# Patient Record
Sex: FEMALE | Race: WHITE | Employment: UNEMPLOYED | URBAN - METROPOLITAN AREA
[De-identification: names, ages, dates, MRNs, and addresses within clinical notes are randomized per-mention and may not be internally consistent; named-entity substitution may affect disease eponyms.]

---

## 2017-10-02 PROBLEM — N63.20 LEFT BREAST MASS: Status: ACTIVE | Noted: 2017-10-02

## 2018-03-19 ENCOUNTER — HOSPITAL ENCOUNTER (OUTPATIENT)
Dept: CT IMAGING | Age: 43
Discharge: HOME | End: 2018-03-21
Payer: COMMERCIAL

## 2018-03-19 DIAGNOSIS — K92.1 BLOOD IN STOOL: ICD-10-CM

## 2018-03-19 DIAGNOSIS — R10.84 GENERALIZED ABDOMINAL PAIN: ICD-10-CM

## 2018-03-19 PROCEDURE — 6360000004 HC RX CONTRAST MEDICATION: Performed by: RADIOLOGY

## 2018-03-19 PROCEDURE — 74177 CT ABD & PELVIS W/CONTRAST: CPT

## 2018-03-19 RX ADMIN — IOPAMIDOL 110 ML: 755 INJECTION, SOLUTION INTRAVENOUS at 16:54

## 2018-03-19 RX ADMIN — IOHEXOL 50 ML: 240 INJECTION, SOLUTION INTRATHECAL; INTRAVASCULAR; INTRAVENOUS; ORAL at 16:54

## 2018-03-21 ENCOUNTER — HOSPITAL ENCOUNTER (OUTPATIENT)
Age: 43
Discharge: HOME | End: 2018-03-21
Payer: COMMERCIAL

## 2018-03-21 ENCOUNTER — OFFICE VISIT (OUTPATIENT)
Dept: FAMILY MEDICINE CLINIC | Age: 43
End: 2018-03-21
Payer: COMMERCIAL

## 2018-03-21 VITALS
BODY MASS INDEX: 43.4 KG/M2 | SYSTOLIC BLOOD PRESSURE: 127 MMHG | OXYGEN SATURATION: 98 % | RESPIRATION RATE: 18 BRPM | HEIGHT: 69 IN | WEIGHT: 293 LBS | DIASTOLIC BLOOD PRESSURE: 77 MMHG | TEMPERATURE: 100 F | HEART RATE: 91 BPM

## 2018-03-21 DIAGNOSIS — K52.9 COLITIS: ICD-10-CM

## 2018-03-21 DIAGNOSIS — R50.9 FEVER, UNSPECIFIED FEVER CAUSE: Primary | ICD-10-CM

## 2018-03-21 DIAGNOSIS — K92.1 BLOOD IN STOOL: ICD-10-CM

## 2018-03-21 DIAGNOSIS — R05.9 COUGH: ICD-10-CM

## 2018-03-21 LAB
ALBUMIN SERPL-MCNC: 3.9 G/DL (ref 3.5–5.2)
ALP BLD-CCNC: 75 U/L (ref 35–104)
ALT SERPL-CCNC: 15 U/L (ref 0–32)
ANION GAP SERPL CALCULATED.3IONS-SCNC: 11 MMOL/L (ref 7–16)
AST SERPL-CCNC: 17 U/L (ref 0–31)
BASOPHILS ABSOLUTE: 0.04 E9/L (ref 0–0.2)
BASOPHILS RELATIVE PERCENT: 0.5 % (ref 0–2)
BILIRUB SERPL-MCNC: 0.3 MG/DL (ref 0–1.2)
BUN BLDV-MCNC: 8 MG/DL (ref 6–20)
C-REACTIVE PROTEIN: 5.1 MG/DL (ref 0–0.4)
CALCIUM SERPL-MCNC: 9.1 MG/DL (ref 8.6–10.2)
CHLORIDE BLD-SCNC: 93 MMOL/L (ref 98–107)
CO2: 28 MMOL/L (ref 22–29)
CREAT SERPL-MCNC: 0.7 MG/DL (ref 0.5–1)
EOSINOPHILS ABSOLUTE: 0.25 E9/L (ref 0.05–0.5)
EOSINOPHILS RELATIVE PERCENT: 3.3 % (ref 0–6)
GFR AFRICAN AMERICAN: >60
GFR NON-AFRICAN AMERICAN: >60 ML/MIN/1.73
GLUCOSE BLD-MCNC: 84 MG/DL (ref 74–109)
HCT VFR BLD CALC: 39.1 % (ref 34–48)
HEMOGLOBIN: 12.9 G/DL (ref 11.5–15.5)
IMMATURE GRANULOCYTES #: 0.02 E9/L
IMMATURE GRANULOCYTES %: 0.3 % (ref 0–5)
INFLUENZA A ANTIBODY: NORMAL
INFLUENZA B ANTIBODY: NORMAL
LACTIC ACID: 2.1 MMOL/L (ref 0.5–2.2)
LYMPHOCYTES ABSOLUTE: 1.46 E9/L (ref 1.5–4)
LYMPHOCYTES RELATIVE PERCENT: 19.5 % (ref 20–42)
MCH RBC QN AUTO: 29.5 PG (ref 26–35)
MCHC RBC AUTO-ENTMCNC: 33 % (ref 32–34.5)
MCV RBC AUTO: 89.3 FL (ref 80–99.9)
MONOCYTES ABSOLUTE: 0.8 E9/L (ref 0.1–0.95)
MONOCYTES RELATIVE PERCENT: 10.7 % (ref 2–12)
NEUTROPHILS ABSOLUTE: 4.92 E9/L (ref 1.8–7.3)
NEUTROPHILS RELATIVE PERCENT: 65.7 % (ref 43–80)
PDW BLD-RTO: 12.6 FL (ref 11.5–15)
PLATELET # BLD: 227 E9/L (ref 130–450)
PMV BLD AUTO: 10.1 FL (ref 7–12)
POTASSIUM SERPL-SCNC: 3.7 MMOL/L (ref 3.5–5)
RBC # BLD: 4.38 E12/L (ref 3.5–5.5)
SEDIMENTATION RATE, ERYTHROCYTE: 50 MM/HR (ref 0–20)
SODIUM BLD-SCNC: 132 MMOL/L (ref 132–146)
TOTAL PROTEIN: 7.6 G/DL (ref 6.4–8.3)
WBC # BLD: 7.5 E9/L (ref 4.5–11.5)

## 2018-03-21 PROCEDURE — 86140 C-REACTIVE PROTEIN: CPT

## 2018-03-21 PROCEDURE — 83605 ASSAY OF LACTIC ACID: CPT

## 2018-03-21 PROCEDURE — 80053 COMPREHEN METABOLIC PANEL: CPT

## 2018-03-21 PROCEDURE — 85025 COMPLETE CBC W/AUTO DIFF WBC: CPT

## 2018-03-21 PROCEDURE — 99214 OFFICE O/P EST MOD 30 MIN: CPT | Performed by: FAMILY MEDICINE

## 2018-03-21 PROCEDURE — 36415 COLL VENOUS BLD VENIPUNCTURE: CPT

## 2018-03-21 PROCEDURE — 87804 INFLUENZA ASSAY W/OPTIC: CPT | Performed by: FAMILY MEDICINE

## 2018-03-21 PROCEDURE — 85651 RBC SED RATE NONAUTOMATED: CPT

## 2018-04-05 ENCOUNTER — HOSPITAL ENCOUNTER (OUTPATIENT)
Age: 43
Discharge: HOME | End: 2018-04-05
Payer: COMMERCIAL

## 2018-04-06 ENCOUNTER — HOSPITAL ENCOUNTER (OUTPATIENT)
Age: 43
Setting detail: SPECIMEN
Discharge: HOME | End: 2018-04-06
Payer: COMMERCIAL

## 2018-04-06 PROCEDURE — 87329 GIARDIA AG IA: CPT

## 2018-04-06 PROCEDURE — 89055 LEUKOCYTE ASSESSMENT FECAL: CPT

## 2018-04-06 PROCEDURE — 87045 FECES CULTURE AEROBIC BACT: CPT

## 2018-04-06 PROCEDURE — 83993 ASSAY FOR CALPROTECTIN FECAL: CPT

## 2018-04-07 LAB — WHITE BLOOD CELLS (WBC), STOOL: NORMAL

## 2018-04-09 ENCOUNTER — HOSPITAL ENCOUNTER (OUTPATIENT)
Age: 43
Discharge: HOME | End: 2018-04-11

## 2018-04-09 LAB
CULTURE, STOOL: NORMAL
GIARDIA ANTIGEN STOOL: NORMAL

## 2018-04-09 PROCEDURE — 88305 TISSUE EXAM BY PATHOLOGIST: CPT

## 2018-04-12 LAB
Lab: NORMAL
REPORT: NORMAL
THIS TEST SENT TO: NORMAL

## 2018-04-16 LAB
Lab: NORMAL
REPORT: NORMAL
THIS TEST SENT TO: NORMAL

## 2018-05-02 ENCOUNTER — OFFICE VISIT (OUTPATIENT)
Dept: FAMILY MEDICINE CLINIC | Age: 43
End: 2018-05-02
Payer: COMMERCIAL

## 2018-05-02 VITALS
WEIGHT: 293 LBS | RESPIRATION RATE: 16 BRPM | OXYGEN SATURATION: 97 % | BODY MASS INDEX: 43.4 KG/M2 | SYSTOLIC BLOOD PRESSURE: 144 MMHG | HEART RATE: 92 BPM | DIASTOLIC BLOOD PRESSURE: 76 MMHG | TEMPERATURE: 98.8 F | HEIGHT: 69 IN

## 2018-05-02 DIAGNOSIS — J02.0 PHARYNGITIS, STREPTOCOCCAL: ICD-10-CM

## 2018-05-02 DIAGNOSIS — J02.9 SORE THROAT: Primary | ICD-10-CM

## 2018-05-02 LAB — S PYO AG THROAT QL: POSITIVE

## 2018-05-02 PROCEDURE — 99213 OFFICE O/P EST LOW 20 MIN: CPT | Performed by: FAMILY MEDICINE

## 2018-05-02 PROCEDURE — 87880 STREP A ASSAY W/OPTIC: CPT | Performed by: FAMILY MEDICINE

## 2018-05-02 RX ORDER — PREDNISONE 10 MG/1
30 TABLET ORAL DAILY
Refills: 0 | Status: ON HOLD | COMMUNITY
Start: 2018-04-09 | End: 2018-12-15 | Stop reason: HOSPADM

## 2018-05-02 RX ORDER — AZITHROMYCIN 250 MG/1
TABLET, FILM COATED ORAL
Qty: 1 PACKET | Refills: 0 | Status: SHIPPED | OUTPATIENT
Start: 2018-05-02 | End: 2018-05-12

## 2018-05-02 ASSESSMENT — PATIENT HEALTH QUESTIONNAIRE - PHQ9
1. LITTLE INTEREST OR PLEASURE IN DOING THINGS: 0
2. FEELING DOWN, DEPRESSED OR HOPELESS: 0
SUM OF ALL RESPONSES TO PHQ9 QUESTIONS 1 & 2: 0
SUM OF ALL RESPONSES TO PHQ QUESTIONS 1-9: 0

## 2018-05-31 ENCOUNTER — TELEPHONE (OUTPATIENT)
Dept: FAMILY MEDICINE CLINIC | Age: 43
End: 2018-05-31

## 2018-06-01 ENCOUNTER — OFFICE VISIT (OUTPATIENT)
Dept: FAMILY MEDICINE CLINIC | Age: 43
End: 2018-06-01
Payer: COMMERCIAL

## 2018-06-01 ENCOUNTER — HOSPITAL ENCOUNTER (OUTPATIENT)
Age: 43
Discharge: HOME | End: 2018-06-03
Payer: COMMERCIAL

## 2018-06-01 VITALS
HEART RATE: 83 BPM | WEIGHT: 291.2 LBS | OXYGEN SATURATION: 97 % | TEMPERATURE: 98.8 F | SYSTOLIC BLOOD PRESSURE: 126 MMHG | RESPIRATION RATE: 16 BRPM | DIASTOLIC BLOOD PRESSURE: 84 MMHG | BODY MASS INDEX: 43.13 KG/M2 | HEIGHT: 69 IN

## 2018-06-01 DIAGNOSIS — J02.9 SORE THROAT: Primary | ICD-10-CM

## 2018-06-01 DIAGNOSIS — J02.9 SORE THROAT: ICD-10-CM

## 2018-06-01 PROCEDURE — 87880 STREP A ASSAY W/OPTIC: CPT | Performed by: FAMILY MEDICINE

## 2018-06-01 PROCEDURE — 86308 HETEROPHILE ANTIBODY SCREEN: CPT

## 2018-06-01 PROCEDURE — 99213 OFFICE O/P EST LOW 20 MIN: CPT | Performed by: FAMILY MEDICINE

## 2018-06-01 PROCEDURE — 85025 COMPLETE CBC W/AUTO DIFF WBC: CPT

## 2018-06-01 PROCEDURE — 86665 EPSTEIN-BARR CAPSID VCA: CPT

## 2018-06-01 RX ORDER — LEVOFLOXACIN 500 MG/1
500 TABLET, FILM COATED ORAL DAILY
Qty: 5 TABLET | Refills: 0 | Status: SHIPPED | OUTPATIENT
Start: 2018-06-01 | End: 2018-06-06

## 2018-06-01 RX ORDER — BUDESONIDE 3 MG/1
CAPSULE, COATED PELLETS ORAL
Refills: 0 | Status: ON HOLD | COMMUNITY
Start: 2018-05-16 | End: 2018-12-13

## 2018-06-02 LAB
BASOPHILS ABSOLUTE: 0.05 E9/L (ref 0–0.2)
BASOPHILS RELATIVE PERCENT: 0.5 % (ref 0–2)
EOSINOPHILS ABSOLUTE: 0.28 E9/L (ref 0.05–0.5)
EOSINOPHILS RELATIVE PERCENT: 2.7 % (ref 0–6)
HCT VFR BLD CALC: 41.4 % (ref 34–48)
HEMOGLOBIN: 12.6 G/DL (ref 11.5–15.5)
IMMATURE GRANULOCYTES #: 0.03 E9/L
IMMATURE GRANULOCYTES %: 0.3 % (ref 0–5)
LYMPHOCYTES ABSOLUTE: 1.5 E9/L (ref 1.5–4)
LYMPHOCYTES RELATIVE PERCENT: 14.5 % (ref 20–42)
MCH RBC QN AUTO: 28.6 PG (ref 26–35)
MCHC RBC AUTO-ENTMCNC: 30.4 % (ref 32–34.5)
MCV RBC AUTO: 93.9 FL (ref 80–99.9)
MONOCYTES ABSOLUTE: 0.88 E9/L (ref 0.1–0.95)
MONOCYTES RELATIVE PERCENT: 8.5 % (ref 2–12)
NEUTROPHILS ABSOLUTE: 7.6 E9/L (ref 1.8–7.3)
NEUTROPHILS RELATIVE PERCENT: 73.5 % (ref 43–80)
PDW BLD-RTO: 13.4 FL (ref 11.5–15)
PLATELET # BLD: 291 E9/L (ref 130–450)
PMV BLD AUTO: 10.3 FL (ref 7–12)
RBC # BLD: 4.41 E12/L (ref 3.5–5.5)
WBC # BLD: 10.3 E9/L (ref 4.5–11.5)

## 2018-06-03 LAB — EPSTEIN-BARR VCA IGM: <10 U/ML (ref 0–43.9)

## 2018-06-04 LAB — MONO TEST: NEGATIVE

## 2018-06-11 ENCOUNTER — HOSPITAL ENCOUNTER (OUTPATIENT)
Age: 43
Discharge: HOME | End: 2018-06-13

## 2018-06-11 PROCEDURE — 88304 TISSUE EXAM BY PATHOLOGIST: CPT

## 2018-06-11 PROCEDURE — 88312 SPECIAL STAINS GROUP 1: CPT

## 2018-06-15 ENCOUNTER — TELEPHONE (OUTPATIENT)
Dept: FAMILY MEDICINE CLINIC | Age: 43
End: 2018-06-15

## 2018-06-15 RX ORDER — CIPROFLOXACIN 250 MG/1
250 TABLET, FILM COATED ORAL 2 TIMES DAILY
Qty: 10 TABLET | Refills: 0 | Status: SHIPPED | OUTPATIENT
Start: 2018-06-15 | End: 2018-06-20

## 2018-06-20 ENCOUNTER — PATIENT MESSAGE (OUTPATIENT)
Dept: FAMILY MEDICINE CLINIC | Age: 43
End: 2018-06-20

## 2018-07-24 ENCOUNTER — HOSPITAL ENCOUNTER (OUTPATIENT)
Age: 43
Discharge: HOME | End: 2018-07-24
Payer: COMMERCIAL

## 2018-07-24 ENCOUNTER — HOSPITAL ENCOUNTER (OUTPATIENT)
Dept: GENERAL RADIOLOGY | Age: 43
Discharge: HOME | End: 2018-07-26
Payer: COMMERCIAL

## 2018-07-24 ENCOUNTER — HOSPITAL ENCOUNTER (OUTPATIENT)
Age: 43
Discharge: HOME | End: 2018-07-26
Payer: COMMERCIAL

## 2018-07-24 DIAGNOSIS — R52 PAIN: ICD-10-CM

## 2018-07-24 LAB
ALBUMIN SERPL-MCNC: 3.5 G/DL (ref 3.5–5.2)
ALP BLD-CCNC: 84 U/L (ref 35–104)
ALT SERPL-CCNC: 43 U/L (ref 0–32)
ANION GAP SERPL CALCULATED.3IONS-SCNC: 15 MMOL/L (ref 7–16)
AST SERPL-CCNC: 32 U/L (ref 0–31)
BASOPHILS ABSOLUTE: 0 E9/L (ref 0–0.2)
BASOPHILS RELATIVE PERCENT: 0 % (ref 0–2)
BILIRUB SERPL-MCNC: 0.3 MG/DL (ref 0–1.2)
BUN BLDV-MCNC: 10 MG/DL (ref 6–20)
C-REACTIVE PROTEIN: 7.9 MG/DL (ref 0–0.4)
CALCIUM SERPL-MCNC: 9.6 MG/DL (ref 8.6–10.2)
CHLORIDE BLD-SCNC: 96 MMOL/L (ref 98–107)
CO2: 25 MMOL/L (ref 22–29)
CREAT SERPL-MCNC: 0.8 MG/DL (ref 0.5–1)
EOSINOPHILS ABSOLUTE: 0.18 E9/L (ref 0.05–0.5)
EOSINOPHILS RELATIVE PERCENT: 1.8 % (ref 0–6)
GFR AFRICAN AMERICAN: >60
GFR NON-AFRICAN AMERICAN: >60 ML/MIN/1.73
GLUCOSE BLD-MCNC: 151 MG/DL (ref 74–109)
HCT VFR BLD CALC: 36.6 % (ref 34–48)
HEMOGLOBIN: 11.6 G/DL (ref 11.5–15.5)
LYMPHOCYTES ABSOLUTE: 0.3 E9/L (ref 1.5–4)
LYMPHOCYTES RELATIVE PERCENT: 2.7 % (ref 20–42)
MCH RBC QN AUTO: 27.8 PG (ref 26–35)
MCHC RBC AUTO-ENTMCNC: 31.7 % (ref 32–34.5)
MCV RBC AUTO: 87.6 FL (ref 80–99.9)
MONOCYTES ABSOLUTE: 0.1 E9/L (ref 0.1–0.95)
MONOCYTES RELATIVE PERCENT: 0.9 % (ref 2–12)
NEUTROPHILS ABSOLUTE: 9.5 E9/L (ref 1.8–7.3)
NEUTROPHILS RELATIVE PERCENT: 94.6 % (ref 43–80)
NUCLEATED RED BLOOD CELLS: 0 /100 WBC
PDW BLD-RTO: 13.8 FL (ref 11.5–15)
PLATELET # BLD: 292 E9/L (ref 130–450)
PMV BLD AUTO: 9.7 FL (ref 7–12)
POTASSIUM SERPL-SCNC: 4.2 MMOL/L (ref 3.5–5)
RBC # BLD: 4.18 E12/L (ref 3.5–5.5)
RBC # BLD: NORMAL 10*6/UL
SODIUM BLD-SCNC: 136 MMOL/L (ref 132–146)
TOTAL PROTEIN: 7.7 G/DL (ref 6.4–8.3)
WBC # BLD: 10 E9/L (ref 4.5–11.5)

## 2018-07-24 PROCEDURE — 36415 COLL VENOUS BLD VENIPUNCTURE: CPT

## 2018-07-24 PROCEDURE — 87340 HEPATITIS B SURFACE AG IA: CPT

## 2018-07-24 PROCEDURE — 86481 TB AG RESPONSE T-CELL SUSP: CPT

## 2018-07-24 PROCEDURE — 80053 COMPREHEN METABOLIC PANEL: CPT

## 2018-07-24 PROCEDURE — 86706 HEP B SURFACE ANTIBODY: CPT

## 2018-07-24 PROCEDURE — 86140 C-REACTIVE PROTEIN: CPT

## 2018-07-24 PROCEDURE — 85025 COMPLETE CBC W/AUTO DIFF WBC: CPT

## 2018-07-24 PROCEDURE — 71046 X-RAY EXAM CHEST 2 VIEWS: CPT

## 2018-07-25 LAB
HBV SURFACE AB TITR SER: NORMAL {TITER}
HEPATITIS B SURFACE ANTIGEN INTERPRETATION: NORMAL

## 2018-08-09 ENCOUNTER — HOSPITAL ENCOUNTER (OUTPATIENT)
Age: 43
Discharge: HOME | End: 2018-08-09
Payer: COMMERCIAL

## 2018-08-09 PROCEDURE — 36415 COLL VENOUS BLD VENIPUNCTURE: CPT

## 2018-08-09 PROCEDURE — 86481 TB AG RESPONSE T-CELL SUSP: CPT

## 2018-08-13 LAB
COMMENT: NORMAL
REPORT: NORMAL

## 2018-08-24 ENCOUNTER — HOSPITAL ENCOUNTER (OUTPATIENT)
Age: 43
Discharge: HOME | End: 2018-08-24
Payer: COMMERCIAL

## 2018-08-24 LAB
BASOPHILS ABSOLUTE: 0.01 E9/L (ref 0–0.2)
BASOPHILS RELATIVE PERCENT: 0.1 % (ref 0–2)
C-REACTIVE PROTEIN: 8.2 MG/DL (ref 0–0.4)
EOSINOPHILS ABSOLUTE: 0 E9/L (ref 0.05–0.5)
EOSINOPHILS RELATIVE PERCENT: 0 % (ref 0–6)
HCT VFR BLD CALC: 35 % (ref 34–48)
HEMOGLOBIN: 10.9 G/DL (ref 11.5–15.5)
IMMATURE GRANULOCYTES #: 0.14 E9/L
IMMATURE GRANULOCYTES %: 1.7 % (ref 0–5)
LYMPHOCYTES ABSOLUTE: 0.61 E9/L (ref 1.5–4)
LYMPHOCYTES RELATIVE PERCENT: 7.6 % (ref 20–42)
MCH RBC QN AUTO: 27.4 PG (ref 26–35)
MCHC RBC AUTO-ENTMCNC: 31.1 % (ref 32–34.5)
MCV RBC AUTO: 87.9 FL (ref 80–99.9)
MONOCYTES ABSOLUTE: 0.25 E9/L (ref 0.1–0.95)
MONOCYTES RELATIVE PERCENT: 3.1 % (ref 2–12)
NEUTROPHILS ABSOLUTE: 7.05 E9/L (ref 1.8–7.3)
NEUTROPHILS RELATIVE PERCENT: 87.5 % (ref 43–80)
PDW BLD-RTO: 13.8 FL (ref 11.5–15)
PLATELET # BLD: 348 E9/L (ref 130–450)
PMV BLD AUTO: 9.2 FL (ref 7–12)
RBC # BLD: 3.98 E12/L (ref 3.5–5.5)
WBC # BLD: 8.1 E9/L (ref 4.5–11.5)

## 2018-08-24 PROCEDURE — 83993 ASSAY FOR CALPROTECTIN FECAL: CPT

## 2018-08-24 PROCEDURE — 86140 C-REACTIVE PROTEIN: CPT

## 2018-08-24 PROCEDURE — 36415 COLL VENOUS BLD VENIPUNCTURE: CPT

## 2018-08-24 PROCEDURE — 85025 COMPLETE CBC W/AUTO DIFF WBC: CPT

## 2018-08-28 ENCOUNTER — TELEPHONE (OUTPATIENT)
Dept: FAMILY MEDICINE CLINIC | Age: 43
End: 2018-08-28

## 2018-09-06 LAB
Lab: NORMAL
REPORT: NORMAL
THIS TEST SENT TO: NORMAL

## 2018-09-28 ENCOUNTER — HOSPITAL ENCOUNTER (OUTPATIENT)
Age: 43
Discharge: HOME | End: 2018-09-28
Payer: COMMERCIAL

## 2018-09-28 LAB
ALBUMIN SERPL-MCNC: 3.5 G/DL (ref 3.5–5.2)
ALP BLD-CCNC: 66 U/L (ref 35–104)
ALT SERPL-CCNC: 13 U/L (ref 0–32)
ANION GAP SERPL CALCULATED.3IONS-SCNC: 13 MMOL/L (ref 7–16)
AST SERPL-CCNC: 13 U/L (ref 0–31)
BASOPHILS ABSOLUTE: 0.03 E9/L (ref 0–0.2)
BASOPHILS RELATIVE PERCENT: 0.3 % (ref 0–2)
BILIRUB SERPL-MCNC: 0.4 MG/DL (ref 0–1.2)
BUN BLDV-MCNC: 8 MG/DL (ref 6–20)
C-REACTIVE PROTEIN: 4.3 MG/DL (ref 0–0.4)
CALCIUM SERPL-MCNC: 9.7 MG/DL (ref 8.6–10.2)
CHLORIDE BLD-SCNC: 94 MMOL/L (ref 98–107)
CO2: 30 MMOL/L (ref 22–29)
CREAT SERPL-MCNC: 0.8 MG/DL (ref 0.5–1)
EOSINOPHILS ABSOLUTE: 0.16 E9/L (ref 0.05–0.5)
EOSINOPHILS RELATIVE PERCENT: 1.7 % (ref 0–6)
GFR AFRICAN AMERICAN: >60
GFR NON-AFRICAN AMERICAN: >60 ML/MIN/1.73
GLUCOSE BLD-MCNC: 131 MG/DL (ref 74–109)
HCT VFR BLD CALC: 38.4 % (ref 34–48)
HEMOGLOBIN: 11.7 G/DL (ref 11.5–15.5)
IMMATURE GRANULOCYTES #: 0.05 E9/L
IMMATURE GRANULOCYTES %: 0.5 % (ref 0–5)
LYMPHOCYTES ABSOLUTE: 1.93 E9/L (ref 1.5–4)
LYMPHOCYTES RELATIVE PERCENT: 20.3 % (ref 20–42)
MCH RBC QN AUTO: 27.2 PG (ref 26–35)
MCHC RBC AUTO-ENTMCNC: 30.5 % (ref 32–34.5)
MCV RBC AUTO: 89.3 FL (ref 80–99.9)
MONOCYTES ABSOLUTE: 0.71 E9/L (ref 0.1–0.95)
MONOCYTES RELATIVE PERCENT: 7.5 % (ref 2–12)
NEUTROPHILS ABSOLUTE: 6.61 E9/L (ref 1.8–7.3)
NEUTROPHILS RELATIVE PERCENT: 69.7 % (ref 43–80)
PDW BLD-RTO: 14.6 FL (ref 11.5–15)
PLATELET # BLD: 297 E9/L (ref 130–450)
PMV BLD AUTO: 8.9 FL (ref 7–12)
POTASSIUM SERPL-SCNC: 4 MMOL/L (ref 3.5–5)
RBC # BLD: 4.3 E12/L (ref 3.5–5.5)
SODIUM BLD-SCNC: 137 MMOL/L (ref 132–146)
TOTAL PROTEIN: 7.4 G/DL (ref 6.4–8.3)
WBC # BLD: 9.5 E9/L (ref 4.5–11.5)

## 2018-09-28 PROCEDURE — 80053 COMPREHEN METABOLIC PANEL: CPT

## 2018-09-28 PROCEDURE — 86140 C-REACTIVE PROTEIN: CPT

## 2018-09-28 PROCEDURE — 36415 COLL VENOUS BLD VENIPUNCTURE: CPT

## 2018-09-28 PROCEDURE — 85025 COMPLETE CBC W/AUTO DIFF WBC: CPT

## 2018-11-07 ENCOUNTER — HOSPITAL ENCOUNTER (EMERGENCY)
Age: 43
Discharge: HOME | End: 2018-11-07
Attending: EMERGENCY MEDICINE
Payer: COMMERCIAL

## 2018-11-07 VITALS
TEMPERATURE: 99.1 F | OXYGEN SATURATION: 94 % | SYSTOLIC BLOOD PRESSURE: 144 MMHG | WEIGHT: 247 LBS | DIASTOLIC BLOOD PRESSURE: 65 MMHG | HEART RATE: 107 BPM | HEIGHT: 69 IN | RESPIRATION RATE: 15 BRPM | BODY MASS INDEX: 36.58 KG/M2

## 2018-11-07 DIAGNOSIS — R10.84 GENERALIZED ABDOMINAL PAIN: Primary | ICD-10-CM

## 2018-11-07 LAB
ALBUMIN SERPL-MCNC: 3.2 G/DL (ref 3.5–5.2)
ALP BLD-CCNC: 70 U/L (ref 35–104)
ALT SERPL-CCNC: 7 U/L (ref 0–32)
ANION GAP SERPL CALCULATED.3IONS-SCNC: 14 MMOL/L (ref 7–16)
AST SERPL-CCNC: 7 U/L (ref 0–31)
ATYPICAL LYMPHOCYTE RELATIVE PERCENT: 1 % (ref 0–4)
BACTERIA: ABNORMAL /HPF
BASOPHILS ABSOLUTE: 0 E9/L (ref 0–0.2)
BASOPHILS RELATIVE PERCENT: 0 % (ref 0–2)
BILIRUB SERPL-MCNC: 0.6 MG/DL (ref 0–1.2)
BILIRUBIN URINE: ABNORMAL
BLOOD, URINE: NEGATIVE
BUN BLDV-MCNC: 13 MG/DL (ref 6–20)
CALCIUM SERPL-MCNC: 9.1 MG/DL (ref 8.6–10.2)
CHLORIDE BLD-SCNC: 96 MMOL/L (ref 98–107)
CLARITY: ABNORMAL
CO2: 24 MMOL/L (ref 22–29)
COLOR: ABNORMAL
CREAT SERPL-MCNC: 0.9 MG/DL (ref 0.5–1)
EOSINOPHILS ABSOLUTE: 0 E9/L (ref 0.05–0.5)
EOSINOPHILS RELATIVE PERCENT: 0 % (ref 0–6)
EPITHELIAL CELLS, UA: ABNORMAL /HPF
GFR AFRICAN AMERICAN: >60
GFR NON-AFRICAN AMERICAN: >60 ML/MIN/1.73
GLUCOSE BLD-MCNC: 118 MG/DL (ref 74–99)
GLUCOSE URINE: NEGATIVE MG/DL
HCT VFR BLD CALC: 39.1 % (ref 34–48)
HEMOGLOBIN: 12.5 G/DL (ref 11.5–15.5)
INFLUENZA A BY PCR: NOT DETECTED
INFLUENZA B BY PCR: NOT DETECTED
KETONES, URINE: 40 MG/DL
LEUKOCYTE ESTERASE, URINE: ABNORMAL
LIPASE: 13 U/L (ref 13–60)
LYMPHOCYTES ABSOLUTE: 1.24 E9/L (ref 1.5–4)
LYMPHOCYTES RELATIVE PERCENT: 18 % (ref 20–42)
MCH RBC QN AUTO: 26.7 PG (ref 26–35)
MCHC RBC AUTO-ENTMCNC: 32 % (ref 32–34.5)
MCV RBC AUTO: 83.5 FL (ref 80–99.9)
MONOCYTES ABSOLUTE: 1.24 E9/L (ref 0.1–0.95)
MONOCYTES RELATIVE PERCENT: 19 % (ref 2–12)
MUCUS: PRESENT
NEUTROPHILS ABSOLUTE: 4.03 E9/L (ref 1.8–7.3)
NEUTROPHILS RELATIVE PERCENT: 62 % (ref 43–80)
NITRITE, URINE: NEGATIVE
PDW BLD-RTO: 13.5 FL (ref 11.5–15)
PH UA: 5.5 (ref 5–9)
PLATELET # BLD: 344 E9/L (ref 130–450)
PMV BLD AUTO: 8.8 FL (ref 7–12)
POTASSIUM SERPL-SCNC: 3.5 MMOL/L (ref 3.5–5)
PROTEIN UA: 30 MG/DL
RBC # BLD: 4.68 E12/L (ref 3.5–5.5)
RBC # BLD: NORMAL 10*6/UL
RBC UA: ABNORMAL /HPF (ref 0–2)
SODIUM BLD-SCNC: 134 MMOL/L (ref 132–146)
SPECIFIC GRAVITY UA: 1.02 (ref 1–1.03)
TOTAL PROTEIN: 7.4 G/DL (ref 6.4–8.3)
UROBILINOGEN, URINE: 0.2 E.U./DL
WBC # BLD: 6.5 E9/L (ref 4.5–11.5)
WBC UA: ABNORMAL /HPF (ref 0–5)

## 2018-11-07 PROCEDURE — 87088 URINE BACTERIA CULTURE: CPT

## 2018-11-07 PROCEDURE — 96374 THER/PROPH/DIAG INJ IV PUSH: CPT

## 2018-11-07 PROCEDURE — 6360000002 HC RX W HCPCS: Performed by: EMERGENCY MEDICINE

## 2018-11-07 PROCEDURE — 85025 COMPLETE CBC W/AUTO DIFF WBC: CPT

## 2018-11-07 PROCEDURE — 99284 EMERGENCY DEPT VISIT MOD MDM: CPT

## 2018-11-07 PROCEDURE — 36415 COLL VENOUS BLD VENIPUNCTURE: CPT

## 2018-11-07 PROCEDURE — 6370000000 HC RX 637 (ALT 250 FOR IP): Performed by: EMERGENCY MEDICINE

## 2018-11-07 PROCEDURE — 83690 ASSAY OF LIPASE: CPT

## 2018-11-07 PROCEDURE — 81001 URINALYSIS AUTO W/SCOPE: CPT

## 2018-11-07 PROCEDURE — 2580000003 HC RX 258: Performed by: EMERGENCY MEDICINE

## 2018-11-07 PROCEDURE — 87502 INFLUENZA DNA AMP PROBE: CPT

## 2018-11-07 PROCEDURE — 80053 COMPREHEN METABOLIC PANEL: CPT

## 2018-11-07 RX ORDER — KETOROLAC TROMETHAMINE 30 MG/ML
15 INJECTION, SOLUTION INTRAMUSCULAR; INTRAVENOUS ONCE
Status: COMPLETED | OUTPATIENT
Start: 2018-11-07 | End: 2018-11-07

## 2018-11-07 RX ORDER — ACETAMINOPHEN 500 MG
1000 TABLET ORAL ONCE
Status: COMPLETED | OUTPATIENT
Start: 2018-11-07 | End: 2018-11-07

## 2018-11-07 RX ORDER — CEFDINIR 300 MG/1
300 CAPSULE ORAL 2 TIMES DAILY
Qty: 14 CAPSULE | Refills: 0 | Status: SHIPPED | OUTPATIENT
Start: 2018-11-07 | End: 2018-11-14

## 2018-11-07 RX ORDER — 0.9 % SODIUM CHLORIDE 0.9 %
1000 INTRAVENOUS SOLUTION INTRAVENOUS ONCE
Status: COMPLETED | OUTPATIENT
Start: 2018-11-07 | End: 2018-11-07

## 2018-11-07 RX ADMIN — KETOROLAC TROMETHAMINE 15 MG: 30 INJECTION, SOLUTION INTRAMUSCULAR at 13:17

## 2018-11-07 RX ADMIN — ACETAMINOPHEN 1000 MG: 500 TABLET ORAL at 13:17

## 2018-11-07 RX ADMIN — SODIUM CHLORIDE 1000 ML: 9 INJECTION, SOLUTION INTRAVENOUS at 13:18

## 2018-11-07 ASSESSMENT — PAIN SCALES - GENERAL
PAINLEVEL_OUTOF10: 4
PAINLEVEL_OUTOF10: 8

## 2018-11-07 ASSESSMENT — PAIN DESCRIPTION - LOCATION
LOCATION: ABDOMEN
LOCATION: ABDOMEN

## 2018-11-07 ASSESSMENT — PAIN DESCRIPTION - PAIN TYPE
TYPE: ACUTE PAIN
TYPE: ACUTE PAIN

## 2018-11-08 LAB — URINE CULTURE, ROUTINE: NORMAL

## 2018-12-12 ENCOUNTER — HOSPITAL ENCOUNTER (INPATIENT)
Age: 43
LOS: 3 days | Discharge: HOME | DRG: 386 | End: 2018-12-15
Attending: EMERGENCY MEDICINE | Admitting: INTERNAL MEDICINE
Payer: COMMERCIAL

## 2018-12-12 ENCOUNTER — APPOINTMENT (OUTPATIENT)
Dept: CT IMAGING | Age: 43
DRG: 386 | End: 2018-12-12
Payer: COMMERCIAL

## 2018-12-12 DIAGNOSIS — K50.911 EXACERBATION OF CROHN'S DISEASE WITH RECTAL BLEEDING (HCC): Primary | ICD-10-CM

## 2018-12-12 PROBLEM — K50.10 EXACERBATION OF CROHN'S DISEASE OF LARGE INTESTINE (HCC): Status: ACTIVE | Noted: 2018-12-12

## 2018-12-12 LAB
ALBUMIN SERPL-MCNC: 3.2 G/DL (ref 3.5–5.2)
ALP BLD-CCNC: 68 U/L (ref 35–104)
ALT SERPL-CCNC: 12 U/L (ref 0–32)
ANION GAP SERPL CALCULATED.3IONS-SCNC: 15 MMOL/L (ref 7–16)
AST SERPL-CCNC: 15 U/L (ref 0–31)
BACTERIA: ABNORMAL /HPF
BASOPHILS ABSOLUTE: 0.01 E9/L (ref 0–0.2)
BASOPHILS RELATIVE PERCENT: 0.2 % (ref 0–2)
BILIRUB SERPL-MCNC: 0.2 MG/DL (ref 0–1.2)
BILIRUBIN URINE: NEGATIVE
BLOOD, URINE: NEGATIVE
BUN BLDV-MCNC: 8 MG/DL (ref 6–20)
CALCIUM SERPL-MCNC: 8.8 MG/DL (ref 8.6–10.2)
CHLORIDE BLD-SCNC: 96 MMOL/L (ref 98–107)
CHP ED QC CHECK: YES
CLARITY: CLEAR
CO2: 26 MMOL/L (ref 22–29)
COLOR: YELLOW
CREAT SERPL-MCNC: 0.7 MG/DL (ref 0.5–1)
EOSINOPHILS ABSOLUTE: 0 E9/L (ref 0.05–0.5)
EOSINOPHILS RELATIVE PERCENT: 0 % (ref 0–6)
EPITHELIAL CELLS, UA: ABNORMAL /HPF
GFR AFRICAN AMERICAN: >60
GFR NON-AFRICAN AMERICAN: >60 ML/MIN/1.73
GLUCOSE BLD-MCNC: 146 MG/DL (ref 74–99)
GLUCOSE URINE: NEGATIVE MG/DL
HCT VFR BLD CALC: 34.5 % (ref 34–48)
HEMOGLOBIN: 10.5 G/DL (ref 11.5–15.5)
IMMATURE GRANULOCYTES #: 0.03 E9/L
IMMATURE GRANULOCYTES %: 0.5 % (ref 0–5)
KETONES, URINE: ABNORMAL MG/DL
LACTIC ACID: 2.9 MMOL/L (ref 0.5–2.2)
LEUKOCYTE ESTERASE, URINE: ABNORMAL
LIPASE: 13 U/L (ref 13–60)
LYMPHOCYTES ABSOLUTE: 0.68 E9/L (ref 1.5–4)
LYMPHOCYTES RELATIVE PERCENT: 10.6 % (ref 20–42)
MCH RBC QN AUTO: 26.1 PG (ref 26–35)
MCHC RBC AUTO-ENTMCNC: 30.4 % (ref 32–34.5)
MCV RBC AUTO: 85.6 FL (ref 80–99.9)
MONOCYTES ABSOLUTE: 0.4 E9/L (ref 0.1–0.95)
MONOCYTES RELATIVE PERCENT: 6.2 % (ref 2–12)
MUCUS: PRESENT
NEUTROPHILS ABSOLUTE: 5.3 E9/L (ref 1.8–7.3)
NEUTROPHILS RELATIVE PERCENT: 82.5 % (ref 43–80)
NITRITE, URINE: NEGATIVE
PDW BLD-RTO: 13.4 FL (ref 11.5–15)
PH UA: 5.5 (ref 5–9)
PLATELET # BLD: 353 E9/L (ref 130–450)
PMV BLD AUTO: 9 FL (ref 7–12)
POTASSIUM SERPL-SCNC: 3.8 MMOL/L (ref 3.5–5)
PREGNANCY TEST URINE, POC: NEGATIVE
PROTEIN UA: ABNORMAL MG/DL
RBC # BLD: 4.03 E12/L (ref 3.5–5.5)
RBC UA: ABNORMAL /HPF (ref 0–2)
SODIUM BLD-SCNC: 137 MMOL/L (ref 132–146)
SPECIFIC GRAVITY UA: 1.02 (ref 1–1.03)
TOTAL PROTEIN: 6.8 G/DL (ref 6.4–8.3)
UROBILINOGEN, URINE: 0.2 E.U./DL
WBC # BLD: 6.4 E9/L (ref 4.5–11.5)
WBC UA: ABNORMAL /HPF (ref 0–5)

## 2018-12-12 PROCEDURE — 81001 URINALYSIS AUTO W/SCOPE: CPT

## 2018-12-12 PROCEDURE — 1200000000 HC SEMI PRIVATE

## 2018-12-12 PROCEDURE — 99284 EMERGENCY DEPT VISIT MOD MDM: CPT

## 2018-12-12 PROCEDURE — 83605 ASSAY OF LACTIC ACID: CPT

## 2018-12-12 PROCEDURE — 6360000004 HC RX CONTRAST MEDICATION: Performed by: RADIOLOGY

## 2018-12-12 PROCEDURE — 85025 COMPLETE CBC W/AUTO DIFF WBC: CPT

## 2018-12-12 PROCEDURE — 2580000003 HC RX 258: Performed by: EMERGENCY MEDICINE

## 2018-12-12 PROCEDURE — 80053 COMPREHEN METABOLIC PANEL: CPT

## 2018-12-12 PROCEDURE — 74177 CT ABD & PELVIS W/CONTRAST: CPT

## 2018-12-12 PROCEDURE — 96374 THER/PROPH/DIAG INJ IV PUSH: CPT

## 2018-12-12 PROCEDURE — 6360000002 HC RX W HCPCS: Performed by: EMERGENCY MEDICINE

## 2018-12-12 PROCEDURE — 83690 ASSAY OF LIPASE: CPT

## 2018-12-12 RX ORDER — 0.9 % SODIUM CHLORIDE 0.9 %
1000 INTRAVENOUS SOLUTION INTRAVENOUS ONCE
Status: COMPLETED | OUTPATIENT
Start: 2018-12-12 | End: 2018-12-13

## 2018-12-12 RX ORDER — SODIUM CHLORIDE 9 MG/ML
INJECTION, SOLUTION INTRAVENOUS ONCE
Status: DISCONTINUED | OUTPATIENT
Start: 2018-12-12 | End: 2018-12-15 | Stop reason: HOSPADM

## 2018-12-12 RX ORDER — SODIUM CHLORIDE 0.9 % (FLUSH) 0.9 %
10 SYRINGE (ML) INJECTION EVERY 12 HOURS SCHEDULED
Status: DISCONTINUED | OUTPATIENT
Start: 2018-12-12 | End: 2018-12-15 | Stop reason: HOSPADM

## 2018-12-12 RX ORDER — METHYLPREDNISOLONE SODIUM SUCCINATE 125 MG/2ML
125 INJECTION, POWDER, LYOPHILIZED, FOR SOLUTION INTRAMUSCULAR; INTRAVENOUS ONCE
Status: COMPLETED | OUTPATIENT
Start: 2018-12-12 | End: 2018-12-12

## 2018-12-12 RX ORDER — SODIUM CHLORIDE 0.9 % (FLUSH) 0.9 %
10 SYRINGE (ML) INJECTION PRN
Status: DISCONTINUED | OUTPATIENT
Start: 2018-12-12 | End: 2018-12-15 | Stop reason: HOSPADM

## 2018-12-12 RX ORDER — ACETAMINOPHEN 325 MG/1
650 TABLET ORAL EVERY 4 HOURS PRN
Status: DISCONTINUED | OUTPATIENT
Start: 2018-12-12 | End: 2018-12-15 | Stop reason: HOSPADM

## 2018-12-12 RX ADMIN — METHYLPREDNISOLONE SODIUM SUCCINATE 125 MG: 125 INJECTION, POWDER, FOR SOLUTION INTRAMUSCULAR; INTRAVENOUS at 21:27

## 2018-12-12 RX ADMIN — SODIUM CHLORIDE 1000 ML: 9 INJECTION, SOLUTION INTRAVENOUS at 23:02

## 2018-12-12 RX ADMIN — IOPAMIDOL 110 ML: 755 INJECTION, SOLUTION INTRAVENOUS at 22:25

## 2018-12-12 ASSESSMENT — PAIN SCALES - GENERAL: PAINLEVEL_OUTOF10: 4

## 2018-12-13 PROBLEM — R79.89 ELEVATED LACTIC ACID LEVEL: Status: ACTIVE | Noted: 2018-12-12

## 2018-12-13 LAB
ALBUMIN SERPL-MCNC: 3 G/DL (ref 3.5–5.2)
ALP BLD-CCNC: 60 U/L (ref 35–104)
ALT SERPL-CCNC: 13 U/L (ref 0–32)
ANION GAP SERPL CALCULATED.3IONS-SCNC: 11 MMOL/L (ref 7–16)
AST SERPL-CCNC: 15 U/L (ref 0–31)
BASOPHILS ABSOLUTE: 0 E9/L (ref 0–0.2)
BASOPHILS RELATIVE PERCENT: 0 % (ref 0–2)
BILIRUB SERPL-MCNC: 0.2 MG/DL (ref 0–1.2)
BILIRUBIN DIRECT: <0.2 MG/DL (ref 0–0.3)
BILIRUBIN, INDIRECT: ABNORMAL MG/DL (ref 0–1)
BUN BLDV-MCNC: 7 MG/DL (ref 6–20)
C-REACTIVE PROTEIN: 8.1 MG/DL (ref 0–0.4)
CALCIUM SERPL-MCNC: 8.6 MG/DL (ref 8.6–10.2)
CHLORIDE BLD-SCNC: 99 MMOL/L (ref 98–107)
CO2: 25 MMOL/L (ref 22–29)
CREAT SERPL-MCNC: 0.6 MG/DL (ref 0.5–1)
EOSINOPHILS ABSOLUTE: 0 E9/L (ref 0.05–0.5)
EOSINOPHILS RELATIVE PERCENT: 0 % (ref 0–6)
GFR AFRICAN AMERICAN: >60
GFR NON-AFRICAN AMERICAN: >60 ML/MIN/1.73
GLUCOSE BLD-MCNC: 148 MG/DL (ref 74–99)
HCT VFR BLD CALC: 31.3 % (ref 34–48)
HEMOGLOBIN: 9.8 G/DL (ref 11.5–15.5)
IMMATURE GRANULOCYTES #: 0.05 E9/L
IMMATURE GRANULOCYTES %: 0.7 % (ref 0–5)
LACTIC ACID: 2.9 MMOL/L (ref 0.5–2.2)
LYMPHOCYTES ABSOLUTE: 0.67 E9/L (ref 1.5–4)
LYMPHOCYTES RELATIVE PERCENT: 9.6 % (ref 20–42)
MCH RBC QN AUTO: 26.5 PG (ref 26–35)
MCHC RBC AUTO-ENTMCNC: 31.3 % (ref 32–34.5)
MCV RBC AUTO: 84.6 FL (ref 80–99.9)
MONOCYTES ABSOLUTE: 0.19 E9/L (ref 0.1–0.95)
MONOCYTES RELATIVE PERCENT: 2.7 % (ref 2–12)
NEUTROPHILS ABSOLUTE: 6.05 E9/L (ref 1.8–7.3)
NEUTROPHILS RELATIVE PERCENT: 87 % (ref 43–80)
PDW BLD-RTO: 13.5 FL (ref 11.5–15)
PLATELET # BLD: 312 E9/L (ref 130–450)
PMV BLD AUTO: 8.8 FL (ref 7–12)
POTASSIUM REFLEX MAGNESIUM: 4 MMOL/L (ref 3.5–5)
RBC # BLD: 3.7 E12/L (ref 3.5–5.5)
SEDIMENTATION RATE, ERYTHROCYTE: 77 MM/HR (ref 0–20)
SODIUM BLD-SCNC: 135 MMOL/L (ref 132–146)
TOTAL PROTEIN: 6.5 G/DL (ref 6.4–8.3)
WBC # BLD: 7 E9/L (ref 4.5–11.5)

## 2018-12-13 PROCEDURE — 6370000000 HC RX 637 (ALT 250 FOR IP): Performed by: CLINICAL NURSE SPECIALIST

## 2018-12-13 PROCEDURE — 86140 C-REACTIVE PROTEIN: CPT

## 2018-12-13 PROCEDURE — 82397 CHEMILUMINESCENT ASSAY: CPT

## 2018-12-13 PROCEDURE — 99222 1ST HOSP IP/OBS MODERATE 55: CPT | Performed by: INTERNAL MEDICINE

## 2018-12-13 PROCEDURE — 80076 HEPATIC FUNCTION PANEL: CPT

## 2018-12-13 PROCEDURE — 1200000000 HC SEMI PRIVATE

## 2018-12-13 PROCEDURE — 6360000002 HC RX W HCPCS: Performed by: INTERNAL MEDICINE

## 2018-12-13 PROCEDURE — 80299 QUANTITATIVE ASSAY DRUG: CPT

## 2018-12-13 PROCEDURE — 83993 ASSAY FOR CALPROTECTIN FECAL: CPT

## 2018-12-13 PROCEDURE — 6370000000 HC RX 637 (ALT 250 FOR IP): Performed by: INTERNAL MEDICINE

## 2018-12-13 PROCEDURE — 36415 COLL VENOUS BLD VENIPUNCTURE: CPT

## 2018-12-13 PROCEDURE — 80048 BASIC METABOLIC PNL TOTAL CA: CPT

## 2018-12-13 PROCEDURE — 83605 ASSAY OF LACTIC ACID: CPT

## 2018-12-13 PROCEDURE — 2580000003 HC RX 258: Performed by: INTERNAL MEDICINE

## 2018-12-13 PROCEDURE — 85025 COMPLETE CBC W/AUTO DIFF WBC: CPT

## 2018-12-13 PROCEDURE — 85651 RBC SED RATE NONAUTOMATED: CPT

## 2018-12-13 RX ORDER — 0.9 % SODIUM CHLORIDE 0.9 %
500 INTRAVENOUS SOLUTION INTRAVENOUS ONCE
Status: COMPLETED | OUTPATIENT
Start: 2018-12-13 | End: 2018-12-13

## 2018-12-13 RX ORDER — SODIUM CHLORIDE 0.9 % (FLUSH) 0.9 %
10 SYRINGE (ML) INJECTION PRN
Status: DISCONTINUED | OUTPATIENT
Start: 2018-12-13 | End: 2018-12-15 | Stop reason: HOSPADM

## 2018-12-13 RX ORDER — HYOSCYAMINE SULFATE 0.125 MG
125 TABLET ORAL 2 TIMES DAILY
Status: DISCONTINUED | OUTPATIENT
Start: 2018-12-13 | End: 2018-12-14

## 2018-12-13 RX ORDER — FLUTICASONE PROPIONATE 50 MCG
1 SPRAY, SUSPENSION (ML) NASAL DAILY
Status: DISCONTINUED | OUTPATIENT
Start: 2018-12-13 | End: 2018-12-15 | Stop reason: HOSPADM

## 2018-12-13 RX ORDER — PANTOPRAZOLE SODIUM 40 MG/1
40 TABLET, DELAYED RELEASE ORAL
Status: DISCONTINUED | OUTPATIENT
Start: 2018-12-13 | End: 2018-12-13

## 2018-12-13 RX ORDER — ONDANSETRON 2 MG/ML
4 INJECTION INTRAMUSCULAR; INTRAVENOUS EVERY 6 HOURS PRN
Status: DISCONTINUED | OUTPATIENT
Start: 2018-12-13 | End: 2018-12-15 | Stop reason: HOSPADM

## 2018-12-13 RX ORDER — PANTOPRAZOLE SODIUM 40 MG/1
40 TABLET, DELAYED RELEASE ORAL
Status: DISCONTINUED | OUTPATIENT
Start: 2018-12-13 | End: 2018-12-15 | Stop reason: HOSPADM

## 2018-12-13 RX ORDER — SODIUM CHLORIDE 0.9 % (FLUSH) 0.9 %
10 SYRINGE (ML) INJECTION EVERY 12 HOURS SCHEDULED
Status: DISCONTINUED | OUTPATIENT
Start: 2018-12-13 | End: 2018-12-15 | Stop reason: HOSPADM

## 2018-12-13 RX ORDER — SODIUM CHLORIDE 9 MG/ML
INJECTION, SOLUTION INTRAVENOUS CONTINUOUS
Status: DISCONTINUED | OUTPATIENT
Start: 2018-12-13 | End: 2018-12-15 | Stop reason: HOSPADM

## 2018-12-13 RX ORDER — DULOXETIN HYDROCHLORIDE 30 MG/1
30 CAPSULE, DELAYED RELEASE ORAL DAILY
Status: DISCONTINUED | OUTPATIENT
Start: 2018-12-13 | End: 2018-12-15 | Stop reason: HOSPADM

## 2018-12-13 RX ORDER — CALCIUM CARBONATE 200(500)MG
500 TABLET,CHEWABLE ORAL 3 TIMES DAILY PRN
Status: DISCONTINUED | OUTPATIENT
Start: 2018-12-13 | End: 2018-12-15 | Stop reason: HOSPADM

## 2018-12-13 RX ORDER — METHYLPREDNISOLONE SODIUM SUCCINATE 125 MG/2ML
60 INJECTION, POWDER, LYOPHILIZED, FOR SOLUTION INTRAMUSCULAR; INTRAVENOUS EVERY 6 HOURS
Status: DISCONTINUED | OUTPATIENT
Start: 2018-12-13 | End: 2018-12-15 | Stop reason: HOSPADM

## 2018-12-13 RX ORDER — CHOLECALCIFEROL (VITAMIN D3) 50 MCG
2000 TABLET ORAL DAILY
Status: DISCONTINUED | OUTPATIENT
Start: 2018-12-13 | End: 2018-12-15 | Stop reason: HOSPADM

## 2018-12-13 RX ADMIN — PANTOPRAZOLE SODIUM 40 MG: 40 TABLET, DELAYED RELEASE ORAL at 17:11

## 2018-12-13 RX ADMIN — METHYLPREDNISOLONE SODIUM SUCCINATE 60 MG: 125 INJECTION, POWDER, LYOPHILIZED, FOR SOLUTION INTRAMUSCULAR; INTRAVENOUS at 12:42

## 2018-12-13 RX ADMIN — CHOLECALCIFEROL TAB 50 MCG (2000 UNIT) 2000 UNITS: 50 TAB at 09:07

## 2018-12-13 RX ADMIN — METHYLPREDNISOLONE SODIUM SUCCINATE 60 MG: 125 INJECTION, POWDER, LYOPHILIZED, FOR SOLUTION INTRAMUSCULAR; INTRAVENOUS at 05:40

## 2018-12-13 RX ADMIN — SODIUM CHLORIDE: 9 INJECTION, SOLUTION INTRAVENOUS at 09:07

## 2018-12-13 RX ADMIN — Medication 10 ML: at 00:57

## 2018-12-13 RX ADMIN — METHYLPREDNISOLONE SODIUM SUCCINATE 60 MG: 125 INJECTION, POWDER, LYOPHILIZED, FOR SOLUTION INTRAMUSCULAR; INTRAVENOUS at 17:12

## 2018-12-13 RX ADMIN — FLUTICASONE PROPIONATE 1 SPRAY: 50 SPRAY, METERED NASAL at 09:07

## 2018-12-13 RX ADMIN — HYOSCYAMINE SULFATE 125 MCG: 0.12 TABLET ORAL at 20:49

## 2018-12-13 RX ADMIN — SODIUM CHLORIDE 500 ML: 9 INJECTION, SOLUTION INTRAVENOUS at 05:09

## 2018-12-13 RX ADMIN — PANTOPRAZOLE SODIUM 40 MG: 40 TABLET, DELAYED RELEASE ORAL at 05:41

## 2018-12-13 RX ADMIN — DULOXETINE HYDROCHLORIDE 30 MG: 30 CAPSULE, DELAYED RELEASE ORAL at 09:07

## 2018-12-13 ASSESSMENT — PAIN SCALES - GENERAL
PAINLEVEL_OUTOF10: 0
PAINLEVEL_OUTOF10: 0

## 2018-12-13 NOTE — ED PROVIDER NOTES
Qual   Result Value Ref Range    Preg Test, Ur NEGATIVE     QC OK? YES        RADIOLOGY:  Interpreted by Radiologist.  Kait Alicea IV CONTRAST Additional Contrast? None   Final Result   Colitis, which can be infectious, inflammatory or least likely ischemic. This report has been electronically signed by Carolynn Zuleta MD.          ------------------------- NURSING NOTES AND VITALS REVIEWED ---------------------------   The nursing notes within the ED encounter and vital signs as below have been reviewed. /64   Pulse 82   Temp 98 °F (36.7 °C) (Oral)   Resp 18   Ht 5' 9\" (1.753 m)   Wt 235 lb (106.6 kg)   SpO2 98%   BMI 34.70 kg/m²   Oxygen Saturation Interpretation: Normal      ---------------------------------------------------PHYSICAL EXAM--------------------------------------      Constitutional/General: Alert and oriented x3, well appearing, non toxic in NAD  Head: NC/AT  Eyes: PERRL, EOMI  Mouth: Oropharynx clear, handling secretions, no trismus  Neck: Supple, full ROM, no meningeal signs  Pulmonary: Lungs clear to auscultation bilaterally, no wheezes, rales, or rhonchi. Not in respiratory distress  Cardiovascular:  Regular rate and rhythm, no murmurs, gallops, or rubs. 2+ distal pulses  Abdomen: Soft, mild diffuse tenderness to palpation no guarding, bowel sounds hyperactive, non distended,   Extremities: Moves all extremities x 4.  Warm and well perfused  Skin: warm and dry without rash  Neurologic: GCS 15,  Psych: Normal Affect      ------------------------------ ED COURSE/MEDICAL DECISION MAKING----------------------  Medications   0.9 % sodium chloride infusion ( Intravenous Stopped 12/12/18 6374)   0.9 % sodium chloride bolus (1,000 mLs Intravenous New Bag 12/12/18 5815)   sodium chloride flush 0.9 % injection 10 mL (not administered)   sodium chloride flush 0.9 % injection 10 mL (not administered)   acetaminophen (TYLENOL) tablet 650 mg (not administered)   enoxaparin (LOVENOX) injection 40 mg (not administered)   methylPREDNISolone sodium (SOLU-MEDROL) injection 125 mg (125 mg Intravenous Given 12/12/18 2127)   iopamidol (ISOVUE-370) 76 % injection 110 mL (110 mLs Intravenous Given 12/12/18 2225)         Medical Decision Making:    Suspected Crohn's exacerbation-will obtain CT scan and labs and call her primary care physician for admission    Counseling: The emergency provider has spoken with the patient and discussed todays results, in addition to providing specific details for the plan of care and counseling regarding the diagnosis and prognosis. Questions are answered at this time and they are agreeable with the plan.      --------------------------------- IMPRESSION AND DISPOSITION ---------------------------------    IMPRESSION  1.  Exacerbation of Crohn's disease with [facility] ([facility]        DISPOSITION  Disposition: Admit to med/surg floor  Patient condition is Stable                  Erika Pearson MD  12/12/18 0837

## 2018-12-13 NOTE — CONSULTS
Consults     Gastroenterology Consult Note   [facility] with Fercho Ramirez M.D. Consult Note        Date of Service: 12/13/2018  Reason for Consult: Acute exacerbation of Crohn's  Requesting Physician: Dr Caryn Eduardo:  Bloody diarrhea, abdominal pain, nausea, unintentional weight loss, weakness, and fatigue. History Obtained From:  patient, electronic medical record    HISTORY OF PRESENT ILLNESS:       Nelly Reynaga is a 39 y.o. female with significant past medical history of Crohn's disease, anxiety, depression, and GERD admitted via ED for exacerbation of Crohn's disease. Patient was seen in Dr. Brooks Alvarado office yesterday with complaint of diarrhea of 8-10 bouts a day of watery brown to bloody stools with clots. Patient's  stated at that time he noted his wife to be weak, fatigued, and if it takes her until about 3 PM to get started for the day due to the increasing fatigue. Pt reports she has intermittent nausea without vomiting. Patient states she has continuous \"soreness discomfort all the time 3/10 in the stomach\" worse left lower quadrant which sharp cramping pain rated 10/10 with defecation that lessens post defecation. Patient states her appetite is poor and she did not feel she was keeping up on fluids due to the amount of diarrhea and inability to tolerate oral intake, she had lost 14 pounds over the past 3.5 weeks with a total weight loss of over 70 pounds in the past 11 months. Patient was seen in emergency on 11/7/2018, treated with IV fluids for dehydration due to diarrhea, her prednisone was increased to 30 mg a day at that time per Dr. Kwan Spears instruction. Patient was seen in Dr. Brooks Alvarado office about 3.5 weeks ago with continued symptoms of diarrhea, she was to continue her Humira and the prednisone 30 mg daily with adalimumab antibody test ordered which patient was unable to have done due to weakness and inability to get to the lab.  Patient presented yesterday Drugs: Pt denies. Family History: Mother living, Crohn's disease, COPD, renal failure on hemodialysis, she had parathyroid removal.  Father  at the age of 71, diabetes mellitus, hypertension, MI, he had renal failure on hemodialysis  2 children living and healthy. REVIEW OF SYSTEMS:    Aside from what was mentioned in the PMH and HPI, essentially unremarkable, all others negative. PHYSICAL EXAM:      Vitals:    /61   Pulse 71   Temp 98.7 °F (37.1 °C) (Oral)   Resp 16   Ht 5' 9\" (1.753 m)   Wt 238 lb 11.2 oz (108.3 kg)   SpO2 97%   BMI 35.25 kg/m²       CONSTITUTIONAL:  awake, alert, cooperative, pale, fatigue appearing, laying in bed, and appears stated age  EYES:  pupils equal, round and reactive to light, sclera anicteric and conjunctiva pale  ENT:  normocephalic, oral pharynx with moist mucous membranes  NECK:  supple   HEMATOLOGIC/LYMPHATICS:  no cervical or supraclavicular lymphadenopathy noted  LUNGS:  clear to auscultation bilaterally.   CARDIOVASCULAR:  regular rate and rhythm, no murmur noted; 2+ pulses; 1+ BLE edema  ABDOMEN:  hyperactive bowel sounds, softly distended, diffusely tender to palpation without guarding or rebound > LLQ, no masses palpated, no hepatosplenomegaly noted  MUSCULOSKELETAL:  full range of motion noted  motor strength is 5 out of 5 all extremities bilaterally  NEUROLOGIC:  Mental Status Exam:  Level of Alertness:   awake  Orientation:   person, place, time  Motor Exam:  Motor exam is symmetrical 5 out of 5 all extremities bilaterally  SKIN:  pale skin color, moderate texture, turgor    DATA:    CBC with Differential:    Lab Results   Component Value Date    WBC 6.4 2018    RBC 4.03 2018    HGB 10.5 2018    HCT 34.5 2018     2018    MCV 85.6 2018    MCH 26.1 2018    MCHC 30.4 2018    RDW 13.4 2018    NRBC 0.0 2018    SEGSPCT 70 2012    LYMPHOPCT 10.6 2018    MONOPCT 6.2

## 2018-12-14 PROBLEM — E44.0 MODERATE PROTEIN-CALORIE MALNUTRITION (HCC): Chronic | Status: ACTIVE | Noted: 2018-12-14

## 2018-12-14 LAB
ANION GAP SERPL CALCULATED.3IONS-SCNC: 12 MMOL/L (ref 7–16)
BASOPHILS ABSOLUTE: 0.01 E9/L (ref 0–0.2)
BASOPHILS RELATIVE PERCENT: 0.1 % (ref 0–2)
BUN BLDV-MCNC: 8 MG/DL (ref 6–20)
C-REACTIVE PROTEIN: 4.3 MG/DL (ref 0–0.4)
CALCIUM SERPL-MCNC: 9.1 MG/DL (ref 8.6–10.2)
CHLORIDE BLD-SCNC: 103 MMOL/L (ref 98–107)
CO2: 27 MMOL/L (ref 22–29)
CREAT SERPL-MCNC: 0.6 MG/DL (ref 0.5–1)
EOSINOPHILS ABSOLUTE: 0 E9/L (ref 0.05–0.5)
EOSINOPHILS RELATIVE PERCENT: 0 % (ref 0–6)
GFR AFRICAN AMERICAN: >60
GFR NON-AFRICAN AMERICAN: >60 ML/MIN/1.73
GLUCOSE BLD-MCNC: 164 MG/DL (ref 74–99)
HCT VFR BLD CALC: 32.3 % (ref 34–48)
HEMOGLOBIN: 9.7 G/DL (ref 11.5–15.5)
IMMATURE GRANULOCYTES #: 0.07 E9/L
IMMATURE GRANULOCYTES %: 0.8 % (ref 0–5)
LACTIC ACID: 2.8 MMOL/L (ref 0.5–2.2)
LYMPHOCYTES ABSOLUTE: 0.84 E9/L (ref 1.5–4)
LYMPHOCYTES RELATIVE PERCENT: 9.7 % (ref 20–42)
MCH RBC QN AUTO: 26.2 PG (ref 26–35)
MCHC RBC AUTO-ENTMCNC: 30 % (ref 32–34.5)
MCV RBC AUTO: 87.3 FL (ref 80–99.9)
MONOCYTES ABSOLUTE: 0.48 E9/L (ref 0.1–0.95)
MONOCYTES RELATIVE PERCENT: 5.6 % (ref 2–12)
NEUTROPHILS ABSOLUTE: 7.23 E9/L (ref 1.8–7.3)
NEUTROPHILS RELATIVE PERCENT: 83.8 % (ref 43–80)
PDW BLD-RTO: 13.7 FL (ref 11.5–15)
PLATELET # BLD: 338 E9/L (ref 130–450)
PMV BLD AUTO: 9.5 FL (ref 7–12)
POTASSIUM REFLEX MAGNESIUM: 4.3 MMOL/L (ref 3.5–5)
RBC # BLD: 3.7 E12/L (ref 3.5–5.5)
SODIUM BLD-SCNC: 142 MMOL/L (ref 132–146)
WBC # BLD: 8.6 E9/L (ref 4.5–11.5)

## 2018-12-14 PROCEDURE — 86140 C-REACTIVE PROTEIN: CPT

## 2018-12-14 PROCEDURE — 36415 COLL VENOUS BLD VENIPUNCTURE: CPT

## 2018-12-14 PROCEDURE — 83605 ASSAY OF LACTIC ACID: CPT

## 2018-12-14 PROCEDURE — 85025 COMPLETE CBC W/AUTO DIFF WBC: CPT

## 2018-12-14 PROCEDURE — APPSS30 APP SPLIT SHARED TIME 16-30 MINUTES: Performed by: PHYSICIAN ASSISTANT

## 2018-12-14 PROCEDURE — 6360000002 HC RX W HCPCS: Performed by: INTERNAL MEDICINE

## 2018-12-14 PROCEDURE — 6370000000 HC RX 637 (ALT 250 FOR IP): Performed by: INTERNAL MEDICINE

## 2018-12-14 PROCEDURE — 6370000000 HC RX 637 (ALT 250 FOR IP): Performed by: CLINICAL NURSE SPECIALIST

## 2018-12-14 PROCEDURE — 80048 BASIC METABOLIC PNL TOTAL CA: CPT

## 2018-12-14 PROCEDURE — 87045 FECES CULTURE AEROBIC BACT: CPT

## 2018-12-14 PROCEDURE — 87425 ROTAVIRUS AG IA: CPT

## 2018-12-14 PROCEDURE — 2580000003 HC RX 258: Performed by: INTERNAL MEDICINE

## 2018-12-14 PROCEDURE — 99233 SBSQ HOSP IP/OBS HIGH 50: CPT | Performed by: INTERNAL MEDICINE

## 2018-12-14 PROCEDURE — 1200000000 HC SEMI PRIVATE

## 2018-12-14 RX ORDER — HYOSCYAMINE SULFATE 0.125 MG
125 TABLET ORAL 2 TIMES DAILY
Status: DISCONTINUED | OUTPATIENT
Start: 2018-12-14 | End: 2018-12-15 | Stop reason: HOSPADM

## 2018-12-14 RX ADMIN — SODIUM CHLORIDE: 9 INJECTION, SOLUTION INTRAVENOUS at 11:11

## 2018-12-14 RX ADMIN — SODIUM CHLORIDE: 9 INJECTION, SOLUTION INTRAVENOUS at 00:20

## 2018-12-14 RX ADMIN — PANTOPRAZOLE SODIUM 40 MG: 40 TABLET, DELAYED RELEASE ORAL at 15:55

## 2018-12-14 RX ADMIN — FLUTICASONE PROPIONATE 1 SPRAY: 50 SPRAY, METERED NASAL at 09:17

## 2018-12-14 RX ADMIN — DULOXETINE HYDROCHLORIDE 30 MG: 30 CAPSULE, DELAYED RELEASE ORAL at 09:17

## 2018-12-14 RX ADMIN — HYOSCYAMINE SULFATE 125 MCG: 0.12 TABLET ORAL at 20:16

## 2018-12-14 RX ADMIN — PANTOPRAZOLE SODIUM 40 MG: 40 TABLET, DELAYED RELEASE ORAL at 06:02

## 2018-12-14 RX ADMIN — METHYLPREDNISOLONE SODIUM SUCCINATE 60 MG: 125 INJECTION, POWDER, LYOPHILIZED, FOR SOLUTION INTRAMUSCULAR; INTRAVENOUS at 17:45

## 2018-12-14 RX ADMIN — METHYLPREDNISOLONE SODIUM SUCCINATE 60 MG: 125 INJECTION, POWDER, LYOPHILIZED, FOR SOLUTION INTRAMUSCULAR; INTRAVENOUS at 00:17

## 2018-12-14 RX ADMIN — CALCIUM CARBONATE 500 MG: 500 TABLET, CHEWABLE ORAL at 14:48

## 2018-12-14 RX ADMIN — CHOLECALCIFEROL TAB 50 MCG (2000 UNIT) 2000 UNITS: 50 TAB at 09:17

## 2018-12-14 RX ADMIN — METHYLPREDNISOLONE SODIUM SUCCINATE 60 MG: 125 INJECTION, POWDER, LYOPHILIZED, FOR SOLUTION INTRAMUSCULAR; INTRAVENOUS at 06:01

## 2018-12-14 RX ADMIN — METHYLPREDNISOLONE SODIUM SUCCINATE 60 MG: 125 INJECTION, POWDER, LYOPHILIZED, FOR SOLUTION INTRAMUSCULAR; INTRAVENOUS at 23:14

## 2018-12-14 RX ADMIN — HYOSCYAMINE SULFATE 125 MCG: 0.12 TABLET ORAL at 09:17

## 2018-12-14 RX ADMIN — METHYLPREDNISOLONE SODIUM SUCCINATE 60 MG: 125 INJECTION, POWDER, LYOPHILIZED, FOR SOLUTION INTRAMUSCULAR; INTRAVENOUS at 12:27

## 2018-12-14 ASSESSMENT — PAIN SCALES - GENERAL
PAINLEVEL_OUTOF10: 2
PAINLEVEL_OUTOF10: 0

## 2018-12-14 NOTE — PROGRESS NOTES
PROGRESS NOTE    Patient Presents with/Seen in Consultation For      *Acute exacerbation of Crohn's  CHIEF COMPLAINT:  Bloody diarrhea, abdominal pain, nausea, unintentional weight loss, weakness, and fatigue. Subjective:     Patient states she is having loose stools with some red blood and clots. Pt reports the sharp cramping pain with defecation is slightly better today rated 7/10. Pt denies pain at rest. Pt states she is tolerating diet. Pt denies n/v.     Review of Systems  Aside from what was mentioned in the PMH and HPI, essentially unremarkable, all others negative. Objective:     BP (!) 156/75   Pulse 59   Temp 97.7 °F (36.5 °C) (Oral)   Resp 16   Ht 5' 9\" (1.753 m)   Wt 241 lb (109.3 kg)   SpO2 99%   BMI 35.59 kg/m²     General appearance: alert, awake, sitting up in bed, and cooperative  Eyes: conjunctiva pale, sclera anicteric. PERRL.   Lungs: clear to auscultation bilaterally  Heart: regular rate and rhythm, no murmur, 2+ pulses; no edema  Abdomen: softly distended, non-tender to palpation; bowel sounds normal; no masses,  no organomegaly  Extremities: extremities without edema  Pulses: 2+ and symmetric  Skin: Skin color pale, texture, turgor normal.   Neurologic: Grossly normal      hyoscyamine (ANASPAZ;LEVSIN) tablet 125 mcg BID   influenza quadrivalent split vaccine (FLUZONE;FLUARIX;FLULAVAL;AFLURIA) injection 0.5 mL Once   vitamin D tablet 2,000 Units Daily   DULoxetine (CYMBALTA) extended release capsule 30 mg Daily   fluticasone (FLONASE) 50 MCG/ACT nasal spray 1 spray Daily   sodium chloride flush 0.9 % injection 10 mL 2 times per day   sodium chloride flush 0.9 % injection 10 mL PRN   magnesium hydroxide (MILK OF MAGNESIA) 400 MG/5ML suspension 30 mL Daily PRN   ondansetron (ZOFRAN) injection 4 mg Q6H PRN   0.9 % sodium chloride infusion Continuous   methylPREDNISolone sodium (SOLU-MEDROL) injection 60 mg Q6H   pantoprazole (PROTONIX) tablet 40 mg BID AC   calcium carbonate (TUMS) chewable tablet 500 mg TID PRN   0.9 % sodium chloride infusion Once   sodium chloride flush 0.9 % injection 10 mL 2 times per day   sodium chloride flush 0.9 % injection 10 mL PRN   acetaminophen (TYLENOL) tablet 650 mg Q4H PRN        Data Review  CBC:   Lab Results   Component Value Date    WBC 8.6 12/14/2018    RBC 3.70 12/14/2018    HGB 9.7 12/14/2018    HCT 32.3 12/14/2018    MCV 87.3 12/14/2018    MCH 26.2 12/14/2018    MCHC 30.0 12/14/2018    RDW 13.7 12/14/2018     12/14/2018    MPV 9.5 12/14/2018     CMP:    Lab Results   Component Value Date     12/14/2018    K 4.3 12/14/2018     12/14/2018    CO2 27 12/14/2018    BUN 8 12/14/2018    CREATININE 0.6 12/14/2018    GFRAA >60 12/14/2018    LABGLOM >60 12/14/2018    GLUCOSE 164 12/14/2018    GLUCOSE 84 02/21/2012    PROT 6.5 12/13/2018    LABALBU 3.0 12/13/2018    LABALBU 3.9 02/21/2012    CALCIUM 9.1 12/14/2018    BILITOT 0.2 12/13/2018    ALKPHOS 60 12/13/2018    AST 15 12/13/2018    ALT 13 12/13/2018     Hepatic Function Panel:    Lab Results   Component Value Date    ALKPHOS 60 12/13/2018    ALT 13 12/13/2018    AST 15 12/13/2018    PROT 6.5 12/13/2018    BILITOT 0.2 12/13/2018    BILIDIR <0.2 12/13/2018    IBILI see below 12/13/2018    LABALBU 3.0 12/13/2018    LABALBU 3.9 02/21/2012     No components found for: CHLPL    Lab Results   Component Value Date    TRIG 177 (H) 06/13/2017    TRIG 152 (H) 05/17/2016    TRIG 105 04/30/2015       Lab Results   Component Value Date    HDL 49 06/13/2017    HDL 43 05/17/2016    HDL 48 04/30/2015       Lab Results   Component Value Date    LDLCALC 131 (H) 06/13/2017    LDLCALC 112 (H) 05/17/2016    LDLCALC 132 (H) 04/30/2015       Lab Results   Component Value Date    LABVLDL 35 06/13/2017    LABVLDL 30 05/17/2016    LABVLDL 21 04/30/2015      PT/INR:    Lab Results   Component Value Date    PROTIME 11.0 10/14/2011    INR 1.0 10/14/2011       Assessment:     Principal Problem:    Exacerbation of Crohn's disease of large intestine ([facility]  Active Problems:  ? Exacerbation of Crohn's colitis  ? Diarrhea with hematochezia  ? Abdominal pain, diffuse > LLQ  ? Nausea  ? Anemia, normocytic  ? Unintentional weight loss (14# in the past 3.5 weeks with >70#/11 mo loss)       Plan:     ? Continue Solumedrol IV 60 mg Q 6 hours x 24 more hours per Dr Robert Hdez  ? Continue Protonix as ordered  ? Monitor CBC, CMP  ? Adalimumab Ab pending, more than likely will switch to another class of biologics  ? Fecal calprotectin and stools per order  ? Continue to medicate for pain per PCP orders  ? Medicate for nausea as ordered  ? IV fluids per PCP orders  ? Lactose free gi soft diet  ?  Continue to monitor    Discussed with Dr. Wanda Aguirre per Dr. Jovi Del Cid [id number], FNP-BC 12/14/2018 1:43 PM For Dr. Robert Hdez

## 2018-12-14 NOTE — PATIENT CARE CONFERENCE
[facility] Quality Flow/Interdisciplinary Rounds Progress Note        Quality Flow Rounds held on December 14, 2018    Disciplines Attending:  Bedside Nurse, ,  and Nursing Unit Leadership    Rick Wren was admitted on 12/12/2018  8:12 PM    Anticipated Discharge Date:  Expected Discharge Date: 12/17/18    Disposition:    Jhno Score:  Jhon Scale Score: 22    Readmission Risk              Risk of Unplanned Readmission:        10           Discussed patient goal for the day, patient clinical progression, and barriers to discharge.   The following Goal(s) of the Day/Commitment(s) have been identified:  Labs - Report Results      Tani Hdz  December 14, 2018

## 2018-12-14 NOTE — PROGRESS NOTES
None  · Current Nutrition Therapies:  · Oral Diet Orders: Low Fiber (no caffeine, lactose controlled)   · Oral Diet intake: %  · Oral Nutrition Supplement (ONS) Orders: None  · Anthropometric Measures:  · Ht: 5' 9\" (175.3 cm)   · Current Body Wt: 241 lb (109.3 kg) (12/14 actual)  · Admission Body Wt: 238 lb (108 kg) (12/13 bed scale)  · Usual Body Wt: 291 lb (132 kg) (06/2018 actual per EMR)  · % Weight Change:    17% wt loss x 6 months  · Ideal Body Wt: 145 lb (65.8 kg), % Ideal Body 166%  · BMI Classification: BMI 35.0 - 39.9 Obese Class II    Nutrition Interventions:   Continue current diet  Continued Inpatient Monitoring, Education Completed, Coordination of Care (Low fiber diet ed completed; pt declines need for ONS at this time as she is tolerating diet)    Nutrition Evaluation:   · Evaluation: Goals set   · Goals: Pt to consume >75% most meals; stable wt    · Monitoring: Meal Intake, Diet Tolerance, Skin Integrity, I&O, Weight, Pertinent Labs, Nausea or Vomiting, Diarrhea, Monitor Hemodynamic Status, Monitor Bowel Function      Electronically signed by Renetta Cool, MS, RD, LD on 12/14/18 at 12:07 PM    Contact Number: [phone]

## 2018-12-14 NOTE — PROGRESS NOTES
carotid bruits. Heart:  Rhythm regular at rate of 60  Lungs:  CTA. No wheeze, rales, or rhonchi  Abdomen:  Positive bowel sounds positive. Soft. Mild diffuse tenderness. No guarding, rebound or rigidity. Breast/Rectal/Genitourinary: not pertinent. Extremities:  Negative for lower extremity edema  Skin:  Warm and dry  Vascular: 2/4 Dorsalis Pedis pulses bilaterally. Neuro:  Cranial nerves 2-12 grossly intact, no focal weakness or change in sensation noted. Extraocular muscles intact. Pupils equal, round, reactive to light. I agree with the assessment and plan of MARIEL Rdoriguez    Crohn's disease exacerbation  gerd  Depression  Elevated lactic acid level  Moderate protein calorie malnutrition  Hematochezia    Chart reviewed and updated by nursing    Time spent is 35 min    Electronically signed by Lewis Russell D.O.   Hospitalist  [facility] Hospitalist Service at [facility]

## 2018-12-15 VITALS
TEMPERATURE: 98 F | WEIGHT: 245.9 LBS | HEART RATE: 56 BPM | DIASTOLIC BLOOD PRESSURE: 78 MMHG | SYSTOLIC BLOOD PRESSURE: 157 MMHG | BODY MASS INDEX: 36.42 KG/M2 | HEIGHT: 69 IN | OXYGEN SATURATION: 97 % | RESPIRATION RATE: 16 BRPM

## 2018-12-15 LAB
ANION GAP SERPL CALCULATED.3IONS-SCNC: 15 MMOL/L (ref 7–16)
BASOPHILS ABSOLUTE: 0 E9/L (ref 0–0.2)
BASOPHILS RELATIVE PERCENT: 0 % (ref 0–2)
BUN BLDV-MCNC: 10 MG/DL (ref 6–20)
C-REACTIVE PROTEIN: 1.8 MG/DL (ref 0–0.4)
CALCIUM SERPL-MCNC: 8.8 MG/DL (ref 8.6–10.2)
CHLORIDE BLD-SCNC: 97 MMOL/L (ref 98–107)
CO2: 24 MMOL/L (ref 22–29)
CREAT SERPL-MCNC: 0.6 MG/DL (ref 0.5–1)
EOSINOPHILS ABSOLUTE: 0.02 E9/L (ref 0.05–0.5)
EOSINOPHILS RELATIVE PERCENT: 0.2 % (ref 0–6)
GFR AFRICAN AMERICAN: >60
GFR NON-AFRICAN AMERICAN: >60 ML/MIN/1.73
GLUCOSE BLD-MCNC: 174 MG/DL (ref 74–99)
HCT VFR BLD CALC: 30.7 % (ref 34–48)
HEMOGLOBIN: 9.2 G/DL (ref 11.5–15.5)
IMMATURE GRANULOCYTES #: 0.13 E9/L
IMMATURE GRANULOCYTES %: 1.4 % (ref 0–5)
LYMPHOCYTES ABSOLUTE: 1.03 E9/L (ref 1.5–4)
LYMPHOCYTES RELATIVE PERCENT: 11.2 % (ref 20–42)
MCH RBC QN AUTO: 26.1 PG (ref 26–35)
MCHC RBC AUTO-ENTMCNC: 30 % (ref 32–34.5)
MCV RBC AUTO: 87.2 FL (ref 80–99.9)
MONOCYTES ABSOLUTE: 0.5 E9/L (ref 0.1–0.95)
MONOCYTES RELATIVE PERCENT: 5.4 % (ref 2–12)
NEUTROPHILS ABSOLUTE: 7.51 E9/L (ref 1.8–7.3)
NEUTROPHILS RELATIVE PERCENT: 81.8 % (ref 43–80)
PDW BLD-RTO: 13.9 FL (ref 11.5–15)
PLATELET # BLD: 274 E9/L (ref 130–450)
PMV BLD AUTO: 10.2 FL (ref 7–12)
POTASSIUM REFLEX MAGNESIUM: 3.9 MMOL/L (ref 3.5–5)
RBC # BLD: 3.52 E12/L (ref 3.5–5.5)
ROTAVIRUS ANTIGEN: NORMAL
SODIUM BLD-SCNC: 136 MMOL/L (ref 132–146)
WBC # BLD: 9.2 E9/L (ref 4.5–11.5)

## 2018-12-15 PROCEDURE — 2580000003 HC RX 258: Performed by: INTERNAL MEDICINE

## 2018-12-15 PROCEDURE — 85025 COMPLETE CBC W/AUTO DIFF WBC: CPT

## 2018-12-15 PROCEDURE — 6370000000 HC RX 637 (ALT 250 FOR IP): Performed by: INTERNAL MEDICINE

## 2018-12-15 PROCEDURE — 86140 C-REACTIVE PROTEIN: CPT

## 2018-12-15 PROCEDURE — 99239 HOSP IP/OBS DSCHRG MGMT >30: CPT | Performed by: INTERNAL MEDICINE

## 2018-12-15 PROCEDURE — 90686 IIV4 VACC NO PRSV 0.5 ML IM: CPT | Performed by: INTERNAL MEDICINE

## 2018-12-15 PROCEDURE — 36415 COLL VENOUS BLD VENIPUNCTURE: CPT

## 2018-12-15 PROCEDURE — G0008 ADMIN INFLUENZA VIRUS VAC: HCPCS | Performed by: INTERNAL MEDICINE

## 2018-12-15 PROCEDURE — 80048 BASIC METABOLIC PNL TOTAL CA: CPT

## 2018-12-15 PROCEDURE — 6370000000 HC RX 637 (ALT 250 FOR IP): Performed by: CLINICAL NURSE SPECIALIST

## 2018-12-15 PROCEDURE — 6360000002 HC RX W HCPCS: Performed by: INTERNAL MEDICINE

## 2018-12-15 RX ORDER — PREDNISONE 1 MG/1
10 TABLET ORAL DAILY
Qty: 140 TABLET | Refills: 0 | Status: SHIPPED | OUTPATIENT
Start: 2018-12-15 | End: 2018-12-29

## 2018-12-15 RX ORDER — HYOSCYAMINE SULFATE 0.125 MG
125 TABLET ORAL 2 TIMES DAILY
Qty: 180 TABLET | Refills: 3 | Status: SHIPPED | OUTPATIENT
Start: 2018-12-15 | End: 2019-04-16

## 2018-12-15 RX ORDER — PANTOPRAZOLE SODIUM 40 MG/1
40 TABLET, DELAYED RELEASE ORAL
Qty: 30 TABLET | Refills: 3 | Status: SHIPPED | OUTPATIENT
Start: 2018-12-15 | End: 2019-01-09

## 2018-12-15 RX ADMIN — METHYLPREDNISOLONE SODIUM SUCCINATE 60 MG: 125 INJECTION, POWDER, LYOPHILIZED, FOR SOLUTION INTRAMUSCULAR; INTRAVENOUS at 12:08

## 2018-12-15 RX ADMIN — DULOXETINE HYDROCHLORIDE 30 MG: 30 CAPSULE, DELAYED RELEASE ORAL at 10:00

## 2018-12-15 RX ADMIN — Medication 10 ML: at 12:08

## 2018-12-15 RX ADMIN — INFLUENZA A VIRUS A/MICHIGAN/45/2015 X-275 (H1N1) ANTIGEN (FORMALDEHYDE INACTIVATED), INFLUENZA A VIRUS A/SINGAPORE/INFIMH-16-0019/2016 IVR-186 (H3N2) ANTIGEN (FORMALDEHYDE INACTIVATED), INFLUENZA B VIRUS B/PHUKET/3073/2013 ANTIGEN (FORMALDEHYDE INACTIVATED), AND INFLUENZA B VIRUS B/MARYLAND/15/2016 BX-69A ANTIGEN (FORMALDEHYDE INACTIVATED) 0.5 ML: 15; 15; 15; 15 INJECTION, SUSPENSION INTRAMUSCULAR at 10:01

## 2018-12-15 RX ADMIN — METHYLPREDNISOLONE SODIUM SUCCINATE 60 MG: 125 INJECTION, POWDER, LYOPHILIZED, FOR SOLUTION INTRAMUSCULAR; INTRAVENOUS at 06:43

## 2018-12-15 RX ADMIN — FLUTICASONE PROPIONATE 1 SPRAY: 50 SPRAY, METERED NASAL at 10:00

## 2018-12-15 RX ADMIN — CHOLECALCIFEROL TAB 50 MCG (2000 UNIT) 2000 UNITS: 50 TAB at 10:06

## 2018-12-15 RX ADMIN — HYOSCYAMINE SULFATE 125 MCG: 0.12 TABLET ORAL at 10:00

## 2018-12-15 RX ADMIN — CALCIUM CARBONATE 500 MG: 500 TABLET, CHEWABLE ORAL at 10:06

## 2018-12-15 RX ADMIN — SODIUM CHLORIDE: 9 INJECTION, SOLUTION INTRAVENOUS at 06:35

## 2018-12-15 RX ADMIN — PANTOPRAZOLE SODIUM 40 MG: 40 TABLET, DELAYED RELEASE ORAL at 06:36

## 2018-12-15 ASSESSMENT — PAIN SCALES - GENERAL: PAINLEVEL_OUTOF10: 0

## 2018-12-15 NOTE — PROGRESS NOTES
PROGRESS NOTE    Patient Presents with/Seen in Consultation For      *Acute exacerbation of Crohn's  CHIEF COMPLAINT:  Bloody diarrhea, abdominal pain, nausea, unintentional weight loss, weakness, and fatigue. Subjective:     Patient states she is having loose stools with some pale red blood of 3 loose/diarrhea since midnight. Pt reports the cramping pain with defecation is slightly better today rated 5-6/10. Pt denies pain at rest. Pt states she is tolerating diet. Pt denies n/v. Pt would like to go home. Lab results d/w pt and . Review of Systems  Aside from what was mentioned in the PMH and HPI, essentially unremarkable, all others negative. Objective:     BP (!) 157/78   Pulse 56   Temp 98 °F (36.7 °C) (Oral)   Resp 16   Ht 5' 9\" (1.753 m)   Wt 245 lb 14.4 oz (111.5 kg)   SpO2 97%   BMI 36.31 kg/m²     General appearance: alert, awake, sitting up in bed with  at [facility], and cooperative  Eyes: conjunctiva pale, sclera anicteric. PERRL.   Lungs: clear to auscultation bilaterally  Heart: regular rate and rhythm, no murmur, 2+ pulses; no edema  Abdomen: softly distended, non-tender to palpation; bowel sounds normal; no masses,  no organomegaly  Extremities: extremities without edema  Pulses: 2+ and symmetric  Skin: Skin color pale, texture, turgor normal.   Neurologic: Grossly normal      hyoscyamine (ANASPAZ;LEVSIN) tablet 125 mcg BID   vitamin D tablet 2,000 Units Daily   DULoxetine (CYMBALTA) extended release capsule 30 mg Daily   fluticasone (FLONASE) 50 MCG/ACT nasal spray 1 spray Daily   sodium chloride flush 0.9 % injection 10 mL 2 times per day   sodium chloride flush 0.9 % injection 10 mL PRN   magnesium hydroxide (MILK OF MAGNESIA) 400 MG/5ML suspension 30 mL Daily PRN   ondansetron (ZOFRAN) injection 4 mg Q6H PRN   0.9 % sodium chloride infusion Continuous   methylPREDNISolone sodium (SOLU-MEDROL) injection 60 mg Q6H   pantoprazole (PROTONIX) tablet 40 mg BID AC   calcium

## 2018-12-15 NOTE — DISCHARGE SUMMARY
instructions to follow up with her PCP and gastroenterologist    Discharge Exam:  Vitals:    12/14/18 0758 12/14/18 1930 12/15/18 0043 12/15/18 0737   BP: (!) 156/75 (!) 159/73  (!) 157/78   Pulse: 59 58  56   Resp: 16 16  16   Temp: 97.7 °F (36.5 °C) 98.8 °F (37.1 °C)  98 °F (36.7 °C)   TempSrc: Oral Oral  Oral   SpO2: 99% 97%     Weight:   245 lb 14.4 oz (111.5 kg)    Height:           General Appearance: , well developed and well- nourished, in no acute distress  Skin: warm and dry, no rash or erythema  Head: normocephalic and atraumatic  Neck: supple and non-tender without mass, no thyromegaly or thyroid nodules, no cervical lymphadenopathy  Pulmonary/Chest: clear to auscultation bilaterally- no wheezes, rales or rhonchi, normal air movement, no respiratory distress  Cardiovascular: normal rate, regular rhythm, normal S1 and S2, no murmurs  Abdomen: soft, non-tender, non-distended, normal bowel sounds, no masses or organomegaly   Extremities: No clubbing, cyanosis, or edema  Musculoskeletal: normal range of motion, no joint swelling, deformity or tenderness  Neurologic: alert and oriented to person, place and time. coordination and speech normal      I/O last 3 completed shifts: In: 2999 [P.O.:845; I.V.:2410]  Out: 850 [Urine:850]  I/O this shift:   In: 480 [P.O.:480]  Out: 250 [Urine:250]      LABS:  Recent Labs      12/13/18   0520  12/14/18   0401  12/15/18   0715   NA  135  142  136   K  4.0  4.3  3.9   CL  99  103  97*   CO2  25  27  24   BUN  7  8  10   CREATININE  0.6  0.6  0.6   GLUCOSE  148*  164*  174*   CALCIUM  8.6  9.1  8.8       Recent Labs      12/13/18   0520  12/14/18   0401  12/15/18   0715   WBC  7.0  8.6  9.2   RBC  3.70  3.70  3.52   HGB  9.8*  9.7*  9.2*   HCT  31.3*  32.3*  30.7*   MCV  84.6  87.3  87.2   MCH  26.5  26.2  26.1   MCHC  31.3*  30.0*  30.0*   RDW  13.5  13.7  13.9   PLT  312  338  274   MPV  8.8  9.5  10.2       No results for input(s): POCGLU in the last 72

## 2018-12-16 ENCOUNTER — CARE COORDINATION (OUTPATIENT)
Dept: CASE MANAGEMENT | Age: 43
End: 2018-12-16

## 2018-12-16 NOTE — CARE COORDINATION
[facility] Transitions Initial Follow Up Call    Call within 2 business days of discharge: Yes    Patient: Shanna Puckett Patient : 1975   MRN: [id number]  Reason for Admission: crohn's with rectal bleeding   Discharge Date: 12/15/18 RARS: Readmission Risk Score: 10 CMRS 4    Facility: [facility]    First attempt to reach the patient for Care Transition call post hospital discharge. Message left with CTC's contact information requesting return phone call. Follow Up  No future appointments.     Regina Kraus RN

## 2018-12-17 LAB
CULTURE, STOOL: NORMAL
Lab: NORMAL
Lab: NORMAL
REPORT: NORMAL
REPORT: NORMAL
THIS TEST SENT TO: NORMAL
THIS TEST SENT TO: NORMAL

## 2019-01-09 ENCOUNTER — OFFICE VISIT (OUTPATIENT)
Dept: FAMILY MEDICINE CLINIC | Age: 44
End: 2019-01-09
Payer: COMMERCIAL

## 2019-01-09 VITALS
WEIGHT: 220 LBS | OXYGEN SATURATION: 97 % | SYSTOLIC BLOOD PRESSURE: 136 MMHG | HEIGHT: 69 IN | BODY MASS INDEX: 32.58 KG/M2 | TEMPERATURE: 98.5 F | HEART RATE: 115 BPM | DIASTOLIC BLOOD PRESSURE: 85 MMHG | RESPIRATION RATE: 18 BRPM

## 2019-01-09 DIAGNOSIS — E44.0 MODERATE PROTEIN-CALORIE MALNUTRITION (HCC): Chronic | ICD-10-CM

## 2019-01-09 DIAGNOSIS — K50.10 EXACERBATION OF CROHN'S DISEASE OF LARGE INTESTINE (HCC): Primary | ICD-10-CM

## 2019-01-09 PROCEDURE — 99214 OFFICE O/P EST MOD 30 MIN: CPT | Performed by: FAMILY MEDICINE

## 2019-01-09 RX ORDER — PREDNISONE 10 MG/1
25 TABLET ORAL DAILY
Refills: 0 | COMMUNITY
Start: 2019-05-25 | End: 2019-05-31

## 2019-01-09 RX ORDER — OMEPRAZOLE 40 MG/1
40 CAPSULE, DELAYED RELEASE ORAL DAILY
COMMUNITY
End: 2019-05-22 | Stop reason: SDUPTHER

## 2019-01-11 ENCOUNTER — HOSPITAL ENCOUNTER (OUTPATIENT)
Age: 44
Discharge: HOME | End: 2019-01-11
Payer: COMMERCIAL

## 2019-01-11 LAB
ALBUMIN SERPL-MCNC: 3.4 G/DL (ref 3.5–5.2)
ALP BLD-CCNC: 79 U/L (ref 35–104)
ALT SERPL-CCNC: 18 U/L (ref 0–32)
ANION GAP SERPL CALCULATED.3IONS-SCNC: 15 MMOL/L (ref 7–16)
AST SERPL-CCNC: 11 U/L (ref 0–31)
BASOPHILS ABSOLUTE: 0.01 E9/L (ref 0–0.2)
BASOPHILS RELATIVE PERCENT: 0.1 % (ref 0–2)
BILIRUB SERPL-MCNC: 0.5 MG/DL (ref 0–1.2)
BUN BLDV-MCNC: 9 MG/DL (ref 6–20)
C-REACTIVE PROTEIN: 9.5 MG/DL (ref 0–0.4)
CALCIUM SERPL-MCNC: 9.5 MG/DL (ref 8.6–10.2)
CHLORIDE BLD-SCNC: 94 MMOL/L (ref 98–107)
CO2: 27 MMOL/L (ref 22–29)
CREAT SERPL-MCNC: 0.8 MG/DL (ref 0.5–1)
EOSINOPHILS ABSOLUTE: 0.01 E9/L (ref 0.05–0.5)
EOSINOPHILS RELATIVE PERCENT: 0.1 % (ref 0–6)
GFR AFRICAN AMERICAN: >60
GFR NON-AFRICAN AMERICAN: >60 ML/MIN/1.73
GLUCOSE BLD-MCNC: 142 MG/DL (ref 74–99)
HCT VFR BLD CALC: 37.8 % (ref 34–48)
HEMOGLOBIN: 11.7 G/DL (ref 11.5–15.5)
IMMATURE GRANULOCYTES #: 0.06 E9/L
IMMATURE GRANULOCYTES %: 0.7 % (ref 0–5)
LYMPHOCYTES ABSOLUTE: 0.74 E9/L (ref 1.5–4)
LYMPHOCYTES RELATIVE PERCENT: 8.8 % (ref 20–42)
MCH RBC QN AUTO: 25.9 PG (ref 26–35)
MCHC RBC AUTO-ENTMCNC: 31 % (ref 32–34.5)
MCV RBC AUTO: 83.6 FL (ref 80–99.9)
MONOCYTES ABSOLUTE: 0.32 E9/L (ref 0.1–0.95)
MONOCYTES RELATIVE PERCENT: 3.8 % (ref 2–12)
NEUTROPHILS ABSOLUTE: 7.28 E9/L (ref 1.8–7.3)
NEUTROPHILS RELATIVE PERCENT: 86.5 % (ref 43–80)
PDW BLD-RTO: 14.5 FL (ref 11.5–15)
PLATELET # BLD: 372 E9/L (ref 130–450)
PMV BLD AUTO: 8.5 FL (ref 7–12)
POTASSIUM SERPL-SCNC: 4 MMOL/L (ref 3.5–5)
RBC # BLD: 4.52 E12/L (ref 3.5–5.5)
SODIUM BLD-SCNC: 136 MMOL/L (ref 132–146)
TOTAL PROTEIN: 7.1 G/DL (ref 6.4–8.3)
WBC # BLD: 8.4 E9/L (ref 4.5–11.5)

## 2019-01-11 PROCEDURE — 86140 C-REACTIVE PROTEIN: CPT

## 2019-01-11 PROCEDURE — 80053 COMPREHEN METABOLIC PANEL: CPT

## 2019-01-11 PROCEDURE — 85025 COMPLETE CBC W/AUTO DIFF WBC: CPT

## 2019-01-11 PROCEDURE — 36415 COLL VENOUS BLD VENIPUNCTURE: CPT

## 2019-02-11 ENCOUNTER — HOSPITAL ENCOUNTER (OUTPATIENT)
Age: 44
Discharge: HOME | End: 2019-02-11
Payer: COMMERCIAL

## 2019-02-11 LAB — C-REACTIVE PROTEIN: 9.5 MG/DL (ref 0–0.4)

## 2019-02-11 PROCEDURE — 36415 COLL VENOUS BLD VENIPUNCTURE: CPT

## 2019-02-11 PROCEDURE — 86140 C-REACTIVE PROTEIN: CPT

## 2019-02-11 PROCEDURE — 83993 ASSAY FOR CALPROTECTIN FECAL: CPT

## 2019-02-18 LAB
Lab: NORMAL
REPORT: NORMAL
THIS TEST SENT TO: NORMAL

## 2019-02-27 ENCOUNTER — HOSPITAL ENCOUNTER (OUTPATIENT)
Age: 44
Discharge: HOME | End: 2019-02-27
Payer: COMMERCIAL

## 2019-02-27 LAB
ALBUMIN SERPL-MCNC: 3.2 G/DL (ref 3.5–5.2)
ALP BLD-CCNC: 76 U/L (ref 35–104)
ALT SERPL-CCNC: 22 U/L (ref 0–32)
ANION GAP SERPL CALCULATED.3IONS-SCNC: 15 MMOL/L (ref 7–16)
ANISOCYTOSIS: ABNORMAL
AST SERPL-CCNC: 15 U/L (ref 0–31)
BASOPHILS ABSOLUTE: 0.01 E9/L (ref 0–0.2)
BASOPHILS RELATIVE PERCENT: 0.1 % (ref 0–2)
BILIRUB SERPL-MCNC: 0.4 MG/DL (ref 0–1.2)
BUN BLDV-MCNC: 9 MG/DL (ref 6–20)
CALCIUM SERPL-MCNC: 9.6 MG/DL (ref 8.6–10.2)
CHLORIDE BLD-SCNC: 94 MMOL/L (ref 98–107)
CO2: 27 MMOL/L (ref 22–29)
CREAT SERPL-MCNC: 0.8 MG/DL (ref 0.5–1)
EOSINOPHILS ABSOLUTE: 0.04 E9/L (ref 0.05–0.5)
EOSINOPHILS RELATIVE PERCENT: 0.4 % (ref 0–6)
GFR AFRICAN AMERICAN: >60
GFR NON-AFRICAN AMERICAN: >60 ML/MIN/1.73
GLUCOSE BLD-MCNC: 150 MG/DL (ref 74–99)
HCT VFR BLD CALC: 35.5 % (ref 34–48)
HEMOGLOBIN: 11 G/DL (ref 11.5–15.5)
IMMATURE GRANULOCYTES #: 0.11 E9/L
IMMATURE GRANULOCYTES %: 1 % (ref 0–5)
LYMPHOCYTES ABSOLUTE: 0.57 E9/L (ref 1.5–4)
LYMPHOCYTES RELATIVE PERCENT: 5 % (ref 20–42)
MCH RBC QN AUTO: 26.1 PG (ref 26–35)
MCHC RBC AUTO-ENTMCNC: 31 % (ref 32–34.5)
MCV RBC AUTO: 84.1 FL (ref 80–99.9)
MONOCYTES ABSOLUTE: 0.3 E9/L (ref 0.1–0.95)
MONOCYTES RELATIVE PERCENT: 2.6 % (ref 2–12)
NEUTROPHILS ABSOLUTE: 10.36 E9/L (ref 1.8–7.3)
NEUTROPHILS RELATIVE PERCENT: 90.9 % (ref 43–80)
PDW BLD-RTO: 15.1 FL (ref 11.5–15)
PLATELET # BLD: 384 E9/L (ref 130–450)
PMV BLD AUTO: 8.5 FL (ref 7–12)
POLYCHROMASIA: ABNORMAL
POTASSIUM SERPL-SCNC: 3.9 MMOL/L (ref 3.5–5)
RBC # BLD: 4.22 E12/L (ref 3.5–5.5)
SODIUM BLD-SCNC: 136 MMOL/L (ref 132–146)
TOTAL PROTEIN: 7.5 G/DL (ref 6.4–8.3)
WBC # BLD: 11.4 E9/L (ref 4.5–11.5)

## 2019-02-27 PROCEDURE — 80053 COMPREHEN METABOLIC PANEL: CPT

## 2019-02-27 PROCEDURE — 36415 COLL VENOUS BLD VENIPUNCTURE: CPT

## 2019-02-27 PROCEDURE — 85025 COMPLETE CBC W/AUTO DIFF WBC: CPT

## 2019-03-08 RX ORDER — 0.9 % SODIUM CHLORIDE 0.9 %
10 VIAL (ML) INJECTION ONCE
Status: CANCELLED | OUTPATIENT
Start: 2019-03-11 | End: 2019-03-11

## 2019-03-08 RX ORDER — SODIUM CHLORIDE 0.9 % (FLUSH) 0.9 %
30 SYRINGE (ML) INJECTION PRN
Status: CANCELLED | OUTPATIENT
Start: 2019-03-11

## 2019-03-11 ENCOUNTER — APPOINTMENT (OUTPATIENT)
Dept: CT IMAGING | Age: 44
End: 2019-03-11
Payer: COMMERCIAL

## 2019-03-11 ENCOUNTER — HOSPITAL ENCOUNTER (EMERGENCY)
Age: 44
Discharge: HOME | End: 2019-03-11
Attending: EMERGENCY MEDICINE
Payer: COMMERCIAL

## 2019-03-11 VITALS
BODY MASS INDEX: 32.14 KG/M2 | WEIGHT: 217 LBS | HEART RATE: 78 BPM | SYSTOLIC BLOOD PRESSURE: 137 MMHG | OXYGEN SATURATION: 99 % | RESPIRATION RATE: 20 BRPM | DIASTOLIC BLOOD PRESSURE: 77 MMHG | HEIGHT: 69 IN | TEMPERATURE: 99.2 F

## 2019-03-11 DIAGNOSIS — K50.119 CROHN'S DISEASE OF COLON WITH COMPLICATION (HCC): Primary | ICD-10-CM

## 2019-03-11 DIAGNOSIS — E87.1 HYPONATREMIA: ICD-10-CM

## 2019-03-11 DIAGNOSIS — R19.7 DIARRHEA, UNSPECIFIED TYPE: ICD-10-CM

## 2019-03-11 LAB
ALBUMIN SERPL-MCNC: 2.8 G/DL (ref 3.5–5.2)
ALP BLD-CCNC: 83 U/L (ref 35–104)
ALT SERPL-CCNC: 17 U/L (ref 0–32)
ANION GAP SERPL CALCULATED.3IONS-SCNC: 15 MMOL/L (ref 7–16)
AST SERPL-CCNC: 21 U/L (ref 0–31)
BACTERIA: ABNORMAL /HPF
BASOPHILS ABSOLUTE: 0 E9/L (ref 0–0.2)
BASOPHILS RELATIVE PERCENT: 0 % (ref 0–2)
BILIRUB SERPL-MCNC: 0.6 MG/DL (ref 0–1.2)
BILIRUBIN URINE: NEGATIVE
BLOOD, URINE: ABNORMAL
BUN BLDV-MCNC: 7 MG/DL (ref 6–20)
CALCIUM SERPL-MCNC: 9.1 MG/DL (ref 8.6–10.2)
CHLORIDE BLD-SCNC: 86 MMOL/L (ref 98–107)
CLARITY: CLEAR
CO2: 26 MMOL/L (ref 22–29)
COLOR: YELLOW
CREAT SERPL-MCNC: 0.8 MG/DL (ref 0.5–1)
DOHLE BODIES: ABNORMAL
EOSINOPHILS ABSOLUTE: 0 E9/L (ref 0.05–0.5)
EOSINOPHILS RELATIVE PERCENT: 0 % (ref 0–6)
EPITHELIAL CELLS, UA: ABNORMAL /HPF
GFR AFRICAN AMERICAN: >60
GFR NON-AFRICAN AMERICAN: >60 ML/MIN/1.73
GLUCOSE BLD-MCNC: 154 MG/DL (ref 74–99)
GLUCOSE URINE: NEGATIVE MG/DL
HCG, URINE, POC: NEGATIVE
HCT VFR BLD CALC: 33.7 % (ref 34–48)
HEMOGLOBIN: 10.9 G/DL (ref 11.5–15.5)
HYPOCHROMIA: ABNORMAL
KETONES, URINE: NEGATIVE MG/DL
LACTIC ACID: 2.1 MMOL/L (ref 0.5–2.2)
LEUKOCYTE ESTERASE, URINE: ABNORMAL
LIPASE: 9 U/L (ref 13–60)
LYMPHOCYTES ABSOLUTE: 0.65 E9/L (ref 1.5–4)
LYMPHOCYTES RELATIVE PERCENT: 12.3 % (ref 20–42)
Lab: NORMAL
MCH RBC QN AUTO: 26.3 PG (ref 26–35)
MCHC RBC AUTO-ENTMCNC: 32.3 % (ref 32–34.5)
MCV RBC AUTO: 81.4 FL (ref 80–99.9)
MONOCYTES ABSOLUTE: 0.27 E9/L (ref 0.1–0.95)
MONOCYTES RELATIVE PERCENT: 5.3 % (ref 2–12)
NEGATIVE QC PASS/FAIL: NORMAL
NEUTROPHILS ABSOLUTE: 4.43 E9/L (ref 1.8–7.3)
NEUTROPHILS RELATIVE PERCENT: 82.4 % (ref 43–80)
NITRITE, URINE: NEGATIVE
NUCLEATED RED BLOOD CELLS: 0 /100 WBC
PDW BLD-RTO: 14.3 FL (ref 11.5–15)
PH UA: 6.5 (ref 5–9)
PLATELET # BLD: 344 E9/L (ref 130–450)
PMV BLD AUTO: 8.5 FL (ref 7–12)
POLYCHROMASIA: ABNORMAL
POSITIVE QC PASS/FAIL: NORMAL
POTASSIUM SERPL-SCNC: 3.8 MMOL/L (ref 3.5–5)
PROTEIN UA: NEGATIVE MG/DL
RBC # BLD: 4.14 E12/L (ref 3.5–5.5)
RBC UA: ABNORMAL /HPF (ref 0–2)
SODIUM BLD-SCNC: 127 MMOL/L (ref 132–146)
SPECIFIC GRAVITY UA: <=1.005 (ref 1–1.03)
TOTAL PROTEIN: 6.9 G/DL (ref 6.4–8.3)
TOXIC GRANULATION: ABNORMAL
UROBILINOGEN, URINE: 0.2 E.U./DL
VACUOLATED NEUTROPHILS: ABNORMAL
WBC # BLD: 5.4 E9/L (ref 4.5–11.5)
WBC UA: ABNORMAL /HPF (ref 0–5)

## 2019-03-11 PROCEDURE — 80053 COMPREHEN METABOLIC PANEL: CPT

## 2019-03-11 PROCEDURE — 6360000004 HC RX CONTRAST MEDICATION: Performed by: RADIOLOGY

## 2019-03-11 PROCEDURE — 6370000000 HC RX 637 (ALT 250 FOR IP): Performed by: EMERGENCY MEDICINE

## 2019-03-11 PROCEDURE — 6360000002 HC RX W HCPCS: Performed by: EMERGENCY MEDICINE

## 2019-03-11 PROCEDURE — 83605 ASSAY OF LACTIC ACID: CPT

## 2019-03-11 PROCEDURE — 96374 THER/PROPH/DIAG INJ IV PUSH: CPT

## 2019-03-11 PROCEDURE — 2580000003 HC RX 258: Performed by: EMERGENCY MEDICINE

## 2019-03-11 PROCEDURE — 96375 TX/PRO/DX INJ NEW DRUG ADDON: CPT

## 2019-03-11 PROCEDURE — 74177 CT ABD & PELVIS W/CONTRAST: CPT

## 2019-03-11 PROCEDURE — 85025 COMPLETE CBC W/AUTO DIFF WBC: CPT

## 2019-03-11 PROCEDURE — 99284 EMERGENCY DEPT VISIT MOD MDM: CPT

## 2019-03-11 PROCEDURE — 81001 URINALYSIS AUTO W/SCOPE: CPT

## 2019-03-11 PROCEDURE — 83690 ASSAY OF LIPASE: CPT

## 2019-03-11 RX ORDER — ACETAMINOPHEN 500 MG
1000 TABLET ORAL ONCE
Status: COMPLETED | OUTPATIENT
Start: 2019-03-11 | End: 2019-03-11

## 2019-03-11 RX ORDER — METHYLPREDNISOLONE SODIUM SUCCINATE 125 MG/2ML
125 INJECTION, POWDER, LYOPHILIZED, FOR SOLUTION INTRAMUSCULAR; INTRAVENOUS ONCE
Status: COMPLETED | OUTPATIENT
Start: 2019-03-11 | End: 2019-03-11

## 2019-03-11 RX ORDER — ONDANSETRON 2 MG/ML
8 INJECTION INTRAMUSCULAR; INTRAVENOUS ONCE
Status: COMPLETED | OUTPATIENT
Start: 2019-03-11 | End: 2019-03-11

## 2019-03-11 RX ORDER — 0.9 % SODIUM CHLORIDE 0.9 %
1000 INTRAVENOUS SOLUTION INTRAVENOUS ONCE
Status: COMPLETED | OUTPATIENT
Start: 2019-03-11 | End: 2019-03-11

## 2019-03-11 RX ORDER — ONDANSETRON 4 MG/1
4 TABLET, FILM COATED ORAL EVERY 8 HOURS PRN
Qty: 12 TABLET | Refills: 0 | Status: SHIPPED | OUTPATIENT
Start: 2019-03-11 | End: 2019-03-18 | Stop reason: SDUPTHER

## 2019-03-11 RX ORDER — KETOROLAC TROMETHAMINE 30 MG/ML
15 INJECTION, SOLUTION INTRAMUSCULAR; INTRAVENOUS ONCE
Status: DISCONTINUED | OUTPATIENT
Start: 2019-03-11 | End: 2019-03-11

## 2019-03-11 RX ADMIN — METHYLPREDNISOLONE SODIUM SUCCINATE 125 MG: 125 INJECTION, POWDER, FOR SOLUTION INTRAMUSCULAR; INTRAVENOUS at 19:09

## 2019-03-11 RX ADMIN — ACETAMINOPHEN 1000 MG: 500 TABLET ORAL at 19:06

## 2019-03-11 RX ADMIN — SODIUM CHLORIDE 1000 ML: 9 INJECTION, SOLUTION INTRAVENOUS at 19:00

## 2019-03-11 RX ADMIN — IOPAMIDOL 110 ML: 755 INJECTION, SOLUTION INTRAVENOUS at 20:08

## 2019-03-11 RX ADMIN — ONDANSETRON 8 MG: 2 INJECTION INTRAMUSCULAR; INTRAVENOUS at 19:05

## 2019-03-11 ASSESSMENT — PAIN DESCRIPTION - DESCRIPTORS: DESCRIPTORS: CRAMPING

## 2019-03-11 ASSESSMENT — PAIN DESCRIPTION - LOCATION: LOCATION: ABDOMEN

## 2019-03-11 ASSESSMENT — ENCOUNTER SYMPTOMS
CONSTIPATION: 0
BLOOD IN STOOL: 0
HEMATOCHEZIA: 0
BACK PAIN: 0
HEMATEMESIS: 0
WHEEZING: 0
SHORTNESS OF BREATH: 0
DIARRHEA: 0
ABDOMINAL PAIN: 1
NAUSEA: 1
VOMITING: 1
COUGH: 0

## 2019-03-11 ASSESSMENT — PAIN SCALES - GENERAL
PAINLEVEL_OUTOF10: 7
PAINLEVEL_OUTOF10: 7

## 2019-03-11 ASSESSMENT — PAIN DESCRIPTION - PAIN TYPE: TYPE: ACUTE PAIN

## 2019-03-11 ASSESSMENT — PAIN DESCRIPTION - FREQUENCY: FREQUENCY: CONTINUOUS

## 2019-03-15 ENCOUNTER — HOSPITAL ENCOUNTER (OUTPATIENT)
Dept: INFUSION THERAPY | Age: 44
Setting detail: INFUSION SERIES
Discharge: HOME | End: 2019-03-15
Payer: COMMERCIAL

## 2019-03-15 VITALS
DIASTOLIC BLOOD PRESSURE: 78 MMHG | HEART RATE: 77 BPM | OXYGEN SATURATION: 98 % | SYSTOLIC BLOOD PRESSURE: 138 MMHG | WEIGHT: 217 LBS | RESPIRATION RATE: 16 BRPM | TEMPERATURE: 98.8 F | BODY MASS INDEX: 32.14 KG/M2 | HEIGHT: 69 IN

## 2019-03-15 DIAGNOSIS — K50.10 EXACERBATION OF CROHN'S DISEASE OF LARGE INTESTINE (HCC): Primary | ICD-10-CM

## 2019-03-15 PROCEDURE — 6360000002 HC RX W HCPCS: Performed by: INTERNAL MEDICINE

## 2019-03-15 PROCEDURE — 96365 THER/PROPH/DIAG IV INF INIT: CPT

## 2019-03-15 PROCEDURE — 2580000003 HC RX 258: Performed by: INTERNAL MEDICINE

## 2019-03-15 RX ORDER — SODIUM CHLORIDE 0.9 % (FLUSH) 0.9 %
30 SYRINGE (ML) INJECTION PRN
Status: CANCELLED | OUTPATIENT
Start: 2019-03-15

## 2019-03-15 RX ORDER — 0.9 % SODIUM CHLORIDE 0.9 %
10 VIAL (ML) INJECTION ONCE
Status: COMPLETED | OUTPATIENT
Start: 2019-03-15 | End: 2019-03-15

## 2019-03-15 RX ORDER — SODIUM CHLORIDE 0.9 % (FLUSH) 0.9 %
30 SYRINGE (ML) INJECTION PRN
Status: DISCONTINUED | OUTPATIENT
Start: 2019-03-15 | End: 2019-03-16 | Stop reason: HOSPADM

## 2019-03-15 RX ORDER — 0.9 % SODIUM CHLORIDE 0.9 %
10 VIAL (ML) INJECTION ONCE
Status: CANCELLED | OUTPATIENT
Start: 2019-03-15 | End: 2019-03-15

## 2019-03-15 RX ADMIN — Medication 10 ML: at 13:19

## 2019-03-15 RX ADMIN — VEDOLIZUMAB 300 MG: 300 INJECTION, POWDER, LYOPHILIZED, FOR SOLUTION INTRAVENOUS at 14:05

## 2019-03-15 RX ADMIN — Medication 10 ML: at 14:39

## 2019-03-15 RX ADMIN — Medication 10 ML: at 14:40

## 2019-03-15 RX ADMIN — Medication 10 ML: at 14:41

## 2019-03-15 ASSESSMENT — PAIN SCALES - GENERAL: PAINLEVEL_OUTOF10: 0

## 2019-03-15 NOTE — PROGRESS NOTES
Before infusion patient declined any infections, open wounds, or change in medical condition. Tolerated infusion well. Reviewed AVS with patient, reviewed medication information, verbalizes good knowledge of current plan, and has no signs or symptoms to report at this time. Declines copy of AVS. Reviewed therapy plan and scheduled next appointment. Patient stayed the 30 minutes after infusion was complete .

## 2019-03-18 ENCOUNTER — PATIENT MESSAGE (OUTPATIENT)
Dept: FAMILY MEDICINE CLINIC | Age: 44
End: 2019-03-18

## 2019-03-18 RX ORDER — ONDANSETRON 4 MG/1
4 TABLET, FILM COATED ORAL EVERY 8 HOURS PRN
Qty: 30 TABLET | Refills: 1 | Status: SHIPPED | OUTPATIENT
Start: 2019-03-18 | End: 2019-03-23

## 2019-03-29 ENCOUNTER — HOSPITAL ENCOUNTER (OUTPATIENT)
Dept: INFUSION THERAPY | Age: 44
Setting detail: INFUSION SERIES
Discharge: HOME | End: 2019-03-29
Payer: COMMERCIAL

## 2019-03-29 VITALS
TEMPERATURE: 99.5 F | SYSTOLIC BLOOD PRESSURE: 128 MMHG | WEIGHT: 217 LBS | RESPIRATION RATE: 16 BRPM | HEART RATE: 89 BPM | OXYGEN SATURATION: 98 % | BODY MASS INDEX: 32.05 KG/M2 | DIASTOLIC BLOOD PRESSURE: 70 MMHG

## 2019-03-29 DIAGNOSIS — K50.10 EXACERBATION OF CROHN'S DISEASE OF LARGE INTESTINE (HCC): Primary | ICD-10-CM

## 2019-03-29 PROCEDURE — 2580000003 HC RX 258: Performed by: INTERNAL MEDICINE

## 2019-03-29 PROCEDURE — 96365 THER/PROPH/DIAG IV INF INIT: CPT

## 2019-03-29 PROCEDURE — 6360000002 HC RX W HCPCS: Performed by: INTERNAL MEDICINE

## 2019-03-29 RX ORDER — 0.9 % SODIUM CHLORIDE 0.9 %
10 VIAL (ML) INJECTION ONCE
Status: COMPLETED | OUTPATIENT
Start: 2019-03-29 | End: 2019-03-29

## 2019-03-29 RX ORDER — ONDANSETRON 4 MG/1
4 TABLET, FILM COATED ORAL EVERY 8 HOURS PRN
COMMUNITY
End: 2019-05-25

## 2019-03-29 RX ORDER — 0.9 % SODIUM CHLORIDE 0.9 %
10 VIAL (ML) INJECTION ONCE
Status: CANCELLED | OUTPATIENT
Start: 2019-03-29 | End: 2019-03-29

## 2019-03-29 RX ORDER — SODIUM CHLORIDE 0.9 % (FLUSH) 0.9 %
30 SYRINGE (ML) INJECTION PRN
Status: DISCONTINUED | OUTPATIENT
Start: 2019-03-29 | End: 2019-03-30 | Stop reason: HOSPADM

## 2019-03-29 RX ORDER — SODIUM CHLORIDE 0.9 % (FLUSH) 0.9 %
30 SYRINGE (ML) INJECTION PRN
Status: CANCELLED | OUTPATIENT
Start: 2019-03-29

## 2019-03-29 RX ADMIN — Medication 10 ML: at 13:15

## 2019-03-29 RX ADMIN — Medication 10 ML: at 13:58

## 2019-03-29 RX ADMIN — Medication 10 ML: at 13:59

## 2019-03-29 RX ADMIN — Medication 10 ML: at 13:57

## 2019-03-29 RX ADMIN — VEDOLIZUMAB 300 MG: 300 INJECTION, POWDER, LYOPHILIZED, FOR SOLUTION INTRAVENOUS at 13:20

## 2019-03-29 ASSESSMENT — PAIN DESCRIPTION - PROGRESSION: CLINICAL_PROGRESSION: NOT CHANGED

## 2019-03-29 ASSESSMENT — PAIN DESCRIPTION - DESCRIPTORS: DESCRIPTORS: ACHING

## 2019-03-29 ASSESSMENT — PAIN DESCRIPTION - ONSET: ONSET: ON-GOING

## 2019-03-29 ASSESSMENT — PAIN DESCRIPTION - LOCATION: LOCATION: ABDOMEN

## 2019-03-29 ASSESSMENT — PAIN DESCRIPTION - PAIN TYPE: TYPE: CHRONIC PAIN

## 2019-03-29 ASSESSMENT — PAIN DESCRIPTION - FREQUENCY: FREQUENCY: CONTINUOUS

## 2019-04-11 ENCOUNTER — HOSPITAL ENCOUNTER (EMERGENCY)
Age: 44
Discharge: HOME | End: 2019-04-12
Attending: EMERGENCY MEDICINE
Payer: COMMERCIAL

## 2019-04-11 DIAGNOSIS — K52.9 COLITIS: Primary | ICD-10-CM

## 2019-04-11 DIAGNOSIS — E86.0 DEHYDRATION: ICD-10-CM

## 2019-04-11 PROCEDURE — 99284 EMERGENCY DEPT VISIT MOD MDM: CPT

## 2019-04-11 RX ORDER — KETOROLAC TROMETHAMINE 30 MG/ML
15 INJECTION, SOLUTION INTRAMUSCULAR; INTRAVENOUS ONCE
Status: COMPLETED | OUTPATIENT
Start: 2019-04-12 | End: 2019-04-12

## 2019-04-11 RX ORDER — ONDANSETRON 2 MG/ML
4 INJECTION INTRAMUSCULAR; INTRAVENOUS ONCE
Status: COMPLETED | OUTPATIENT
Start: 2019-04-12 | End: 2019-04-12

## 2019-04-11 RX ORDER — 0.9 % SODIUM CHLORIDE 0.9 %
2000 INTRAVENOUS SOLUTION INTRAVENOUS ONCE
Status: COMPLETED | OUTPATIENT
Start: 2019-04-12 | End: 2019-04-12

## 2019-04-11 ASSESSMENT — PAIN DESCRIPTION - PAIN TYPE: TYPE: ACUTE PAIN

## 2019-04-11 ASSESSMENT — PAIN DESCRIPTION - ORIENTATION: ORIENTATION: MID;UPPER

## 2019-04-11 ASSESSMENT — PAIN SCALES - GENERAL: PAINLEVEL_OUTOF10: 6

## 2019-04-11 ASSESSMENT — PAIN DESCRIPTION - DESCRIPTORS: DESCRIPTORS: ACHING

## 2019-04-11 ASSESSMENT — PAIN DESCRIPTION - FREQUENCY: FREQUENCY: CONTINUOUS

## 2019-04-11 ASSESSMENT — PAIN DESCRIPTION - LOCATION: LOCATION: ABDOMEN

## 2019-04-12 VITALS
OXYGEN SATURATION: 98 % | SYSTOLIC BLOOD PRESSURE: 147 MMHG | RESPIRATION RATE: 18 BRPM | HEART RATE: 81 BPM | DIASTOLIC BLOOD PRESSURE: 74 MMHG | BODY MASS INDEX: 31.1 KG/M2 | TEMPERATURE: 98.2 F | WEIGHT: 210 LBS | HEIGHT: 69 IN

## 2019-04-12 LAB
ALBUMIN SERPL-MCNC: 2.5 G/DL (ref 3.5–5.2)
ALP BLD-CCNC: 137 U/L (ref 35–104)
ALT SERPL-CCNC: 13 U/L (ref 0–32)
ANION GAP SERPL CALCULATED.3IONS-SCNC: 15 MMOL/L (ref 7–16)
AST SERPL-CCNC: 11 U/L (ref 0–31)
BASOPHILS ABSOLUTE: 0.01 E9/L (ref 0–0.2)
BASOPHILS RELATIVE PERCENT: 0.1 % (ref 0–2)
BILIRUB SERPL-MCNC: 0.2 MG/DL (ref 0–1.2)
BUN BLDV-MCNC: 10 MG/DL (ref 6–20)
C-REACTIVE PROTEIN: 8.5 MG/DL (ref 0–0.4)
CALCIUM SERPL-MCNC: 8.4 MG/DL (ref 8.6–10.2)
CHLORIDE BLD-SCNC: 94 MMOL/L (ref 98–107)
CO2: 28 MMOL/L (ref 22–29)
CREAT SERPL-MCNC: 0.9 MG/DL (ref 0.5–1)
EOSINOPHILS ABSOLUTE: 0 E9/L (ref 0.05–0.5)
EOSINOPHILS RELATIVE PERCENT: 0 % (ref 0–6)
GFR AFRICAN AMERICAN: >60
GFR NON-AFRICAN AMERICAN: >60 ML/MIN/1.73
GLUCOSE BLD-MCNC: 115 MG/DL (ref 74–99)
HCT VFR BLD CALC: 35.9 % (ref 34–48)
HEMOGLOBIN: 11 G/DL (ref 11.5–15.5)
IMMATURE GRANULOCYTES #: 0.05 E9/L
IMMATURE GRANULOCYTES %: 0.5 % (ref 0–5)
INFLUENZA A BY PCR: NOT DETECTED
INFLUENZA B BY PCR: NOT DETECTED
LACTIC ACID: 2.2 MMOL/L (ref 0.5–2.2)
LACTIC ACID: 4.4 MMOL/L (ref 0.5–2.2)
LYMPHOCYTES ABSOLUTE: 1.14 E9/L (ref 1.5–4)
LYMPHOCYTES RELATIVE PERCENT: 10.4 % (ref 20–42)
MCH RBC QN AUTO: 25.5 PG (ref 26–35)
MCHC RBC AUTO-ENTMCNC: 30.6 % (ref 32–34.5)
MCV RBC AUTO: 83.1 FL (ref 80–99.9)
MONOCYTES ABSOLUTE: 0.53 E9/L (ref 0.1–0.95)
MONOCYTES RELATIVE PERCENT: 4.8 % (ref 2–12)
NEUTROPHILS ABSOLUTE: 9.26 E9/L (ref 1.8–7.3)
NEUTROPHILS RELATIVE PERCENT: 84.2 % (ref 43–80)
PDW BLD-RTO: 14.6 FL (ref 11.5–15)
PLATELET # BLD: 411 E9/L (ref 130–450)
PMV BLD AUTO: 7.9 FL (ref 7–12)
POTASSIUM SERPL-SCNC: 3.5 MMOL/L (ref 3.5–5)
RBC # BLD: 4.32 E12/L (ref 3.5–5.5)
SEDIMENTATION RATE, ERYTHROCYTE: 54 MM/HR (ref 0–20)
SODIUM BLD-SCNC: 137 MMOL/L (ref 132–146)
TOTAL PROTEIN: 6.4 G/DL (ref 6.4–8.3)
WBC # BLD: 11 E9/L (ref 4.5–11.5)

## 2019-04-12 PROCEDURE — 96375 TX/PRO/DX INJ NEW DRUG ADDON: CPT

## 2019-04-12 PROCEDURE — 85025 COMPLETE CBC W/AUTO DIFF WBC: CPT

## 2019-04-12 PROCEDURE — 85651 RBC SED RATE NONAUTOMATED: CPT

## 2019-04-12 PROCEDURE — 80053 COMPREHEN METABOLIC PANEL: CPT

## 2019-04-12 PROCEDURE — 96361 HYDRATE IV INFUSION ADD-ON: CPT

## 2019-04-12 PROCEDURE — 2580000003 HC RX 258: Performed by: EMERGENCY MEDICINE

## 2019-04-12 PROCEDURE — 86140 C-REACTIVE PROTEIN: CPT

## 2019-04-12 PROCEDURE — 96374 THER/PROPH/DIAG INJ IV PUSH: CPT

## 2019-04-12 PROCEDURE — 6360000002 HC RX W HCPCS: Performed by: EMERGENCY MEDICINE

## 2019-04-12 PROCEDURE — 83605 ASSAY OF LACTIC ACID: CPT

## 2019-04-12 PROCEDURE — 87502 INFLUENZA DNA AMP PROBE: CPT

## 2019-04-12 RX ORDER — 0.9 % SODIUM CHLORIDE 0.9 %
1000 INTRAVENOUS SOLUTION INTRAVENOUS ONCE
Status: COMPLETED | OUTPATIENT
Start: 2019-04-12 | End: 2019-04-12

## 2019-04-12 RX ADMIN — ONDANSETRON 4 MG: 2 INJECTION INTRAMUSCULAR; INTRAVENOUS at 00:59

## 2019-04-12 RX ADMIN — KETOROLAC TROMETHAMINE 15 MG: 30 INJECTION, SOLUTION INTRAMUSCULAR at 00:59

## 2019-04-12 RX ADMIN — SODIUM CHLORIDE 1000 ML: 9 INJECTION, SOLUTION INTRAVENOUS at 03:42

## 2019-04-12 RX ADMIN — SODIUM CHLORIDE 1000 ML: 9 INJECTION, SOLUTION INTRAVENOUS at 02:53

## 2019-04-12 RX ADMIN — SODIUM CHLORIDE 2000 ML: 9 INJECTION, SOLUTION INTRAVENOUS at 00:59

## 2019-04-12 ASSESSMENT — PAIN SCALES - GENERAL: PAINLEVEL_OUTOF10: 4

## 2019-04-12 NOTE — ED PROVIDER NOTES
atraumatic  Eyes: PERRL, EOMI, conjunctiva normal  Mouth: Oropharynx clear, handling secretions, no asymmetry of the posterior oropharynx or uvular edema  Neck: Supple, full ROM, no meningeal signs  Respiratory: Lungs clear to auscultation bilaterally, no wheezes, rales, or rhonchi. Not in respiratory distress  Cardiovascular:  Regular rate. Regular rhythm. No murmurs, gallops, or rubs. 2+ distal pulses  GI:  Abdomen Soft, Non tender, Non distended. +BS. No rebound, guarding, or rigidity. No pulsatile masses. Musculoskeletal: Moves all extremities x 4. Warm and well perfused  Integument: skin warm and dry. No rashes. Neurologic: GCS 15, no focal deficits  Psychiatric: Normal Affect      -------------------------------------------------- RESULTS -------------------------------------------------  I have personally reviewed all laboratory and imaging results for this patient. Results are listed below.      LABS:  Results for orders placed or performed during the hospital encounter of 04/11/19   Rapid influenza A/B antigens   Result Value Ref Range    Influenza A by PCR Not Detected Not Detected    Influenza B by PCR Not Detected Not Detected   Comprehensive Metabolic Panel   Result Value Ref Range    Sodium 137 132 - 146 mmol/L    Potassium 3.5 3.5 - 5.0 mmol/L    Chloride 94 (L) 98 - 107 mmol/L    CO2 28 22 - 29 mmol/L    Anion Gap 15 7 - 16 mmol/L    Glucose 115 (H) 74 - 99 mg/dL    BUN 10 6 - 20 mg/dL    CREATININE 0.9 0.5 - 1.0 mg/dL    GFR Non-African American >60 >=60 mL/min/1.73    GFR African American >60     Calcium 8.4 (L) 8.6 - 10.2 mg/dL    Total Protein 6.4 6.4 - 8.3 g/dL    Alb 2.5 (L) 3.5 - 5.2 g/dL    Total Bilirubin 0.2 0.0 - 1.2 mg/dL    Alkaline Phosphatase 137 (H) 35 - 104 U/L    ALT 13 0 - 32 U/L    AST 11 0 - 31 U/L   CBC Auto Differential   Result Value Ref Range    WBC 11.0 4.5 - 11.5 E9/L    RBC 4.32 3.50 - 5.50 E12/L    Hemoglobin 11.0 (L) 11.5 - 15.5 g/dL    Hematocrit 35.9 34.0 - 48.0 % MCV 83.1 80.0 - 99.9 fL    MCH 25.5 (L) 26.0 - 35.0 pg    MCHC 30.6 (L) 32.0 - 34.5 %    RDW 14.6 11.5 - 15.0 fL    Platelets 437 047 - 452 E9/L    MPV 7.9 7.0 - 12.0 fL    Neutrophils % 84.2 (H) 43.0 - 80.0 %    Immature Granulocytes % 0.5 0.0 - 5.0 %    Lymphocytes % 10.4 (L) 20.0 - 42.0 %    Monocytes % 4.8 2.0 - 12.0 %    Eosinophils % 0.0 0.0 - 6.0 %    Basophils % 0.1 0.0 - 2.0 %    Neutrophils # 9.26 (H) 1.80 - 7.30 E9/L    Immature Granulocytes # 0.05 E9/L    Lymphocytes # 1.14 (L) 1.50 - 4.00 E9/L    Monocytes # 0.53 0.10 - 0.95 E9/L    Eosinophils # 0.00 (L) 0.05 - 0.50 E9/L    Basophils # 0.01 0.00 - 0.20 E9/L   Lactic Acid, Plasma   Result Value Ref Range    Lactic Acid 4.4 (HH) 0.5 - 2.2 mmol/L   Sedimentation Rate   Result Value Ref Range    Sed Rate 54 (H) 0 - 20 mm/Hr   Lactic acid, plasma   Result Value Ref Range    Lactic Acid 2.2 0.5 - 2.2 mmol/L       RADIOLOGY:  Interpreted by Radiologist.  No orders to display       ------------------------- NURSING NOTES AND VITALS REVIEWED ---------------------------   The nursing notes within the ED encounter and vital signs as below have been reviewed by myself. /77   Pulse 76   Temp 98.2 °F (36.8 °C) (Oral)   Resp 16   Ht 5' 9\" (1.753 m)   Wt 210 lb (95.3 kg)   SpO2 100%   BMI 31.01 kg/m²   Oxygen Saturation Interpretation: Normal    The patients available past medical records and past encounters were reviewed.         ------------------------------ ED COURSE/MEDICAL DECISION MAKING----------------------  Medications   0.9 % sodium chloride bolus (has no administration in time range)   0.9 % sodium chloride bolus (0 mLs Intravenous Stopped 4/12/19 0253)   ondansetron (ZOFRAN) injection 4 mg (4 mg Intravenous Given 4/12/19 0059)   ketorolac (TORADOL) injection 15 mg (15 mg Intravenous Given 4/12/19 0059)   0.9 % sodium chloride bolus (0 mLs Intravenous Stopped 4/12/19 0332)            Medical Decision Making:    Pt presents for abdominal pain. Hx of colitis. IV fluids and medications given. Imaging and labs obtained and reviewed. Results discussed with pt and family. This patient's ED course included: a personal history and physicial examination and re-evaluation prior to disposition    This patient has remained hemodynamically stable during their ED course. Re-Evaluations:           Re-evaluation. Patients symptoms are improving    Consultations:             na    Counseling: The emergency provider has spoken with the patient and family and discussed todays results, in addition to providing specific details for the plan of care and counseling regarding the diagnosis and prognosis. Questions are answered at this time and they are agreeable with the plan.       --------------------------------- IMPRESSION AND DISPOSITION ---------------------------------    IMPRESSION  1. Colitis Stable   2. Dehydration Improving       DISPOSITION  Disposition: Discharge to home  Patient condition is stable    4/12/19, 12:03 AM.    This note is prepared by Dorota Travis, acting as Scribe for Miriam Thompson MD.    Miriam Thompson MD:  The scribe's documentation has been prepared under my direction and personally reviewed by me in its entirety. I confirm that the note above accurately reflects all work, treatment, procedures, and medical decision making performed by me.              Miriam Thompson MD  04/12/19 [id number]

## 2019-04-15 ENCOUNTER — APPOINTMENT (OUTPATIENT)
Dept: CT IMAGING | Age: 44
End: 2019-04-15
Payer: COMMERCIAL

## 2019-04-15 ENCOUNTER — HOSPITAL ENCOUNTER (EMERGENCY)
Age: 44
Discharge: ANOTHER HOSPITAL | End: 2019-04-16
Attending: EMERGENCY MEDICINE
Payer: COMMERCIAL

## 2019-04-15 ENCOUNTER — OFFICE VISIT (OUTPATIENT)
Dept: FAMILY MEDICINE CLINIC | Age: 44
End: 2019-04-15
Payer: COMMERCIAL

## 2019-04-15 VITALS
OXYGEN SATURATION: 100 % | SYSTOLIC BLOOD PRESSURE: 132 MMHG | BODY MASS INDEX: 28.88 KG/M2 | RESPIRATION RATE: 16 BRPM | DIASTOLIC BLOOD PRESSURE: 82 MMHG | HEART RATE: 114 BPM | WEIGHT: 195 LBS | TEMPERATURE: 99.8 F | HEIGHT: 69 IN

## 2019-04-15 DIAGNOSIS — E86.0 DEHYDRATION: ICD-10-CM

## 2019-04-15 DIAGNOSIS — K50.10 EXACERBATION OF CROHN'S DISEASE OF LARGE INTESTINE (HCC): Primary | ICD-10-CM

## 2019-04-15 DIAGNOSIS — K50.118 CROHN'S COLITIS, OTHER COMPLICATION (HCC): Primary | ICD-10-CM

## 2019-04-15 LAB
ALBUMIN SERPL-MCNC: 2.4 G/DL (ref 3.5–5.2)
ALP BLD-CCNC: 124 U/L (ref 35–104)
ALT SERPL-CCNC: 13 U/L (ref 0–32)
ANION GAP SERPL CALCULATED.3IONS-SCNC: 11 MMOL/L (ref 7–16)
AST SERPL-CCNC: 12 U/L (ref 0–31)
BASOPHILS ABSOLUTE: 0.01 E9/L (ref 0–0.2)
BASOPHILS RELATIVE PERCENT: 0.2 % (ref 0–2)
BILIRUB SERPL-MCNC: 0.7 MG/DL (ref 0–1.2)
BUN BLDV-MCNC: 6 MG/DL (ref 6–20)
CALCIUM SERPL-MCNC: 8.1 MG/DL (ref 8.6–10.2)
CHLORIDE BLD-SCNC: 91 MMOL/L (ref 98–107)
CO2: 28 MMOL/L (ref 22–29)
CREAT SERPL-MCNC: 0.7 MG/DL (ref 0.5–1)
EOSINOPHILS ABSOLUTE: 0 E9/L (ref 0.05–0.5)
EOSINOPHILS RELATIVE PERCENT: 0 % (ref 0–6)
GFR AFRICAN AMERICAN: >60
GFR NON-AFRICAN AMERICAN: >60 ML/MIN/1.73
GLUCOSE BLD-MCNC: 134 MG/DL (ref 74–99)
HCT VFR BLD CALC: 34.1 % (ref 34–48)
HEMOGLOBIN: 10.8 G/DL (ref 11.5–15.5)
IMMATURE GRANULOCYTES #: 0.04 E9/L
IMMATURE GRANULOCYTES %: 0.6 % (ref 0–5)
LACTIC ACID: 1.6 MMOL/L (ref 0.5–2.2)
LIPASE: 6 U/L (ref 13–60)
LYMPHOCYTES ABSOLUTE: 1.04 E9/L (ref 1.5–4)
LYMPHOCYTES RELATIVE PERCENT: 16.7 % (ref 20–42)
MCH RBC QN AUTO: 25.8 PG (ref 26–35)
MCHC RBC AUTO-ENTMCNC: 31.7 % (ref 32–34.5)
MCV RBC AUTO: 81.6 FL (ref 80–99.9)
MONOCYTES ABSOLUTE: 0.29 E9/L (ref 0.1–0.95)
MONOCYTES RELATIVE PERCENT: 4.6 % (ref 2–12)
NEUTROPHILS ABSOLUTE: 4.86 E9/L (ref 1.8–7.3)
NEUTROPHILS RELATIVE PERCENT: 77.9 % (ref 43–80)
PDW BLD-RTO: 15.2 FL (ref 11.5–15)
PLATELET # BLD: 297 E9/L (ref 130–450)
PMV BLD AUTO: 8 FL (ref 7–12)
POTASSIUM SERPL-SCNC: 3.5 MMOL/L (ref 3.5–5)
RBC # BLD: 4.18 E12/L (ref 3.5–5.5)
SODIUM BLD-SCNC: 130 MMOL/L (ref 132–146)
TOTAL PROTEIN: 5.9 G/DL (ref 6.4–8.3)
WBC # BLD: 6.2 E9/L (ref 4.5–11.5)

## 2019-04-15 PROCEDURE — 83690 ASSAY OF LIPASE: CPT

## 2019-04-15 PROCEDURE — 80053 COMPREHEN METABOLIC PANEL: CPT

## 2019-04-15 PROCEDURE — 2580000003 HC RX 258: Performed by: STUDENT IN AN ORGANIZED HEALTH CARE EDUCATION/TRAINING PROGRAM

## 2019-04-15 PROCEDURE — 96361 HYDRATE IV INFUSION ADD-ON: CPT

## 2019-04-15 PROCEDURE — 36415 COLL VENOUS BLD VENIPUNCTURE: CPT

## 2019-04-15 PROCEDURE — 99215 OFFICE O/P EST HI 40 MIN: CPT | Performed by: FAMILY MEDICINE

## 2019-04-15 PROCEDURE — 99285 EMERGENCY DEPT VISIT HI MDM: CPT

## 2019-04-15 PROCEDURE — 74177 CT ABD & PELVIS W/CONTRAST: CPT

## 2019-04-15 PROCEDURE — 6360000004 HC RX CONTRAST MEDICATION: Performed by: RADIOLOGY

## 2019-04-15 PROCEDURE — 85025 COMPLETE CBC W/AUTO DIFF WBC: CPT

## 2019-04-15 PROCEDURE — 83605 ASSAY OF LACTIC ACID: CPT

## 2019-04-15 RX ORDER — 0.9 % SODIUM CHLORIDE 0.9 %
1000 INTRAVENOUS SOLUTION INTRAVENOUS ONCE
Status: COMPLETED | OUTPATIENT
Start: 2019-04-15 | End: 2019-04-15

## 2019-04-15 RX ADMIN — IOPAMIDOL 110 ML: 755 INJECTION, SOLUTION INTRAVENOUS at 21:26

## 2019-04-15 RX ADMIN — SODIUM CHLORIDE 1000 ML: 9 INJECTION, SOLUTION INTRAVENOUS at 20:23

## 2019-04-15 ASSESSMENT — ENCOUNTER SYMPTOMS
SORE THROAT: 0
COUGH: 0
BACK PAIN: 0
BLOOD IN STOOL: 0
SHORTNESS OF BREATH: 0
NAUSEA: 0
CONSTIPATION: 0
DIARRHEA: 1
RHINORRHEA: 0
CHEST TIGHTNESS: 0
WHEEZING: 0
VOMITING: 0
ABDOMINAL PAIN: 1

## 2019-04-15 NOTE — ED PROVIDER NOTES
Patient is 69-year-old female with history of Crohn's disease who presents emergency Department for dehydration and increased symptoms. Patient was sent by her primary care physician. Patient saw her primary care physician today because she said increased flare up with her Crohn's disease. Patient's primary care provider felt that she is dehydrated and he come the emergency department for hydration. Also concerned the patient may need inpatient colonoscopy. Patient has tried outpatient colonoscopy with failure. She is not able to prep. Patient is having multiple episodes of diarrhea per hour. She denies any headache, chest pain, shortness of breath. Review of Systems   Constitutional: Positive for fatigue. Negative for appetite change, diaphoresis and fever. HENT: Negative for congestion, rhinorrhea and sore throat. Eyes: Negative for visual disturbance. Respiratory: Negative for cough, chest tightness, shortness of breath and wheezing. Cardiovascular: Negative for chest pain, palpitations and leg swelling. Gastrointestinal: Positive for abdominal pain and diarrhea. Negative for blood in stool, constipation, nausea and vomiting. Endocrine: Negative for polyuria. Genitourinary: Negative for decreased urine volume, dysuria and vaginal discharge. Musculoskeletal: Negative for back pain, neck pain and neck stiffness. Skin: Negative for rash. Neurological: Negative for syncope, weakness, light-headedness and headaches. Hematological: Negative for adenopathy. Psychiatric/Behavioral: Negative for confusion. Physical Exam   Constitutional: She is oriented to person, place, and time. She appears well-developed and well-nourished. No distress. HENT:   Head: Normocephalic and atraumatic. Mouth/Throat: No oropharyngeal exudate. Eyes: Pupils are equal, round, and reactive to light. EOM are normal. Right eye exhibits no discharge. Left eye exhibits no discharge. No scleral icterus.

## 2019-04-15 NOTE — PROGRESS NOTES
Chief Complaint   Patient presents with    Ulcerative Colitis       HPI:  Janneth Rivera presents to the office for ER follow up. Patient was seen in the ER for colitis. Since being discharged from the ER Perla's  symptoms have been ongoing. She states she has been dehydrated and having diarrhea. Any prescribed medications have not been changed. Discharge instructions and any medication changes were again reviewed. Pt was diagnosed with Crohn's on CT scan and colonoscopy/biopsy 3/2018. She was started on Prednisone with no improvement. She has been hospitalized twice for flares and dehydration. She has tried Humira with limited response from 9/18-1/19. She was seen at [facility] Dr. Mark Montyoa who recommended a clinical trial medication. She was switched to [facility] by Dr. Brandy Ortega. She has had two infusions with a third scheduled on 4/26. She was starting the prep for a colonoscopy last Monday and began having severe diarrhea worse than ever. She ended up in ER and had 4 L of IVF. She never improved and has had diarrhea 1-3 times every hour since then. She is only eating less than 500 calories per day and cannot hydrate enough. She has lost 104 lbs since diagnosis a year ago. Patient's past medical, surgical, social and/or family history reviewed, updated in chart, and are non-contributory (unless otherwise stated). Medications and allergies also reviewed and updated in chart.       Review of Systems:  Constitutional:  No fever, no fatigue, no chills, no headaches, no weight change  Dermatology:  No rash, no mole, no dry or sensitive skin  ENT:  No cough, no sore throat, no sinus pain, no runny nose, no ear pain  Cardiology:  No chest pain, no palpitations, no leg edema, no shortness of breath, no PND  Gastroenterology:  No dysphagia, no abdominal pain, no nausea, no vomiting, no constipation, no diarrhea, no heartburn  Musculoskeletal:  No joint pain, no leg cramps, no back pain, no muscle aches  Respiratory:  No assure aware of all possible risks and side effects of medication before taking. Reviewed age and gender appropriate health screening exams and vaccinations. Advised patient regarding importance of keeping up with recommended health maintenance and to schedule as soon as possible if overdue, as this is important in assessing for undiagnosed pathology, especially cancer, as well as protecting against potentially harmful/life threatening disease. Patient and/or guardian verbalizes understanding and agrees with above counseling, assessment and plan. All questions answered.

## 2019-04-16 VITALS
BODY MASS INDEX: 28.88 KG/M2 | SYSTOLIC BLOOD PRESSURE: 140 MMHG | HEART RATE: 81 BPM | WEIGHT: 195 LBS | TEMPERATURE: 97.7 F | RESPIRATION RATE: 20 BRPM | DIASTOLIC BLOOD PRESSURE: 76 MMHG | OXYGEN SATURATION: 100 % | HEIGHT: 69 IN

## 2019-04-16 PROCEDURE — 96374 THER/PROPH/DIAG INJ IV PUSH: CPT

## 2019-04-16 PROCEDURE — 6360000002 HC RX W HCPCS

## 2019-04-16 RX ORDER — ONDANSETRON 2 MG/ML
INJECTION INTRAMUSCULAR; INTRAVENOUS
Status: COMPLETED
Start: 2019-04-16 | End: 2019-04-16

## 2019-04-16 RX ORDER — ONDANSETRON 2 MG/ML
4 INJECTION INTRAMUSCULAR; INTRAVENOUS EVERY 6 HOURS PRN
Status: DISCONTINUED | OUTPATIENT
Start: 2019-04-16 | End: 2019-04-16

## 2019-04-16 RX ORDER — ONDANSETRON 2 MG/ML
4 INJECTION INTRAMUSCULAR; INTRAVENOUS ONCE
Status: COMPLETED | OUTPATIENT
Start: 2019-04-16 | End: 2019-04-16

## 2019-04-16 RX ADMIN — ONDANSETRON 4 MG: 2 INJECTION INTRAMUSCULAR; INTRAVENOUS at 07:31

## 2019-04-16 NOTE — PROGRESS NOTES
0133 called access center at this time, currently no bed assignment at this time at [facility].

## 2019-04-16 NOTE — PROGRESS NOTES
Per Dr. Srini Garica, [facility] @ 1990. Requesting transfer to [facility] for Complications of Crohns Disease.

## 2019-04-26 ENCOUNTER — HOSPITAL ENCOUNTER (OUTPATIENT)
Dept: INFUSION THERAPY | Age: 44
Setting detail: INFUSION SERIES
Discharge: HOME | End: 2019-04-26
Payer: COMMERCIAL

## 2019-04-26 NOTE — PROGRESS NOTES
Received message from pt  that she is in the hospital and  is taking her off entyvio and starting remicade.  Wants to cancel all future entyvio appts

## 2019-05-09 ENCOUNTER — TELEPHONE (OUTPATIENT)
Dept: FAMILY MEDICINE CLINIC | Age: 44
End: 2019-05-09

## 2019-05-09 RX ORDER — GABAPENTIN 300 MG/1
300 CAPSULE ORAL 3 TIMES DAILY
Qty: 90 CAPSULE | Refills: 1 | Status: SHIPPED | OUTPATIENT
Start: 2019-05-09 | End: 2019-07-09 | Stop reason: SDUPTHER

## 2019-05-09 NOTE — TELEPHONE ENCOUNTER
Bianca Jackson from [facility] left message stating that patient was discharge from [facility] yesterday and they will be following her for new illeostomy and teaching. Patient was on gabapentin 300 mg TID while admitted at [facility] but it was stopped at discharge. She is complaining of a lot of pain in both legs due to her neuropathy and is requesting a Rx for Gabapentin. She asked if it could be sent to [facility] in [place].

## 2019-05-22 RX ORDER — OMEPRAZOLE 40 MG/1
CAPSULE, DELAYED RELEASE ORAL
Qty: 90 CAPSULE | Refills: 3 | Status: SHIPPED
Start: 2019-05-22 | End: 2020-06-11 | Stop reason: SDUPTHER

## 2019-05-24 ENCOUNTER — HOSPITAL ENCOUNTER (OUTPATIENT)
Age: 44
Setting detail: OBSERVATION
Discharge: HOME | End: 2019-05-25
Attending: EMERGENCY MEDICINE | Admitting: INTERNAL MEDICINE
Payer: COMMERCIAL

## 2019-05-24 DIAGNOSIS — I47.1 PAROXYSMAL SUPRAVENTRICULAR TACHYCARDIA (HCC): Primary | ICD-10-CM

## 2019-05-24 PROCEDURE — 84443 ASSAY THYROID STIM HORMONE: CPT

## 2019-05-24 PROCEDURE — 84484 ASSAY OF TROPONIN QUANT: CPT

## 2019-05-24 PROCEDURE — 83880 ASSAY OF NATRIURETIC PEPTIDE: CPT

## 2019-05-24 PROCEDURE — 2580000003 HC RX 258: Performed by: EMERGENCY MEDICINE

## 2019-05-24 PROCEDURE — 93005 ELECTROCARDIOGRAM TRACING: CPT | Performed by: EMERGENCY MEDICINE

## 2019-05-24 PROCEDURE — 99285 EMERGENCY DEPT VISIT HI MDM: CPT

## 2019-05-24 PROCEDURE — 80053 COMPREHEN METABOLIC PANEL: CPT

## 2019-05-24 PROCEDURE — 85025 COMPLETE CBC W/AUTO DIFF WBC: CPT

## 2019-05-24 PROCEDURE — 36415 COLL VENOUS BLD VENIPUNCTURE: CPT

## 2019-05-24 RX ORDER — 0.9 % SODIUM CHLORIDE 0.9 %
1000 INTRAVENOUS SOLUTION INTRAVENOUS ONCE
Status: COMPLETED | OUTPATIENT
Start: 2019-05-24 | End: 2019-05-25

## 2019-05-24 RX ADMIN — SODIUM CHLORIDE 1000 ML: 9 INJECTION, SOLUTION INTRAVENOUS at 23:39

## 2019-05-25 ENCOUNTER — APPOINTMENT (OUTPATIENT)
Dept: GENERAL RADIOLOGY | Age: 44
End: 2019-05-25
Payer: COMMERCIAL

## 2019-05-25 VITALS
BODY MASS INDEX: 27.4 KG/M2 | HEART RATE: 86 BPM | TEMPERATURE: 98 F | WEIGHT: 185 LBS | RESPIRATION RATE: 18 BRPM | OXYGEN SATURATION: 98 % | SYSTOLIC BLOOD PRESSURE: 155 MMHG | DIASTOLIC BLOOD PRESSURE: 69 MMHG | HEIGHT: 69 IN

## 2019-05-25 PROBLEM — I47.10 SVT (SUPRAVENTRICULAR TACHYCARDIA): Status: ACTIVE | Noted: 2019-05-25

## 2019-05-25 PROBLEM — N63.20 LEFT BREAST MASS: Status: RESOLVED | Noted: 2017-10-02 | Resolved: 2019-05-25

## 2019-05-25 PROBLEM — E44.0 MODERATE PROTEIN-CALORIE MALNUTRITION (HCC): Chronic | Status: RESOLVED | Noted: 2018-12-14 | Resolved: 2019-05-25

## 2019-05-25 PROBLEM — D64.9 ANEMIA: Chronic | Status: ACTIVE | Noted: 2019-05-25

## 2019-05-25 PROBLEM — I47.1 SVT (SUPRAVENTRICULAR TACHYCARDIA) (HCC): Status: ACTIVE | Noted: 2019-05-25

## 2019-05-25 PROBLEM — R07.9 CHEST PAIN: Status: ACTIVE | Noted: 2019-05-25

## 2019-05-25 PROBLEM — R79.89 ELEVATED LACTIC ACID LEVEL: Status: RESOLVED | Noted: 2018-12-12 | Resolved: 2019-05-25

## 2019-05-25 LAB
ALBUMIN SERPL-MCNC: 3.7 G/DL (ref 3.5–5.2)
ALP BLD-CCNC: 56 U/L (ref 35–104)
ALT SERPL-CCNC: 85 U/L (ref 0–32)
ANION GAP SERPL CALCULATED.3IONS-SCNC: 12 MMOL/L (ref 7–16)
AST SERPL-CCNC: 89 U/L (ref 0–31)
BASOPHILS ABSOLUTE: 0.01 E9/L (ref 0–0.2)
BASOPHILS RELATIVE PERCENT: 0.1 % (ref 0–2)
BILIRUB SERPL-MCNC: 0.2 MG/DL (ref 0–1.2)
BUN BLDV-MCNC: 29 MG/DL (ref 6–20)
CALCIUM SERPL-MCNC: 9.2 MG/DL (ref 8.6–10.2)
CHLORIDE BLD-SCNC: 107 MMOL/L (ref 98–107)
CHOLESTEROL, TOTAL: 211 MG/DL (ref 0–199)
CO2: 22 MMOL/L (ref 22–29)
CREAT SERPL-MCNC: 0.7 MG/DL (ref 0.5–1)
EKG ATRIAL RATE: 104 BPM
EKG P AXIS: 70 DEGREES
EKG P-R INTERVAL: 150 MS
EKG Q-T INTERVAL: 338 MS
EKG QRS DURATION: 76 MS
EKG QTC CALCULATION (BAZETT): 444 MS
EKG R AXIS: 7 DEGREES
EKG T AXIS: 25 DEGREES
EKG VENTRICULAR RATE: 104 BPM
EOSINOPHILS ABSOLUTE: 0.05 E9/L (ref 0.05–0.5)
EOSINOPHILS RELATIVE PERCENT: 0.7 % (ref 0–6)
GFR AFRICAN AMERICAN: >60
GFR NON-AFRICAN AMERICAN: >60 ML/MIN/1.73
GLUCOSE BLD-MCNC: 104 MG/DL (ref 74–99)
HCG QUALITATIVE: NEGATIVE
HCT VFR BLD CALC: 29.3 % (ref 34–48)
HDLC SERPL-MCNC: 75 MG/DL
HEMOGLOBIN: 8.6 G/DL (ref 11.5–15.5)
IMMATURE GRANULOCYTES #: 0.19 E9/L
IMMATURE GRANULOCYTES %: 2.7 % (ref 0–5)
LDL CHOLESTEROL CALCULATED: 98 MG/DL (ref 0–99)
LYMPHOCYTES ABSOLUTE: 1.54 E9/L (ref 1.5–4)
LYMPHOCYTES RELATIVE PERCENT: 22 % (ref 20–42)
MCH RBC QN AUTO: 27.2 PG (ref 26–35)
MCHC RBC AUTO-ENTMCNC: 29.4 % (ref 32–34.5)
MCV RBC AUTO: 92.7 FL (ref 80–99.9)
MONOCYTES ABSOLUTE: 0.69 E9/L (ref 0.1–0.95)
MONOCYTES RELATIVE PERCENT: 9.8 % (ref 2–12)
NEUTROPHILS ABSOLUTE: 4.53 E9/L (ref 1.8–7.3)
NEUTROPHILS RELATIVE PERCENT: 64.7 % (ref 43–80)
PDW BLD-RTO: 19.2 FL (ref 11.5–15)
PLATELET # BLD: 241 E9/L (ref 130–450)
PMV BLD AUTO: 9.3 FL (ref 7–12)
POTASSIUM SERPL-SCNC: 4.2 MMOL/L (ref 3.5–5)
PRO-BNP: 174 PG/ML (ref 0–125)
RBC # BLD: 3.16 E12/L (ref 3.5–5.5)
SODIUM BLD-SCNC: 141 MMOL/L (ref 132–146)
T4 FREE: 1.2 NG/DL (ref 0.93–1.7)
TOTAL PROTEIN: 6.5 G/DL (ref 6.4–8.3)
TRIGL SERPL-MCNC: 191 MG/DL (ref 0–149)
TROPONIN: 0.07 NG/ML (ref 0–0.03)
TROPONIN: <0.01 NG/ML (ref 0–0.03)
TSH SERPL DL<=0.05 MIU/L-ACNC: 1.46 UIU/ML (ref 0.27–4.2)
VLDLC SERPL CALC-MCNC: 38 MG/DL
WBC # BLD: 7 E9/L (ref 4.5–11.5)

## 2019-05-25 PROCEDURE — 84484 ASSAY OF TROPONIN QUANT: CPT

## 2019-05-25 PROCEDURE — APPSS45 APP SPLIT SHARED TIME 31-45 MINUTES: Performed by: PHYSICIAN ASSISTANT

## 2019-05-25 PROCEDURE — 84439 ASSAY OF FREE THYROXINE: CPT

## 2019-05-25 PROCEDURE — 2580000003 HC RX 258: Performed by: INTERNAL MEDICINE

## 2019-05-25 PROCEDURE — 96372 THER/PROPH/DIAG INJ SC/IM: CPT

## 2019-05-25 PROCEDURE — 6370000000 HC RX 637 (ALT 250 FOR IP): Performed by: INTERNAL MEDICINE

## 2019-05-25 PROCEDURE — 71045 X-RAY EXAM CHEST 1 VIEW: CPT

## 2019-05-25 PROCEDURE — 99255 IP/OBS CONSLTJ NEW/EST HI 80: CPT | Performed by: INTERNAL MEDICINE

## 2019-05-25 PROCEDURE — 6360000002 HC RX W HCPCS: Performed by: INTERNAL MEDICINE

## 2019-05-25 PROCEDURE — G0378 HOSPITAL OBSERVATION PER HR: HCPCS

## 2019-05-25 PROCEDURE — 93010 ELECTROCARDIOGRAM REPORT: CPT | Performed by: INTERNAL MEDICINE

## 2019-05-25 PROCEDURE — 84703 CHORIONIC GONADOTROPIN ASSAY: CPT

## 2019-05-25 PROCEDURE — 36415 COLL VENOUS BLD VENIPUNCTURE: CPT

## 2019-05-25 PROCEDURE — 80061 LIPID PANEL: CPT

## 2019-05-25 RX ORDER — ASPIRIN 81 MG/1
81 TABLET, CHEWABLE ORAL DAILY
Status: DISCONTINUED | OUTPATIENT
Start: 2019-05-26 | End: 2019-05-25 | Stop reason: HOSPADM

## 2019-05-25 RX ORDER — DULOXETIN HYDROCHLORIDE 30 MG/1
30 CAPSULE, DELAYED RELEASE ORAL DAILY
Status: DISCONTINUED | OUTPATIENT
Start: 2019-05-25 | End: 2019-05-25 | Stop reason: HOSPADM

## 2019-05-25 RX ORDER — GABAPENTIN 300 MG/1
300 CAPSULE ORAL 3 TIMES DAILY
Status: DISCONTINUED | OUTPATIENT
Start: 2019-05-25 | End: 2019-05-25 | Stop reason: HOSPADM

## 2019-05-25 RX ORDER — ONDANSETRON 2 MG/ML
4 INJECTION INTRAMUSCULAR; INTRAVENOUS EVERY 6 HOURS PRN
Status: DISCONTINUED | OUTPATIENT
Start: 2019-05-25 | End: 2019-05-25 | Stop reason: HOSPADM

## 2019-05-25 RX ORDER — ATORVASTATIN CALCIUM 40 MG/1
40 TABLET, FILM COATED ORAL NIGHTLY
Qty: 30 TABLET | Refills: 3 | Status: SHIPPED | OUTPATIENT
Start: 2019-05-25 | End: 2019-07-29 | Stop reason: SDUPTHER

## 2019-05-25 RX ORDER — ACETAMINOPHEN 325 MG/1
650 TABLET ORAL EVERY 4 HOURS PRN
Status: DISCONTINUED | OUTPATIENT
Start: 2019-05-25 | End: 2019-05-25 | Stop reason: HOSPADM

## 2019-05-25 RX ORDER — ACETAMINOPHEN 325 MG/1
650 TABLET ORAL EVERY 6 HOURS PRN
COMMUNITY
End: 2020-01-13

## 2019-05-25 RX ORDER — SODIUM CHLORIDE 0.9 % (FLUSH) 0.9 %
10 SYRINGE (ML) INJECTION PRN
Status: DISCONTINUED | OUTPATIENT
Start: 2019-05-25 | End: 2019-05-25 | Stop reason: HOSPADM

## 2019-05-25 RX ORDER — FLUTICASONE PROPIONATE 50 MCG
1 SPRAY, SUSPENSION (ML) NASAL DAILY
Status: DISCONTINUED | OUTPATIENT
Start: 2019-05-25 | End: 2019-05-25 | Stop reason: HOSPADM

## 2019-05-25 RX ORDER — METOPROLOL SUCCINATE 25 MG/1
25 TABLET, EXTENDED RELEASE ORAL DAILY
Status: DISCONTINUED | OUTPATIENT
Start: 2019-05-25 | End: 2019-05-25 | Stop reason: HOSPADM

## 2019-05-25 RX ORDER — PANTOPRAZOLE SODIUM 40 MG/1
40 TABLET, DELAYED RELEASE ORAL
Status: DISCONTINUED | OUTPATIENT
Start: 2019-05-25 | End: 2019-05-25 | Stop reason: HOSPADM

## 2019-05-25 RX ORDER — ATORVASTATIN CALCIUM 40 MG/1
40 TABLET, FILM COATED ORAL NIGHTLY
Status: DISCONTINUED | OUTPATIENT
Start: 2019-05-25 | End: 2019-05-25 | Stop reason: HOSPADM

## 2019-05-25 RX ORDER — METOPROLOL SUCCINATE 25 MG/1
25 TABLET, EXTENDED RELEASE ORAL DAILY
Qty: 30 TABLET | Refills: 3 | Status: SHIPPED | OUTPATIENT
Start: 2019-05-26 | End: 2019-07-29 | Stop reason: SDUPTHER

## 2019-05-25 RX ORDER — SODIUM CHLORIDE 0.9 % (FLUSH) 0.9 %
10 SYRINGE (ML) INJECTION EVERY 12 HOURS SCHEDULED
Status: DISCONTINUED | OUTPATIENT
Start: 2019-05-25 | End: 2019-05-25 | Stop reason: HOSPADM

## 2019-05-25 RX ADMIN — ENOXAPARIN SODIUM 40 MG: 40 INJECTION SUBCUTANEOUS at 09:05

## 2019-05-25 RX ADMIN — ACETAMINOPHEN 650 MG: 325 TABLET, FILM COATED ORAL at 12:14

## 2019-05-25 RX ADMIN — GABAPENTIN 300 MG: 300 CAPSULE ORAL at 09:05

## 2019-05-25 RX ADMIN — METOPROLOL SUCCINATE 25 MG: 25 TABLET, EXTENDED RELEASE ORAL at 12:29

## 2019-05-25 RX ADMIN — PANTOPRAZOLE SODIUM 40 MG: 40 TABLET, DELAYED RELEASE ORAL at 09:05

## 2019-05-25 RX ADMIN — DULOXETINE HYDROCHLORIDE 30 MG: 30 CAPSULE, DELAYED RELEASE ORAL at 09:05

## 2019-05-25 RX ADMIN — Medication 10 ML: at 09:05

## 2019-05-25 RX ADMIN — PREDNISONE 25 MG: 5 TABLET ORAL at 09:05

## 2019-05-25 ASSESSMENT — PAIN SCALES - GENERAL
PAINLEVEL_OUTOF10: 0
PAINLEVEL_OUTOF10: 3

## 2019-05-25 ASSESSMENT — PAIN DESCRIPTION - ONSET: ONSET: ON-GOING

## 2019-05-25 ASSESSMENT — PAIN DESCRIPTION - LOCATION: LOCATION: CHEST;ABDOMEN

## 2019-05-25 ASSESSMENT — PAIN DESCRIPTION - DESCRIPTORS: DESCRIPTORS: DULL;ACHING

## 2019-05-25 ASSESSMENT — PAIN DESCRIPTION - PAIN TYPE: TYPE: ACUTE PAIN

## 2019-05-25 ASSESSMENT — PAIN DESCRIPTION - ORIENTATION: ORIENTATION: MID

## 2019-05-25 ASSESSMENT — PAIN DESCRIPTION - FREQUENCY: FREQUENCY: CONTINUOUS

## 2019-05-25 ASSESSMENT — PAIN DESCRIPTION - PROGRESSION: CLINICAL_PROGRESSION: NOT CHANGED

## 2019-05-25 NOTE — ED PROVIDER NOTES
Department of Emergency Medicine   ED  Provider Note  Admit Date/RoomTime: 5/24/2019 11:07 PM  ED Room: 0074/8524-H          History of Present Illness:  5/24/19, Time: 11:19 PM         Kelly Mendieta is a 39 y.o. female presenting to the ED for irregular heart beath, beginning prior to arrival.  The complaint has been intermittent, moderate in severity, and worsened by nothing. Pt states that she started to feel like her heart was racing. Pt also had shortness of breath and diaphoresis. Reports that she called EMS. EMS reported that pt was in SVT. Pt was cardiovert en route by EMS. Pt states that her symptoms resolved after. Pt presently denies shortness of breath, chest pain, diaphoresis, and heart racing. States that she has not had SVT or any other cardiac issues in the past. Does not report any family cardiac issues. Pt report that she had abdominal surgery at the end of April. Has been on Lovenox since then. Also denies abdominal pain, nausea, emesis, diarrhea, and further complaints at this time. Review of Systems:   Pertinent positives and negatives are stated within HPI, all other systems reviewed and are negative.      --------------------------------------------- PAST HISTORY ---------------------------------------------  Past Medical History:  has a past medical history of Acid reflux, Anxiety and depression, and Crohn's colitis ([facility] [address] Past Surgical History:  has a past surgical history that includes Breast biopsy (1991); Cholecystectomy (11/18/2011); Endometrial ablation (2004); and Upper gastrointestinal endoscopy. Social History:  reports that she has never smoked. She has never used smokeless tobacco. She reports that she does not drink alcohol or use drugs. Family History: family history includes Diabetes in her father; Heart Disease in her father; High Blood Pressure in her father; Kidney Disease in her mother. The patients home medications have been reviewed.     Allergies: Amoxil [amoxicillin]; Doxycycline; and Diflucan [fluconazole]      ---------------------------------------------------PHYSICAL EXAM--------------------------------------    Constitutional/General: Alert and oriented x3  Head: Normocephalic and atraumatic  Eyes: PERRL, EOMI  Mouth: Oropharynx clear, handling secretions  Neck: Supple, full ROM  Respiratory: Lungs clear to auscultation bilaterally, no wheezes, rales, or rhonchi. Not in respiratory distress  Cardiovascular:  Regular rate. Regular rhythm. No murmurs, gallops, or rubs. 2+ distal pulses  Chest: No chest wall tenderness  GI:  Abdomen Soft, Non tender, Non distended. +BS. No rebound, guarding, or rigidity. No pulsatile masses. Musculoskeletal: Moves all extremities x 4. Warm and well perfused, no edema. Integument: skin warm and dry. No rashes. Neurologic: GCS 15, no focal deficits, symmetric strength 5/5 in the upper and lower extremities bilaterally  Psychiatric: Normal Affect      -------------------------------------------------- RESULTS -------------------------------------------------  I have personally reviewed all laboratory and imaging results for this patient. Results are listed below.      LABS:  Results for orders placed or performed during the hospital encounter of 05/24/19   Troponin   Result Value Ref Range    Troponin <0.01 0.00 - 0.03 ng/mL   CBC Auto Differential   Result Value Ref Range    WBC 7.0 4.5 - 11.5 E9/L    RBC 3.16 (L) 3.50 - 5.50 E12/L    Hemoglobin 8.6 (L) 11.5 - 15.5 g/dL    Hematocrit 29.3 (L) 34.0 - 48.0 %    MCV 92.7 80.0 - 99.9 fL    MCH 27.2 26.0 - 35.0 pg    MCHC 29.4 (L) 32.0 - 34.5 %    RDW 19.2 (H) 11.5 - 15.0 fL    Platelets 818 658 - 198 E9/L    MPV 9.3 7.0 - 12.0 fL    Neutrophils % 64.7 43.0 - 80.0 %    Immature Granulocytes % 2.7 0.0 - 5.0 %    Lymphocytes % 22.0 20.0 - 42.0 %    Monocytes % 9.8 2.0 - 12.0 %    Eosinophils % 0.7 0.0 - 6.0 %    Basophils % 0.1 0.0 - 2.0 %    Neutrophils # 4.53 1.80 - 7.30 E9/L    Immature Granulocytes # 0.19 E9/L    Lymphocytes # 1.54 1.50 - 4.00 E9/L    Monocytes # 0.69 0.10 - 0.95 E9/L    Eosinophils # 0.05 0.05 - 0.50 E9/L    Basophils # 0.01 0.00 - 0.20 E9/L   Comprehensive Metabolic Panel   Result Value Ref Range    Sodium 141 132 - 146 mmol/L    Potassium 4.2 3.5 - 5.0 mmol/L    Chloride 107 98 - 107 mmol/L    CO2 22 22 - 29 mmol/L    Anion Gap 12 7 - 16 mmol/L    Glucose 104 (H) 74 - 99 mg/dL    BUN 29 (H) 6 - 20 mg/dL    CREATININE 0.7 0.5 - 1.0 mg/dL    GFR Non-African American >60 >=60 mL/min/1.73    GFR African American >60     Calcium 9.2 8.6 - 10.2 mg/dL    Total Protein 6.5 6.4 - 8.3 g/dL    Alb 3.7 3.5 - 5.2 g/dL    Total Bilirubin 0.2 0.0 - 1.2 mg/dL    Alkaline Phosphatase 56 35 - 104 U/L    ALT 85 (H) 0 - 32 U/L    AST 89 (H) 0 - 31 U/L   Brain Natriuretic Peptide   Result Value Ref Range    Pro- (H) 0 - 125 pg/mL   TSH without Reflex   Result Value Ref Range    TSH 1.460 0.270 - 4.200 uIU/mL   Troponin   Result Value Ref Range    Troponin 0.07 (H) 0.00 - 0.03 ng/mL   Lipid panel - fasting   Result Value Ref Range    Cholesterol, Total 211 (H) 0 - 199 mg/dL    Triglycerides 191 (H) 0 - 149 mg/dL    HDL 75 >40 mg/dL    LDL Calculated 98 0 - 99 mg/dL    VLDL Cholesterol Calculated 38 mg/dL   HCG Qualitative, Serum   Result Value Ref Range    hCG Qual NEGATIVE NEGATIVE   T4, free   Result Value Ref Range    T4 Free 1.20 0.93 - 1.70 ng/dL   EKG 12 Lead   Result Value Ref Range    Ventricular Rate 104 BPM    Atrial Rate 104 BPM    P-R Interval 150 ms    QRS Duration 76 ms    Q-T Interval 338 ms    QTc Calculation (Bazett) 444 ms    P Axis 70 degrees    R Axis 7 degrees    T Axis 25 degrees       RADIOLOGY:  Interpreted by Radiologist.  XR CHEST PORTABLE   Final Result   Air space disease , compatible with atelectasis at the   right cardiophrenic angle                           EKG:  This EKG is signed and interpreted by the EP.     Time: 23:04  Rate: 104  Rhythm: sinus tachycardia  Interpretation: no acute changes  Comparison: no STEMI    ------------------------- NURSING NOTES AND VITALS REVIEWED ---------------------------   The nursing notes within the ED encounter and vital signs as below have been reviewed by myself. BP (!) 155/69   Pulse 86   Temp 98 °F (36.7 °C) (Oral)   Resp 18   Ht 5' 9\" (1.753 m)   Wt 185 lb (83.9 kg)   SpO2 98%   BMI 27.32 kg/m²   Oxygen Saturation Interpretation: Normal    The patients available past medical records and past encounters were reviewed. ------------------------------ ED COURSE/MEDICAL DECISION MAKING----------------------  Medications   0.9 % sodium chloride bolus (0 mLs Intravenous Stopped 5/25/19 0204)       Medical Decision Making:    Patient comes to the ED with irregular heart beat. Had shortness of breath, heart racing, and sweating. Was felt to be in SVT and cardioverted en route by EMS. Symptoms resolved after and pt feels normal. Had EKG, lab work, chest XR, and monitoring ordered. This patient's ED course included: a personal history and physicial examination, re-evaluation prior to disposition, IV medications, cardiac monitoring and continuous pulse oximetry    This patient has been closely monitored during their ED course. Re-Evaluations:           Time :2:45 AM  Upon re-examination, patient is stable. This patient is stable for admission. The emergency provider has shared the specific reasons for admission, and has shared results of today's workup. The patient is agreeable for admission. Consultations:  Time 2:27 AM  Spoke with Dr. Luisa Hartmann (Medicine). Discussed case. They will admit this patient. Counseling: The emergency provider has spoken with the patient and discussed todays results, in addition to providing specific details for the plan of care and counseling regarding the diagnosis and prognosis. Questions are answered at this time and they are agreeable with the plan. --------------------------------- IMPRESSION AND DISPOSITION ---------------------------------    IMPRESSION  1. Paroxysmal supraventricular tachycardia ([address]        DISPOSITION  Disposition: Admit to telemetry  Patient condition is stable    SCRIBE ATTESTATION  5/24/19, 11:19 PM.    This note is prepared by Umang Peck acting as Scribe for Judith Rojas MD.    Judith Rojas MD:  The scribe's documentation has been prepared under my direction and personally reviewed by me in its entirety. I confirm that the note above accurately reflects all work, treatment, procedures, and medical decision making performed by me.              Judith Rojas MD  06/03/19 [id number]

## 2019-05-25 NOTE — CONSULTS
I have reviewed the notes, assessments, and/or procedures performed by MARIEL Macario, I interviewed and examined the patient, I concur with her/his documentation of Rach Rivers. HPI: 39year old, IBD, Crohn's colon resection and ileostomy placement April 2019, GERD, anxiety/depression, anemia of chronic disease, Hg 8.6 stable, uterine ablation, recently sedentary given abdominal surgery, and yesterday felt persistent palpitations, SOB, diaphoresis, mid sternal dull ache 4/10. Persisted 30 minutes, EMS arrived and telemetry strip, SVT status post 30 J shock with restoration of NSR. On telemetry now, NSR, no recurrence, and symptoms have completely resolved. No stroke like symptoms,      VS: HR 80s, BP 130s/60s; afebrile  NAD  AT/NC anicteric  No JVD  CTABL  S1S2 no m/r/g  Extremities no edema warm  Neuro intact        ECG: NSR  , , HDL 75, LDL 98  Carole < 0.01, 0.07  TSH 1.46        A:  1. SVT, status post successful cardioversion  2. Elevated troponin due to cardioversion  3. Dyslipidemia  4. Anemia of chronic disease  5. IBD, crohn's status post recent ileostomy      R:  1. Toprol xl 25 mg daily  2. Atorvastatin 40 mg daily  3. Outpatient holter and tte we will schedule and arrange  4. [id number] [address] for discharge from cardiology standpoint      Thank you for this consultation. We will continue to participate in the care of this  patient. Please do not hesitate to contact us with any questions. Finn Tolentino M.D.  [facility]  Heart Failure and Pulmonary Hypertension  Mobile [phone]

## 2019-05-25 NOTE — CONSULTS
Inpatient Cardiology Consultation      Reason for Consult:  SVT/chest pain    Consulting Physician: Dr. Shira Figueroa    Requesting Physician:  Dr. Debbie Tavares    Date of Consultation: 5/25/2019    HISTORY OF PRESENT ILLNESS:    This is a very pleasant 39year old female who is unknown to the practice; however, she does have a medical history of GERD, Crohn's disease (s/p colon resection and ileostomy 4/26/19 at [facility]), anemia of chronic disease, hx uterine ablation, anxiety, and depression. She had unremarkable exercise non-nuclear stress testing in 10/11. Yesterday at 830 pm while walking up 3 steps at home, she noted sudden onset of palpitations, SOB, clamminess, and dull 4/10 mid-sternal chest pressure. She denied any radiation of the chest pressure. Her symptoms persisted for 30 minutes. Upon EMS arrival, EKG noted SVT rate 195 with extensive st/t wave changes. She underwent 50 J shock administered by EMS with return to NSR. Upon arrival to ED, /78, EKG noted ST rate 104 with nonspecific st/t wave changes, K 4.2, BUN 29, creatinine 0.7, pBNP 174, trop <0.01, TSH WNL, H/H 8.6/29.3. Repeat troponin noted 0.07. She is presently asymptomatic and maintaining NSR. Please note: past medical records were reviewed per electronic medical record (EMR) - see detailed reports under Past Medical/ Surgical History.    Past Medical History:    Past Medical History:   Diagnosis Date    Acid reflux     Anxiety and depression     Crohn's colitis ([facility] [address] 04/2018    Crohn's colitis per colonoscopy biopsies 4/9/18, 6/11/18     · GERD  ·  Crohn's disease (s/p colon resection and ileostomy 4/26/19 at [facility])  · anemia of chronic disease  ·  hx uterine ablation  · Anxiety  · Depression  · 10/11 Exercise non-nuclear stress: No ischemic EKG changes, normal exercise tolerance reported  Past Surgical History:    Past Surgical History:   Procedure Laterality Date    BREAST BIOPSY  1991    Benign Cyst    CHOLECYSTECTOMY 11/18/2011    Ramon Paez, laparoscopic     ENDOMETRIAL ABLATION  2004    UPPER GASTROINTESTINAL ENDOSCOPY         Medications Prior to admit:  Prior to Admission medications    Medication Sig Start Date End Date Taking? Authorizing Provider   enoxaparin (LOVENOX) 40 MG/0.4ML injection Inject into the skin daily   Yes Historical Provider, MD   acetaminophen (TYLENOL) 325 MG tablet Take 650 mg by mouth every 6 hours as needed for Pain   Yes Historical Provider, MD   omeprazole (PRILOSEC) 40 MG delayed release capsule TAKE 1 CAPSULE DAILY 5/22/19  Yes Jhoana Damico MD   gabapentin (NEURONTIN) 300 MG capsule Take 1 capsule by mouth 3 times daily for 30 days.  Intended supply: 90 days 5/9/19 6/8/19 Yes Irwin Sanchez MD   loperamide (IMODIUM) 1 MG/5ML solution Take 2 mg by mouth 4 times daily as needed for Diarrhea   Yes Historical Provider, MD   predniSONE (DELTASONE) 10 MG tablet Take 25 mg by mouth daily Patient states she has been decreasing her prednisone dose weekly, states she is to begin taking 25mg daily today Saturday 5/25/19 5/25/19 5/31/19 Yes Historical Provider, MD   DULoxetine (CYMBALTA) 30 MG extended release capsule TAKE 1 CAPSULE DAILY 8/13/18  Yes Jhoana Damico MD   fluticasone (FLONASE) 50 MCG/ACT nasal spray 1 spray by Nasal route daily  Patient taking differently: 1 spray by Nasal route daily as needed  5/5/15  Yes Jhoana Damico MD   Cholecalciferol (VITAMIN D) 2000 UNITS TABS Take 2,000 Units by mouth daily    Yes Historical Provider, MD       Current Medications:    Current Facility-Administered Medications: DULoxetine (CYMBALTA) extended release capsule 30 mg, 30 mg, Oral, Daily  fluticasone (FLONASE) 50 MCG/ACT nasal spray 1 spray, 1 spray, Nasal, Daily  gabapentin (NEURONTIN) capsule 300 mg, 300 mg, Oral, TID  pantoprazole (PROTONIX) tablet 40 mg, 40 mg, Oral, QAM AC  predniSONE (DELTASONE) tablet 25 mg, 25 mg, Oral, Daily  sodium chloride flush 0.9 % injection 10 mL, 10 mL, Intravenous, 2 times per day  sodium chloride flush 0.9 % injection 10 mL, 10 mL, Intravenous, PRN  magnesium hydroxide (MILK OF MAGNESIA) 400 MG/5ML suspension 30 mL, 30 mL, Oral, Daily PRN  ondansetron (ZOFRAN) injection 4 mg, 4 mg, Intravenous, Q6H PRN  [START ON 5/26/2019] aspirin chewable tablet 81 mg, 81 mg, Oral, Daily  enoxaparin (LOVENOX) injection 40 mg, 40 mg, Subcutaneous, Daily  regadenoson (LEXISCAN) injection 0.4 mg, 0.4 mg, Intravenous, ONCE PRN  acetaminophen (TYLENOL) tablet 650 mg, 650 mg, Oral, Q4H PRN    Allergies:  Amoxil [amoxicillin];  Doxycycline; and Diflucan [fluconazole]    Social History:    Social History     Socioeconomic History    Marital status:      Spouse name: Not on file    Number of children: Not on file    Years of education: Not on file    Highest education level: Not on file   Occupational History    Not on file   Social Needs    Financial resource strain: Not on file    Food insecurity:     Worry: Not on file     Inability: Not on file    Transportation needs:     Medical: Not on file     Non-medical: Not on file   Tobacco Use    Smoking status: Never Smoker    Smokeless tobacco: Never Used   Substance and Sexual Activity    Alcohol use: No    Drug use: No    Sexual activity: Not on file   Lifestyle    Physical activity:     Days per week: Not on file     Minutes per session: Not on file    Stress: Not on file   Relationships    Social connections:     Talks on phone: Not on file     Gets together: Not on file     Attends Druze service: Not on file     Active member of club or organization: Not on file     Attends meetings of clubs or organizations: Not on file     Relationship status: Not on file    Intimate partner violence:     Fear of current or ex partner: Not on file     Emotionally abused: Not on file     Physically abused: Not on file     Forced sexual activity: Not on file   Other Topics Concern    Not on file   Social History Narrative    Not on file       Family History:   Family History   Problem Relation Age of Onset    Kidney Disease Mother     Diabetes Father         on HD s/p MI    Heart Disease Father     High Blood Pressure Father    Father- DM, CKD, cardiac arrest (age 71)    REVIEW OF SYSTEMS:     · Constitutional: + fatigue, denies fevers, chills or night sweats  · Eyes: Denies visual changes or drainage  · ENT: Denies headaches or hearing loss. No mouth sores or sore throat. No epistaxis   · Cardiovascular: Denies chest pain, pressure or palpitations. No lower extremity swelling. · Respiratory: Denies SKINNER, cough, orthopnea or PND. No hemoptysis   · Gastrointestinal: Denies hematemesis or anorexia. No hematochezia or melena    · Genitourinary: Denies urgency, dysuria or hematuria. · Musculoskeletal: Denies gait disturbance, weakness or joint complaints  · Integumentary: Denies rash, hives or pruritis   · Neurological: Denies dizziness, headaches or seizures. No numbness or tingling  · Psychiatric: Denies anxiety or depression. · Endocrine: Denies temperature intolerance. No recent weight change. .  · Hematologic/Lymphatic: Denies abnormal bruising or bleeding. No swollen lymph nodes    PHYSICAL EXAM:   /63   Pulse 87   Temp 98 °F (36.7 °C) (Oral)   Resp 18   Ht 5' 9\" (1.753 m)   Wt 185 lb (83.9 kg)   SpO2 98%   BMI 27.32 kg/m²   CONST:  Well developed, well nourished who appears of stated age. Awake, alert and cooperative. No apparent distress. HEENT:   Head- Normocephalic, atraumatic   Eyes- Conjunctivae pink, anicteric  Throat- Oral mucosa pink and moist  Neck-  No stridor, trachea midline, no jugular venous distention. No carotid bruit. CHEST: Chest symmetrical and non-tender to palpation.  No accessory muscle use or intercostal retractions  RESPIRATORY: Lung sounds - clear throughout fields   CARDIOVASCULAR:     Heart Inspection- shows no noted pulsations  Heart Palpation- no heaves or thrills; PMI is non-displaced   Heart Ausculation- Regular rate and rhythm, no murmur. No s3, s4 or rub   PV: No lower extremity edema. No varicosities. Pedal pulses palpable, no clubbing or cyanosis   ABDOMEN: Soft,  + mild tender to light palpation. Bowel sounds present. No palpable masses no organomegaly; no abdominal bruit + ileostomy  MS: Good muscle strength and tone. No atrophy or abnormal movements. : Deferred  SKIN: Warm and dry no statis dermatitis or ulcers   NEURO / PSYCH: Oriented to person, place and time. Speech clear and appropriate. Follows all commands. Pleasant affect     DATA:    ECG / Tele strips: EMS strip 22:13 SVT rate 195 extensive st/t wave changes    EKG ST rate 104, QTc 444, nonspecific st/t wave changes    RS: presently NSR/ST (rates  bpm)    Diagnostic:    CXR:  Impression:        Air space disease , compatible with atelectasis at the  right cardiophrenic angle         No intake or output data in the 24 hours ending 05/25/19 1200    Labs:   CBC:   Recent Labs     05/24/19 2335   WBC 7.0   HGB 8.6*   HCT 29.3*        BMP:   Recent Labs     05/24/19 2335      K 4.2   CO2 22   BUN 29*   CREATININE 0.7   LABGLOM >60   CALCIUM 9.2     Mag: No results for input(s): MG in the last 72 hours. Phos: No results for input(s): PHOS in the last 72 hours. TSH:   Recent Labs     05/24/19 2335   TSH 1.460     HgA1c: No results found for: LABA1C  No results found for: EAG  proBNP:   Recent Labs     05/24/19 2335   PROBNP 174*     PT/INR: No results for input(s): PROTIME, INR in the last 72 hours. APTT:No results for input(s): APTT in the last 72 hours.   CARDIAC ENZYMES:  Recent Labs     05/24/19  2335 05/25/19  0739   TROPONINI <0.01 0.07*     FASTING LIPID PANEL:  Lab Results   Component Value Date    CHOL 211 05/25/2019    HDL 75 05/25/2019    LDLCALC 98 05/25/2019    TRIG 191 05/25/2019     LIVER PROFILE:  Recent Labs     05/24/19 2335   AST 89*   ALT 85*   LABALBU 3.7 Assessment and Recommendations are to follow as per Dr. Osman Alcantar.  [facility]    Electronically signed by MARIEL Fisher on 5/25/2019 at 12:00 PM

## 2019-05-25 NOTE — PLAN OF CARE
Problem: Falls - Risk of:  Goal: Will remain free from falls  Description  Will remain free from falls  5/25/2019 1421 by Haley Joe RN  Outcome: Met This Shift     Problem: Falls - Risk of:  Goal: Absence of physical injury  Description  Absence of physical injury  5/25/2019 1421 by Haley Joe RN  Outcome: Met This Shift

## 2019-05-25 NOTE — H&P
[address] [facility]  Attending History and Physical      CHIEF COMPLAINT:  Chest pain      HISTORY OF PRESENT ILLNESS:      The patient is a 39 y.o. female patient of dr Oliver López who presents with complains of chest pain. Patient was in her usual state of health the morning of presentation. She acutely felt her heart racing. She felt terrible. She was lightheaded and dizzy. She denied shortness of breath, abdominal pain, nausea, vomiting, fevers, chills and diaphoresis. EMS were called. She was found to have low BP and SVT. She was cardioverted en route to the hospital.  Her symptoms resolved. She is back to her usual state of health. She has never had symptoms like this before. Past Medical History:    Past Medical History:   Diagnosis Date    Acid reflux     Anxiety and depression     Crohn's colitis ([facility] [address] 04/2018    Crohn's colitis per colonoscopy biopsies 4/9/18, 6/11/18       Past Surgical History:    Past Surgical History:   Procedure Laterality Date    BREAST BIOPSY  1991    Benign Cyst    CHOLECYSTECTOMY  11/18/2011    Sharla Vazquez, laparoscopic     ENDOMETRIAL ABLATION  2004    UPPER GASTROINTESTINAL ENDOSCOPY         Medications Prior to Admission:    Medications Prior to Admission: enoxaparin (LOVENOX) 40 MG/0.4ML injection, Inject into the skin daily  acetaminophen (TYLENOL) 325 MG tablet, Take 650 mg by mouth every 6 hours as needed for Pain  omeprazole (PRILOSEC) 40 MG delayed release capsule, TAKE 1 CAPSULE DAILY  gabapentin (NEURONTIN) 300 MG capsule, Take 1 capsule by mouth 3 times daily for 30 days.  Intended supply: 90 days  loperamide (IMODIUM) 1 MG/5ML solution, Take 2 mg by mouth 4 times daily as needed for Diarrhea  predniSONE (DELTASONE) 10 MG tablet, Take 25 mg by mouth daily Patient states she has been decreasing her prednisone dose weekly, states she is to begin taking 25mg daily today Saturday 5/25/19  DULoxetine (CYMBALTA) 30 MG extended release capsule, TAKE 1 CAPSULE DAILY  fluticasone (FLONASE) 50 MCG/ACT nasal spray, 1 spray by Nasal route daily (Patient taking differently: 1 spray by Nasal route daily as needed )  Cholecalciferol (VITAMIN D) 2000 UNITS TABS, Take 2,000 Units by mouth daily     Allergies:    Amoxil [amoxicillin]; Doxycycline; and Diflucan [fluconazole]    Social History:    reports that she has never smoked. She has never used smokeless tobacco. She reports that she does not drink alcohol or use drugs. Family History:   family history includes Diabetes in her father; Heart Disease in her father; High Blood Pressure in her father; Kidney Disease in her mother. REVIEW OF SYSTEMS:  As above in the HPI, otherwise negative    PHYSICAL EXAM:    Vitals:  /63   Pulse 87   Temp 98 °F (36.7 °C) (Oral)   Resp 18   Ht 5' 9\" (1.753 m)   Wt 185 lb (83.9 kg)   SpO2 98%   BMI 27.32 kg/m²     General:  Awake, alert, oriented X 3. Well developed, well nourished, well groomed. No apparent distress. HEENT:  Normocephalic, atraumatic. Pupils equal, round, reactive to light. No scleral icterus. No conjunctival injection. Normal lips, teeth, and gums. No nasal discharge. Neck:  Supple  Heart:  RRR, no murmurs, gallops, rubs  Lungs:  CTA bilaterally, bilat symmetrical expansion, no wheeze, rales, or rhonchi  Abdomen:   Bowel sounds present, soft, nontender, no masses, no organomegaly, no peritoneal signs  Extremities:  No clubbing, cyanosis, or edema  Skin:  Warm and dry, no open lesions or rash  Neuro:  Cranial nerves 2-12 intact, no focal deficits  Breast: deferred  Rectal: deferred  Genitalia:  deferred    LABS:    CBC with Differential:    Lab Results   Component Value Date    WBC 7.0 05/24/2019    RBC 3.16 05/24/2019    HGB 8.6 05/24/2019    HCT 29.3 05/24/2019     05/24/2019    MCV 92.7 05/24/2019    MCH 27.2 05/24/2019    MCHC 29.4 05/24/2019    RDW 19.2 05/24/2019    NRBC 0.0 03/11/2019    SEGSPCT 70 12/04/2012 LYMPHOPCT 22.0 05/24/2019    MONOPCT 9.8 05/24/2019    BASOPCT 0.1 05/24/2019    MONOSABS 0.69 05/24/2019    LYMPHSABS 1.54 05/24/2019    EOSABS 0.05 05/24/2019    BASOSABS 0.01 05/24/2019     CMP:    Lab Results   Component Value Date     05/24/2019    K 4.2 05/24/2019    K 3.9 12/15/2018     05/24/2019    CO2 22 05/24/2019    BUN 29 05/24/2019    CREATININE 0.7 05/24/2019    GFRAA >60 05/24/2019    LABGLOM >60 05/24/2019    GLUCOSE 104 05/24/2019    GLUCOSE 84 02/21/2012    PROT 6.5 05/24/2019    LABALBU 3.7 05/24/2019    LABALBU 3.9 02/21/2012    CALCIUM 9.2 05/24/2019    BILITOT 0.2 05/24/2019    ALKPHOS 56 05/24/2019    AST 89 05/24/2019    ALT 85 05/24/2019     BMP:    Lab Results   Component Value Date     05/24/2019    K 4.2 05/24/2019    K 3.9 12/15/2018     05/24/2019    CO2 22 05/24/2019    BUN 29 05/24/2019    LABALBU 3.7 05/24/2019    LABALBU 3.9 02/21/2012    CREATININE 0.7 05/24/2019    CALCIUM 9.2 05/24/2019    GFRAA >60 05/24/2019    LABGLOM >60 05/24/2019    GLUCOSE 104 05/24/2019    GLUCOSE 84 02/21/2012     Magnesium:  No results found for: MG  Phosphorus:    Lab Results   Component Value Date    PHOS 4.0 02/14/2011     PT/INR:    Lab Results   Component Value Date    PROTIME 11.0 10/14/2011    INR 1.0 10/14/2011     PTT:    Lab Results   Component Value Date    APTT 27.0 10/14/2011   [APTT}  Troponin:    Lab Results   Component Value Date    TROPONINI 0.07 05/25/2019     Last 3 Troponin:    Lab Results   Component Value Date    TROPONINI 0.07 05/25/2019    TROPONINI <0.01 05/24/2019    TROPONINI <0.01 10/14/2011    TROPONINI <0.01 10/13/2011     U/A:    Lab Results   Component Value Date    COLORU Yellow 03/11/2019    PROTEINU Negative 03/11/2019    PHUR 6.5 03/11/2019    WBCUA 5-10 03/11/2019    WBCUA 0-1 02/20/2012    RBCUA 1-3 03/11/2019    RBCUA NONE 02/20/2012    MUCUS Present 12/12/2018    BACTERIA MODERATE 03/11/2019    CLARITYU Clear 03/11/2019    SPECGRAV <=1.005

## 2019-05-28 ENCOUNTER — TELEPHONE (OUTPATIENT)
Dept: CARDIOLOGY CLINIC | Age: 44
End: 2019-05-28

## 2019-05-28 ENCOUNTER — TELEPHONE (OUTPATIENT)
Dept: FAMILY MEDICINE CLINIC | Age: 44
End: 2019-05-28

## 2019-05-28 DIAGNOSIS — R00.2 PALPITATIONS: Primary | ICD-10-CM

## 2019-05-28 DIAGNOSIS — R06.02 SHORTNESS OF BREATH: ICD-10-CM

## 2019-05-28 NOTE — TELEPHONE ENCOUNTER
[facility] Trans[facility] Initial Follow Up Call    Outreach made within 2 business days of discharge: No    Patient: Reji Ramírez Patient : 1975   MRN: [id number]  Reason for Admission: There are no discharge diagnoses documented for the most recent discharge. Discharge Date: 19      Attempted to make TCM Call to patient but was unsuccessful.

## 2019-05-28 NOTE — TELEPHONE ENCOUNTER
Attempted to call patient to schedule 30day monitor and 4-6 week OV. No answer so I left a message on patient's voicemail to call me back at the office.     I placed ordered for Echo and Halima notified to schedule

## 2019-05-28 NOTE — TELEPHONE ENCOUNTER
5/28/19 @ 3:53 pm  After the SVT episode ΣΑΡΑΝΤΙ wants to know if she should participate in physical therapy ( post ileostomy surgery). Are there any restrictions? She will be doing steps.

## 2019-05-30 NOTE — TELEPHONE ENCOUNTER
Called patient to schedule 30 day monitor per previous note from Mariana Maldonado. Per patient she is going to [facility] 6-9-19 through 6-14-19 and will not have any cell signal.  Is it ok to schedule monitor to be placed after she comes back to town? Please Advise. Thanks.

## 2019-05-31 ENCOUNTER — TELEPHONE (OUTPATIENT)
Dept: CARDIOLOGY | Age: 44
End: 2019-05-31

## 2019-06-03 NOTE — TELEPHONE ENCOUNTER
Patient scheduled for 30 day cardionet on 6-17-19 per patient request.  (recent hospital admission for SVT)  Patient also scheduled for FU with Dr. General Augustin on 7-29-19 per Chris Ch and Cyndi.

## 2019-06-05 ENCOUNTER — OFFICE VISIT (OUTPATIENT)
Dept: FAMILY MEDICINE CLINIC | Age: 44
End: 2019-06-05
Payer: COMMERCIAL

## 2019-06-05 VITALS
SYSTOLIC BLOOD PRESSURE: 133 MMHG | RESPIRATION RATE: 16 BRPM | BODY MASS INDEX: 30.84 KG/M2 | HEART RATE: 82 BPM | TEMPERATURE: 98.4 F | WEIGHT: 208.2 LBS | OXYGEN SATURATION: 100 % | DIASTOLIC BLOOD PRESSURE: 85 MMHG | HEIGHT: 69 IN

## 2019-06-05 DIAGNOSIS — K50.10 EXACERBATION OF CROHN'S DISEASE OF LARGE INTESTINE (HCC): ICD-10-CM

## 2019-06-05 DIAGNOSIS — I47.1 SVT (SUPRAVENTRICULAR TACHYCARDIA) (HCC): Primary | ICD-10-CM

## 2019-06-05 PROCEDURE — 99214 OFFICE O/P EST MOD 30 MIN: CPT | Performed by: FAMILY MEDICINE

## 2019-06-05 RX ORDER — PREDNISONE 1 MG/1
TABLET ORAL
Refills: 0 | COMMUNITY
Start: 2019-05-08 | End: 2020-01-13

## 2019-06-05 NOTE — PROGRESS NOTES
Chief Complaint   Patient presents with    Follow-Up from Hospital       HPI:  Matthew Hayes presents to the office for hospital follow up. Patient was seen in the hospital for  Ileostomy and SVT. Since being discharged from the  [facility]  symptoms have been resolved from SVT. She has seen GI and surgeon in [place] for follow up. Next appointment is in October. Any prescribed medications have not been finished. Discharge instructions and any medication changes were again reviewed. She is feeling much better. She is weaning off Prednisone and will likely not have further surgeries due to risk of Crohn's flare. Patient's past medical, surgical, social and/or family history reviewed, updated in chart, and are non-contributory (unless otherwise stated). Medications and allergies also reviewed and updated in chart.       Review of Systems:  Constitutional:  No fever, no fatigue, no chills, no headaches, no weight change  Dermatology:  No rash, no mole, no dry or sensitive skin  ENT:  No cough, no sore throat, no sinus pain, no runny nose, no ear pain  Cardiology:  No chest pain, no palpitations, no leg edema, no shortness of breath, no PND  Gastroenterology:  No dysphagia, no abdominal pain, no nausea, no vomiting, no constipation, no diarrhea, no heartburn  Musculoskeletal:  No joint pain, no leg cramps, no back pain, no muscle aches  Respiratory:  No shortness of breath, no orthopnea, no wheezing, no SKINNER, no hemoptysis  Urology:  No blood in the urine, no urinary frequency, no urinary incontinence, no urinary urgency, no nocturia, no dysuria    Vitals:    06/05/19 1139   BP: 133/85   Pulse: 82   Resp: 16   Temp: 98.4 °F (36.9 °C)   TempSrc: Oral   SpO2: 100%   Weight: 208 lb 3.2 oz (94.4 kg)   Height: 5' 9\" (1.753 m)       General:  Patient alert and oriented x 3, NAD, pleasant  HEENT:  Atraumatic, normocephalic, PERRLA, EOMI, clear conjunctiva, TMs clear, nose-clear, throat - no erythema  Neck:  Supple, no goiter, no carotid bruits, no LAD  Lungs:  CTA B  Heart:  RRR, no murmurs, gallops or rubs  Abdomen:  Soft/nt/nd, + bowel sounds  Extremities:  No clubbing, cyanosis or edema  Skin: unremarkable    Assessment/Plan:      Torrey Hernandez was seen today for follow-up from hospital.    Diagnoses and all orders for this visit:    SVT (supraventricular tachycardia) (HCC)    Exacerbation of Crohn's disease of large intestine ([address]      Over 25 mins spent with the patient and > 50% was spent on counseling and care coordination. Educational materials and/or home exercises printed for patient's review and were included in patient instructions on his/her After Visit Summary and given to patient at the end of visit. Counseled regarding above diagnosis, including possible risks and complications,  especially if left uncontrolled. Counseled regarding the possible side effects, risks, benefits and alternatives to treatment; patient and/or guardian verbalizes understanding, agrees, feels comfortable with and wishes to proceed with above treatment plan. Advised patient to call with any new medication issues, and read all Rx info from pharmacy to assure aware of all possible risks and side effects of medication before taking. Reviewed age and gender appropriate health screening exams and vaccinations. Advised patient regarding importance of keeping up with recommended health maintenance and to schedule as soon as possible if overdue, as this is important in assessing for undiagnosed pathology, especially cancer, as well as protecting against potentially harmful/life threatening disease. Patient and/or guardian verbalizes understanding and agrees with above counseling, assessment and plan. All questions answered.

## 2019-06-17 ENCOUNTER — HOSPITAL ENCOUNTER (OUTPATIENT)
Age: 44
Discharge: HOME | End: 2019-06-17
Payer: COMMERCIAL

## 2019-06-17 ENCOUNTER — NURSE ONLY (OUTPATIENT)
Dept: CARDIOLOGY CLINIC | Age: 44
End: 2019-06-17

## 2019-06-17 LAB
C-REACTIVE PROTEIN: 0.3 MG/DL (ref 0–0.4)
FERRITIN: 13 NG/ML
FOLATE: 19.4 NG/ML (ref 4.8–24.2)
HCT VFR BLD CALC: 28.8 % (ref 34–48)
HEMOGLOBIN: 8.2 G/DL (ref 11.5–15.5)
IRON SATURATION: 7 % (ref 15–50)
IRON: 27 MCG/DL (ref 37–145)
MCH RBC QN AUTO: 25.2 PG (ref 26–35)
MCHC RBC AUTO-ENTMCNC: 28.5 % (ref 32–34.5)
MCV RBC AUTO: 88.3 FL (ref 80–99.9)
PDW BLD-RTO: 16.7 FL (ref 11.5–15)
PLATELET # BLD: 265 E9/L (ref 130–450)
PMV BLD AUTO: 9.2 FL (ref 7–12)
RBC # BLD: 3.26 E12/L (ref 3.5–5.5)
TOTAL IRON BINDING CAPACITY: 392 MCG/DL (ref 250–450)
VITAMIN B-12: 266 PG/ML (ref 211–946)
WBC # BLD: 7.9 E9/L (ref 4.5–11.5)

## 2019-06-17 PROCEDURE — 85027 COMPLETE CBC AUTOMATED: CPT

## 2019-06-17 PROCEDURE — 82728 ASSAY OF FERRITIN: CPT

## 2019-06-17 PROCEDURE — 36415 COLL VENOUS BLD VENIPUNCTURE: CPT

## 2019-06-17 PROCEDURE — 82746 ASSAY OF FOLIC ACID SERUM: CPT

## 2019-06-17 PROCEDURE — 86140 C-REACTIVE PROTEIN: CPT

## 2019-06-17 PROCEDURE — 83540 ASSAY OF IRON: CPT

## 2019-06-17 PROCEDURE — 82607 VITAMIN B-12: CPT

## 2019-06-17 PROCEDURE — 83550 IRON BINDING TEST: CPT

## 2019-06-17 NOTE — PROGRESS NOTES
Pt came in for a 30 day Cardionet monitor per Dr Amara Hunt, I put it on the patient and we couldn't upload cause Dr Amara Hunt isn't in our selection of Dr's to enroll it. [place] [facility] is enrolling it there at their office. Patient understood all instructions.

## 2019-07-02 ENCOUNTER — OFFICE VISIT (OUTPATIENT)
Dept: FAMILY MEDICINE CLINIC | Age: 44
End: 2019-07-02
Payer: COMMERCIAL

## 2019-07-02 VITALS
DIASTOLIC BLOOD PRESSURE: 77 MMHG | BODY MASS INDEX: 35.1 KG/M2 | SYSTOLIC BLOOD PRESSURE: 120 MMHG | HEIGHT: 69 IN | WEIGHT: 237 LBS | TEMPERATURE: 98.6 F | RESPIRATION RATE: 16 BRPM | OXYGEN SATURATION: 100 % | HEART RATE: 71 BPM

## 2019-07-02 DIAGNOSIS — L30.9 DERMATITIS: Primary | ICD-10-CM

## 2019-07-02 PROCEDURE — 99213 OFFICE O/P EST LOW 20 MIN: CPT | Performed by: FAMILY MEDICINE

## 2019-07-02 RX ORDER — TRIAMCINOLONE ACETONIDE 1 MG/G
CREAM TOPICAL
Qty: 60 G | Refills: 0 | Status: SHIPPED | OUTPATIENT
Start: 2019-07-02 | End: 2020-01-13

## 2019-07-02 NOTE — PROGRESS NOTES
LAD  Lungs:  CTA B  Heart:  RRR, no murmurs, gallops or rubs  Abdomen:  Soft/nt/nd, + bowel sounds  Extremities:  No clubbing, cyanosis or edema  Skin: chest with scattered erythematous papules    Assessment/Plan:      Isidro Harrell was seen today for rash. Diagnoses and all orders for this visit:    Dermatitis    Other orders  -     triamcinolone (KENALOG) 0.1 % cream; Apply to affected area twice a day      As above. Call or go to ED immediately if symptoms worsen or persist.  No follow-ups on file. , or sooner if necessary. Educational materials and/or home exercises printed for patient's review and were included in patient instructions on his/her After Visit Summary and given to patient at the end of visit. Counseled regarding above diagnosis, including possible risks and complications,  especially if left uncontrolled. Counseled regarding the possible side effects, risks, benefits and alternatives to treatment; patient and/or guardian verbalizes understanding, agrees, feels comfortable with and wishes to proceed with above treatment plan. Advised patient to call with any new medication issues, and read all Rx info from pharmacy to assure aware of all possible risks and side effects of medication before taking. Reviewed age and gender appropriate health screening exams and vaccinations. Advised patient regarding importance of keeping up with recommended health maintenance and to schedule as soon as possible if overdue, as this is important in assessing for undiagnosed pathology, especially cancer, as well as protecting against potentially harmful/life threatening disease. Patient and/or guardian verbalizes understanding and agrees with above counseling, assessment and plan. All questions answered.

## 2019-07-05 ENCOUNTER — HOSPITAL ENCOUNTER (OUTPATIENT)
Dept: CARDIOLOGY | Age: 44
Discharge: HOME | End: 2019-07-05
Payer: COMMERCIAL

## 2019-07-05 DIAGNOSIS — R06.02 SHORTNESS OF BREATH: ICD-10-CM

## 2019-07-05 DIAGNOSIS — R00.2 PALPITATIONS: ICD-10-CM

## 2019-07-05 LAB
LV EF: 65 %
LVEF MODALITY: NORMAL

## 2019-07-05 PROCEDURE — 93306 TTE W/DOPPLER COMPLETE: CPT

## 2019-07-09 RX ORDER — GABAPENTIN 300 MG/1
300 CAPSULE ORAL 3 TIMES DAILY
Qty: 90 CAPSULE | Refills: 1 | Status: SHIPPED | OUTPATIENT
Start: 2019-07-09 | End: 2019-09-06 | Stop reason: SDUPTHER

## 2019-07-11 DIAGNOSIS — D50.0 IRON DEFICIENCY ANEMIA SECONDARY TO BLOOD LOSS (CHRONIC): ICD-10-CM

## 2019-07-11 RX ORDER — SODIUM CHLORIDE 0.9 % (FLUSH) 0.9 %
10 SYRINGE (ML) INJECTION PRN
Status: CANCELLED | OUTPATIENT
Start: 2019-07-12

## 2019-07-23 ENCOUNTER — HOSPITAL ENCOUNTER (OUTPATIENT)
Dept: INFUSION THERAPY | Age: 44
Setting detail: INFUSION SERIES
Discharge: HOME | End: 2019-07-23
Payer: COMMERCIAL

## 2019-07-23 ENCOUNTER — TELEPHONE (OUTPATIENT)
Dept: CARDIOLOGY CLINIC | Age: 44
End: 2019-07-23

## 2019-07-23 VITALS
BODY MASS INDEX: 35.92 KG/M2 | RESPIRATION RATE: 16 BRPM | HEIGHT: 68 IN | TEMPERATURE: 98.4 F | DIASTOLIC BLOOD PRESSURE: 53 MMHG | OXYGEN SATURATION: 97 % | HEART RATE: 64 BPM | SYSTOLIC BLOOD PRESSURE: 108 MMHG | WEIGHT: 237 LBS

## 2019-07-23 DIAGNOSIS — D50.0 IRON DEFICIENCY ANEMIA SECONDARY TO BLOOD LOSS (CHRONIC): Primary | ICD-10-CM

## 2019-07-23 PROCEDURE — 96365 THER/PROPH/DIAG IV INF INIT: CPT

## 2019-07-23 PROCEDURE — 6360000002 HC RX W HCPCS: Performed by: INTERNAL MEDICINE

## 2019-07-23 PROCEDURE — 2580000003 HC RX 258: Performed by: INTERNAL MEDICINE

## 2019-07-23 RX ORDER — SODIUM CHLORIDE 0.9 % (FLUSH) 0.9 %
10 SYRINGE (ML) INJECTION PRN
Status: CANCELLED | OUTPATIENT
Start: 2019-07-30

## 2019-07-23 RX ORDER — SODIUM CHLORIDE 0.9 % (FLUSH) 0.9 %
10 SYRINGE (ML) INJECTION PRN
Status: DISCONTINUED | OUTPATIENT
Start: 2019-07-23 | End: 2019-07-24 | Stop reason: HOSPADM

## 2019-07-23 RX ADMIN — Medication 10 ML: at 09:13

## 2019-07-23 RX ADMIN — FERUMOXYTOL 510 MG: 510 INJECTION INTRAVENOUS at 09:28

## 2019-07-23 RX ADMIN — Medication 10 ML: at 09:57

## 2019-07-23 RX ADMIN — Medication 10 ML: at 09:58

## 2019-07-26 ENCOUNTER — HOSPITAL ENCOUNTER (OUTPATIENT)
Dept: INFUSION THERAPY | Age: 44
Setting detail: INFUSION SERIES
Discharge: HOME | End: 2019-07-26
Payer: COMMERCIAL

## 2019-07-26 VITALS
HEIGHT: 68 IN | SYSTOLIC BLOOD PRESSURE: 119 MMHG | WEIGHT: 237 LBS | DIASTOLIC BLOOD PRESSURE: 55 MMHG | HEART RATE: 80 BPM | RESPIRATION RATE: 16 BRPM | BODY MASS INDEX: 35.92 KG/M2 | TEMPERATURE: 98.3 F | OXYGEN SATURATION: 99 %

## 2019-07-26 DIAGNOSIS — K50.10 EXACERBATION OF CROHN'S DISEASE OF LARGE INTESTINE (HCC): Primary | ICD-10-CM

## 2019-07-26 DIAGNOSIS — D50.0 IRON DEFICIENCY ANEMIA SECONDARY TO BLOOD LOSS (CHRONIC): ICD-10-CM

## 2019-07-26 PROCEDURE — 96365 THER/PROPH/DIAG IV INF INIT: CPT

## 2019-07-26 PROCEDURE — 6360000002 HC RX W HCPCS: Performed by: INTERNAL MEDICINE

## 2019-07-26 PROCEDURE — 2580000003 HC RX 258: Performed by: INTERNAL MEDICINE

## 2019-07-26 RX ORDER — 0.9 % SODIUM CHLORIDE 0.9 %
10 VIAL (ML) INJECTION ONCE
Status: CANCELLED | OUTPATIENT
Start: 2019-08-16

## 2019-07-26 RX ORDER — SODIUM CHLORIDE 0.9 % (FLUSH) 0.9 %
30 SYRINGE (ML) INJECTION PRN
Status: CANCELLED | OUTPATIENT
Start: 2019-08-16

## 2019-07-26 RX ORDER — SODIUM CHLORIDE 0.9 % (FLUSH) 0.9 %
10 SYRINGE (ML) INJECTION PRN
Status: CANCELLED | OUTPATIENT
Start: 2019-07-30

## 2019-07-26 RX ORDER — SODIUM CHLORIDE 0.9 % (FLUSH) 0.9 %
10 SYRINGE (ML) INJECTION PRN
Status: DISCONTINUED | OUTPATIENT
Start: 2019-07-26 | End: 2019-07-27 | Stop reason: HOSPADM

## 2019-07-26 RX ADMIN — Medication 10 ML: at 12:19

## 2019-07-26 RX ADMIN — Medication 10 ML: at 11:50

## 2019-07-26 RX ADMIN — Medication 10 ML: at 12:18

## 2019-07-26 RX ADMIN — FERUMOXYTOL 510 MG: 510 INJECTION INTRAVENOUS at 11:57

## 2019-07-26 NOTE — PROGRESS NOTES
Tolerated infusion well. Therapy plan reviewed with patient. Verbalizes understanding. Reviewed AVS with patient, reviewed medication information, verbalizes good knowledge of current plan, and has no signs or symptoms to report at this time. stayed 30 minutes post infusion with no complaints. Copy of avs provided and therapy is complete at this time.

## 2019-07-29 ENCOUNTER — INITIAL CONSULT (OUTPATIENT)
Dept: CARDIOLOGY CLINIC | Age: 44
End: 2019-07-29
Payer: COMMERCIAL

## 2019-07-29 VITALS
WEIGHT: 246.2 LBS | BODY MASS INDEX: 36.46 KG/M2 | SYSTOLIC BLOOD PRESSURE: 118 MMHG | HEART RATE: 80 BPM | DIASTOLIC BLOOD PRESSURE: 80 MMHG | RESPIRATION RATE: 16 BRPM | HEIGHT: 69 IN

## 2019-07-29 DIAGNOSIS — R00.2 PALPITATIONS: Primary | ICD-10-CM

## 2019-07-29 PROCEDURE — 99214 OFFICE O/P EST MOD 30 MIN: CPT | Performed by: INTERNAL MEDICINE

## 2019-07-29 PROCEDURE — 93000 ELECTROCARDIOGRAM COMPLETE: CPT | Performed by: INTERNAL MEDICINE

## 2019-07-29 RX ORDER — ATORVASTATIN CALCIUM 40 MG/1
40 TABLET, FILM COATED ORAL NIGHTLY
Qty: 90 TABLET | Refills: 3 | Status: SHIPPED | OUTPATIENT
Start: 2019-07-29 | End: 2020-01-13

## 2019-07-29 RX ORDER — METOPROLOL SUCCINATE 25 MG/1
25 TABLET, EXTENDED RELEASE ORAL DAILY
Qty: 90 TABLET | Refills: 3 | Status: SHIPPED | OUTPATIENT
Start: 2019-07-29 | End: 2019-10-11 | Stop reason: SDUPTHER

## 2019-07-29 NOTE — PROGRESS NOTES
predniSONE (DELTASONE) 5 MG tablet patient is taking 5 mg daily  0    atorvastatin (LIPITOR) 40 MG tablet Take 1 tablet by mouth nightly (Patient not taking: Reported on 7/29/2019) 30 tablet 3     No current facility-administered medications for this visit. Social History     Socioeconomic History    Marital status:      Spouse name: Not on file    Number of children: Not on file    Years of education: Not on file    Highest education level: Not on file   Occupational History    Not on file   Social Needs    Financial resource strain: Not on file    Food insecurity:     Worry: Not on file     Inability: Not on file    Transportation needs:     Medical: Not on file     Non-medical: Not on file   Tobacco Use    Smoking status: Never Smoker    Smokeless tobacco: Never Used   Substance and Sexual Activity    Alcohol use: No    Drug use: No    Sexual activity: Not on file   Lifestyle    Physical activity:     Days per week: Not on file     Minutes per session: Not on file    Stress: Not on file   Relationships    Social connections:     Talks on phone: Not on file     Gets together: Not on file     Attends Yazidi service: Not on file     Active member of club or organization: Not on file     Attends meetings of clubs or organizations: Not on file     Relationship status: Not on file    Intimate partner violence:     Fear of current or ex partner: Not on file     Emotionally abused: Not on file     Physically abused: Not on file     Forced sexual activity: Not on file   Other Topics Concern    Not on file   Social History Narrative    Not on file       Family History   Problem Relation Age of Onset    Kidney Disease Mother     Diabetes Father         on HD s/p MI    Heart Disease Father     High Blood Pressure Father        Review of Systems:  Heart: as above   Lungs: as above   Eyes: denies changes in vision or discharge. Ears: denies changes in hearing or pain.    Nose: denies epistaxis or masses   Throat: denies sore throat or trouble swallowing. Neuro: denies numbness, tingling, tremors. Skin: denies rashes or itching. : denies hematuria, dysuria   GI: denies vomiting, diarrhea   Psych: denies mood changed, anxiety, depression. All other systems negative. Physical Exam   /80   Pulse 80   Resp 16   Ht 5' 9\" (1.753 m)   Wt 246 lb 3.2 oz (111.7 kg)   BMI 36.36 kg/m²   Constitutional: Oriented to person, place, and time. Well-developed and well-nourished. No distress. Head: Normocephalic and atraumatic. Eyes: EOM are normal. Pupils are equal, round, and reactive to light. Neck: Normal range of motion. Neck supple. No hepatojugular reflux and no JVD present. Carotid bruit is not present. No tracheal deviation present. No thyromegaly present. Cardiovascular: Normal rate, regular rhythm, normal heart sounds and intact distal pulses. Exam reveals no gallop and no friction rub. No murmur heard. Pulmonary/Chest: Effort normal and breath sounds normal. No respiratory distress. No wheezes. No rales. No tenderness. Abdominal: Soft. Bowel sounds are normal. No distension and no mass. No tenderness. No rebound and no guarding. Musculoskeletal: Normal range of motion. No edema and no tenderness. Lymphadenopathy:   No cervical adenopathy. No groin adenopathy. Neurological: Alert and oriented to person, place, and time. Skin: Skin is warm and dry. No rash noted. Not diaphoretic. No erythema. Psychiatric: Normal mood and affect. Behavior is normal.     EKG:  normal sinus rhythm, nonspecific ST and T waves changes.     ASSESSMENT AND PLAN:  Patient Active Problem List   Diagnosis    GERD (gastroesophageal reflux disease)    Depression with anxiety    Exacerbation of Cro[address]'s disease of large intestine ([facility] [address]    SVT (supraventricular tachycardia) (HCC)    Chest pain    Anemia    Paroxysmal supraventricular tachycardia (HCC)    Iron deficiency anemia secondary to blood loss (chronic)     1. SVT: Echo normal.     Unable to wear monitor due to severe reaction to patches. Continue BB. If recurrent then EP. 2. Lipids: Statin. 3. IBD    4. Anemia of chronic disease    Beni Patel D.O.   Cardiologist  Cardiology, [address] [facility]

## 2019-08-06 DIAGNOSIS — E78.2 MIXED HYPERLIPIDEMIA: ICD-10-CM

## 2019-08-06 DIAGNOSIS — D50.0 IRON DEFICIENCY ANEMIA SECONDARY TO BLOOD LOSS (CHRONIC): Primary | ICD-10-CM

## 2019-08-12 ENCOUNTER — HOSPITAL ENCOUNTER (OUTPATIENT)
Age: 44
Discharge: HOME | End: 2019-08-12
Payer: COMMERCIAL

## 2019-08-12 DIAGNOSIS — D50.0 IRON DEFICIENCY ANEMIA SECONDARY TO BLOOD LOSS (CHRONIC): ICD-10-CM

## 2019-08-12 DIAGNOSIS — E78.2 MIXED HYPERLIPIDEMIA: ICD-10-CM

## 2019-08-12 LAB
ANISOCYTOSIS: ABNORMAL
BASOPHILS ABSOLUTE: 0.04 E9/L (ref 0–0.2)
BASOPHILS RELATIVE PERCENT: 1 % (ref 0–2)
CHOLESTEROL, TOTAL: 181 MG/DL (ref 0–199)
EOSINOPHILS ABSOLUTE: 0.18 E9/L (ref 0.05–0.5)
EOSINOPHILS RELATIVE PERCENT: 4.3 % (ref 0–6)
FERRITIN: 222 NG/ML
HCT VFR BLD CALC: 37.3 % (ref 34–48)
HDLC SERPL-MCNC: 47 MG/DL
HEMOGLOBIN: 11.1 G/DL (ref 11.5–15.5)
HYPOCHROMIA: ABNORMAL
IMMATURE GRANULOCYTES #: 0.01 E9/L
IMMATURE GRANULOCYTES %: 0.2 % (ref 0–5)
IRON SATURATION: 29 % (ref 15–50)
IRON: 79 MCG/DL (ref 37–145)
LDL CHOLESTEROL CALCULATED: 110 MG/DL (ref 0–99)
LYMPHOCYTES ABSOLUTE: 1.11 E9/L (ref 1.5–4)
LYMPHOCYTES RELATIVE PERCENT: 26.4 % (ref 20–42)
MCH RBC QN AUTO: 25.3 PG (ref 26–35)
MCHC RBC AUTO-ENTMCNC: 29.8 % (ref 32–34.5)
MCV RBC AUTO: 85 FL (ref 80–99.9)
MONOCYTES ABSOLUTE: 0.43 E9/L (ref 0.1–0.95)
MONOCYTES RELATIVE PERCENT: 10.2 % (ref 2–12)
NEUTROPHILS ABSOLUTE: 2.44 E9/L (ref 1.8–7.3)
NEUTROPHILS RELATIVE PERCENT: 57.9 % (ref 43–80)
PDW BLD-RTO: 21.3 FL (ref 11.5–15)
PLATELET # BLD: 209 E9/L (ref 130–450)
PMV BLD AUTO: 11.1 FL (ref 7–12)
POIKILOCYTES: ABNORMAL
POLYCHROMASIA: ABNORMAL
RBC # BLD: 4.39 E12/L (ref 3.5–5.5)
TEAR DROP CELLS: ABNORMAL
TOTAL IRON BINDING CAPACITY: 274 MCG/DL (ref 250–450)
TRIGL SERPL-MCNC: 122 MG/DL (ref 0–149)
VLDLC SERPL CALC-MCNC: 24 MG/DL
WBC # BLD: 4.2 E9/L (ref 4.5–11.5)

## 2019-08-12 PROCEDURE — 82728 ASSAY OF FERRITIN: CPT

## 2019-08-12 PROCEDURE — 83540 ASSAY OF IRON: CPT

## 2019-08-12 PROCEDURE — 83550 IRON BINDING TEST: CPT

## 2019-08-12 PROCEDURE — 36415 COLL VENOUS BLD VENIPUNCTURE: CPT

## 2019-08-12 PROCEDURE — 85025 COMPLETE CBC W/AUTO DIFF WBC: CPT

## 2019-08-12 PROCEDURE — 80061 LIPID PANEL: CPT

## 2019-09-06 RX ORDER — GABAPENTIN 300 MG/1
CAPSULE ORAL
Qty: 90 CAPSULE | Refills: 0 | Status: SHIPPED | OUTPATIENT
Start: 2019-09-06 | End: 2019-10-04 | Stop reason: SDUPTHER

## 2019-09-23 ENCOUNTER — PATIENT MESSAGE (OUTPATIENT)
Dept: FAMILY MEDICINE CLINIC | Age: 44
End: 2019-09-23

## 2019-09-24 ENCOUNTER — PATIENT MESSAGE (OUTPATIENT)
Dept: FAMILY MEDICINE CLINIC | Age: 44
End: 2019-09-24

## 2019-09-24 RX ORDER — DULOXETIN HYDROCHLORIDE 30 MG/1
30 CAPSULE, DELAYED RELEASE ORAL DAILY
Qty: 30 CAPSULE | Refills: 0 | Status: SHIPPED | OUTPATIENT
Start: 2019-09-24 | End: 2019-09-24 | Stop reason: SDUPTHER

## 2019-09-24 RX ORDER — DULOXETIN HYDROCHLORIDE 30 MG/1
30 CAPSULE, DELAYED RELEASE ORAL DAILY
Qty: 90 CAPSULE | Refills: 3 | Status: SHIPPED | OUTPATIENT
Start: 2019-09-24 | End: 2019-09-24 | Stop reason: SDUPTHER

## 2019-09-24 NOTE — TELEPHONE ENCOUNTER
From: Shane Holt  To: Awilda Huffman MD  Sent: 2019 1:16 PM EDT  Subject: Prescription Question    Gilma Conroy, I do not have any cymbalta left. Is there any way to get a partial or just a month prescription sent to [facility] in [place] since there was confusion with the mail order pharmacy? Thanks :)  ----- Message -----  From: Neelam Rahman MA  Sent: 2019 7:28 AM EDT  To: Shane Holt  Subject: RE: Prescription Question  Sarahy Abbott,  What prescription are you referring too?     ----- Message -----   From: Shane Holt   Sent: 2019 8:57 PM EDT   To: Awilda Huffman MD  Subject: Prescription Question    Dr. Yas Pastrana, I requested a prescription refill over a week ago for our mail order pharmacy and received a reply that it had been refilled. I have not received it yet, and it shows no pending refill on our account online. There may have been some confusion as we now have a new mail order pharmacy. Our new mail order pharmacy is [facility](???). I am now down to my last few pills. Could you please resend the script to be refilled to the mail order pharmacy and possibly send a partial supply to [facility] in [place] until they get this cleared up?     Thank you -   Babetta Apgar :)

## 2019-09-25 RX ORDER — DULOXETIN HYDROCHLORIDE 30 MG/1
30 CAPSULE, DELAYED RELEASE ORAL DAILY
Qty: 30 CAPSULE | Refills: 0 | Status: SHIPPED
Start: 2019-09-25 | End: 2020-10-08 | Stop reason: SDUPTHER

## 2019-10-11 ENCOUNTER — NURSE ONLY (OUTPATIENT)
Dept: FAMILY MEDICINE CLINIC | Age: 44
End: 2019-10-11
Payer: COMMERCIAL

## 2019-10-11 DIAGNOSIS — Z23 NEED FOR INFLUENZA VACCINATION: Primary | ICD-10-CM

## 2019-10-11 PROCEDURE — 90471 IMMUNIZATION ADMIN: CPT | Performed by: FAMILY MEDICINE

## 2019-10-11 PROCEDURE — 90686 IIV4 VACC NO PRSV 0.5 ML IM: CPT | Performed by: FAMILY MEDICINE

## 2019-10-11 RX ORDER — METOPROLOL SUCCINATE 25 MG/1
25 TABLET, EXTENDED RELEASE ORAL DAILY
Qty: 90 TABLET | Refills: 3 | Status: SHIPPED
Start: 2019-10-11 | End: 2020-06-11 | Stop reason: SDUPTHER

## 2019-10-18 ENCOUNTER — PATIENT MESSAGE (OUTPATIENT)
Dept: FAMILY MEDICINE CLINIC | Age: 44
End: 2019-10-18

## 2019-10-18 ENCOUNTER — NURSE ONLY (OUTPATIENT)
Dept: FAMILY MEDICINE CLINIC | Age: 44
End: 2019-10-18
Payer: COMMERCIAL

## 2019-10-18 ENCOUNTER — HOSPITAL ENCOUNTER (OUTPATIENT)
Age: 44
Discharge: HOME | End: 2019-10-20
Payer: COMMERCIAL

## 2019-10-18 DIAGNOSIS — R30.0 DYSURIA: ICD-10-CM

## 2019-10-18 DIAGNOSIS — R30.0 DYSURIA: Primary | ICD-10-CM

## 2019-10-18 LAB
BILIRUBIN, POC: NORMAL
BLOOD URINE, POC: NORMAL
CLARITY, POC: CLEAR
COLOR, POC: YELLOW
GLUCOSE URINE, POC: NORMAL
KETONES, POC: NORMAL
LEUKOCYTE EST, POC: NORMAL
NITRITE, POC: NORMAL
PH, POC: 5.5
PROTEIN, POC: NORMAL
SPECIFIC GRAVITY, POC: >=1.03
UROBILINOGEN, POC: NORMAL

## 2019-10-18 PROCEDURE — 81003 URINALYSIS AUTO W/O SCOPE: CPT | Performed by: FAMILY MEDICINE

## 2019-10-18 PROCEDURE — 87088 URINE BACTERIA CULTURE: CPT

## 2019-10-18 RX ORDER — CIPROFLOXACIN 500 MG/1
500 TABLET, FILM COATED ORAL 2 TIMES DAILY
Qty: 10 TABLET | Refills: 0 | Status: SHIPPED | OUTPATIENT
Start: 2019-10-18 | End: 2019-10-23

## 2019-10-20 LAB — URINE CULTURE, ROUTINE: NORMAL

## 2019-10-23 ENCOUNTER — HOSPITAL ENCOUNTER (OUTPATIENT)
Age: 44
Discharge: HOME | End: 2019-10-23
Payer: COMMERCIAL

## 2019-10-23 LAB
BASOPHILS ABSOLUTE: 0.04 E9/L (ref 0–0.2)
BASOPHILS RELATIVE PERCENT: 0.7 % (ref 0–2)
EOSINOPHILS ABSOLUTE: 0.24 E9/L (ref 0.05–0.5)
EOSINOPHILS RELATIVE PERCENT: 4.2 % (ref 0–6)
FERRITIN: 43 NG/ML
HCT VFR BLD CALC: 35.7 % (ref 34–48)
HEMOGLOBIN: 11.5 G/DL (ref 11.5–15.5)
IMMATURE GRANULOCYTES #: 0.01 E9/L
IMMATURE GRANULOCYTES %: 0.2 % (ref 0–5)
IRON SATURATION: 29 % (ref 15–50)
IRON: 82 MCG/DL (ref 37–145)
LYMPHOCYTES ABSOLUTE: 1.42 E9/L (ref 1.5–4)
LYMPHOCYTES RELATIVE PERCENT: 25.1 % (ref 20–42)
MCH RBC QN AUTO: 28.1 PG (ref 26–35)
MCHC RBC AUTO-ENTMCNC: 32.2 % (ref 32–34.5)
MCV RBC AUTO: 87.3 FL (ref 80–99.9)
MONOCYTES ABSOLUTE: 0.39 E9/L (ref 0.1–0.95)
MONOCYTES RELATIVE PERCENT: 6.9 % (ref 2–12)
NEUTROPHILS ABSOLUTE: 3.55 E9/L (ref 1.8–7.3)
NEUTROPHILS RELATIVE PERCENT: 62.9 % (ref 43–80)
PDW BLD-RTO: 16.5 FL (ref 11.5–15)
PLATELET # BLD: 189 E9/L (ref 130–450)
PMV BLD AUTO: 10.9 FL (ref 7–12)
RBC # BLD: 4.09 E12/L (ref 3.5–5.5)
TOTAL IRON BINDING CAPACITY: 278 MCG/DL (ref 250–450)
WBC # BLD: 5.7 E9/L (ref 4.5–11.5)

## 2019-10-23 PROCEDURE — 82728 ASSAY OF FERRITIN: CPT

## 2019-10-23 PROCEDURE — 36415 COLL VENOUS BLD VENIPUNCTURE: CPT

## 2019-10-23 PROCEDURE — 85025 COMPLETE CBC W/AUTO DIFF WBC: CPT

## 2019-10-23 PROCEDURE — 83540 ASSAY OF IRON: CPT

## 2019-10-23 PROCEDURE — 83550 IRON BINDING TEST: CPT

## 2019-12-04 RX ORDER — GABAPENTIN 300 MG/1
CAPSULE ORAL
Qty: 90 CAPSULE | Refills: 0 | Status: SHIPPED | OUTPATIENT
Start: 2019-12-04 | End: 2020-01-06

## 2019-12-27 ENCOUNTER — NURSE ONLY (OUTPATIENT)
Dept: FAMILY MEDICINE CLINIC | Age: 44
End: 2019-12-27
Payer: COMMERCIAL

## 2019-12-27 ENCOUNTER — HOSPITAL ENCOUNTER (OUTPATIENT)
Age: 44
Discharge: HOME | End: 2019-12-29
Payer: COMMERCIAL

## 2019-12-27 DIAGNOSIS — R30.0 DYSURIA: Primary | ICD-10-CM

## 2019-12-27 LAB
BILIRUBIN, POC: NORMAL
BLOOD URINE, POC: NORMAL
CLARITY, POC: NORMAL
COLOR, POC: YELLOW
GLUCOSE URINE, POC: NORMAL
KETONES, POC: NORMAL
LEUKOCYTE EST, POC: NORMAL
NITRITE, POC: NORMAL
PH, POC: 5.5
PROTEIN, POC: 30
SPECIFIC GRAVITY, POC: 1.02
UROBILINOGEN, POC: 0.2

## 2019-12-27 PROCEDURE — 87088 URINE BACTERIA CULTURE: CPT

## 2019-12-27 PROCEDURE — 81002 URINALYSIS NONAUTO W/O SCOPE: CPT | Performed by: FAMILY MEDICINE

## 2019-12-27 RX ORDER — SULFAMETHOXAZOLE AND TRIMETHOPRIM 800; 160 MG/1; MG/1
1 TABLET ORAL 2 TIMES DAILY
Qty: 10 TABLET | Refills: 0 | Status: SHIPPED
Start: 2019-12-27 | End: 2020-03-24 | Stop reason: SDUPTHER

## 2019-12-29 LAB — URINE CULTURE, ROUTINE: NORMAL

## 2020-01-06 RX ORDER — GABAPENTIN 300 MG/1
CAPSULE ORAL
Qty: 90 CAPSULE | Refills: 0 | Status: SHIPPED | OUTPATIENT
Start: 2020-01-06 | End: 2020-02-03

## 2020-01-13 ENCOUNTER — OFFICE VISIT (OUTPATIENT)
Dept: CARDIOLOGY CLINIC | Age: 45
End: 2020-01-13
Payer: COMMERCIAL

## 2020-01-13 VITALS
DIASTOLIC BLOOD PRESSURE: 74 MMHG | HEIGHT: 69 IN | HEART RATE: 57 BPM | BODY MASS INDEX: 37.03 KG/M2 | WEIGHT: 250 LBS | RESPIRATION RATE: 16 BRPM | SYSTOLIC BLOOD PRESSURE: 112 MMHG

## 2020-01-13 PROCEDURE — 99214 OFFICE O/P EST MOD 30 MIN: CPT | Performed by: INTERNAL MEDICINE

## 2020-01-13 PROCEDURE — 93000 ELECTROCARDIOGRAM COMPLETE: CPT | Performed by: INTERNAL MEDICINE

## 2020-01-13 RX ORDER — OMEGA-3 FATTY ACIDS/FISH OIL 300-1000MG
2 CAPSULE ORAL 2 TIMES DAILY
COMMUNITY
End: 2022-10-05

## 2020-01-13 NOTE — PROGRESS NOTES
CHIEF COMPLAINT: SVT    HISTORY OF PRESENT ILLNESS: Patient is a 38 y.o. female seen at the request of Beatriz Ledesma MD.      No further palpitations. No CP or SOB. Unable to wear monitor due to severe reaction to patches. Past Medical History:   Diagnosis Date    Acid reflux     Anxiety and depression     Crohn's colitis ([facility] [address] 04/2018    Crohn's colitis per colonoscopy biopsies 4/9/18, 6/11/18       Patient Active Problem List   Diagnosis    GERD (gastroesophageal reflux disease)    Depression with anxiety    Exacerbation of Crohn's disease of large intestine (HCC)    SVT (supraventricular tachycardia) (HCC)    Chest pain    Anemia    Paroxysmal supraventricular tachycardia (HCC)    Iron deficiency anemia secondary to blood loss (chronic)       Allergies   Allergen Reactions    Amoxil [Amoxicillin] Diarrhea    Doxycycline      Stomach upset      Diflucan [Fluconazole] Palpitations       Current Outpatient Medications   Medication Sig Dispense Refill    ibuprofen (ADVIL) 200 MG CAPS Take 2 capsules by mouth 2 times daily      gabapentin (NEURONTIN) 300 MG capsule TAKE 1 CAPSULE BY MOUTH THREE TIMES DAILY 90 capsule 0    metoprolol succinate (TOPROL XL) 25 MG extended release tablet Take 1 tablet by mouth daily 90 tablet 3    DULoxetine (CYMBALTA) 30 MG extended release capsule Take 1 capsule by mouth daily 30 capsule 0    omeprazole (PRILOSEC) 40 MG delayed release capsule TAKE 1 CAPSULE DAILY 90 capsule 3    loperamide (IMODIUM) 1 MG/5ML solution Take 2 mg by mouth 4 times daily as needed for Diarrhea      fluticasone (FLONASE) 50 MCG/ACT nasal spray 1 spray by Nasal route daily (Patient taking differently: 1 spray by Nasal route daily as needed ) 3 Bottle 3    Cholecalciferol (VITAMIN D) 2000 UNITS TABS Take 2,000 Units by mouth daily        No current facility-administered medications for this visit.         Social History     Socioeconomic History    Marital status:      Spouse name: Not on file    Number of children: Not on file    Years of education: Not on file    Highest education level: Not on file   Occupational History    Not on file   Social Needs    Financial resource strain: Not on file    Food insecurity:     Worry: Not on file     Inability: Not on file    Transportation needs:     Medical: Not on file     Non-medical: Not on file   Tobacco Use    Smoking status: Never Smoker    Smokeless tobacco: Never Used   Substance and Sexual Activity    Alcohol use: No    Drug use: No    Sexual activity: Not on file   Lifestyle    Physical activity:     Days per week: Not on file     Minutes per session: Not on file    Stress: Not on file   Relationships    Social connections:     Talks on phone: Not on file     Gets together: Not on file     Attends Sabianist service: Not on file     Active member of club or organization: Not on file     Attends meetings of clubs or organizations: Not on file     Relationship status: Not on file    Intimate partner violence:     Fear of current or ex partner: Not on file     Emotionally abused: Not on file     Physically abused: Not on file     Forced sexual activity: Not on file   Other Topics Concern    Not on file   Social History Narrative    Not on file       Family History   Problem Relation Age of Onset    Kidney Disease Mother     Diabetes Father         on HD s/p MI    Heart Disease Father     High Blood Pressure Father        Review of Systems:  Heart: as above   Lungs: as above   Eyes: denies changes in vision or discharge. Ears: denies changes in hearing or pain. Nose: denies epistaxis or masses   Throat: denies sore throat or trouble swallowing. Neuro: denies numbness, tingling, tremors. Skin: denies rashes or itching. : denies hematuria, dysuria   GI: denies vomiting, diarrhea   Psych: denies mood changed, anxiety, depression. All other systems negative.     Physical Exam   /74 Pulse 57   Resp 16   Ht 5' 9\" (1.753 m)   Wt 250 lb (113.4 kg)   BMI 36.92 kg/m²   Constitutional: Oriented to person, place, and time. Well-developed and well-nourished. No distress. Head: Normocephalic and atraumatic. Eyes: EOM are normal. Pupils are equal, round, and reactive to light. Neck: Normal range of motion. Neck supple. No hepatojugular reflux and no JVD present. Carotid bruit is not present. No tracheal deviation present. No thyromegaly present. Cardiovascular: Normal rate, regular rhythm, normal heart sounds and intact distal pulses. Exam reveals no gallop and no friction rub. No murmur heard. Pulmonary/Chest: Effort normal and breath sounds normal. No respiratory distress. No wheezes. No rales. No tenderness. Abdominal: Soft. Bowel sounds are normal. No distension and no mass. No tenderness. No rebound and no guarding. Musculoskeletal: Normal range of motion. No edema and no tenderness. Lymphadenopathy:   No cervical adenopathy. No groin adenopathy. Neurological: Alert and oriented to person, place, and time. Skin: Skin is warm and dry. No rash noted. Not diaphoretic. No erythema. Psychiatric: Normal mood and affect. Behavior is normal.     EKG:  sinus bradycardia, nonspecific ST and T waves changes. ASSESSMENT AND PLAN:  Patient Active Problem List   Diagnosis    GERD (gastroesophageal reflux disease)    Depression with anxiety    Exacerbation of Crohn's disease of large intestine ([address]    SVT (supraventricular tachycardia) (HCC)    Chest pain    Anemia    Paroxysmal supraventricular tachycardia (HCC)    Iron deficiency anemia secondary to blood loss (chronic)     1. SVT: Echo normal.     Unable to wear monitor due to severe reaction to patches. Continue BB. If recurrent then EP. 2. Lipids: Statin. 3. IBD    4. Anemia of chronic disease    5. Pre-op: Patient is an acceptable risk to proceed without further cardiac testing.      Beata Hernandez

## 2020-02-03 RX ORDER — GABAPENTIN 300 MG/1
CAPSULE ORAL
Qty: 90 CAPSULE | Refills: 0 | Status: SHIPPED
Start: 2020-02-03 | End: 2020-03-09

## 2020-03-09 RX ORDER — GABAPENTIN 300 MG/1
CAPSULE ORAL
Qty: 90 CAPSULE | Refills: 0 | Status: SHIPPED
Start: 2020-03-09 | End: 2020-04-06

## 2020-03-23 ENCOUNTER — PATIENT MESSAGE (OUTPATIENT)
Dept: FAMILY MEDICINE CLINIC | Age: 45
End: 2020-03-23

## 2020-03-24 RX ORDER — SULFAMETHOXAZOLE AND TRIMETHOPRIM 800; 160 MG/1; MG/1
1 TABLET ORAL 2 TIMES DAILY
Qty: 10 TABLET | Refills: 0 | Status: SHIPPED | OUTPATIENT
Start: 2020-03-24 | End: 2020-03-29

## 2020-04-06 RX ORDER — GABAPENTIN 300 MG/1
CAPSULE ORAL
Qty: 90 CAPSULE | Refills: 0 | Status: SHIPPED
Start: 2020-04-06 | End: 2020-05-04

## 2020-04-16 ENCOUNTER — HOSPITAL ENCOUNTER (OUTPATIENT)
Age: 45
Discharge: HOME | End: 2020-04-18
Payer: COMMERCIAL

## 2020-04-16 ENCOUNTER — VIRTUAL VISIT (OUTPATIENT)
Dept: FAMILY MEDICINE CLINIC | Age: 45
End: 2020-04-16
Payer: COMMERCIAL

## 2020-04-16 LAB
BILIRUBIN, POC: NORMAL
BLOOD URINE, POC: NORMAL
CLARITY, POC: NORMAL
COLOR, POC: NORMAL
GLUCOSE URINE, POC: NORMAL
KETONES, POC: NORMAL
LEUKOCYTE EST, POC: NORMAL
NITRITE, POC: NORMAL
PH, POC: 5
PROTEIN, POC: NORMAL
SPECIFIC GRAVITY, POC: 1.03
UROBILINOGEN, POC: 0.2

## 2020-04-16 PROCEDURE — 99214 OFFICE O/P EST MOD 30 MIN: CPT | Performed by: FAMILY MEDICINE

## 2020-04-16 PROCEDURE — 81003 URINALYSIS AUTO W/O SCOPE: CPT | Performed by: FAMILY MEDICINE

## 2020-04-16 PROCEDURE — 87088 URINE BACTERIA CULTURE: CPT

## 2020-04-16 RX ORDER — CIPROFLOXACIN 500 MG/1
500 TABLET, FILM COATED ORAL 2 TIMES DAILY
Qty: 14 TABLET | Refills: 0 | Status: SHIPPED | OUTPATIENT
Start: 2020-04-16 | End: 2020-04-23

## 2020-04-16 NOTE — PROGRESS NOTES
TeleMedicine Patient Consent    This visit was performed as a virtual video visit using a synchronous, two-way, audio-video telehealth technology platform. Patient identification was verified at the start of the visit, including the patient's telephone number and physical location. I discussed with the patient the nature of our telehealth visits, that:     1. Due to the nature of an audio- video modality, the only components of a physical exam that could be done are the elements supported by direct observation. 2. I would evaluate the patient and recommend diagnostics and treatments based on my assessment. 3. If it was felt that the patient should be evaluated in clinic or an emergency room setting, then they would be directed there. 4. Our sessions are not being recorded and that personal health information is protected. 5. Our team would provide follow up care in person if/when the patient needs it. Patient does agree to proceed with telemedicine consultation. Patient's location: home address in [place]    2020    TELEHEALTH EVALUATION -- Audio/Visual (During GAJJT-08 public health emergency)    HPI:    Shelby Corley (:  1975) has requested an audio/video evaluation for the following concern(s):    Dysuria:  Patient is here today with complaints of dysuria. This has been going on for several day(s). Associated signs and symptoms include dysuria, frequency, urgency. No alleviating factors. Patient does have a history of recurrent UTI. Patient does not have a history of pyelonephritis. Patient does not have genital complaints. Patient denies back pain, fever, chills, nausea, hematuria, nocturia, incontinence, stones or infection.       ROS Unless otherwise specified  Review of Systems - General ROS: negative for - chills, fatigue, fever, night sweats, sleep disturbance, weight gain or weight loss  Psychological ROS: negative for - anxiety, behavioral disorder, depression, hallucinations, irritability, memory difficulties, mood swings, sleep disturbances or suicidal ideation  ENT ROS: negative for - epistaxis, headaches, hearing change, nasal congestion, nasal discharge, nasal polyps, sinus pain, tinnitus, vertigo or visual changes  Hematological and Lymphatic ROS: negative for - bleeding problems, blood clots, fatigue or swollen lymph nodes  Respiratory ROS: negative for - cough, orthopnea, shortness of breath, sputum changes, tachypnea or wheezing  Cardiovascular ROS: negative for - chest pain, dyspnea on exertion, irregular heartbeat, loss of consciousness, palpitations, paroxysmal nocturnal dyspnea or rapid heart rate  Gastrointestinal ROS: negative for - abdominal pain, blood in stools, change in bowel habits, constipation, diarrhea, gas/bloating, heartburn or nausea/vomiting  Musculoskeletal ROS: negative for - joint pain, joint stiffness, joint swelling or muscle, back pain, bowel or bladder incontinence  Neurological ROS: negative for - behavioral changes, confusion, dizziness, headaches, memory loss, numbness/tingling, seizures or speech problems, weakness  Dermatological ROS: negative for - dry skin, mole changes, nail changes, pruritus, rash or skin lesion changes    Prior to Visit Medications    Medication Sig Taking?  Authorizing Provider   ciprofloxacin (CIPRO) 500 MG tablet Take 1 tablet by mouth 2 times daily for 7 days Yes Zabrina Hernandez, DO   gabapentin (NEURONTIN) 300 MG capsule TAKE 1 CAPSULE BY MOUTH THREE TIMES DAILY Yes Zabrina Hernandez, DO   ibuprofen (ADVIL) 200 MG CAPS Take 2 capsules by mouth 2 times daily Yes Historical Provider, MD   metoprolol succinate (TOPROL XL) 25 MG extended release tablet Take 1 tablet by mouth daily Yes Louie Núñez MD   DULoxetine (CYMBALTA) 30 MG extended release capsule Take 1 capsule by mouth daily Yes Louie Núñez MD   omeprazole (PRILOSEC) 40 MG delayed release capsule TAKE 1 CAPSULE DAILY Yes Dorota Gorman Bradford Phillip MD   loperamide (IMODIUM) 1 MG/5ML solution Take 2 mg by mouth 4 times daily as needed for Diarrhea Yes Historical Provider, MD   fluticasone (FLONASE) 50 MCG/ACT nasal spray 1 spray by Nasal route daily  Patient taking differently: 1 spray by Nasal route daily as needed  Yes Kimani Anderson MD   Cholecalciferol (VITAMIN D) 2000 UNITS TABS Take 2,000 Units by mouth daily  Yes Historical Provider, MD       Social History     Tobacco Use    Smoking status: Never Smoker    Smokeless tobacco: Never Used   Substance Use Topics    Alcohol use: No    Drug use: No        Allergies   Allergen Reactions    Amoxil [Amoxicillin] Diarrhea    Doxycycline      Stomach upset      Diflucan [Fluconazole] Palpitations   ,   Past Medical History:   Diagnosis Date    Acid reflux     Anxiety and depression     [facility] 04/2018    Crohn's colitis per colonoscopy biopsies 4/9/18, 6/11/18   ,   Past Surgical History:   Procedure Laterality Date    BREAST BIOPSY  1991    Benign Cyst    CHOLECYSTECTOMY  11/18/2011    Caffie Actis, laparoscopic     ENDOMETRIAL ABLATION  2004    ILEOSTOMY OR JEJUNOSTOMY  04/2019    UPPER GASTROINTESTINAL ENDOSCOPY     ,   Social History     Tobacco Use    Smoking status: Never Smoker    Smokeless tobacco: Never Used   Substance Use Topics    Alcohol use: No    Drug use: No   ,   Family History   Problem Relation Age of Onset    Kidney Disease Mother     Diabetes Father         on HD s/p MI    Heart Disease Father     High Blood Pressure Father    ,   Immunization History   Administered Date(s) Administered    Influenza 10/01/2012, 10/08/2013    Influenza, Quadv, 6 mo and older, IM, PF (Flulaval, Fluarix) 12/15/2018    Influenza, Quadv, IM, PF (6 mo and older Fluzone, Flulaval, Fluarix, and 3 yrs and older Afluria) 10/11/2019    Tdap (Boostrix, Adacel) 04/22/2014   ,   Health Maintenance   Topic Date Due    HIV screen  07/13/1989    Diabetes screen  07/13/2014  Cervical cancer screen  03/23/2018    Breast cancer screen  07/05/2019    DTaP/Tdap/Td vaccine (2 - Td) 04/22/2024    Lipid screen  08/12/2024    Flu vaccine  Completed    Hepatitis A vaccine  Aged Out    Hepatitis B vaccine  Aged Out    Hib vaccine  Aged Out    Meningococcal (ACWY) vaccine  Aged Out    Pneumococcal 0-64 years Vaccine  Aged Out       PHYSICAL EXAMINATION:  [ INSTRUCTIONS:  \"[x]\" Indicates a positive item  \"[]\" Indicates a negative item  -- DELETE ALL ITEMS NOT EXAMINED]  Vital Signs: (As obtained by patient/caregiver or practitioner observation)    Blood pressure-  Heart rate-    Respiratory rate-    Temperature- afebrile Pulse oximetry-     Constitutional: [x] Appears well-developed and well-nourished [x] No apparent distress      [] Abnormal-   Mental status  [x] Alert and awake  [x] Oriented to person/place/time []Able to follow commands      Eyes:  EOM    [x]  Normal  [] Abnormal-  Sclera  [x]  Normal  [] Abnormal -         Discharge [x]  None visible  [] Abnormal -    HENT:   [x] Normocephalic, atraumatic.   [x] Abnormal   [] Mouth/Throat: Mucous membranes are moist.     External Ears [x] Normal  [] Abnormal-     Neck: [x] No visualized mass     Pulmonary/Chest: [x] Respiratory effort normal.  [x] No visualized signs of difficulty breathing or respiratory distress        [] Abnormal-      Musculoskeletal:   [x] Normal gait with no signs of ataxia         [] Normal range of motion of neck        [] Abnormal-       Neurological:        [x] No Facial Asymmetry (Cranial nerve 7 motor function) (limited exam to video visit)          [x] No gaze palsy        [] Abnormal-         Skin:        [x] No significant exanthematous lesions or discoloration noted on facial skin         [] Abnormal-            Psychiatric:       [x] Normal Affect [x] No Hallucinations        [] Abnormal-     Other pertinent observable physical exam findings-     Due to this being a TeleHealth encounter, evaluation

## 2020-04-18 LAB — URINE CULTURE, ROUTINE: NORMAL

## 2020-04-23 ENCOUNTER — HOSPITAL ENCOUNTER (OUTPATIENT)
Age: 45
Discharge: HOME | End: 2020-04-25
Payer: COMMERCIAL

## 2020-04-23 LAB
BILIRUBIN, POC: NORMAL
BLOOD URINE, POC: NORMAL
CLARITY, POC: CLEAR
COLOR, POC: YELLOW
GLUCOSE URINE, POC: NORMAL
KETONES, POC: NORMAL
LEUKOCYTE EST, POC: NORMAL
NITRITE, POC: NORMAL
PH, POC: 5
PROTEIN, POC: NORMAL
SPECIFIC GRAVITY, POC: 1.02
UROBILINOGEN, POC: 0.2

## 2020-04-23 PROCEDURE — 81003 URINALYSIS AUTO W/O SCOPE: CPT | Performed by: FAMILY MEDICINE

## 2020-04-23 PROCEDURE — 87088 URINE BACTERIA CULTURE: CPT

## 2020-04-25 LAB — URINE CULTURE, ROUTINE: NORMAL

## 2020-05-04 RX ORDER — GABAPENTIN 300 MG/1
CAPSULE ORAL
Qty: 90 CAPSULE | Refills: 0 | Status: SHIPPED
Start: 2020-05-04 | End: 2020-06-01

## 2020-06-01 RX ORDER — GABAPENTIN 300 MG/1
CAPSULE ORAL
Qty: 90 CAPSULE | Refills: 0 | Status: SHIPPED
Start: 2020-06-01 | End: 2020-07-01

## 2020-06-09 ENCOUNTER — OFFICE VISIT (OUTPATIENT)
Dept: FAMILY MEDICINE CLINIC | Age: 45
End: 2020-06-09
Payer: COMMERCIAL

## 2020-06-09 ENCOUNTER — HOSPITAL ENCOUNTER (OUTPATIENT)
Age: 45
Discharge: HOME | End: 2020-06-11
Payer: COMMERCIAL

## 2020-06-09 VITALS
RESPIRATION RATE: 16 BRPM | SYSTOLIC BLOOD PRESSURE: 127 MMHG | OXYGEN SATURATION: 98 % | HEIGHT: 69 IN | WEIGHT: 249.8 LBS | BODY MASS INDEX: 37 KG/M2 | HEART RATE: 70 BPM | DIASTOLIC BLOOD PRESSURE: 77 MMHG | TEMPERATURE: 98.8 F

## 2020-06-09 LAB
BILIRUBIN, POC: NORMAL
BLOOD URINE, POC: NORMAL
CLARITY, POC: CLEAR
COLOR, POC: YELLOW
GLUCOSE URINE, POC: NORMAL
KETONES, POC: NORMAL
LEUKOCYTE EST, POC: NORMAL
NITRITE, POC: NORMAL
PH, POC: 5.5
PROTEIN, POC: NORMAL
SPECIFIC GRAVITY, POC: >=1.03
UROBILINOGEN, POC: 0.2

## 2020-06-09 PROCEDURE — 81002 URINALYSIS NONAUTO W/O SCOPE: CPT | Performed by: FAMILY MEDICINE

## 2020-06-09 PROCEDURE — 87088 URINE BACTERIA CULTURE: CPT

## 2020-06-09 PROCEDURE — 99213 OFFICE O/P EST LOW 20 MIN: CPT | Performed by: FAMILY MEDICINE

## 2020-06-09 RX ORDER — OXYCODONE HYDROCHLORIDE 5 MG/1
TABLET ORAL
COMMUNITY
Start: 2020-06-04 | End: 2021-02-26 | Stop reason: SDUPTHER

## 2020-06-09 RX ORDER — FLUCONAZOLE 100 MG/1
100 TABLET ORAL DAILY
Qty: 7 TABLET | Refills: 0 | Status: SHIPPED
Start: 2020-06-09 | End: 2020-09-30 | Stop reason: ALTCHOICE

## 2020-06-09 NOTE — PROGRESS NOTES
Dysuria:  Patient is here today with complaints dysuria. This has been going on for 1 1/2 week(s). Associated signs and symptoms include burning with urination, edema and vaginal itching. Alleviating factors include none. This has not happen to patient in the past.  Patient does have genital complaints. She has cutaneous Crohn's and is dealing with wound healing. She also c/o possible yeast infection. Patient's past medical, surgical, social and/or family history reviewed, updated in chart, and are non-contributory (unless otherwise stated). Medications and allergies also reviewed and updated in chart.      Review of Systems:  Constitutional:  No fever, no fatigue, no chills, no headaches, no weight change  Dermatology:  No rash, no mole, no dry or sensitive skin  ENT:  No cough, no sore throat, no sinus pain, no runny nose, no ear pain  Cardiology:  No chest pain, no palpitations, no leg edema, no shortness of breath, no PND  Gastroenterology:  No dysphagia, no abdominal pain, no nausea, no vomiting, no constipation, no diarrhea, no heartburn  Musculoskeletal:  No joint pain, no leg cramps, no back pain, no muscle aches  Respiratory:  No shortness of breath, no orthopnea, no wheezing, no SKINNER, no hemoptysis  Urology:  No blood in the urine, no urinary frequency, no urinary incontinence, no urinary urgency, no nocturia, no dysuria    Vitals:    06/09/20 1152   BP: 127/77   Pulse: 70   Resp: 16   Temp: 98.8 °F (37.1 °C)   TempSrc: Temporal   SpO2: 98%   Weight: 249 lb 12.8 oz (113.3 kg)   Height: 5' 9\" (1.753 m)       General:  Patient alert and oriented x 3, NAD, pleasant  HEENT:  Atraumatic, normocephalic, PERRLA, EOMI, clear conjunctiva, TMs clear, nose-clear, throat - no erythema  Neck:  Supple, no goiter, no carotid bruits, no lymphadenopathy  Lungs:  CTA B  Heart:  RRR, no murmurs, gallops or rubs  Abdomen:  Soft/nt/nd, + bowel sounds  Extremities:  No clubbing, cyanosis or edema  Skin: unremarkable    Assessment/Plan:      Vania Barker was seen today for urinary tract infection. Diagnoses and all orders for this visit:    Dysuria  -     Culture, Urine; Future  -     POCT Urinalysis no Micro    Crohn's disease of both small and large intestine with other complication (HCC)    Vulvovaginitis    Other orders  -     fluconazole (DIFLUCAN) 100 MG tablet; Take 1 tablet by mouth daily      As above. Call or go to ED immediately if symptoms worsen or persist.  No follow-ups on file. , or sooner if necessary. Educational materials and/or home exercises printed for patient's review and were included in patient instructions on his/her After Visit Summary and given to patient at the end of visit. Counseled regarding above diagnosis, including possible risks and complications,  especially if left uncontrolled. Counseled regarding the possible side effects, risks, benefits and alternatives to treatment; patient and/or guardian verbalizes understanding, agrees, feels comfortable with and wishes to proceed with above treatment plan. Advised patient to call with any new medication issues, and read all Rx info from pharmacy to assure aware of all possible risks and side effects of medication before taking. Reviewed age and gender appropriate health screening exams and vaccinations. Advised patient regarding importance of keeping up with recommended health maintenance and to schedule as soon as possible if overdue, as this is important in assessing for undiagnosed pathology, especially cancer, as well as protecting against potentially harmful/life threatening disease. Patient and/or guardian verbalizes understanding and agrees with above counseling, assessment and plan. All questions answered. Hernan Schaefer MD  6/10/2020    I have personally reviewed and updated the chief complaint, HPI, Past Medical, Family and Social History, as well as the above Review of Systems.

## 2020-06-11 LAB — URINE CULTURE, ROUTINE: NORMAL

## 2020-06-11 RX ORDER — OMEPRAZOLE 40 MG/1
CAPSULE, DELAYED RELEASE ORAL
Qty: 90 CAPSULE | Refills: 3 | Status: SHIPPED
Start: 2020-06-11 | End: 2021-05-20

## 2020-06-11 RX ORDER — METOPROLOL SUCCINATE 25 MG/1
25 TABLET, EXTENDED RELEASE ORAL DAILY
Qty: 90 TABLET | Refills: 3 | Status: SHIPPED
Start: 2020-06-11 | End: 2021-01-05 | Stop reason: SDUPTHER

## 2020-07-01 RX ORDER — GABAPENTIN 300 MG/1
CAPSULE ORAL
Qty: 90 CAPSULE | Refills: 0 | Status: SHIPPED
Start: 2020-07-01 | End: 2020-07-29

## 2020-07-29 RX ORDER — GABAPENTIN 300 MG/1
CAPSULE ORAL
Qty: 90 CAPSULE | Refills: 0 | Status: SHIPPED
Start: 2020-07-29 | End: 2020-08-27

## 2020-08-27 RX ORDER — GABAPENTIN 300 MG/1
CAPSULE ORAL
Qty: 90 CAPSULE | Refills: 0 | Status: SHIPPED
Start: 2020-08-27 | End: 2020-09-28

## 2020-09-28 RX ORDER — GABAPENTIN 300 MG/1
CAPSULE ORAL
Qty: 90 CAPSULE | Refills: 0 | Status: SHIPPED
Start: 2020-09-28 | End: 2020-10-26

## 2020-09-30 ENCOUNTER — HOSPITAL ENCOUNTER (OUTPATIENT)
Dept: HYPERBARIC MEDICINE | Age: 45
Setting detail: THERAPIES SERIES
Discharge: HOME | End: 2020-09-30
Payer: COMMERCIAL

## 2020-09-30 ENCOUNTER — HOSPITAL ENCOUNTER (OUTPATIENT)
Dept: WOUND CARE | Age: 45
Discharge: HOME | End: 2020-09-30
Payer: COMMERCIAL

## 2020-09-30 VITALS
HEART RATE: 72 BPM | WEIGHT: 245 LBS | RESPIRATION RATE: 18 BRPM | BODY MASS INDEX: 36.29 KG/M2 | DIASTOLIC BLOOD PRESSURE: 58 MMHG | SYSTOLIC BLOOD PRESSURE: 102 MMHG | TEMPERATURE: 98.8 F | HEIGHT: 69 IN

## 2020-09-30 PROCEDURE — 99204 OFFICE O/P NEW MOD 45 MIN: CPT | Performed by: SURGERY

## 2020-09-30 PROCEDURE — 99213 OFFICE O/P EST LOW 20 MIN: CPT

## 2020-09-30 PROCEDURE — 99214 OFFICE O/P EST MOD 30 MIN: CPT

## 2020-09-30 RX ORDER — SODIUM HYPOCHLORITE 1.25 MG/ML
SOLUTION TOPICAL DAILY
Qty: 4 BOTTLE | Refills: 3 | Status: SHIPPED | OUTPATIENT
Start: 2020-09-30 | End: 2020-10-30

## 2020-09-30 RX ORDER — LIDOCAINE HYDROCHLORIDE 40 MG/ML
SOLUTION TOPICAL ONCE
Status: DISCONTINUED | OUTPATIENT
Start: 2020-09-30 | End: 2020-10-01 | Stop reason: HOSPADM

## 2020-09-30 RX ORDER — TACROLIMUS 1 MG/G
1 OINTMENT TOPICAL DAILY
COMMUNITY

## 2020-09-30 RX ORDER — SULFAMETHOXAZOLE AND TRIMETHOPRIM 800; 160 MG/1; MG/1
1 TABLET ORAL 2 TIMES DAILY
COMMUNITY
End: 2022-09-06

## 2020-09-30 ASSESSMENT — PAIN DESCRIPTION - FREQUENCY: FREQUENCY: INTERMITTENT

## 2020-09-30 ASSESSMENT — PAIN DESCRIPTION - PAIN TYPE: TYPE: CHRONIC PAIN

## 2020-09-30 ASSESSMENT — PAIN DESCRIPTION - DESCRIPTORS: DESCRIPTORS: THROBBING

## 2020-09-30 ASSESSMENT — PAIN - FUNCTIONAL ASSESSMENT: PAIN_FUNCTIONAL_ASSESSMENT: PREVENTS OR INTERFERES SOME ACTIVE ACTIVITIES AND ADLS

## 2020-09-30 ASSESSMENT — PAIN DESCRIPTION - LOCATION: LOCATION: BUTTOCKS;ABDOMEN

## 2020-09-30 ASSESSMENT — PAIN DESCRIPTION - PROGRESSION: CLINICAL_PROGRESSION: NOT CHANGED

## 2020-09-30 ASSESSMENT — PAIN SCALES - GENERAL: PAINLEVEL_OUTOF10: 7

## 2020-09-30 ASSESSMENT — PAIN DESCRIPTION - ONSET: ONSET: ON-GOING

## 2020-10-04 PROBLEM — L98.492 ABDOMINAL WALL SKIN ULCER, WITH FAT LAYER EXPOSED (HCC): Chronic | Status: ACTIVE | Noted: 2020-10-04

## 2020-10-04 PROBLEM — L98.492: Chronic | Status: ACTIVE | Noted: 2020-10-04

## 2020-10-04 PROBLEM — K50.119 CROHN'S DISEASE OF COLON WITH COMPLICATION (HCC): Chronic | Status: ACTIVE | Noted: 2020-10-04

## 2020-10-04 NOTE — H&P
Wound Healing Center /Hyperbarics   History and Physical/Consultation  Vascular    Referring Physician : MD Jenniffer Sprague RECORD NUMBER:  [id number]  AGE: 54 y.o. GENDER: female  : 1975  EPISODE DATE:  2020  Subjective:     Chief Complaint   Patient presents with    Wound Check     abdomen/perineum         HISTORY of PRESENT ILLNESS HPI     Brando Marin is a 54 y.o. female presents to wound care center for treatment of multiple wounds. She has history of Crohn's colitis s/p TAC 3/2019 and completion proctectomy 2020. She developed wound in lower abdominal crease and in perineum. She has been treating with local care. C/o severe pain with dressing changes. Denies leaking from stoma. Also investigating HBO as Crohn's may still be active in perineal wound.  present at bedside.       PAST MEDICAL HISTORY      Diagnosis Date    Acid reflux     Anxiety and depression     Crohn's colitis ([address] 2018    Crohn's colitis per colonoscopy biopsies 18, 18     Past Surgical History:   Procedure Laterality Date    BREAST BIOPSY      Benign Cyst    CHOLECYSTECTOMY  2011    [facility], laparoscopic     ENDOMETRIAL ABLATION  2004    ILEOSTOMY OR JEJUNOSTOMY  2019    UPPER GASTROINTESTINAL ENDOSCOPY       Family History   Problem Relation Age of Onset    Kidney Disease Mother     Diabetes Father         on HD s/p MI    Heart Disease Father     High Blood Pressure Father      Social History     Tobacco Use    Smoking status: Never Smoker    Smokeless tobacco: Never Used   Substance Use Topics    Alcohol use: No    Drug use: No     Allergies   Allergen Reactions    Amoxil [Amoxicillin] Diarrhea    Doxycycline      Stomach upset       Current Outpatient Medications on File Prior to Encounter   Medication Sig Dispense Refill    sulfamethoxazole-trimethoprim (BACTRIM DS;SEPTRA DS) 800-160 MG per tablet Take 1 tablet by mouth 2 times daily      tacrolimus (PROTOPIC) 0.1 % ointment Apply 1 each topically daily Apply topically 2 times daily.  gabapentin (NEURONTIN) 300 MG capsule TAKE 1 CAPSULE BY MOUTH THREE TIMES DAILY 90 capsule 0    metoprolol succinate (TOPROL XL) 25 MG extended release tablet Take 1 tablet by mouth daily 90 tablet 3    omeprazole (PRILOSEC) 40 MG delayed release capsule TAKE 1 CAPSULE DAILY 90 capsule 3    oxyCODONE (ROXICODONE) 5 MG immediate release tablet TK 1 T PO Q 6 H PRF PAIN      DULoxetine (CYMBALTA) 30 MG extended release capsule Take 1 capsule by mouth daily 30 capsule 0    fluticasone (FLONASE) 50 MCG/ACT nasal spray 1 spray by Nasal route daily (Patient taking differently: 1 spray by Nasal route daily as needed ) 3 Bottle 3    Cholecalciferol (VITAMIN D) 2000 UNITS TABS Take 2,000 Units by mouth daily       ibuprofen (ADVIL) 200 MG CAPS Take 2 capsules by mouth 2 times daily      loperamide (IMODIUM) 1 MG/5ML solution Take 2 mg by mouth 4 times daily as needed for Diarrhea       No current facility-administered medications on file prior to encounter.         REVIEW OF SYSTEMS   ROS : All others Negative if blank [], Positive if [x]  General Urinary   [] Fevers [] Hematuria   [] Chills [] Dysuria   [] Weight Loss Vascular   Skin [] Claudication   [x] Tissue Loss [] Rest Pain   Eyes Neurologic   [] Wears Glasses/Contacts [] Stroke/TIA   [] Vision Changes [] Focal weakness   Respiratory [] Slurred Speech    [] Shortness of breath ENT   Cardiovascular [] Difficulty swallowing   [] Chest Pain Gastrointestinal   [] Shortness of breath with exertion [] Abdominal Pain    [] Melena       [] Hematochezia               Objective:    BP (!) 102/58   Pulse 72   Temp 98.8 °F (37.1 °C) (Temporal)   Resp 18   Ht 5' 9\" (1.753 m)   Wt 245 lb (111.1 kg)   BMI 36.18 kg/m²   Wt Readings from Last 3 Encounters:   09/30/20 245 lb (111.1 kg)   06/09/20 249 lb 12.8 oz (113.3 kg)   01/13/20 250 lb (113.4 kg) PHYSICAL EXAM  CONSTITUTIONAL:   Awake, alert, cooperative   PSYCHIATRIC :  Oriented to time, place and person      normal insight to disease process  ENT:  External ears and nose without lesions    Hearing deficits is not noted  NECK: Supple, symmetrical, trachea midline      LUNGS:  No increased work of breathing                  Clear to auscultation bilaterally   CARDIOVASCULAR:  regular rate and rhythm   ABDOMEN:  soft, non-distended, non-tender    Hernias is not noted   Lower stoma.   Wound in lower crease  Lymphatics : Cervical lymphadenopathy is not noted     Femoral lymphadenopathy is not noted  SKIN:   Skin color is normal   Texture and turgor is  normal   Induration is not noted, perineal wound  EXTREMITIES:   R UE Edema is not noted  L UE Edema is not noted  R LE Edema is not noted  L LE Edema is not noted  R femoral  L femoral    R dorsalis pedis  L dorsalis pedis    R posterior tibial  L posterior tibial      Assessment:     Problem List Items Addressed This Visit     Crohn's disease of colon with complication (HCC) (Chronic)    * (Principal) Abdominal wall skin ulcer, with fat layer exposed ([address] (Chronic)    Skin ulcer of perineum, with fat layer exposed ([address] (Chronic)          Pre Debridement Measurements:  Are located in the [facility]  Documentation Flow Sheet  Post Debridement Measurements:  Wound/Ulcer Descriptions are Pre Debridement except measurements:     Wound 09/30/20 Abdomen Lower;Medial #1 (Active)   Wound Image   09/30/20 1343   Wound Other 09/30/20 1343   Wound Length (cm) 1.6 cm 09/30/20 1343   Wound Width (cm) 24 cm 09/30/20 1343   Wound Depth (cm) 1.1 cm 09/30/20 1343   Wound Surface Area (cm^2) 38.4 cm^2 09/30/20 1343   Wound Volume (cm^3) 42.24 cm^3 09/30/20 1343   Wound Assessment Pink;Yellow 09/30/20 1343   Drainage Amount Large 09/30/20 1343   Drainage Description Yellow 09/30/20 1343   Odor None 09/30/20 1343   Lakisha-wound Assessment Maceration 09/30/20 1343   Non-staged Wound Description Full thickness 09/30/20 1343   Number of days: 3          Procedure Note  Indications:  Based on my examination of this patient's wound(s)/ulcer(s) today, debridement is required to promote healing and evaluate the wound base. Performed by: Rocio Hill MD    Consent obtained:  Yes    Time out taken:  Yes    Pain Control: Anesthetic  Anesthetic: 4% Lidocaine Liquid Topical     Debridement:Other (comment)    Using  the wound(s)/ulcer(s) was/were sharply debrided down through and including the removal of . Devitalized Tissue Debrided:   to stimulate bleeding to promote healing, post debridement good bleeding base and wound edges noted    Wound/Ulcer #: 1 and 2    Percent of Wound/Ulcer Debrided: 0%    Total Surface Area Debrided:  0 sq cm     Estimated Blood Loss:  None  Hemostasis Achieved:  not needed    Procedural Pain:    / 10   Post Procedural Pain:   / 10     Response to treatment:  Well tolerated by patient. A culture was not done. 9/30/20 - I reviewed with patient and  that they need to pack wound open. The wounds are in skin creases and repetitive shear stress is pulling skin edges apart. The wound must heal from the depth. They understand. Plan:     Pt is not a smoker   - Discussed relationship of smoking and negative affects on wound healing   -   In my professional opinion and based off the information that is available at this time this patient has appropriate indication for HBO Therapy: Yes    Treatment Note please see attached Discharge Instructions    Written patient dismissal instructions given to patient and signed by patient or POA.          Discharge Instructions       Visit Discharge/Physician Orders    Discharge condition: Stable    Assessment of pain at discharge:MODERATE    Anesthetic used: Tamara Petty 45    Discharge to: Home    Left via:Private automobile    Accompanied by:     ECF/HHA: [facility]    Dressing Rajesh Leroy AND RECTAL AREA CLEANSE WOUND WITH NORMAL SALINE APPLY DAKINS MOISTENED GAUZE, COVER WITH DRY DRESSING AND SECURE. CHANGE DAILY AND AS NEEDED     Treatment Orders:FOLLOW A NUTRITIOUS DIET HIGH IN PROTEIN AND VITAMIN C TO PROMOTE WOUND HEALING. TAKE A MULTIVITAMIN DAILY. KEEP AS MUCH PRESSURE OFF WOUNDS AS POSSIBLE. CONSULT [address] followup visit ____1 WEEK_________________________  (Please note your next appointment above and if you are unable to keep, kindly give a 24 hour notice. Thank you.)    Physician signature:__________________________      If you experience any of the following, please call the [facility] during business hours:    * Increase in Pain  * Temperature over 101  * Increase in drainage from your wound  * Drainage with a foul odor  * Bleeding  * Increase in swelling  * Need for compression bandage changes due to slippage, breakthrough drainage. If you need medical attention outside of the business hours of the [facility] please contact your PCP or go to the nearest emergency room.         Electronically signed by John Oliveros MD on 10/4/2020 at 10:04 AM

## 2020-10-04 NOTE — H&P
Wound Healing Center /Hyperbarics   History and Physical/Consultation  Vascular    Referring Physician : Bethanie Lopez MD  alma rosa [facility] RECORD NUMBER:  [id number]  AGE: 54 y.o. GENDER: female  : 1975  EPISODE DATE:  2020  Subjective:     Chief Complaint   Patient presents with    Wound Check     abdomen/perineum         HISTORY of PRESENT ILLNESS HPI     Thea Bishop is a 54 y.o. female presents to wound care center for treatment of multiple wounds. She has history of Crohn's colitis s/p TAC 3/2019 and completion proctectomy 2020. She developed wound in lower abdominal crease and in perineum. She has been treating with local care. C/o severe pain with dressing changes. Denies leaking from stoma. Also investigating HBO as Crohn's may still be active in perineal wound.  present at bedside.       PAST MEDICAL HISTORY      Diagnosis Date    Acid reflux     Anxiety and depression     Crohn's colitis ([facility] [address] [facility] 2018    Crohn's colitis per colonoscopy biopsies 18, 18     Past Surgical History:   Procedure Laterality Date    BREAST BIOPSY      Benign Cyst    CHOLECYSTECTOMY  2011    [facility], laparoscopic     ENDOMETRIAL ABLATION  2004    ILEOSTOMY OR JEJUNOSTOMY  2019    UPPER GASTROINTESTINAL ENDOSCOPY       Family History   Problem Relation Age of Onset    Kidney Disease Mother     Diabetes Father         on HD s/p MI    Heart Disease Father     High Blood Pressure Father      Social History     Tobacco Use    Smoking status: Never Smoker    Smokeless tobacco: Never Used   Substance Use Topics    Alcohol use: No    Drug use: No     Allergies   Allergen Reactions    Amoxil [Amoxicillin] Diarrhea    Doxycycline      Stomach upset       Current Outpatient Medications on File Prior to Encounter   Medication Sig Dispense Refill    sulfamethoxazole-trimethoprim (BACTRIM DS;SEPTRA DS) 800-160 MG per tablet Take 1 tablet by mouth 2 times daily      tacrolimus (PROTOPIC) 0.1 % ointment Apply 1 each topically daily Apply topically 2 times daily.  gabapentin (NEURONTIN) 300 MG capsule TAKE 1 CAPSULE BY MOUTH THREE TIMES DAILY 90 capsule 0    metoprolol succinate (TOPROL XL) 25 MG extended release tablet Take 1 tablet by mouth daily 90 tablet 3    omeprazole (PRILOSEC) 40 MG delayed release capsule TAKE 1 CAPSULE DAILY 90 capsule 3    oxyCODONE (ROXICODONE) 5 MG immediate release tablet TK 1 T PO Q 6 H PRF PAIN      DULoxetine (CYMBALTA) 30 MG extended release capsule Take 1 capsule by mouth daily 30 capsule 0    fluticasone (FLONASE) 50 MCG/ACT nasal spray 1 spray by Nasal route daily (Patient taking differently: 1 spray by Nasal route daily as needed ) 3 Bottle 3    Cholecalciferol (VITAMIN D) 2000 UNITS TABS Take 2,000 Units by mouth daily       ibuprofen (ADVIL) 200 MG CAPS Take 2 capsules by mouth 2 times daily      loperamide (IMODIUM) 1 MG/5ML solution Take 2 mg by mouth 4 times daily as needed for Diarrhea       No current facility-administered medications on file prior to encounter.         REVIEW OF SYSTEMS   ROS : All others Negative if blank [], Positive if [x]  General Urinary   [] Fevers [] Hematuria   [] Chills [] Dysuria   [] Weight Loss Vascular   Skin [] Claudication   [x] Tissue Loss [] Rest Pain   Eyes Neurologic   [] Wears Glasses/Contacts [] Stroke/TIA   [] Vision Changes [] Focal weakness   Respiratory [] Slurred Speech    [] Shortness of breath ENT   Cardiovascular [] Difficulty swallowing   [] Chest Pain Gastrointestinal   [] Shortness of breath with exertion [] Abdominal Pain    [] Melena       [] Hematochezia               Objective:    BP (!) 102/58   Pulse 72   Temp 98.8 °F (37.1 °C) (Temporal)   Resp 18   Ht 5' 9\" (1.753 m)   Wt 245 lb (111.1 kg)   BMI 36.18 kg/m²   Wt Readings from Last 3 Encounters:   09/30/20 245 lb (111.1 kg)   06/09/20 249 lb 12.8 oz (113.3 kg)   01/13/20 250 lb (113.4 kg) PHYSICAL EXAM  CONSTITUTIONAL:   Awake, alert, cooperative   PSYCHIATRIC :  Oriented to time, place and person      normal insight to disease process  ENT:  External ears and nose without lesions    Hearing deficits is not noted  NECK: Supple, symmetrical, trachea midline      LUNGS:  No increased work of breathing                  Clear to auscultation bilaterally   CARDIOVASCULAR:  regular rate and rhythm   ABDOMEN:  soft, non-distended, non-tender    Hernias is not noted   Lower stoma.   Wound in lower crease  Lymphatics : Cervical lymphadenopathy is not noted     Femoral lymphadenopathy is not noted  SKIN:   Skin color is normal   Texture and turgor is  normal   Induration is not noted, perineal wound  EXTREMITIES:   R UE Edema is not noted  L UE Edema is not noted  R LE Edema is not noted  L LE Edema is not noted  R femoral  L femoral    R dorsalis pedis  L dorsalis pedis    R posterior tibial  L posterior tibial      Assessment:     Problem List Items Addressed This Visit     Crohn's disease of colon with complication (HCC) (Chronic)    * (Principal) Abdominal wall skin ulcer, with fat layer exposed ([address] (Chronic)    Skin ulcer of perineum, with fat layer exposed ([address] (Chronic)          Pre Debridement Measurements:  Are located in the [facility]  Documentation Flow Sheet  Post Debridement Measurements:  Wound/Ulcer Descriptions are Pre Debridement except measurements:     Wound 09/30/20 Abdomen Lower;Medial #1 (Active)   Wound Image   09/30/20 1343   Wound Other 09/30/20 1343   Wound Length (cm) 1.6 cm 09/30/20 1343   Wound Width (cm) 24 cm 09/30/20 1343   Wound Depth (cm) 1.1 cm 09/30/20 1343   Wound Surface Area (cm^2) 38.4 cm^2 09/30/20 1343   Wound Volume (cm^3) 42.24 cm^3 09/30/20 1343   Wound Assessment Pink;Yellow 09/30/20 1343   Drainage Amount Large 09/30/20 1343   Drainage Description Yellow 09/30/20 1343   Odor None 09/30/20 1343   Lakisha-wound Assessment Maceration 09/30/20 1343   Non-staged Wound Description Full thickness 09/30/20 1343   Number of days: 3      I reviewed the option of hyperbaric oxygen therapy with the patient and her . HBO has been shown effective in wounds associated with active Crohn's disease, particularly fistulas. I reviewed the procedure with the patient. I discussed the risks, benefits, and alternatives of the treatment. The patient understands and consents. All questions were answered. Plan:     Pt is not a smoker   - Discussed relationship of smoking and negative affects on wound healing   -   In my professional opinion and based off the information that is available at this time this patient has appropriate indication for HBO Therapy: Yes    Treatment Note please see attached Discharge Instructions    Written patient dismissal instructions given to patient and signed by patient or POA.

## 2020-10-07 ENCOUNTER — HOSPITAL ENCOUNTER (OUTPATIENT)
Dept: WOUND CARE | Age: 45
Discharge: HOME | End: 2020-10-07
Payer: COMMERCIAL

## 2020-10-07 VITALS
SYSTOLIC BLOOD PRESSURE: 110 MMHG | DIASTOLIC BLOOD PRESSURE: 70 MMHG | RESPIRATION RATE: 18 BRPM | TEMPERATURE: 99.8 F | HEART RATE: 72 BPM

## 2020-10-07 PROCEDURE — 97597 DBRDMT OPN WND 1ST 20 CM/<: CPT | Performed by: SURGERY

## 2020-10-07 PROCEDURE — 97597 DBRDMT OPN WND 1ST 20 CM/<: CPT

## 2020-10-07 RX ORDER — LIDOCAINE HYDROCHLORIDE 40 MG/ML
SOLUTION TOPICAL ONCE
Status: DISCONTINUED | OUTPATIENT
Start: 2020-10-07 | End: 2020-10-08 | Stop reason: HOSPADM

## 2020-10-07 NOTE — PROGRESS NOTES
Wound Healing Center Followup Visit Note    Referring Physician : MD Jenniffer Tong RECORD NUMBER:  [id number]  AGE: 54 y.o. GENDER: female  : 1975  EPISODE DATE:  10/7/2020    Subjective:     Chief Complaint   Patient presents with    Wound Check     perineum      HISTORY of PRESENT ILLNESS HPI    20 - Monica Mackey is a 54 y.o. female presents to wound care center for treatment of multiple wounds. She has history of Crohn's colitis s/p TAC 3/2019 and completion proctectomy 2020. She developed wound in lower abdominal crease and in perineum. She has been treating with local care. C/o severe pain with dressing changes. Denies leaking from stoma. Also investigating HBO as Crohn's may still be active in perineal wound.  present at bedside. 10/7/20 -patient and  return. Still complaining of pain with dressing changes but improved. Denies fevers or chills.         PAST MEDICAL HISTORY      Diagnosis Date    Acid reflux     Anxiety and depression     Crohn's colitis ([facility] [address] 2018    Crohn's colitis per colonoscopy biopsies 18, 18     Past Surgical History:   Procedure Laterality Date    BREAST BIOPSY      Benign Cyst    CHOLECYSTECTOMY  2011    [place] [facility], laparoscopic     ENDOMETRIAL ABLATION  2004    ILEOSTOMY OR JEJUNOSTOMY  2019    UPPER GASTROINTESTINAL ENDOSCOPY       Family History   Problem Relation Age of Onset    Kidney Disease Mother     Diabetes Father         on HD s/p MI    Heart Disease Father     High Blood Pressure Father      Social History     Tobacco Use    Smoking status: Never Smoker    Smokeless tobacco: Never Used   Substance Use Topics    Alcohol use: No    Drug use: No     Allergies   Allergen Reactions    Amoxil [Amoxicillin] Diarrhea    Doxycycline      Stomach upset       Current Outpatient Medications on File Prior to Encounter   Medication Sig Dispense Refill    sulfamethoxazole-trimethoprim (BACTRIM DS;SEPTRA DS) 800-160 MG per tablet Take 1 tablet by mouth 2 times daily      tacrolimus (PROTOPIC) 0.1 % ointment Apply 1 each topically daily Apply topically 2 times daily.  sodium hypochlorite (DAKINS) 0.125 % SOLN external solution Apply topically daily 4 Bottle 3    gabapentin (NEURONTIN) 300 MG capsule TAKE 1 CAPSULE BY MOUTH THREE TIMES DAILY 90 capsule 0    metoprolol succinate (TOPROL XL) 25 MG extended release tablet Take 1 tablet by mouth daily 90 tablet 3    omeprazole (PRILOSEC) 40 MG delayed release capsule TAKE 1 CAPSULE DAILY 90 capsule 3    oxyCODONE (ROXICODONE) 5 MG immediate release tablet TK 1 T PO Q 6 H PRF PAIN      ibuprofen (ADVIL) 200 MG CAPS Take 2 capsules by mouth 2 times daily      DULoxetine (CYMBALTA) 30 MG extended release capsule Take 1 capsule by mouth daily 30 capsule 0    fluticasone (FLONASE) 50 MCG/ACT nasal spray 1 spray by Nasal route daily (Patient taking differently: 1 spray by Nasal route daily as needed ) 3 Bottle 3    Cholecalciferol (VITAMIN D) 2000 UNITS TABS Take 2,000 Units by mouth daily       loperamide (IMODIUM) 1 MG/5ML solution Take 2 mg by mouth 4 times daily as needed for Diarrhea       No current facility-administered medications on file prior to encounter.         REVIEW OF SYSTEMS See HPI    Objective:    /70   Pulse 72   Temp 99.8 °F (37.7 °C) (Temporal)   Resp 18   Wt Readings from Last 3 Encounters:   09/30/20 245 lb (111.1 kg)   06/09/20 249 lb 12.8 oz (113.3 kg)   01/13/20 250 lb (113.4 kg)     PHYSICAL EXAM  CONSTITUTIONAL:   Awake, alert, cooperative  EYES:  lids and lashes normal  ENT: external ears and nose without lesions  NECK:  supple, symmetrical, trachea midline  SKIN:  Open wound present    Assessment:     Problem List Items Addressed This Visit     Crohn's disease of colon with complication ([facility] [address] - Primary (Chronic)    * (Principal) Abdominal wall skin ulcer, with fat layer exposed ([address] (Chronic)    Skin ulcer of perineum, with fat layer exposed ([address] (Chronic)          Pre Debridement Measurements:  Are located in the [place]  Documentation Flow Sheet  Post Debridement Measurements:  Wound/Ulcer Descriptions are Pre Debridement except measurements:     Wound 09/30/20 Abdomen Lower;Medial #1 (Active)   Wound Image   09/30/20 1343   Wound Etiology Other 09/30/20 1343   Wound Length (cm) 1.2 cm 10/07/20 1341   Wound Width (cm) 22 cm 10/07/20 1341   Wound Depth (cm) 0.2 cm 10/07/20 1341   Wound Surface Area (cm^2) 26.4 cm^2 10/07/20 1341   Change in Wound Size % (l*w) 31.25 10/07/20 1341   Wound Volume (cm^3) 5.28 cm^3 10/07/20 1341   Wound Healing % 88 10/07/20 1341   Wound Assessment Granulation tissue 10/07/20 1334   Drainage Amount Large 10/07/20 1334   Drainage Description Yellow;Purulent 10/07/20 1334   Odor None 10/07/20 1334   Lakisha-wound Assessment Intact 10/07/20 1334   Wound Thickness Description not for Pressure Injury Full thickness 09/30/20 1343   Number of days: 7       Wound 09/30/20 Perineum #2 (Active)   Wound Image   10/07/20 1334   Wound Etiology Other 10/07/20 1334   Wound Length (cm) 10 cm 10/07/20 1334   Wound Width (cm) 1.9 cm 10/07/20 1334   Wound Depth (cm) 1.8 cm 10/07/20 1334   Wound Surface Area (cm^2) 19 cm^2 10/07/20 1334   Wound Volume (cm^3) 34.2 cm^3 10/07/20 1334   Wound Assessment Granulation tissue 10/07/20 1334   Drainage Amount Large 10/07/20 1334   Drainage Description Yellow;Purulent 10/07/20 1334   Odor None 10/07/20 1334   Lakisha-wound Assessment Intact 10/07/20 1334   Number of days: 7          Procedure Note  Indications:  Based on my examination of this patient's wound(s)/ulcer(s) today, debridement is required to promote healing and evaluate the wound base.     Performed by: Magali Villegas MD    Consent obtained:  Yes    Time out taken:  Yes    Pain Control: Anesthetic  Anesthetic: 4% Lidocaine Liquid Topical     Debridement:Non-excisional Debridement    Using curette the wound(s)/ulcer(s) was/were sharply debrided down through and including the removal of epidermis. Devitalized Tissue Debrided:  slough to stimulate bleeding to promote healing, post debridement good bleeding base and wound edges noted    Wound/Ulcer #: 1    Percent of Wound/Ulcer Debrided: 70%    Total Surface Area Debrided:  20 sq cm     Estimated Blood Loss:  Minimal  Hemostasis Achieved:  not needed    Procedural Pain:  7  / 10   Post Procedural Pain:  4 / 10     Response to treatment:  Well tolerated by patient. Plan:   Treatment Note please see attached Discharge Instructions    Written patient dismissal instructions given to patient and signed by patient or POA. Discharge Instructions       Visit Discharge/Physician Orders     Discharge condition: Stable     Assessment of pain at discharge:MODERATE     Anesthetic used: LIODOCAINE 45     Discharge to: Home     Left via:Private automobile     Accompanied by:      ECF/HHA: [facility]     Dressing Orders:WOUND LOCATION ABDOMEN AND RECTAL OH AREA CLEANSE WOUND WITH NORMAL SALINE PACK WITH DAKINS MOISTENED GAUZE, COVER WITH DRY DRESSING AND SECURE. CHANGE DAILY AND AS NEEDED      Treatment Orders:FOLLOW A NUTRITIOUS DIET HIGH IN PROTEIN AND VITAMIN C TO PROMOTE WOUND HEALING. TAKE A MULTIVITAMIN DAILY.     KEEP AS MUCH PRESSURE OFF WOUNDS AS POSSIBLE.      CONSULT [facility] followup visit ____1 WEEK_________________________  (Please note your next appointment above and if you are unable to keep, kindly give a 24 hour notice.  Thank you.)     Physician signature:__________________________        If you experience any of the following, please call the [address] [place] [address] Road during business hours:     * Increase in Pain  * Temperature over 101  * Increase in drainage from your wound  * Drainage with a foul odor  * Bleeding  * Increase in swelling  * Need for compression bandage changes due to slippage, breakthrough drainage.     If you need medical attention outside of the business hours of the [address] [place] [address] Road please contact your PCP or go to the nearest emergency room.                   Electronically signed by Denise Johnson MD on 10/7/2020 at 2:25 PM

## 2020-10-08 ENCOUNTER — NURSE ONLY (OUTPATIENT)
Dept: FAMILY MEDICINE CLINIC | Age: 45
End: 2020-10-08

## 2020-10-08 ENCOUNTER — HOSPITAL ENCOUNTER (OUTPATIENT)
Age: 45
Discharge: HOME | End: 2020-10-10
Payer: COMMERCIAL

## 2020-10-08 LAB
ALBUMIN SERPL-MCNC: 3.7 G/DL (ref 3.5–5.2)
ALP BLD-CCNC: 64 U/L (ref 35–104)
ALT SERPL-CCNC: 9 U/L (ref 0–32)
ANION GAP SERPL CALCULATED.3IONS-SCNC: 14 MMOL/L (ref 7–16)
AST SERPL-CCNC: 22 U/L (ref 0–31)
BASOPHILS ABSOLUTE: 0.05 E9/L (ref 0–0.2)
BASOPHILS RELATIVE PERCENT: 0.6 % (ref 0–2)
BILIRUB SERPL-MCNC: 0.3 MG/DL (ref 0–1.2)
BUN BLDV-MCNC: 15 MG/DL (ref 6–20)
CALCIUM SERPL-MCNC: 9.4 MG/DL (ref 8.6–10.2)
CHLORIDE BLD-SCNC: 102 MMOL/L (ref 98–107)
CHOLESTEROL, TOTAL: 166 MG/DL (ref 0–199)
CO2: 21 MMOL/L (ref 22–29)
CREAT SERPL-MCNC: 1 MG/DL (ref 0.5–1)
EOSINOPHILS ABSOLUTE: 0.36 E9/L (ref 0.05–0.5)
EOSINOPHILS RELATIVE PERCENT: 4.6 % (ref 0–6)
GFR AFRICAN AMERICAN: >60
GFR NON-AFRICAN AMERICAN: 60 ML/MIN/1.73
GLUCOSE BLD-MCNC: 83 MG/DL (ref 74–99)
HCT VFR BLD CALC: 37.7 % (ref 34–48)
HDLC SERPL-MCNC: 47 MG/DL
HEMOGLOBIN: 11.7 G/DL (ref 11.5–15.5)
IMMATURE GRANULOCYTES #: 0.02 E9/L
IMMATURE GRANULOCYTES %: 0.3 % (ref 0–5)
LDL CHOLESTEROL CALCULATED: 99 MG/DL (ref 0–99)
LYMPHOCYTES ABSOLUTE: 1.32 E9/L (ref 1.5–4)
LYMPHOCYTES RELATIVE PERCENT: 17 % (ref 20–42)
MCH RBC QN AUTO: 28.5 PG (ref 26–35)
MCHC RBC AUTO-ENTMCNC: 31 % (ref 32–34.5)
MCV RBC AUTO: 92 FL (ref 80–99.9)
MONOCYTES ABSOLUTE: 0.44 E9/L (ref 0.1–0.95)
MONOCYTES RELATIVE PERCENT: 5.7 % (ref 2–12)
NEUTROPHILS ABSOLUTE: 5.59 E9/L (ref 1.8–7.3)
NEUTROPHILS RELATIVE PERCENT: 71.8 % (ref 43–80)
PDW BLD-RTO: 13.7 FL (ref 11.5–15)
PLATELET # BLD: 278 E9/L (ref 130–450)
PMV BLD AUTO: 10.5 FL (ref 7–12)
POTASSIUM SERPL-SCNC: 4.7 MMOL/L (ref 3.5–5)
RBC # BLD: 4.1 E12/L (ref 3.5–5.5)
SODIUM BLD-SCNC: 137 MMOL/L (ref 132–146)
TOTAL PROTEIN: 7.9 G/DL (ref 6.4–8.3)
TRIGL SERPL-MCNC: 99 MG/DL (ref 0–149)
TSH SERPL DL<=0.05 MIU/L-ACNC: 3 UIU/ML (ref 0.27–4.2)
VLDLC SERPL CALC-MCNC: 20 MG/DL
WBC # BLD: 7.8 E9/L (ref 4.5–11.5)

## 2020-10-08 PROCEDURE — 82306 VITAMIN D 25 HYDROXY: CPT

## 2020-10-08 PROCEDURE — 84443 ASSAY THYROID STIM HORMONE: CPT

## 2020-10-08 PROCEDURE — 80053 COMPREHEN METABOLIC PANEL: CPT

## 2020-10-08 PROCEDURE — 80061 LIPID PANEL: CPT

## 2020-10-08 PROCEDURE — 85025 COMPLETE CBC W/AUTO DIFF WBC: CPT

## 2020-10-09 LAB — VITAMIN D 25-HYDROXY: 35 NG/ML (ref 30–100)

## 2020-10-14 ENCOUNTER — OFFICE VISIT (OUTPATIENT)
Dept: FAMILY MEDICINE CLINIC | Age: 45
End: 2020-10-14
Payer: COMMERCIAL

## 2020-10-14 VITALS
DIASTOLIC BLOOD PRESSURE: 57 MMHG | HEIGHT: 69 IN | OXYGEN SATURATION: 100 % | SYSTOLIC BLOOD PRESSURE: 109 MMHG | RESPIRATION RATE: 16 BRPM | BODY MASS INDEX: 36.4 KG/M2 | WEIGHT: 245.8 LBS | TEMPERATURE: 97.8 F | HEART RATE: 67 BPM

## 2020-10-14 PROCEDURE — 90471 IMMUNIZATION ADMIN: CPT | Performed by: FAMILY MEDICINE

## 2020-10-14 PROCEDURE — 90686 IIV4 VACC NO PRSV 0.5 ML IM: CPT | Performed by: FAMILY MEDICINE

## 2020-10-14 PROCEDURE — 99396 PREV VISIT EST AGE 40-64: CPT | Performed by: FAMILY MEDICINE

## 2020-10-14 RX ORDER — USTEKINUMAB 90 MG/ML
INJECTION, SOLUTION SUBCUTANEOUS
COMMUNITY
Start: 2020-05-21 | End: 2022-02-23 | Stop reason: ALTCHOICE

## 2020-10-14 SDOH — ECONOMIC STABILITY: FOOD INSECURITY: WITHIN THE PAST 12 MONTHS, THE FOOD YOU BOUGHT JUST DIDN'T LAST AND YOU DIDN'T HAVE MONEY TO GET MORE.: NEVER TRUE

## 2020-10-14 SDOH — ECONOMIC STABILITY: INCOME INSECURITY: HOW HARD IS IT FOR YOU TO PAY FOR THE VERY BASICS LIKE FOOD, HOUSING, MEDICAL CARE, AND HEATING?: NOT HARD AT ALL

## 2020-10-14 SDOH — ECONOMIC STABILITY: TRANSPORTATION INSECURITY
IN THE PAST 12 MONTHS, HAS LACK OF TRANSPORTATION KEPT YOU FROM MEETINGS, WORK, OR FROM GETTING THINGS NEEDED FOR DAILY LIVING?: NO

## 2020-10-14 SDOH — ECONOMIC STABILITY: TRANSPORTATION INSECURITY
IN THE PAST 12 MONTHS, HAS THE LACK OF TRANSPORTATION KEPT YOU FROM MEDICAL APPOINTMENTS OR FROM GETTING MEDICATIONS?: NO

## 2020-10-14 SDOH — ECONOMIC STABILITY: FOOD INSECURITY: WITHIN THE PAST 12 MONTHS, YOU WORRIED THAT YOUR FOOD WOULD RUN OUT BEFORE YOU GOT MONEY TO BUY MORE.: NEVER TRUE

## 2020-10-14 NOTE — PROGRESS NOTES
Annual exam:  Patient is here for routine yearly physical/preventative visit. Patient has no complaints or concerns today. Patient is  generally healthy. Chronic medical conditions are generally controlled. Most recent labs reviewed with patient and  are not remarkable. Health maintenance reviewed with patient and is not up to date. Overall doing well.       Past Medical History:   Diagnosis Date    Acid reflux     Anxiety and depression     Crohn's colitis ([facility] [address] 04/2018    Crohn's colitis per colonoscopy biopsies 4/9/18, 6/11/18      Past Surgical History:   Procedure Laterality Date    BREAST BIOPSY  1991    Benign Cyst    CHOLECYSTECTOMY  11/18/2011    Donnie Stokes, laparoscopic     ENDOMETRIAL ABLATION  2004    ILEOSTOMY OR JEJUNOSTOMY  04/2019    UPPER GASTROINTESTINAL ENDOSCOPY        Family History   Problem Relation Age of Onset    Kidney Disease Mother     Diabetes Father         on HD s/p MI    Heart Disease Father     High Blood Pressure Father      Social History     Socioeconomic History    Marital status:      Spouse name: Not on file    Number of children: Not on file    Years of education: Not on file    Highest education level: Not on file   Occupational History    Not on file   Social Needs    Financial resource strain: Not hard at all   [employer] insecurity     Worry: Never true     Inability: Never true   [employer] needs     Medical: No     Non-medical: No   Tobacco Use    Smoking status: Never Smoker    Smokeless tobacco: Never Used   Substance and Sexual Activity    Alcohol use: No    Drug use: No    Sexual activity: Not on file   Lifestyle    Physical activity     Days per week: Not on file     Minutes per session: Not on file    Stress: Not on file   Relationships    Social connections     Talks on phone: Not on file     Gets together: Not on file     Attends Jain service: Not on file     Active member of club or organization: Not on file Attends meetings of clubs or organizations: Not on file     Relationship status: Not on file    Intimate partner violence     Fear of current or ex partner: Not on file     Emotionally abused: Not on file     Physically abused: Not on file     Forced sexual activity: Not on file   Other Topics Concern    Not on file   Social History Narrative    Not on file      Allergies   Allergen Reactions    Amoxil [Amoxicillin] Diarrhea    Doxycycline      Stomach upset          Review of Systems:  Constitutional:  No fever, no fatigue, no chills, no headaches, no weight change  Dermatology:  No rash, no mole, no dry or sensitive skin  ENT:  No cough, no sore throat, no sinus pain, no runny nose, no ear pain  Cardiology:  No chest pain, no palpitations, no leg edema, no shortness of breath, no PND  Endocrinology:  No polydipsia, no polyuria, no cold intolerance, no heat intolerance, no polyphagia, no hair changes  Gastroenterology:  No dysphagia, no abdominal pain, no nausea, no vomiting, no constipation, no diarrhea, no heartburn  Female Reproductive:  No hot flashes, no abnormal vaginal discharge, no pain with menstruation, no pelvic pain  Musculoskeletal:  No joint pain, no leg cramps, no back pain, no muscle aches  Respiratory:  No shortness of breath, no orthopnea, no wheezing, no SKINNER, no hemoptysis  Urology:  No blood in the urine, no urinary frequency, no urinary incontinence, no urinary urgency, no nocturia, no dysuria  Neurology:  No numbness/tingling, no dizziness, no weakness  Psychology:  No depression, no sleep disturbances, no suicidal ideation, no anxiety  Physical Exam:  Vitals:    10/14/20 1159   BP: (!) 109/57   Pulse: 67   Resp: 16   Temp: 97.8 °F (36.6 °C)   TempSrc: Temporal   SpO2: 100%   Weight: 245 lb 12.8 oz (111.5 kg)   Height: 5' 9\" (1.753 m)     General:  Patient alert and oriented x 3, NAD, pleasant  HEENT:  Atraumatic, normocephalic, PERRLA, EOMI, clear conjunctiva, TMs clear, nose-clear, possible if overdue, as this is important in assessing for undiagnosed pathology, especially cancer, as well as protecting against potentially harmful/life threatening disease. Patient and/or guardian verbalizes understanding and agrees with above counseling, assessment and plan. All questions answered. Bethanie Lopez MD  10/14/2020    I have personally reviewed and updated the chief complaint, HPI, Past Medical, Family and Social History, as well as the above Review of Systems.

## 2020-10-21 ENCOUNTER — HOSPITAL ENCOUNTER (OUTPATIENT)
Dept: WOUND CARE | Age: 45
Discharge: HOME | End: 2020-10-21
Payer: COMMERCIAL

## 2020-10-26 RX ORDER — GABAPENTIN 300 MG/1
CAPSULE ORAL
Qty: 90 CAPSULE | Refills: 0 | Status: SHIPPED
Start: 2020-10-26 | End: 2020-11-23

## 2020-10-28 ENCOUNTER — HOSPITAL ENCOUNTER (OUTPATIENT)
Dept: WOUND CARE | Age: 45
Discharge: HOME | End: 2020-10-28
Payer: COMMERCIAL

## 2020-10-28 VITALS
SYSTOLIC BLOOD PRESSURE: 126 MMHG | RESPIRATION RATE: 16 BRPM | TEMPERATURE: 98.5 F | WEIGHT: 245 LBS | BODY MASS INDEX: 36.29 KG/M2 | HEART RATE: 64 BPM | HEIGHT: 69 IN | DIASTOLIC BLOOD PRESSURE: 72 MMHG

## 2020-10-28 PROCEDURE — 99212 OFFICE O/P EST SF 10 MIN: CPT | Performed by: SURGERY

## 2020-10-28 PROCEDURE — 99213 OFFICE O/P EST LOW 20 MIN: CPT

## 2020-10-28 RX ORDER — LIDOCAINE HYDROCHLORIDE 40 MG/ML
SOLUTION TOPICAL ONCE
Status: DISCONTINUED | OUTPATIENT
Start: 2020-10-28 | End: 2020-10-29 | Stop reason: HOSPADM

## 2020-10-28 NOTE — PROGRESS NOTES
W[facility]aling Center Followup Visit Note    Referring Physician : MD Jenniffer Valdivia RECORD NUMBER:  [id number]  AGE: 54 y.o. GENDER: female  : 1975  EPISODE DATE:  10/28/2020    Subjective:     Chief Complaint   Patient presents with    Wound Check     perineum and abdomen      HISTORY of PRESENT ILLNESS HPI    20 - Wendie Payne is a 54 y.o. female presents to wound care center for treatment of multiple wounds. She has history of Crohn's colitis s/p TAC 3/2019 and completion proctectomy 2020. She developed wound in lower abdominal crease and in perineum. She has been treating with local care. C/o severe pain with dressing changes. Denies leaking from stoma. Also investigating HBO as Crohn's may still be active in perineal wound.  present at bedside. 10/7/20 -patient and  return. Still complaining of pain with dressing changes but improved. Denies fevers or chills. 10/28/20 -patient and  state that the wounds are improving. She denies fevers or chills. They are using Aquacel packing as the Dakin soaked gauze was too painful to remove.         PAST MEDICAL HISTORY      Diagnosis Date    Acid reflux     Anxiety and depression     Crohn's colitis ([facility] [address] 2018    Crohn's colitis per colonoscopy biopsies 18, 18     Past Surgical History:   Procedure Laterality Date    BREAST BIOPSY      Benign Cyst    CHOLECYSTECTOMY  2011    Santos Shock, laparoscopic     ENDOMETRIAL ABLATION  2004    ILEOSTOMY OR JEJUNOSTOMY  2019    UPPER GASTROINTESTINAL ENDOSCOPY       Family History   Problem Relation Age of Onset    Kidney Disease Mother     Diabetes Father         on HD s/p MI    Heart Disease Father     High Blood Pressure Father      Social History     Tobacco Use    Smoking status: Never Smoker    Smokeless tobacco: Never Used   Substance Use Topics    Alcohol use: No    Drug use: No     Allergies Allergen Reactions    Amoxil [Amoxicillin] Diarrhea    Doxycycline      Stomach upset       Current Outpatient Medications on File Prior to Encounter   Medication Sig Dispense Refill    Multiple Vitamins-Minerals (MULTIVITAMIN ADULT PO) Take by mouth daily      gabapentin (NEURONTIN) 300 MG capsule TAKE 1 CAPSULE BY MOUTH THREE TIMES DAILY 90 capsule 0    ustekinumab (STELARA) 90 MG/ML SOSY prefilled syringe Inject into the skin      DULoxetine (CYMBALTA) 30 MG extended release capsule Take 1 capsule by mouth daily 90 capsule 3    sulfamethoxazole-trimethoprim (BACTRIM DS;SEPTRA DS) 800-160 MG per tablet Take 1 tablet by mouth 2 times daily      tacrolimus (PROTOPIC) 0.1 % ointment Apply 1 each topically daily Apply topically 2 times daily.  sodium hypochlorite (DAKINS) 0.125 % SOLN external solution Apply topically daily 4 Bottle 3    metoprolol succinate (TOPROL XL) 25 MG extended release tablet Take 1 tablet by mouth daily 90 tablet 3    omeprazole (PRILOSEC) 40 MG delayed release capsule TAKE 1 CAPSULE DAILY 90 capsule 3    oxyCODONE (ROXICODONE) 5 MG immediate release tablet TK 1 T PO Q 6 H PRF PAIN      ibuprofen (ADVIL) 200 MG CAPS Take 2 capsules by mouth 2 times daily      loperamide (IMODIUM) 1 MG/5ML solution Take 2 mg by mouth 4 times daily as needed for Diarrhea      fluticasone (FLONASE) 50 MCG/ACT nasal spray 1 spray by Nasal route daily (Patient taking differently: 1 spray by Nasal route daily as needed ) 3 Bottle 3    Cholecalciferol (VITAMIN D) 2000 UNITS TABS Take 2,000 Units by mouth daily        No current facility-administered medications on file prior to encounter.         REVIEW OF SYSTEMS See HPI    Objective:    /72   Pulse 64   Temp 98.5 °F (36.9 °C) (Temporal)   Resp 16   Ht 5' 9\" (1.753 m)   Wt 245 lb (111.1 kg)   BMI 36.18 kg/m²   Wt Readings from Last 3 Encounters:   10/28/20 245 lb (111.1 kg)   10/14/20 245 lb 12.8 oz (111.5 kg)   09/30/20 245 lb (111.1 kg)     PHYSICAL EXAM  CONSTITUTIONAL:   Awake, alert, cooperative  EYES:  lids and lashes normal  ENT: external ears and nose without lesions  NECK:  supple, symmetrical, trachea midline  SKIN:  Open wound present    Assessment:     Problem List Items Addressed This Visit     Crohn's disease of colon with complication ([address] (Chronic)    * (Principal) Abdominal wall skin ulcer, with fat layer exposed ([address] (Chronic)    Skin ulcer of perineum, with fat layer exposed ([address] - Primary (Chronic)          Pre Debridement Measurements:  Are located in the [facility]  Documentation Flow Sheet  Post Debridement Measurements:  Wound/Ulcer Descriptions are Pre Debridement except measurements:     Wound 09/30/20 Abdomen Lower;Medial #1 (Active)   Wound Image   09/30/20 1343   Wound Etiology Other 09/30/20 1343   Wound Length (cm) 0.9 cm 10/28/20 1345   Wound Width (cm) 22 cm 10/28/20 1345   Wound Depth (cm) 0.7 cm 10/28/20 1345   Wound Surface Area (cm^2) 19.8 cm^2 10/28/20 1345   Change in Wound Size % (l*w) 48.44 10/28/20 1345   Wound Volume (cm^3) 13.86 cm^3 10/28/20 1345   Wound Healing % 67 10/28/20 1345   Wound Assessment Granulation tissue;Fibrin 10/28/20 1345   Drainage Amount Small 10/28/20 1345   Drainage Description Serous; Serosanguinous 10/28/20 1345   Odor None 10/28/20 1345   Lakisha-wound Assessment Intact 10/28/20 1345   Wound Thickness Description not for Pressure Injury Full thickness 09/30/20 1343   Number of days: 28       Wound 09/30/20 Perineum #2 (Active)   Wound Image   10/07/20 1334   Wound Etiology Other 10/07/20 1334   Wound Length (cm) 8.6 cm 10/28/20 1345   Wound Width (cm) 1 cm 10/28/20 1345   Wound Depth (cm) 1 cm 10/28/20 1345   Wound Surface Area (cm^2) 8.6 cm^2 10/28/20 1345   Change in Wound Size % (l*w) 54.74 10/28/20 1345   Wound Volume (cm^3) 8.6 cm^3 10/28/20 1345   Wound Healing % 75 10/28/20 1345   Wound Assessment Granulation tissue 10/28/20 1345   Drainage Amount Large 10/28/20 1345   Drainage Description Serous; Serosanguinous 10/28/20 1345   Odor None 10/28/20 1345   Oh-wound Assessment Intact 10/28/20 1345   Number of days: 28          Procedure Note  Indications:  Based on my examination of this patient's wound(s)/ulcer(s) today, debridement is required to promote healing and evaluate the wound base. Performed by: Zen Lee MD    Consent obtained:  Yes    Time out taken:  Yes    Pain Control: Anesthetic  Anesthetic: 4% Lidocaine Liquid Topical     Debridement:Other (comment)    Using curette the wound(s)/ulcer(s) was/were sharply debrided down through and including the removal of epidermis. Devitalized Tissue Debrided:  slough to stimulate bleeding to promote healing, post debridement good bleeding base and wound edges noted    Wound/Ulcer #: 1    Percent of Wound/Ulcer Debrided: 0%    Total Surface Area Debrided: 0 sq cm     Estimated Blood Loss:  Minimal  Hemostasis Achieved:  not needed    Procedural Pain:   / 10   Post Procedural Pain:  / 10     Response to treatment:  Well tolerated by patient. Plan:   Treatment Note please see attached Discharge Instructions    Written patient dismissal instructions given to patient and signed by patient or POA. Discharge Instructions          Visit Discharge/Physician Orders     Discharge condition: Stable     Assessment of pain at discharge:MODERATE     Anesthetic used: LIODOCAINE 45     Discharge to: Home     Left via:Private automobile     Accompanied by:      ECF/HHA: [facility]     Dressing Orders:WOUND LOCATION ABDOMEN AND RECTAL OH AREA CLEANSE WOUND WITH NORMAL SALINE PACK WITH AQUACEL GAUZE, COVER WITH DRY DRESSING AND SECURE. CHANGE DAILY AND AS NEEDED      Treatment Orders:FOLLOW A NUTRITIOUS DIET HIGH IN PROTEIN AND VITAMIN C TO PROMOTE WOUND HEALING.  TAKE A MULTIVITAMIN DAILY.     KEEP AS MUCH PRESSURE OFF WOUNDS AS POSSIBLE.      HBO DENIED BY INSURANCE     [facility] followup visit ____3 WEEK_________________________  (Please note your next appointment above and if you are unable to keep, kindly give a 24 hour notice.  Thank you.)     Physician signature:__________________________        If you experience any of the following, please call the [facility] during business hours:     * Increase in Pain  * Temperature over 101  * Increase in drainage from your wound  * Drainage with a foul odor  * Bleeding  * Increase in swelling  * Need for compression bandage changes due to slippage, breakthrough drainage.     If you need medical attention outside of the business hours of the [facility] please contact your PCP or go to the nearest emergency room.                   Electronically signed by Bruno Capellan MD on 10/28/2020 at 4:16 PM

## 2020-11-18 ENCOUNTER — HOSPITAL ENCOUNTER (OUTPATIENT)
Dept: WOUND CARE | Age: 45
Discharge: HOME | End: 2020-11-18
Payer: COMMERCIAL

## 2020-11-23 RX ORDER — GABAPENTIN 300 MG/1
CAPSULE ORAL
Qty: 90 CAPSULE | Refills: 0 | Status: SHIPPED
Start: 2020-11-23 | End: 2020-12-22

## 2020-12-22 RX ORDER — GABAPENTIN 300 MG/1
CAPSULE ORAL
Qty: 90 CAPSULE | Refills: 0 | Status: SHIPPED
Start: 2020-12-22 | End: 2021-01-25

## 2021-01-05 RX ORDER — METOPROLOL SUCCINATE 25 MG/1
25 TABLET, EXTENDED RELEASE ORAL DAILY
Qty: 30 TABLET | Refills: 0 | Status: SHIPPED
Start: 2021-01-05 | End: 2021-02-03

## 2021-01-12 ENCOUNTER — NURSE ONLY (OUTPATIENT)
Dept: FAMILY MEDICINE CLINIC | Age: 46
End: 2021-01-12
Payer: COMMERCIAL

## 2021-01-12 DIAGNOSIS — R30.0 DYSURIA: Primary | ICD-10-CM

## 2021-01-12 DIAGNOSIS — R30.0 DYSURIA: ICD-10-CM

## 2021-01-12 LAB
BILIRUBIN, POC: NORMAL
BLOOD URINE, POC: NORMAL
CLARITY, POC: CLEAR
COLOR, POC: YELLOW
GLUCOSE URINE, POC: NORMAL
KETONES, POC: NORMAL
LEUKOCYTE EST, POC: NORMAL
NITRITE, POC: NORMAL
PH, POC: 6
PROTEIN, POC: NORMAL
SPECIFIC GRAVITY, POC: >=1.03
UROBILINOGEN, POC: NORMAL

## 2021-01-12 PROCEDURE — 81002 URINALYSIS NONAUTO W/O SCOPE: CPT | Performed by: FAMILY MEDICINE

## 2021-01-12 RX ORDER — CIPROFLOXACIN 500 MG/1
500 TABLET, FILM COATED ORAL 2 TIMES DAILY
Qty: 10 TABLET | Refills: 0 | Status: SHIPPED
Start: 2021-01-12 | End: 2021-07-29 | Stop reason: SDUPTHER

## 2021-01-15 LAB — URINE CULTURE, ROUTINE: NORMAL

## 2021-01-21 RX ORDER — FLUCONAZOLE 100 MG/1
100 TABLET ORAL DAILY
Qty: 7 TABLET | Refills: 0 | Status: SHIPPED
Start: 2021-01-21 | End: 2021-07-29 | Stop reason: SDUPTHER

## 2021-01-25 RX ORDER — GABAPENTIN 300 MG/1
CAPSULE ORAL
Qty: 90 CAPSULE | Refills: 0 | Status: SHIPPED
Start: 2021-01-25 | End: 2021-02-22

## 2021-02-03 RX ORDER — METOPROLOL SUCCINATE 25 MG/1
25 TABLET, EXTENDED RELEASE ORAL DAILY
Qty: 30 TABLET | Refills: 0 | Status: SHIPPED
Start: 2021-02-03 | End: 2021-02-23

## 2021-02-08 ENCOUNTER — NURSE ONLY (OUTPATIENT)
Dept: FAMILY MEDICINE CLINIC | Age: 46
End: 2021-02-08
Payer: COMMERCIAL

## 2021-02-08 DIAGNOSIS — R30.0 DYSURIA: ICD-10-CM

## 2021-02-08 DIAGNOSIS — R30.0 DYSURIA: Primary | ICD-10-CM

## 2021-02-08 LAB
BILIRUBIN, POC: NORMAL
BLOOD URINE, POC: NORMAL
CLARITY, POC: CLEAR
COLOR, POC: YELLOW
GLUCOSE URINE, POC: NORMAL
KETONES, POC: NORMAL
LEUKOCYTE EST, POC: NORMAL
NITRITE, POC: NORMAL
PH, POC: 5.5
PROTEIN, POC: NORMAL
SPECIFIC GRAVITY, POC: 1.03
UROBILINOGEN, POC: 0.2

## 2021-02-08 PROCEDURE — 81002 URINALYSIS NONAUTO W/O SCOPE: CPT | Performed by: FAMILY MEDICINE

## 2021-02-10 LAB — URINE CULTURE, ROUTINE: NORMAL

## 2021-02-22 RX ORDER — GABAPENTIN 300 MG/1
CAPSULE ORAL
Qty: 90 CAPSULE | Refills: 0 | Status: SHIPPED
Start: 2021-02-22 | End: 2021-03-22

## 2021-02-23 ENCOUNTER — OFFICE VISIT (OUTPATIENT)
Dept: CARDIOLOGY CLINIC | Age: 46
End: 2021-02-23
Payer: COMMERCIAL

## 2021-02-23 VITALS
HEART RATE: 56 BPM | WEIGHT: 248.3 LBS | SYSTOLIC BLOOD PRESSURE: 118 MMHG | BODY MASS INDEX: 36.78 KG/M2 | RESPIRATION RATE: 16 BRPM | DIASTOLIC BLOOD PRESSURE: 72 MMHG | HEIGHT: 69 IN

## 2021-02-23 DIAGNOSIS — R00.2 PALPITATIONS: Primary | ICD-10-CM

## 2021-02-23 PROCEDURE — 93000 ELECTROCARDIOGRAM COMPLETE: CPT | Performed by: INTERNAL MEDICINE

## 2021-02-23 PROCEDURE — 99214 OFFICE O/P EST MOD 30 MIN: CPT | Performed by: INTERNAL MEDICINE

## 2021-02-23 RX ORDER — METOPROLOL SUCCINATE 25 MG/1
12.5 TABLET, EXTENDED RELEASE ORAL DAILY
Qty: 90 TABLET | Refills: 3 | Status: SHIPPED
Start: 2021-02-23 | End: 2022-07-21 | Stop reason: SDUPTHER

## 2021-02-23 NOTE — PROGRESS NOTES
Diabetes Father         on HD s/p MI    Heart Disease Father     High Blood Pressure Father        Review of Systems:  Heart: as above   Lungs: as above   Eyes: denies changes in vision or discharge. Ears: denies changes in hearing or pain. Nose: denies epistaxis or masses   Throat: denies sore throat or trouble swallowing. Neuro: denies numbness, tingling, tremors. Skin: denies rashes or itching. : denies hematuria, dysuria   GI: denies vomiting, diarrhea   Psych: denies mood changed, anxiety, depression. All other systems negative. Physical Exam   /72   Pulse 56   Resp 16   Ht 5' 9\" (1.753 m)   Wt 248 lb 4.8 oz (112.6 kg)   BMI 36.67 kg/m²   Constitutional: Oriented to person, place, and time. Well-developed and well-nourished. No distress. Head: Normocephalic and atraumatic. Eyes: EOM are normal. Pupils are equal, round, and reactive to light. Neck: Normal range of motion. Neck supple. No hepatojugular reflux and no JVD present. Carotid bruit is not present. No tracheal deviation present. No thyromegaly present. Cardiovascular: Normal rate, regular rhythm, normal heart sounds and intact distal pulses. Exam reveals no gallop and no friction rub. No murmur heard. Pulmonary/Chest: Effort normal and breath sounds normal. No respiratory distress. No wheezes. No rales. No tenderness. Abdominal: Soft. Bowel sounds are normal. No distension and no mass. No tenderness. No rebound and no guarding. Musculoskeletal: Normal range of motion. No edema and no tenderness. Lymphadenopathy:   No cervical adenopathy. No groin adenopathy. Neurological: Alert and oriented to person, place, and time. Skin: Skin is warm and dry. No rash noted. Not diaphoretic. No erythema. Psychiatric: Normal mood and affect. Behavior is normal.     EKG:  sinus bradycardia, nonspecific ST and T waves changes.     ASSESSMENT AND PLAN:  Patient Active Problem List   Diagnosis    GERD (gastroesophageal reflux disease)    Depression with anxiety    Exacerbation of Crohn's disease of large intestine (HCC)    SVT (supraventricular tachycardia) (HCC)    Chest pain    Anemia    Paroxysmal supraventricular tachycardia (HCC)    Iron deficiency anemia secondary to blood loss (chronic)    Crohn's disease of colon with complication (HCC)    Abdominal wall skin ulcer, with fat layer exposed ([address]    Skin ulcer of perineum, with fat layer exposed ([address]     1. SVT: Echo normal.     Unable to wear monitor due to severe reaction to patches. Continue BB. If recurrent then EP. 2. Lipids: Statin. 3. IBD    4. Anemia of chronic disease    Jesu West D.O.   Cardiologist  Cardiology, [facility]

## 2021-02-25 ENCOUNTER — PATIENT MESSAGE (OUTPATIENT)
Dept: FAMILY MEDICINE CLINIC | Age: 46
End: 2021-02-25

## 2021-02-25 DIAGNOSIS — K50.818 CROHN'S DISEASE OF BOTH SMALL AND LARGE INTESTINE WITH OTHER COMPLICATION (HCC): Primary | ICD-10-CM

## 2021-02-25 NOTE — TELEPHONE ENCOUNTER
From: Deon Reed  To: Cyndee Gomez MD  Sent: 2/25/2021 2:18 PM EST  Subject: Prescription Question    Dr. Shannan Olivas,   1) As we spoke at my appt back in October, could you send in a refill for my oxycodone 5MG immediate release tablet. I have finished my script from June of 2020 and am currently seeing a plastic surgeon for the wounds and take the oxycodone for exam appts. 2) Could you also please send a script for my Loperamide 2MG capsules, Alvaro cannot get in touch with the GI surgeon to renew the script.     Thank you - Landon Britton :)

## 2021-02-26 RX ORDER — OXYCODONE HYDROCHLORIDE 5 MG/1
5 TABLET ORAL EVERY 8 HOURS PRN
Qty: 30 TABLET | Refills: 0 | Status: SHIPPED | OUTPATIENT
Start: 2021-02-26 | End: 2021-03-28

## 2021-02-26 RX ORDER — LOPERAMIDE HYDROCHLORIDE 2 MG/1
2 CAPSULE ORAL 4 TIMES DAILY PRN
Qty: 120 CAPSULE | Refills: 3 | Status: SHIPPED
Start: 2021-02-26 | End: 2021-07-02

## 2021-03-17 ENCOUNTER — HOSPITAL ENCOUNTER (OUTPATIENT)
Dept: HYPERBARIC MEDICINE | Age: 46
Setting detail: THERAPIES SERIES
Discharge: HOME | End: 2021-03-17
Payer: COMMERCIAL

## 2021-03-17 DIAGNOSIS — L98.492 SKIN ULCER OF PERINEUM, WITH FAT LAYER EXPOSED (HCC): Chronic | ICD-10-CM

## 2021-03-17 DIAGNOSIS — K50.119 CROHN'S DISEASE OF COLON WITH COMPLICATION (HCC): Primary | Chronic | ICD-10-CM

## 2021-03-17 DIAGNOSIS — L98.492 ABDOMINAL WALL SKIN ULCER, WITH FAT LAYER EXPOSED (HCC): Chronic | ICD-10-CM

## 2021-03-17 PROCEDURE — 99213 OFFICE O/P EST LOW 20 MIN: CPT

## 2021-03-17 PROCEDURE — 99213 OFFICE O/P EST LOW 20 MIN: CPT | Performed by: SURGERY

## 2021-03-17 NOTE — H&P
H&P / Consultation Note - Hyperbaric Oxygen Therapy    Referring Physician:    Reason for Consult: Nonhealing abdominal and perineal wounds associated with Crohn's disease    HISTORY OF PRESENT ILLNESS:    The patient is a 54 y.o. female who presents for evaluation and treatment at the [address] [place] [address]. Linda Richardson is a 54 y.o. female presents to wound care center for treatment of multiple wounds. She has history of Crohn's colitis s/p TAC 3/2019 and completion proctectomy 1/2020. She developed wound in lower abdominal crease and in perineum. She has been treating with local care. C/o severe pain with dressing changes. Denies leaking from stoma. Investigating HBO as Crohn's may still be active in perineal wound.  present at bedside.         ROS : Negative if blank [], Positive if [x]  General Vascular   [] Fevers [] Claudication (Blocks)   [] Chills [] Rest Pain   [] Weight Loss [] Tissue Loss   [] Chest Pain [] Vision Changes   [] SOB at rest [] Slurred Speech   [] SOB with exertion [] Focal Weakness   [] Nausea    [] Vomitting [] Stroke/TIA   [] Abdominal Pain [] DVT   [] Melena    [] Hematochezia    [] Hematuria    [] Dysuria    [] Leg Swelling    [] Wears Glasses/Contacts    [] Blindness       [] Difficulty swallowing         Problem List:  Patient Active Problem List   Diagnosis Code    GERD (gastroesophageal reflux disease) K21.9    Depression with anxiety F41.8    Exacerbation of Crohn's disease of large intestine (HCC) K50.10    SVT (supraventricular tachycardia) (HCC) I47.1    Chest pain R07.9    Anemia D64.9    Paroxysmal supraventricular tachycardia (HCC) I47.1    Iron deficiency anemia secondary to blood loss (chronic) D50.0    Crohn's disease of colon with complication (HCC) S87.697    Abdominal wall skin ulcer, with fat layer exposed ([address] L98.492    Skin ulcer of perineum, with fat layer exposed ([address] B93.317       Past Medical History:  Past Medical History: Diagnosis Date    Acid reflux     Anxiety and depression     Crohn's colitis ([facility] [address] 04/2018    Crohn's colitis per colonoscopy biopsies 4/9/18, 6/11/18       Past Surgical History  Past Surgical History:   Procedure Laterality Date    BREAST BIOPSY  1991    Benign Cyst    CHOLECYSTECTOMY  11/18/2011    Suly Shepardrodolfo, laparoscopic     ENDOMETRIAL ABLATION  2004    ILEOSTOMY OR JEJUNOSTOMY  04/2019    UPPER GASTROINTESTINAL ENDOSCOPY         Current Medications:     Current Outpatient Medications:     oxyCODONE (ROXICODONE) 5 MG immediate release tablet, Take 1 tablet by mouth every 8 hours as needed for Pain for up to 30 days. , Disp: 30 tablet, Rfl: 0    loperamide (RA ANTI-DIARRHEAL) 2 MG capsule, Take 1 capsule by mouth 4 times daily as needed for Diarrhea, Disp: 120 capsule, Rfl: 3    metoprolol succinate (TOPROL XL) 25 MG extended release tablet, Take 0.5 tablets by mouth daily, Disp: 90 tablet, Rfl: 3    gabapentin (NEURONTIN) 300 MG capsule, TAKE 1 CAPSULE BY MOUTH THREE TIMES DAILY, Disp: 90 capsule, Rfl: 0    Multiple Vitamins-Minerals (MULTIVITAMIN ADULT PO), Take by mouth daily, Disp: , Rfl:     ustekinumab (STELARA) 90 MG/ML SOSY prefilled syringe, Inject into the skin every 28 days , Disp: , Rfl:     DULoxetine (CYMBALTA) 30 MG extended release capsule, Take 1 capsule by mouth daily, Disp: 90 capsule, Rfl: 3    sulfamethoxazole-trimethoprim (BACTRIM DS;SEPTRA DS) 800-160 MG per tablet, Take 1 tablet by mouth 2 times daily, Disp: , Rfl:     tacrolimus (PROTOPIC) 0.1 % ointment, Apply 1 each topically daily Apply topically 2 times daily. , Disp: , Rfl:     omeprazole (PRILOSEC) 40 MG delayed release capsule, TAKE 1 CAPSULE DAILY, Disp: 90 capsule, Rfl: 3    ibuprofen (ADVIL) 200 MG CAPS, Take 2 capsules by mouth 2 times daily, Disp: , Rfl:     loperamide (IMODIUM) 1 MG/5ML solution, Take 2 mg by mouth 4 times daily as needed for Diarrhea, Disp: , Rfl:     fluticasone (FLONASE) 50 MCG/ACT nasal tissue - Minimal amt  - Granulation tissue - Moderate amt  - Errythema - Minimal amt            Measurements shown are from today's visit. Please refer to nursing measurements and assessment regarding wound. There were no vitals taken for this visit. CONSTITUTIONAL:  awake, alert, cooperative, no apparent distress, and appears stated age  CN II-XII intact   EYES:  lids and lashes normal, sclera clear and conjunctiva normal  ENT:  normocepalic, without obvious abnormality, external ears without lesions  NECK:  supple, symmetrical, trachea midline,no jugular venous distension, no carotid bruits  LUNGS:  no increased work of breathing, good air exchange  CARDIOVASCULAR:  regular rate and rhythm  ABDOMEN:  soft, non-distended, non-tender, Aorta not palpated  SKIN: Lower abdominal wound increase. Perineal wound. Both wounds have evidence of skin bridging. No drainage currently. EXTREMITIES:         Procedure:     Hyperbaric oxygen therapy would help the patient with wound healing if her wounds are being affected by active Crohn's. We will submit for insurance approval.  I reviewed the treatment with the patient as well as the risk, benefits, and alternatives. She understands. All questions were answered.     Plan:         Armando Joseph MD

## 2021-03-22 RX ORDER — GABAPENTIN 300 MG/1
CAPSULE ORAL
Qty: 90 CAPSULE | Refills: 0 | Status: SHIPPED
Start: 2021-03-22 | End: 2021-04-19

## 2021-04-19 RX ORDER — GABAPENTIN 300 MG/1
CAPSULE ORAL
Qty: 90 CAPSULE | Refills: 0 | Status: SHIPPED
Start: 2021-04-19 | End: 2021-05-17

## 2021-05-17 ENCOUNTER — HOSPITAL ENCOUNTER (OUTPATIENT)
Dept: HYPERBARIC MEDICINE | Age: 46
Setting detail: THERAPIES SERIES
Discharge: HOME | End: 2021-05-17
Payer: COMMERCIAL

## 2021-05-17 VITALS
TEMPERATURE: 97.2 F | DIASTOLIC BLOOD PRESSURE: 56 MMHG | SYSTOLIC BLOOD PRESSURE: 123 MMHG | HEART RATE: 68 BPM | RESPIRATION RATE: 18 BRPM

## 2021-05-17 PROBLEM — R07.9 CHEST PAIN: Status: RESOLVED | Noted: 2019-05-25 | Resolved: 2021-05-17

## 2021-05-17 PROCEDURE — G0277 HBOT, FULL BODY CHAMBER, 30M: HCPCS

## 2021-05-17 PROCEDURE — 99183 HYPERBARIC OXYGEN THERAPY: CPT | Performed by: SURGERY

## 2021-05-17 RX ORDER — GABAPENTIN 300 MG/1
CAPSULE ORAL
Qty: 90 CAPSULE | Refills: 0 | Status: SHIPPED
Start: 2021-05-17 | End: 2021-06-17

## 2021-05-17 NOTE — PROGRESS NOTES
[facility]  Hyperbaric Oxygen Therapy   Progress Note    NAME: Jenniffer Louis RECORD NUMBER:  [id number]  AGE: 54 y.o. GENDER: female  : 1975  EPISODE DATE:  2021   Subjective   HBO Treatment Number: 1 out of Total Treatments: 40  HBO Diagnosis:   Problem List Items Addressed This Visit     None        Safety checks performed prior to treatment. See doc flowsheets for documentation. Objective       No results for input(s): GLUMET in the last 72 hours. Pre treatment Vital Signs       Temp: 98.4 °F (36.9 °C)     Pulse: 78     Resp: 18     BP: 126/68     Post treatment Vital Signs  Temp: 97.2 °F (36.2 °C)  Pulse: 68  Resp: 18  BP: (!) 123/56  Assessment      Physical Exam:  General Appearance:  alert and oriented to person, place and time, well-developed and well-nourished, in no acute distress  ENT:  tympanic membranes intact bilaterally  Pulmonary/Chest:  clear to auscultation bilaterally- no wheezes, rales or rhonchi, normal air movement, no respiratory distress  Cardiovascular:  regular rate and rhythm  Chamber #: [id number]  Treatment Start Time: 0944     Pressure Reached Time: 0952  RONAN : 2  Number of Air Breaks:  Treatment Status: No Air break     Decompression Time: 1122   Treatment End Time: 1134  Length of Treatment: 90 Minutes  Symptoms Noted During Treatment: None  Total Treatment Time (min): 110    Adverse Event: no    I was present on these premises and immediately available to furnish assistance & direction throughout the procedure. Sonam Rodriguez is a 54 y.o. female  did successfully complete today's hyperbaric oxygen treatment at [address] [place] [address]. In my clinical judgement, ongoing HBO therapy is  necessary at this time, given a threat to patient function, limb or life from the current condition. Supervision and attendance of Hyperbaric Oxygen Therapy provided.   Continue HBO treatment as outlined in the treatment plan. Hyperbaric Oxygen: Perla Linares tolerated Treatment Number: 1 well today without complications.     Discharge Instructions were explained and given to Ms. Hackettus Morris     Electronically signed by Fredrick Durham MD on 5/17/2021 at 12:23 PM

## 2021-05-18 ENCOUNTER — HOSPITAL ENCOUNTER (OUTPATIENT)
Dept: HYPERBARIC MEDICINE | Age: 46
Setting detail: THERAPIES SERIES
Discharge: HOME | End: 2021-05-18
Payer: COMMERCIAL

## 2021-05-18 VITALS
DIASTOLIC BLOOD PRESSURE: 68 MMHG | SYSTOLIC BLOOD PRESSURE: 130 MMHG | HEART RATE: 64 BPM | TEMPERATURE: 97.3 F | RESPIRATION RATE: 16 BRPM

## 2021-05-18 DIAGNOSIS — L98.492 SKIN ULCER OF PERINEUM, WITH FAT LAYER EXPOSED (HCC): ICD-10-CM

## 2021-05-18 DIAGNOSIS — L98.492 ABDOMINAL WALL SKIN ULCER, WITH FAT LAYER EXPOSED (HCC): Primary | Chronic | ICD-10-CM

## 2021-05-18 PROCEDURE — 99183 HYPERBARIC OXYGEN THERAPY: CPT | Performed by: SURGERY

## 2021-05-18 PROCEDURE — G0277 HBOT, FULL BODY CHAMBER, 30M: HCPCS

## 2021-05-18 NOTE — PROGRESS NOTES
the current condition. Supervision and attendance of Hyperbaric Oxygen Therapy provided. Continue HBO treatment as outlined in the treatment plan. Hyperbaric Oxygen: Perla Linares tolerated Treatment Number: 2 well today without complications.     Discharge Instructions were explained and given to MsTracy Ezekiel Rachel     Electronically signed by Berry Mazariegos MD on 5/18/2021 at 12:59 PM

## 2021-05-19 ENCOUNTER — HOSPITAL ENCOUNTER (OUTPATIENT)
Dept: HYPERBARIC MEDICINE | Age: 46
Setting detail: THERAPIES SERIES
Discharge: HOME | End: 2021-05-19
Payer: COMMERCIAL

## 2021-05-19 VITALS
DIASTOLIC BLOOD PRESSURE: 57 MMHG | SYSTOLIC BLOOD PRESSURE: 132 MMHG | TEMPERATURE: 97.1 F | HEART RATE: 61 BPM | RESPIRATION RATE: 18 BRPM

## 2021-05-19 DIAGNOSIS — K50.119 CROHN'S DISEASE OF COLON WITH COMPLICATION (HCC): Chronic | ICD-10-CM

## 2021-05-19 DIAGNOSIS — L98.492 ABDOMINAL WALL SKIN ULCER, WITH FAT LAYER EXPOSED (HCC): Primary | Chronic | ICD-10-CM

## 2021-05-19 DIAGNOSIS — L98.492 SKIN ULCER OF PERINEUM, WITH FAT LAYER EXPOSED (HCC): ICD-10-CM

## 2021-05-19 PROCEDURE — G0277 HBOT, FULL BODY CHAMBER, 30M: HCPCS

## 2021-05-19 PROCEDURE — 99183 HYPERBARIC OXYGEN THERAPY: CPT | Performed by: SURGERY

## 2021-05-20 ENCOUNTER — HOSPITAL ENCOUNTER (OUTPATIENT)
Dept: HYPERBARIC MEDICINE | Age: 46
Setting detail: THERAPIES SERIES
Discharge: HOME | End: 2021-05-20
Payer: COMMERCIAL

## 2021-05-20 VITALS
DIASTOLIC BLOOD PRESSURE: 80 MMHG | HEART RATE: 76 BPM | SYSTOLIC BLOOD PRESSURE: 164 MMHG | RESPIRATION RATE: 19 BRPM | TEMPERATURE: 97.2 F

## 2021-05-20 DIAGNOSIS — L98.492 ABDOMINAL WALL SKIN ULCER, WITH FAT LAYER EXPOSED (HCC): ICD-10-CM

## 2021-05-20 DIAGNOSIS — K50.119 CROHN'S DISEASE OF COLON WITH COMPLICATION (HCC): Primary | ICD-10-CM

## 2021-05-20 DIAGNOSIS — L98.492 SKIN ULCER OF PERINEUM, WITH FAT LAYER EXPOSED (HCC): ICD-10-CM

## 2021-05-20 PROCEDURE — G0277 HBOT, FULL BODY CHAMBER, 30M: HCPCS

## 2021-05-20 PROCEDURE — 99183 HYPERBARIC OXYGEN THERAPY: CPT | Performed by: SURGERY

## 2021-05-20 RX ORDER — OMEPRAZOLE 40 MG/1
CAPSULE, DELAYED RELEASE ORAL
Qty: 90 CAPSULE | Refills: 3 | Status: SHIPPED
Start: 2021-05-20 | End: 2022-05-31 | Stop reason: SDUPTHER

## 2021-05-21 ENCOUNTER — HOSPITAL ENCOUNTER (OUTPATIENT)
Dept: HYPERBARIC MEDICINE | Age: 46
Setting detail: THERAPIES SERIES
Discharge: HOME | End: 2021-05-21
Payer: COMMERCIAL

## 2021-05-21 VITALS
HEART RATE: 72 BPM | SYSTOLIC BLOOD PRESSURE: 122 MMHG | TEMPERATURE: 97.9 F | DIASTOLIC BLOOD PRESSURE: 78 MMHG | RESPIRATION RATE: 16 BRPM

## 2021-05-21 DIAGNOSIS — L98.492 ABDOMINAL WALL SKIN ULCER, WITH FAT LAYER EXPOSED (HCC): ICD-10-CM

## 2021-05-21 DIAGNOSIS — K50.119 CROHN'S DISEASE OF COLON WITH COMPLICATION (HCC): Primary | ICD-10-CM

## 2021-05-21 DIAGNOSIS — L98.492 SKIN ULCER OF PERINEUM, WITH FAT LAYER EXPOSED (HCC): ICD-10-CM

## 2021-05-21 PROCEDURE — 99183 HYPERBARIC OXYGEN THERAPY: CPT | Performed by: SURGERY

## 2021-05-21 PROCEDURE — G0277 HBOT, FULL BODY CHAMBER, 30M: HCPCS

## 2021-05-21 NOTE — PROGRESS NOTES
[facility]  Hyperbaric Oxygen Therapy   Progress Note    NAME: Jenniffer Louis RECORD NUMBER:  [id number]  AGE: 54 y.o. GENDER: female  : 1975  EPISODE DATE:  2021   Subjective   HBO Treatment Number: 4 out of Total Treatments: 40  HBO Diagnosis:   Problem List Items Addressed This Visit     Crohn's disease of colon with complication ([address] - Primary (Chronic)    Abdominal wall skin ulcer, with fat layer exposed ([address] (Chronic)    Skin ulcer of perineum, with fat layer exposed ([address] (Chronic)        Safety checks performed prior to treatment by HBOT staff. See doc flowsheets for documentation. Objective       No results for input(s): GLUMET in the last 72 hours. Pre treatment Vital Signs       Temp: 97.4 °F (36.3 °C)     Pulse: 76     Resp: 18     BP: 130/78     Post treatment Vital Signs  Temp: 97.2 °F (36.2 °C)  Pulse: 76  Resp: 19  BP: (!) 164/80  Assessment      Physical Exam:  General Appearance:  alert and oriented to person, place and time, well-developed and well-nourished, in no acute distress  ENT:  tympanic membranes intact bilaterally, normal color, normal light reflex bilaterally  Pulmonary/Chest:  clear to auscultation bilaterally- no wheezes, rales or rhonchi, normal air movement, no respiratory distress  Cardiovascular:  regular rate and rhythm  Chamber #: 38  Treatment Start Time: 1005     Pressure Reached Time: 1018  RONAN : 2  Number of Air Breaks:  Treatment Status: No Air break     Decompression Time: 1148   Treatment End Time: 1200  Length of Treatment: 90 Minutes  Symptoms Noted During Treatment: None  Total Treatment Time (min): 115    Adverse Event: no    I was present on these premises and immediately available to furnish assistance & direction throughout the procedure.    Sonam Rodriguez is a 54 y.o. female  did successfully complete today's hyperbaric oxygen treatment at [facility]

## 2021-05-24 ENCOUNTER — HOSPITAL ENCOUNTER (OUTPATIENT)
Dept: HYPERBARIC MEDICINE | Age: 46
Setting detail: THERAPIES SERIES
Discharge: HOME | End: 2021-05-24
Payer: COMMERCIAL

## 2021-05-24 VITALS
HEART RATE: 73 BPM | SYSTOLIC BLOOD PRESSURE: 124 MMHG | TEMPERATURE: 97.4 F | RESPIRATION RATE: 18 BRPM | DIASTOLIC BLOOD PRESSURE: 80 MMHG

## 2021-05-24 DIAGNOSIS — L98.492 ABDOMINAL WALL SKIN ULCER, WITH FAT LAYER EXPOSED (HCC): ICD-10-CM

## 2021-05-24 DIAGNOSIS — K50.119 CROHN'S DISEASE OF COLON WITH COMPLICATION (HCC): Primary | ICD-10-CM

## 2021-05-24 DIAGNOSIS — L98.492 SKIN ULCER OF PERINEUM, WITH FAT LAYER EXPOSED (HCC): ICD-10-CM

## 2021-05-24 PROCEDURE — 99183 HYPERBARIC OXYGEN THERAPY: CPT | Performed by: SURGERY

## 2021-05-24 PROCEDURE — G0277 HBOT, FULL BODY CHAMBER, 30M: HCPCS

## 2021-05-24 NOTE — PROGRESS NOTES
[facility]  Hyperbaric Oxygen Therapy   Progress Note    NAME: Jenniffer Louis RECORD NUMBER:  [id number]  AGE: 54 y.o. GENDER: female  : 1975  EPISODE DATE:  2021   Subjective   HBO Treatment Number: 6 out of Total Treatments: 40  HBO Diagnosis:   Problem List Items Addressed This Visit     Abdominal wall skin ulcer, with fat layer exposed ([address] (Chronic)    Relevant Orders    Notify physician (specify)    Hyperbaric Oxygen Therapy    Skin ulcer of perineum, with fat layer exposed ([address] (Chronic)    Relevant Orders    Notify physician (specify)    Hyperbaric Oxygen Therapy    Crohn's disease of colon with complication ([address] - Primary (Chronic)    Relevant Orders    Notify physician (specify)    Hyperbaric Oxygen Therapy        Safety checks performed prior to treatment. See doc flowsheets for documentation. Objective       No results for input(s): GLUMET in the last 72 hours. Pre treatment Vital Signs       Temp: 97.4 °F (36.3 °C)     Pulse: 72     Resp: 18     BP: 134/60     Post treatment Vital Signs  Temp: 97.4 °F (36.3 °C)  Pulse: 73  Resp: 18  BP: 124/80  Assessment      Physical Exam:  General Appearance:  alert and oriented to person, place and time, well-developed and well-nourished, in no acute distress  ENT:  tympanic membranes intact bilaterally  Pulmonary/Chest:  clear to auscultation bilaterally- no wheezes, rales or rhonchi, normal air movement, no respiratory distress  Cardiovascular:  regular rate and rhythm  Chamber #: 38  Treatment Start Time: 0957     Pressure Reached Time: 1007  RONAN : 2  Number of Air Breaks:  Treatment Status: No Air break     Decompression Time: 1138   Treatment End Time: 1150  Length of Treatment: 90 Minutes  Symptoms Noted During Treatment: None  Total Treatment Time (min): 113    Adverse Event: no    I was present on these premises and immediately available to furnish assistance & direction throughout the procedure. Franklyn Dupree is a 54 y.o. female  did successfully complete today's hyperbaric oxygen treatment at [address] In my clinical judgement, ongoing HBO therapy is  necessary at this time, given a threat to patient function, limb or life from the current condition. Supervision and attendance of Hyperbaric Oxygen Therapy provided. Continue HBO treatment as outlined in the treatment plan. Hyperbaric Oxygen: Perla Linares tolerated Treatment Number: 6 well today without complications.     Discharge Instructions were explained and given to Ms. Giselle Mcclelland     Electronically signed by Lilia Thayer MD on 5/24/2021 at 12:12 PM

## 2021-05-25 ENCOUNTER — HOSPITAL ENCOUNTER (OUTPATIENT)
Dept: HYPERBARIC MEDICINE | Age: 46
Setting detail: THERAPIES SERIES
Discharge: HOME | End: 2021-05-25
Payer: COMMERCIAL

## 2021-05-25 VITALS
RESPIRATION RATE: 18 BRPM | TEMPERATURE: 97.2 F | SYSTOLIC BLOOD PRESSURE: 138 MMHG | DIASTOLIC BLOOD PRESSURE: 78 MMHG | HEART RATE: 74 BPM

## 2021-05-25 DIAGNOSIS — L98.492 ABDOMINAL WALL SKIN ULCER, WITH FAT LAYER EXPOSED (HCC): ICD-10-CM

## 2021-05-25 DIAGNOSIS — L98.492 SKIN ULCER OF PERINEUM, WITH FAT LAYER EXPOSED (HCC): ICD-10-CM

## 2021-05-25 DIAGNOSIS — K50.119 CROHN'S DISEASE OF COLON WITH COMPLICATION (HCC): Primary | ICD-10-CM

## 2021-05-25 PROCEDURE — 99183 HYPERBARIC OXYGEN THERAPY: CPT | Performed by: SURGERY

## 2021-05-25 PROCEDURE — G0277 HBOT, FULL BODY CHAMBER, 30M: HCPCS

## 2021-05-25 NOTE — PROGRESS NOTES
[facility]  Hyperbaric Oxygen Therapy   Progress Note    NAME: Jenniffer Louis RECORD NUMBER:  [id number]  AGE: 54 y.o. GENDER: female  : 1975  EPISODE DATE:  2021   Subjective   HBO Treatment Number: 7 out of Total Treatments: 40  HBO Diagnosis:   Problem List Items Addressed This Visit     Abdominal wall skin ulcer, with fat layer exposed ([address] (Chronic)    Relevant Orders    Notify physician (specify)    Hyperbaric Oxygen Therapy    Skin ulcer of perineum, with fat layer exposed ([address] (Chronic)    Relevant Orders    Notify physician (specify)    Hyperbaric Oxygen Therapy    Crohn's disease of colon with complication ([address] - Primary (Chronic)    Relevant Orders    Notify physician (specify)    Hyperbaric Oxygen Therapy        Safety checks performed prior to treatment. See doc flowsheets for documentation. Objective       No results for input(s): GLUMET in the last 72 hours. Pre treatment Vital Signs       Temp: 97.4 °F (36.3 °C)     Pulse: 72     Resp: 18     BP: 120/68     Post treatment Vital Signs  Temp: 97.2 °F (36.2 °C)  Pulse: 74  Resp: 18  BP: 138/78  Assessment      Physical Exam:  General Appearance:  alert and oriented to person, place and time, well-developed and well-nourished, in no acute distress  ENT:  tympanic membranes intact bilaterally  Pulmonary/Chest:  clear to auscultation bilaterally- no wheezes, rales or rhonchi, normal air movement, no respiratory distress  Cardiovascular:  regular rate and rhythm  Chamber #: 38  Treatment Start Time: 0951     Pressure Reached Time: 1001  RONAN : 2  Number of Air Breaks:  Treatment Status: No Air break     Decompression Time: 1132   Treatment End Time: 1144  Length of Treatment: 90 Minutes  Symptoms Noted During Treatment: None  Total Treatment Time (min): 113    Adverse Event: yes    I was present on these premises and immediately available to furnish assistance & direction throughout the procedure. Alexander Wakefield is a 54 y.o. female  did successfully complete today's hyperbaric oxygen treatment at [address] [facility]. In my clinical judgement, ongoing HBO therapy is  necessary at this time, given a threat to patient function, limb or life from the current condition. Supervision and attendance of Hyperbaric Oxygen Therapy provided. Continue HBO treatment as outlined in the treatment plan. Hyperbaric Oxygen: Perla MADRID Vijay tolerated Treatment Number: 7 well today without complications.     Discharge Instructions were explained and given to Ms. Jamal Jarvis     Electronically signed by Zeferino Lewis MD on 5/25/2021 at 1:38 PM

## 2021-05-26 ENCOUNTER — HOSPITAL ENCOUNTER (OUTPATIENT)
Dept: HYPERBARIC MEDICINE | Age: 46
Setting detail: THERAPIES SERIES
Discharge: HOME | End: 2021-05-26
Payer: COMMERCIAL

## 2021-05-26 VITALS
DIASTOLIC BLOOD PRESSURE: 62 MMHG | RESPIRATION RATE: 16 BRPM | TEMPERATURE: 97.3 F | HEART RATE: 72 BPM | SYSTOLIC BLOOD PRESSURE: 122 MMHG

## 2021-05-26 DIAGNOSIS — K50.119 CROHN'S DISEASE OF COLON WITH COMPLICATION (HCC): Primary | ICD-10-CM

## 2021-05-26 DIAGNOSIS — L98.492 ABDOMINAL WALL SKIN ULCER, WITH FAT LAYER EXPOSED (HCC): ICD-10-CM

## 2021-05-26 DIAGNOSIS — L98.492 SKIN ULCER OF PERINEUM, WITH FAT LAYER EXPOSED (HCC): ICD-10-CM

## 2021-05-26 PROCEDURE — G0277 HBOT, FULL BODY CHAMBER, 30M: HCPCS

## 2021-05-26 PROCEDURE — 99183 HYPERBARIC OXYGEN THERAPY: CPT | Performed by: SURGERY

## 2021-05-26 NOTE — PROGRESS NOTES
this time, given a threat to patient function, limb or life from the current condition. Supervision and attendance of Hyperbaric Oxygen Therapy provided. Continue HBO treatment as outlined in the treatment plan. Hyperbaric Oxygen: Perla [facility] Linares tolerated Treatment Number: 3 well today without complications.     Discharge Instructions were explained and given to Ms. Aleyda Hendrix     Electronically signed by MD Darion on 5/19/2021 at 10:11 PM

## 2021-05-27 ENCOUNTER — HOSPITAL ENCOUNTER (OUTPATIENT)
Dept: HYPERBARIC MEDICINE | Age: 46
Setting detail: THERAPIES SERIES
Discharge: HOME | End: 2021-05-27
Payer: COMMERCIAL

## 2021-05-27 VITALS
RESPIRATION RATE: 16 BRPM | DIASTOLIC BLOOD PRESSURE: 76 MMHG | TEMPERATURE: 97.4 F | HEART RATE: 79 BPM | SYSTOLIC BLOOD PRESSURE: 118 MMHG

## 2021-05-27 DIAGNOSIS — L98.492 ABDOMINAL WALL SKIN ULCER, WITH FAT LAYER EXPOSED (HCC): ICD-10-CM

## 2021-05-27 DIAGNOSIS — K50.119 CROHN'S DISEASE OF COLON WITH COMPLICATION (HCC): Primary | ICD-10-CM

## 2021-05-27 DIAGNOSIS — L98.492 SKIN ULCER OF PERINEUM, WITH FAT LAYER EXPOSED (HCC): ICD-10-CM

## 2021-05-27 PROCEDURE — G0277 HBOT, FULL BODY CHAMBER, 30M: HCPCS

## 2021-05-27 PROCEDURE — 99183 HYPERBARIC OXYGEN THERAPY: CPT | Performed by: SURGERY

## 2021-05-28 ENCOUNTER — HOSPITAL ENCOUNTER (OUTPATIENT)
Dept: HYPERBARIC MEDICINE | Age: 46
Setting detail: THERAPIES SERIES
Discharge: HOME | End: 2021-05-28
Payer: COMMERCIAL

## 2021-05-28 VITALS
TEMPERATURE: 97.6 F | HEART RATE: 84 BPM | RESPIRATION RATE: 16 BRPM | DIASTOLIC BLOOD PRESSURE: 70 MMHG | SYSTOLIC BLOOD PRESSURE: 112 MMHG

## 2021-05-28 DIAGNOSIS — K50.119 CROHN'S DISEASE OF COLON WITH COMPLICATION (HCC): Primary | ICD-10-CM

## 2021-05-28 DIAGNOSIS — L98.492 SKIN ULCER OF PERINEUM, WITH FAT LAYER EXPOSED (HCC): ICD-10-CM

## 2021-05-28 DIAGNOSIS — L98.492 ABDOMINAL WALL SKIN ULCER, WITH FAT LAYER EXPOSED (HCC): ICD-10-CM

## 2021-05-28 PROCEDURE — G0277 HBOT, FULL BODY CHAMBER, 30M: HCPCS

## 2021-05-28 PROCEDURE — 99183 HYPERBARIC OXYGEN THERAPY: CPT | Performed by: SURGERY

## 2021-06-01 ENCOUNTER — HOSPITAL ENCOUNTER (OUTPATIENT)
Dept: HYPERBARIC MEDICINE | Age: 46
Setting detail: THERAPIES SERIES
Discharge: HOME | End: 2021-06-01
Payer: COMMERCIAL

## 2021-06-01 VITALS
HEART RATE: 64 BPM | RESPIRATION RATE: 16 BRPM | SYSTOLIC BLOOD PRESSURE: 130 MMHG | TEMPERATURE: 97.7 F | DIASTOLIC BLOOD PRESSURE: 72 MMHG

## 2021-06-01 DIAGNOSIS — L98.492 ABDOMINAL WALL SKIN ULCER, WITH FAT LAYER EXPOSED (HCC): ICD-10-CM

## 2021-06-01 DIAGNOSIS — K50.119 CROHN'S DISEASE OF COLON WITH COMPLICATION (HCC): Primary | ICD-10-CM

## 2021-06-01 DIAGNOSIS — L98.492 SKIN ULCER OF PERINEUM, WITH FAT LAYER EXPOSED (HCC): ICD-10-CM

## 2021-06-01 PROCEDURE — 99183 HYPERBARIC OXYGEN THERAPY: CPT | Performed by: SURGERY

## 2021-06-01 PROCEDURE — G0277 HBOT, FULL BODY CHAMBER, 30M: HCPCS

## 2021-06-01 NOTE — PROGRESS NOTES
[facility]  Hyperbaric Oxygen Therapy   Progress Note    NAME: Jenniffer Louis RECORD NUMBER:  [id number]  AGE: 54 y.o. GENDER: female  : 1975  EPISODE DATE:  2021   Subjective   HBO Treatment Number: 11 out of Total Treatments: 40  HBO Diagnosis:   Problem List Items Addressed This Visit     Abdominal wall skin ulcer, with fat layer exposed ([address] (Chronic)    Relevant Orders    Notify physician (specify)    Hyperbaric Oxygen Therapy    Skin ulcer of perineum, with fat layer exposed ([address] (Chronic)    Relevant Orders    Notify physician (specify)    Hyperbaric Oxygen Therapy    Crohn's disease of colon with complication ([address] - Primary (Chronic)    Relevant Orders    Notify physician (specify)    Hyperbaric Oxygen Therapy        Safety checks performed prior to treatment. See doc flowsheets for documentation. Objective       No results for input(s): GLUMET in the last 72 hours. Pre treatment Vital Signs       Temp: 97.9 °F (36.6 °C)     Pulse: 64     Resp: 16     BP: 128/70     Post treatment Vital Signs  Temp: 97.7 °F (36.5 °C)  Pulse: 64  Resp: 16  BP: 130/72  Assessment      Physical Exam:  General Appearance:  alert and oriented to person, place and time, well-developed and well-nourished, in no acute distress  ENT:  tympanic membranes intact bilaterally  Pulmonary/Chest:  clear to auscultation bilaterally- no wheezes, rales or rhonchi, normal air movement, no respiratory distress  Cardiovascular:  regular rate and rhythm  Chamber #: 38  Treatment Start Time: 1015     Pressure Reached Time: 1025  RONAN : 2  Number of Air Breaks:  Treatment Status: No Air break     Decompression Time: 1155   Treatment End Time: 5907  Length of Treatment: 90 Minutes  Symptoms Noted During Treatment: None  Total Treatment Time (min): 112    Adverse Event: no    I was present on these premises and immediately available to furnish assistance & direction throughout the procedure. Shari Zaidi is a 54 y.o. female  did successfully complete today's hyperbaric oxygen treatment at [address] In my clinical judgement, ongoing HBO therapy is  necessary at this time, given a threat to patient function, limb or life from the current condition. Supervision and attendance of Hyperbaric Oxygen Therapy provided. Continue HBO treatment as outlined in the treatment plan. Hyperbaric Oxygen: Perla MADRID Vijay tolerated Treatment Number: 55 well today without complications.     Discharge Instructions were explained and given to Ms. Santiagolene Td     Electronically signed by Audrey Hadley MD on 6/1/2021 at 1:27 PM

## 2021-06-01 NOTE — PROGRESS NOTES
[facility]  Hyperbaric Oxygen Therapy   Progress Note    NAME: Jenniffer Louis RECORD NUMBER:  [id number]  AGE: 54 y.o. GENDER: female  : 1975  EPISODE DATE:  2021   Subjective   HBO Treatment Number: 8 out of Total Treatments: 40  HBO Diagnosis:   Problem List Items Addressed This Visit     Crohn's disease of colon with complication ([address] - Primary (Chronic)    Abdominal wall skin ulcer, with fat layer exposed ([address] (Chronic)    Skin ulcer of perineum, with fat layer exposed ([address] (Chronic)        Safety checks performed prior to treatment. See doc flowsheets for documentation. Objective       No results for input(s): GLUMET in the last 72 hours. Pre treatment Vital Signs       Temp: 97.1 °F (36.2 °C)     Pulse: 71     Resp: 18     BP: 122/68     Post treatment Vital Signs  Temp: 97.3 °F (36.3 °C)  Pulse: 72  Resp: 16  BP: 122/62  Assessment      Physical Exam:  General Appearance:  alert and oriented to person, place and time, well-developed and well-nourished, in no acute distress  ENT:  tympanic membranes intact bilaterally  Pulmonary/Chest:  clear to auscultation bilaterally- no wheezes, rales or rhonchi, normal air movement, no respiratory distress  Cardiovascular:  regular rate and rhythm  Chamber #: 38  Treatment Start Time: 0950     Pressure Reached Time: 1001  RONAN : 2  Number of Air Breaks:  Treatment Status: No Air break     Decompression Time: 1135   Treatment End Time: 1148  Length of Treatment: 90 Minutes  Symptoms Noted During Treatment: None  Total Treatment Time (min): 118    Adverse Event: no    I was present on these premises and immediately available to furnish assistance & direction throughout the procedure. Adriana Reid is a 54 y.o. female  did successfully complete today's hyperbaric oxygen treatment at [address] [place] [address].     In my clinical judgement, ongoing HBO therapy is  necessary at this time, given a threat to patient function, limb or life from the current condition. Supervision and attendance of Hyperbaric Oxygen Therapy provided. Continue HBO treatment as outlined in the treatment plan. Hyperbaric Oxygen: Perla MADRID Vijay tolerated Treatment Number: 8 well today without complications.     Discharge Instructions were explained and given to Ms. Darold Councilman     Electronically signed by Karen Cali MD on 5/26/2021 at 2:32 PM

## 2021-06-02 ENCOUNTER — HOSPITAL ENCOUNTER (OUTPATIENT)
Dept: HYPERBARIC MEDICINE | Age: 46
Setting detail: THERAPIES SERIES
Discharge: HOME | End: 2021-06-02
Payer: COMMERCIAL

## 2021-06-02 VITALS
TEMPERATURE: 97.1 F | HEART RATE: 61 BPM | DIASTOLIC BLOOD PRESSURE: 72 MMHG | RESPIRATION RATE: 18 BRPM | SYSTOLIC BLOOD PRESSURE: 118 MMHG

## 2021-06-02 DIAGNOSIS — K50.119 CROHN'S DISEASE OF COLON WITH COMPLICATION (HCC): Primary | ICD-10-CM

## 2021-06-02 DIAGNOSIS — L98.492 ABDOMINAL WALL SKIN ULCER, WITH FAT LAYER EXPOSED (HCC): ICD-10-CM

## 2021-06-02 DIAGNOSIS — L98.492 SKIN ULCER OF PERINEUM, WITH FAT LAYER EXPOSED (HCC): ICD-10-CM

## 2021-06-02 PROCEDURE — 99183 HYPERBARIC OXYGEN THERAPY: CPT | Performed by: SURGERY

## 2021-06-03 ENCOUNTER — HOSPITAL ENCOUNTER (OUTPATIENT)
Dept: HYPERBARIC MEDICINE | Age: 46
Setting detail: THERAPIES SERIES
Discharge: HOME | End: 2021-06-03
Payer: COMMERCIAL

## 2021-06-03 VITALS
DIASTOLIC BLOOD PRESSURE: 76 MMHG | HEART RATE: 76 BPM | TEMPERATURE: 97.3 F | RESPIRATION RATE: 16 BRPM | SYSTOLIC BLOOD PRESSURE: 122 MMHG

## 2021-06-03 DIAGNOSIS — L98.492 ABDOMINAL WALL SKIN ULCER, WITH FAT LAYER EXPOSED (HCC): ICD-10-CM

## 2021-06-03 DIAGNOSIS — L98.492 SKIN ULCER OF PERINEUM, WITH FAT LAYER EXPOSED (HCC): ICD-10-CM

## 2021-06-03 DIAGNOSIS — K50.119 CROHN'S DISEASE OF COLON WITH COMPLICATION (HCC): Primary | ICD-10-CM

## 2021-06-03 PROCEDURE — 99183 HYPERBARIC OXYGEN THERAPY: CPT | Performed by: SURGERY

## 2021-06-03 PROCEDURE — G0277 HBOT, FULL BODY CHAMBER, 30M: HCPCS

## 2021-06-04 ENCOUNTER — HOSPITAL ENCOUNTER (OUTPATIENT)
Dept: HYPERBARIC MEDICINE | Age: 46
Setting detail: THERAPIES SERIES
Discharge: HOME | End: 2021-06-04
Payer: COMMERCIAL

## 2021-06-04 VITALS
DIASTOLIC BLOOD PRESSURE: 62 MMHG | SYSTOLIC BLOOD PRESSURE: 122 MMHG | HEART RATE: 64 BPM | TEMPERATURE: 97.6 F | RESPIRATION RATE: 14 BRPM

## 2021-06-04 DIAGNOSIS — K50.119 CROHN'S DISEASE OF COLON WITH COMPLICATION (HCC): Primary | ICD-10-CM

## 2021-06-04 DIAGNOSIS — L98.492 SKIN ULCER OF PERINEUM, WITH FAT LAYER EXPOSED (HCC): ICD-10-CM

## 2021-06-04 DIAGNOSIS — L98.492 ABDOMINAL WALL SKIN ULCER, WITH FAT LAYER EXPOSED (HCC): ICD-10-CM

## 2021-06-04 PROCEDURE — G0277 HBOT, FULL BODY CHAMBER, 30M: HCPCS

## 2021-06-04 PROCEDURE — 99183 HYPERBARIC OXYGEN THERAPY: CPT | Performed by: SURGERY

## 2021-06-07 ENCOUNTER — APPOINTMENT (OUTPATIENT)
Dept: HYPERBARIC MEDICINE | Age: 46
End: 2021-06-07
Payer: COMMERCIAL

## 2021-06-08 ENCOUNTER — HOSPITAL ENCOUNTER (OUTPATIENT)
Dept: HYPERBARIC MEDICINE | Age: 46
Setting detail: THERAPIES SERIES
Discharge: HOME | End: 2021-06-08
Payer: COMMERCIAL

## 2021-06-08 VITALS
TEMPERATURE: 98 F | HEART RATE: 66 BPM | DIASTOLIC BLOOD PRESSURE: 78 MMHG | SYSTOLIC BLOOD PRESSURE: 128 MMHG | RESPIRATION RATE: 18 BRPM

## 2021-06-08 DIAGNOSIS — L98.492 SKIN ULCER OF PERINEUM, WITH FAT LAYER EXPOSED (HCC): ICD-10-CM

## 2021-06-08 DIAGNOSIS — L98.492 ABDOMINAL WALL SKIN ULCER, WITH FAT LAYER EXPOSED (HCC): ICD-10-CM

## 2021-06-08 DIAGNOSIS — K50.119 CROHN'S DISEASE OF COLON WITH COMPLICATION (HCC): Primary | ICD-10-CM

## 2021-06-08 PROCEDURE — 99183 HYPERBARIC OXYGEN THERAPY: CPT | Performed by: SURGERY

## 2021-06-08 PROCEDURE — G0277 HBOT, FULL BODY CHAMBER, 30M: HCPCS

## 2021-06-08 NOTE — PROGRESS NOTES
[facility]  Hyperbaric Oxygen Therapy   Progress Note    NAME: Jenniffer Louis RECORD NUMBER:  [id number]  AGE: 54 y.o. GENDER: female  : 1975  EPISODE DATE:  2021   Subjective   HBO Treatment Number: 12 out of Total Treatments: 40  HBO Diagnosis:   Problem List Items Addressed This Visit     Crohn's disease of colon with complication ([address] - Primary (Chronic)    Abdominal wall skin ulcer, with fat layer exposed ([address] (Chronic)    Skin ulcer of perineum, with fat layer exposed ([address] (Chronic)        Safety checks performed prior to treatment. See doc flowsheets for documentation. Objective       No results for input(s): GLUMET in the last 72 hours. Pre treatment Vital Signs       Temp: 97 °F (36.1 °C)     Pulse: 72     Resp: 18     BP: 100/70     Post treatment Vital Signs  Temp: 97.1 °F (36.2 °C)  Pulse: 61  Resp: 18  BP: 118/72  Assessment      Physical Exam:  General Appearance:  alert and oriented to person, place and time, well-developed and well-nourished, in no acute distress  ENT:  tympanic membranes intact bilaterally  Pulmonary/Chest:  clear to auscultation bilaterally- no wheezes, rales or rhonchi, normal air movement, no respiratory distress  Cardiovascular:  regular rate and rhythm  Chamber #: 38  Treatment Start Time: 0957     Pressure Reached Time: 1006  RONAN : 2  Number of Air Breaks:  Treatment Status: Air break X 2 for 5 minutes each     Decompression Time: 1137   Treatment End Time: 1149  Length of Treatment: 90 Minutes  Symptoms Noted During Treatment: None  Total Treatment Time (min): 112    Adverse Event: no    I was present on these premises and immediately available to furnish assistance & direction throughout the procedure. Regina Guerrier is a 54 y.o. female  did successfully complete today's hyperbaric oxygen treatment at [address] [place] [address].     In my clinical judgement, ongoing HBO therapy is necessary at this time, given a threat to patient function, limb or life from the current condition. Supervision and attendance of Hyperbaric Oxygen Therapy provided. Continue HBO treatment as outlined in the treatment plan. Hyperbaric Oxygen: Perla Linares tolerated Treatment Number: 12 well today without complications.     Discharge Instructions were explained and given to MsTracy Blossom Jaja     Electronically signed by Flaco Vazquez MD on 6/2/2021 at 1:31 PM

## 2021-06-09 ENCOUNTER — HOSPITAL ENCOUNTER (OUTPATIENT)
Dept: HYPERBARIC MEDICINE | Age: 46
Setting detail: THERAPIES SERIES
Discharge: HOME | End: 2021-06-09
Payer: COMMERCIAL

## 2021-06-09 VITALS
DIASTOLIC BLOOD PRESSURE: 74 MMHG | TEMPERATURE: 96.6 F | RESPIRATION RATE: 18 BRPM | HEART RATE: 68 BPM | SYSTOLIC BLOOD PRESSURE: 134 MMHG

## 2021-06-09 DIAGNOSIS — L98.492 SKIN ULCER OF PERINEUM, WITH FAT LAYER EXPOSED (HCC): ICD-10-CM

## 2021-06-09 DIAGNOSIS — K50.119 CROHN'S DISEASE OF COLON WITH COMPLICATION (HCC): Primary | ICD-10-CM

## 2021-06-09 DIAGNOSIS — L98.492 ABDOMINAL WALL SKIN ULCER, WITH FAT LAYER EXPOSED (HCC): ICD-10-CM

## 2021-06-09 PROCEDURE — 99183 HYPERBARIC OXYGEN THERAPY: CPT | Performed by: SURGERY

## 2021-06-09 PROCEDURE — G0277 HBOT, FULL BODY CHAMBER, 30M: HCPCS

## 2021-06-09 NOTE — PROGRESS NOTES
[facility]  Hyperbaric Oxygen Therapy   Progress Note    NAME: Jenniffer Louis RECORD NUMBER:  [id number]  AGE: 54 y.o. GENDER: female  : 1975  EPISODE DATE:  2021   Subjective   HBO Treatment Number: 16 out of Total Treatments: 40  HBO Diagnosis:   Problem List Items Addressed This Visit     Crohn's disease of colon with complication ([address] - Primary (Chronic)    Relevant Orders    Notify physician (specify)    Hyperbaric Oxygen Therapy    Abdominal wall skin ulcer, with fat layer exposed ([address] (Chronic)    Relevant Orders    Notify physician (specify)    Hyperbaric Oxygen Therapy    Skin ulcer of perineum, with fat layer exposed ([address] (Chronic)    Relevant Orders    Notify physician (specify)    Hyperbaric Oxygen Therapy        Safety checks performed prior to treatment. See doc flowsheets for documentation. Objective       No results for input(s): GLUMET in the last 72 hours. Pre treatment Vital Signs       Temp: 96.9 °F (36.1 °C)     Pulse: 68     Resp: 18     BP: 124/74     Post treatment Vital Signs  Temp: 96.6 °F (35.9 °C)  Pulse: 68  Resp: 18  BP: 134/74  Assessment      Physical Exam:  General Appearance:  alert and oriented to person, place and time, well-developed and well-nourished, in no acute distress  ENT:  tympanic membranes intact bilaterally  Pulmonary/Chest:  clear to auscultation bilaterally- no wheezes, rales or rhonchi, normal air movement, no respiratory distress  Cardiovascular:  regular rate and rhythm  Chamber #: 38  Treatment Start Time: 1002     Pressure Reached Time: 1012  RONAN : 2  Number of Air Breaks:  Treatment Status: No Air break     Decompression Time: 1143   Treatment End Time: 1153  Length of Treatment: 90 Minutes  Symptoms Noted During Treatment: None  Total Treatment Time (min): 111    Adverse Event: no    I was present on these premises and immediately available to furnish assistance & direction throughout the procedure. Purcell Libman is a 54 y.o. female  did successfully complete today's hyperbaric oxygen treatment at [address] [facility]. In my clinical judgement, ongoing HBO therapy is  necessary at this time, given a threat to patient function, limb or life from the current condition. Supervision and attendance of Hyperbaric Oxygen Therapy provided. Continue HBO treatment as outlined in the treatment plan. Hyperbaric Oxygen: Perla MADRID Vijay tolerated Treatment Number: 12 well today without complications.     Discharge Instructions were explained and given to Ms. Donald Tori     Electronically signed by Severa Hatch, MD on 6/9/2021 at 4:59 PM

## 2021-06-10 ENCOUNTER — HOSPITAL ENCOUNTER (OUTPATIENT)
Dept: HYPERBARIC MEDICINE | Age: 46
Setting detail: THERAPIES SERIES
Discharge: HOME | End: 2021-06-10
Payer: COMMERCIAL

## 2021-06-10 VITALS
RESPIRATION RATE: 16 BRPM | SYSTOLIC BLOOD PRESSURE: 122 MMHG | DIASTOLIC BLOOD PRESSURE: 70 MMHG | HEART RATE: 84 BPM | TEMPERATURE: 97.2 F

## 2021-06-10 DIAGNOSIS — L98.492 ABDOMINAL WALL SKIN ULCER, WITH FAT LAYER EXPOSED (HCC): ICD-10-CM

## 2021-06-10 DIAGNOSIS — K50.119 CROHN'S DISEASE OF COLON WITH COMPLICATION (HCC): Primary | ICD-10-CM

## 2021-06-10 DIAGNOSIS — L98.492 SKIN ULCER OF PERINEUM, WITH FAT LAYER EXPOSED (HCC): ICD-10-CM

## 2021-06-10 PROCEDURE — 99183 HYPERBARIC OXYGEN THERAPY: CPT | Performed by: SURGERY

## 2021-06-10 PROCEDURE — G0277 HBOT, FULL BODY CHAMBER, 30M: HCPCS

## 2021-06-10 RX ORDER — PSEUDOEPHEDRINE HYDROCHLORIDE 30 MG/1
30 TABLET ORAL PRN
COMMUNITY
End: 2021-07-21

## 2021-06-10 RX ORDER — OXYMETAZOLINE HYDROCHLORIDE 0.05 G/100ML
2 SPRAY NASAL PRN
COMMUNITY
End: 2022-09-06

## 2021-06-11 ENCOUNTER — HOSPITAL ENCOUNTER (OUTPATIENT)
Dept: HYPERBARIC MEDICINE | Age: 46
Setting detail: THERAPIES SERIES
Discharge: HOME | End: 2021-06-11
Payer: COMMERCIAL

## 2021-06-11 VITALS
TEMPERATURE: 97.2 F | HEART RATE: 80 BPM | SYSTOLIC BLOOD PRESSURE: 120 MMHG | RESPIRATION RATE: 18 BRPM | DIASTOLIC BLOOD PRESSURE: 68 MMHG

## 2021-06-11 DIAGNOSIS — K50.119 CROHN'S DISEASE OF COLON WITH COMPLICATION (HCC): Primary | ICD-10-CM

## 2021-06-11 DIAGNOSIS — L98.492 SKIN ULCER OF PERINEUM, WITH FAT LAYER EXPOSED (HCC): ICD-10-CM

## 2021-06-11 DIAGNOSIS — L98.492 ABDOMINAL WALL SKIN ULCER, WITH FAT LAYER EXPOSED (HCC): ICD-10-CM

## 2021-06-11 PROCEDURE — 99183 HYPERBARIC OXYGEN THERAPY: CPT | Performed by: SURGERY

## 2021-06-11 PROCEDURE — G0277 HBOT, FULL BODY CHAMBER, 30M: HCPCS

## 2021-06-14 ENCOUNTER — HOSPITAL ENCOUNTER (OUTPATIENT)
Dept: HYPERBARIC MEDICINE | Age: 46
Setting detail: THERAPIES SERIES
Discharge: HOME | End: 2021-06-14
Payer: COMMERCIAL

## 2021-06-14 VITALS
HEART RATE: 72 BPM | RESPIRATION RATE: 16 BRPM | TEMPERATURE: 97.7 F | SYSTOLIC BLOOD PRESSURE: 131 MMHG | DIASTOLIC BLOOD PRESSURE: 60 MMHG

## 2021-06-14 DIAGNOSIS — L98.492 SKIN ULCER OF PERINEUM, WITH FAT LAYER EXPOSED (HCC): ICD-10-CM

## 2021-06-14 DIAGNOSIS — L98.492 ABDOMINAL WALL SKIN ULCER, WITH FAT LAYER EXPOSED (HCC): ICD-10-CM

## 2021-06-14 DIAGNOSIS — K50.119 CROHN'S DISEASE OF COLON WITH COMPLICATION (HCC): Primary | ICD-10-CM

## 2021-06-14 PROCEDURE — 99183 HYPERBARIC OXYGEN THERAPY: CPT | Performed by: SURGERY

## 2021-06-14 PROCEDURE — G0277 HBOT, FULL BODY CHAMBER, 30M: HCPCS

## 2021-06-15 ENCOUNTER — HOSPITAL ENCOUNTER (OUTPATIENT)
Dept: HYPERBARIC MEDICINE | Age: 46
Setting detail: THERAPIES SERIES
Discharge: HOME | End: 2021-06-15
Payer: COMMERCIAL

## 2021-06-15 VITALS
DIASTOLIC BLOOD PRESSURE: 68 MMHG | RESPIRATION RATE: 18 BRPM | SYSTOLIC BLOOD PRESSURE: 118 MMHG | TEMPERATURE: 97.9 F | HEART RATE: 70 BPM

## 2021-06-15 DIAGNOSIS — L98.492 SKIN ULCER OF PERINEUM, WITH FAT LAYER EXPOSED (HCC): ICD-10-CM

## 2021-06-15 DIAGNOSIS — L98.492 ABDOMINAL WALL SKIN ULCER, WITH FAT LAYER EXPOSED (HCC): ICD-10-CM

## 2021-06-15 DIAGNOSIS — K50.119 CROHN'S DISEASE OF COLON WITH COMPLICATION (HCC): Primary | ICD-10-CM

## 2021-06-15 PROCEDURE — G0277 HBOT, FULL BODY CHAMBER, 30M: HCPCS

## 2021-06-15 PROCEDURE — 99183 HYPERBARIC OXYGEN THERAPY: CPT | Performed by: SURGERY

## 2021-06-15 NOTE — PROGRESS NOTES
[facility]  Hyperbaric Oxygen Therapy   Progress Note    NAME: Jenniffer Louis RECORD NUMBER:  [id number]  AGE: 54 y.o. GENDER: female  : 1975  EPISODE DATE:  2021   Subjective   HBO Treatment Number: 19 out of Total Treatments: 40  HBO Diagnosis:   Problem List Items Addressed This Visit     Abdominal wall skin ulcer, with fat layer exposed ([address] (Chronic)    Skin ulcer of perineum, with fat layer exposed ([address] (Chronic)    Crohn's disease of colon with complication ([address] - Primary (Chronic)        Safety checks performed prior to treatment. See doc flowsheets for documentation. Objective       No results for input(s): GLUMET in the last 72 hours. Pre treatment Vital Signs       Temp: 97.6 °F (36.4 °C)     Pulse: 72     Resp: 18     BP: 128/76     Post treatment Vital Signs  Temp: 97.7 °F (36.5 °C)  Pulse: 72  Resp: 16  BP: 131/60  Assessment      Physical Exam:  General Appearance:  alert and oriented to person, place and time, well-developed and well-nourished, in no acute distress  ENT:  tympanic membranes intact bilaterally  Pulmonary/Chest:  clear to auscultation bilaterally- no wheezes, rales or rhonchi, normal air movement, no respiratory distress  Cardiovascular:  regular rate and rhythm  Chamber #: 38  Treatment Start Time: 0952     Pressure Reached Time: 1002  RONAN : 2  Number of Air Breaks:  Treatment Status: No Air break     Decompression Time: 1133   Treatment End Time: 1150  Length of Treatment: 90 Minutes  Symptoms Noted During Treatment: None  Total Treatment Time (min): 118    Adverse Event: no    I was present on these premises and immediately available to furnish assistance & direction throughout the procedure. Adriana Reid is a 54 y.o. female  did successfully complete today's hyperbaric oxygen treatment at [address] [place] [address].     In my clinical judgement, ongoing HBO therapy is  necessary at this time, given a threat to patient function, limb or life from the current condition. Supervision and attendance of Hyperbaric Oxygen Therapy provided. Continue HBO treatment as outlined in the treatment plan. Hyperbaric Oxygen: Perla Linares tolerated Treatment Number: 23 well today without complications.     Discharge Instructions were explained and given to Ms. Lakhwinder Yomi     Electronically signed by Rayray Stokes MD on 6/14/2021 at 8:59 AM

## 2021-06-16 ENCOUNTER — HOSPITAL ENCOUNTER (OUTPATIENT)
Dept: HYPERBARIC MEDICINE | Age: 46
Setting detail: THERAPIES SERIES
Discharge: HOME | End: 2021-06-16
Payer: COMMERCIAL

## 2021-06-16 VITALS
HEART RATE: 72 BPM | SYSTOLIC BLOOD PRESSURE: 123 MMHG | RESPIRATION RATE: 72 BRPM | TEMPERATURE: 97.9 F | DIASTOLIC BLOOD PRESSURE: 67 MMHG

## 2021-06-16 DIAGNOSIS — L98.492 ABDOMINAL WALL SKIN ULCER, WITH FAT LAYER EXPOSED (HCC): ICD-10-CM

## 2021-06-16 DIAGNOSIS — K50.119 CROHN'S DISEASE OF COLON WITH COMPLICATION (HCC): Primary | ICD-10-CM

## 2021-06-16 DIAGNOSIS — L98.492 SKIN ULCER OF PERINEUM, WITH FAT LAYER EXPOSED (HCC): ICD-10-CM

## 2021-06-16 PROCEDURE — G0277 HBOT, FULL BODY CHAMBER, 30M: HCPCS

## 2021-06-16 PROCEDURE — 99183 HYPERBARIC OXYGEN THERAPY: CPT | Performed by: SURGERY

## 2021-06-16 NOTE — PROGRESS NOTES
[facility]  Hyperbaric Oxygen Therapy   Progress Note    NAME: Jenniffer Louis RECORD NUMBER:  [id number]  AGE: 54 y.o. GENDER: female  : 1975  EPISODE DATE:  6/15/2021   Subjective   HBO Treatment Number: 20 out of Total Treatments: 40  HBO Diagnosis:   Problem List Items Addressed This Visit     Abdominal wall skin ulcer, with fat layer exposed ([address] (Chronic)    Skin ulcer of perineum, with fat layer exposed ([address] (Chronic)    Crohn's disease of colon with complication ([address] - Primary (Chronic)        Safety checks performed prior to treatment. See doc flowsheets for documentation. Objective       No results for input(s): GLUMET in the last 72 hours. Pre treatment Vital Signs       Temp: 97.2 °F (36.2 °C)     Pulse: 76     Resp: 16     BP: 118/72     Post treatment Vital Signs  Temp: 97.9 °F (36.6 °C)  Pulse: 70  Resp: 18  BP: 118/68  Assessment      Physical Exam:  General Appearance:  alert and oriented to person, place and time, well-developed and well-nourished, in no acute distress  ENT:  tympanic membranes intact bilaterally  Pulmonary/Chest:  clear to auscultation bilaterally- no wheezes, rales or rhonchi, normal air movement, no respiratory distress  Cardiovascular:  regular rate and rhythm  Chamber #: 38  Treatment Start Time: 0945     Pressure Reached Time: 0956  RONAN : 2  Number of Air Breaks:  Treatment Status: No Air break     Decompression Time: 1128   Treatment End Time: 1139  Length of Treatment: 90 Minutes     Total Treatment Time (min): 114    Adverse Event: no    I was present on these premises and immediately available to furnish assistance & direction throughout the procedure. Carmen Gongora is a 54 y.o. female  did successfully complete today's hyperbaric oxygen treatment at [address] [place] [address].     In my clinical judgement, ongoing HBO therapy is  necessary at this time, given a threat to patient

## 2021-06-17 ENCOUNTER — HOSPITAL ENCOUNTER (OUTPATIENT)
Dept: HYPERBARIC MEDICINE | Age: 46
Setting detail: THERAPIES SERIES
Discharge: HOME | End: 2021-06-17
Payer: COMMERCIAL

## 2021-06-17 VITALS
RESPIRATION RATE: 18 BRPM | SYSTOLIC BLOOD PRESSURE: 134 MMHG | HEART RATE: 64 BPM | DIASTOLIC BLOOD PRESSURE: 76 MMHG | TEMPERATURE: 97.1 F

## 2021-06-17 DIAGNOSIS — L98.492 ABDOMINAL WALL SKIN ULCER, WITH FAT LAYER EXPOSED (HCC): ICD-10-CM

## 2021-06-17 DIAGNOSIS — L98.492 SKIN ULCER OF PERINEUM, WITH FAT LAYER EXPOSED (HCC): ICD-10-CM

## 2021-06-17 DIAGNOSIS — K50.119 CROHN'S DISEASE OF COLON WITH COMPLICATION (HCC): Primary | ICD-10-CM

## 2021-06-17 PROCEDURE — 99183 HYPERBARIC OXYGEN THERAPY: CPT | Performed by: SURGERY

## 2021-06-17 PROCEDURE — G0277 HBOT, FULL BODY CHAMBER, 30M: HCPCS

## 2021-06-17 NOTE — PROGRESS NOTES
[facility]  Hyperbaric Oxygen Therapy   Progress Note    NAME: Jenniffer Louis RECORD NUMBER:  [id number]  AGE: 54 y.o. GENDER: female  : 1975  EPISODE DATE:  2021   Subjective   HBO Treatment Number: 21 out of Total Treatments: 40  HBO Diagnosis:   Problem List Items Addressed This Visit     Abdominal wall skin ulcer, with fat layer exposed ([address] (Chronic)    Relevant Orders    Notify physician (specify)    Hyperbaric Oxygen Therapy    Skin ulcer of perineum, with fat layer exposed ([address] (Chronic)    Relevant Orders    Notify physician (specify)    Hyperbaric Oxygen Therapy    Crohn's disease of colon with complication ([address] - Primary (Chronic)    Relevant Orders    Notify physician (specify)    Hyperbaric Oxygen Therapy        Safety checks performed prior to treatment. See doc flowsheets for documentation. Objective       No results for input(s): GLUMET in the last 72 hours. Pre treatment Vital Signs       Temp: 97.8 °F (36.6 °C)     Pulse: 72     Resp: 18     BP: 130/80     Post treatment Vital Signs  Temp: 97.9 °F (36.6 °C)  Pulse: 72  Resp: (!) 72  BP: 123/67  Assessment      Physical Exam:  General Appearance:  alert and oriented to person, place and time, well-developed and well-nourished, in no acute distress  ENT:  tympanic membranes intact bilaterally  Pulmonary/Chest:  clear to auscultation bilaterally- no wheezes, rales or rhonchi, normal air movement, no respiratory distress  Cardiovascular:  regular rate and rhythm  Chamber #: 38  Treatment Start Time: 0944     Pressure Reached Time: 0956  RONAN : 2  Number of Air Breaks:  Treatment Status: No Air break     Decompression Time: 1127   Treatment End Time: 1137  Length of Treatment: 90 Minutes  Symptoms Noted During Treatment: None  Total Treatment Time (min): 113    Adverse Event: no    I was present on these premises and immediately available to furnish assistance & direction throughout the procedure. Purcell Libman is a 54 y.o. female  did successfully complete today's hyperbaric oxygen treatment at [address] In my clinical judgement, ongoing HBO therapy is not necessary at this time, given a threat to patient function, limb or life from the current condition. Supervision and attendance of Hyperbaric Oxygen Therapy provided. Continue HBO treatment as outlined in the treatment plan. Hyperbaric Oxygen: Perla MADRID Vijay tolerated Treatment Number: 21 well today without complications.     Discharge Instructions were explained and given to MsTracy Donald Valle     Electronically signed by Jacky Anthony MD on 6/16/2021 at 9:05 PM

## 2021-06-18 ENCOUNTER — HOSPITAL ENCOUNTER (OUTPATIENT)
Dept: HYPERBARIC MEDICINE | Age: 46
Setting detail: THERAPIES SERIES
Discharge: HOME | End: 2021-06-18
Payer: COMMERCIAL

## 2021-06-18 VITALS
SYSTOLIC BLOOD PRESSURE: 140 MMHG | HEART RATE: 67 BPM | TEMPERATURE: 97.4 F | DIASTOLIC BLOOD PRESSURE: 60 MMHG | RESPIRATION RATE: 18 BRPM

## 2021-06-18 DIAGNOSIS — K50.119 CROHN'S DISEASE OF COLON WITH COMPLICATION (HCC): Primary | ICD-10-CM

## 2021-06-18 DIAGNOSIS — L98.492 SKIN ULCER OF PERINEUM, WITH FAT LAYER EXPOSED (HCC): ICD-10-CM

## 2021-06-18 DIAGNOSIS — L98.492 ABDOMINAL WALL SKIN ULCER, WITH FAT LAYER EXPOSED (HCC): ICD-10-CM

## 2021-06-18 PROCEDURE — 99183 HYPERBARIC OXYGEN THERAPY: CPT | Performed by: SURGERY

## 2021-06-18 PROCEDURE — G0277 HBOT, FULL BODY CHAMBER, 30M: HCPCS

## 2021-06-21 ENCOUNTER — HOSPITAL ENCOUNTER (OUTPATIENT)
Dept: HYPERBARIC MEDICINE | Age: 46
Setting detail: THERAPIES SERIES
Discharge: HOME | End: 2021-06-21
Payer: COMMERCIAL

## 2021-06-21 VITALS
HEART RATE: 72 BPM | DIASTOLIC BLOOD PRESSURE: 52 MMHG | RESPIRATION RATE: 18 BRPM | TEMPERATURE: 97.4 F | SYSTOLIC BLOOD PRESSURE: 138 MMHG

## 2021-06-21 DIAGNOSIS — L98.492 ABDOMINAL WALL SKIN ULCER, WITH FAT LAYER EXPOSED (HCC): ICD-10-CM

## 2021-06-21 DIAGNOSIS — L98.492 SKIN ULCER OF PERINEUM, WITH FAT LAYER EXPOSED (HCC): ICD-10-CM

## 2021-06-21 DIAGNOSIS — K50.119 CROHN'S DISEASE OF COLON WITH COMPLICATION (HCC): Primary | ICD-10-CM

## 2021-06-21 PROCEDURE — G0277 HBOT, FULL BODY CHAMBER, 30M: HCPCS

## 2021-06-21 PROCEDURE — 99183 HYPERBARIC OXYGEN THERAPY: CPT | Performed by: SURGERY

## 2021-06-22 ENCOUNTER — HOSPITAL ENCOUNTER (OUTPATIENT)
Dept: HYPERBARIC MEDICINE | Age: 46
Setting detail: THERAPIES SERIES
Discharge: HOME | End: 2021-06-22
Payer: COMMERCIAL

## 2021-06-22 VITALS
SYSTOLIC BLOOD PRESSURE: 130 MMHG | RESPIRATION RATE: 16 BRPM | TEMPERATURE: 97.6 F | DIASTOLIC BLOOD PRESSURE: 60 MMHG | HEART RATE: 76 BPM

## 2021-06-22 DIAGNOSIS — L98.492 SKIN ULCER OF PERINEUM, WITH FAT LAYER EXPOSED (HCC): ICD-10-CM

## 2021-06-22 DIAGNOSIS — L98.492 ABDOMINAL WALL SKIN ULCER, WITH FAT LAYER EXPOSED (HCC): ICD-10-CM

## 2021-06-22 DIAGNOSIS — K50.119 CROHN'S DISEASE OF COLON WITH COMPLICATION (HCC): Primary | ICD-10-CM

## 2021-06-22 PROCEDURE — G0277 HBOT, FULL BODY CHAMBER, 30M: HCPCS

## 2021-06-22 PROCEDURE — 99183 HYPERBARIC OXYGEN THERAPY: CPT | Performed by: SURGERY

## 2021-06-23 ENCOUNTER — HOSPITAL ENCOUNTER (OUTPATIENT)
Dept: HYPERBARIC MEDICINE | Age: 46
Setting detail: THERAPIES SERIES
Discharge: HOME | End: 2021-06-23
Payer: COMMERCIAL

## 2021-06-23 VITALS
SYSTOLIC BLOOD PRESSURE: 124 MMHG | RESPIRATION RATE: 18 BRPM | TEMPERATURE: 97.2 F | DIASTOLIC BLOOD PRESSURE: 66 MMHG | HEART RATE: 78 BPM

## 2021-06-23 DIAGNOSIS — L98.492 SKIN ULCER OF PERINEUM, WITH FAT LAYER EXPOSED (HCC): ICD-10-CM

## 2021-06-23 DIAGNOSIS — K50.119 CROHN'S DISEASE OF COLON WITH COMPLICATION (HCC): Primary | ICD-10-CM

## 2021-06-23 DIAGNOSIS — L98.492 ABDOMINAL WALL SKIN ULCER, WITH FAT LAYER EXPOSED (HCC): ICD-10-CM

## 2021-06-23 PROCEDURE — 99183 HYPERBARIC OXYGEN THERAPY: CPT | Performed by: SURGERY

## 2021-06-23 PROCEDURE — G0277 HBOT, FULL BODY CHAMBER, 30M: HCPCS

## 2021-06-23 NOTE — PROGRESS NOTES
Roberto Haas is a 54 y.o. female  did successfully complete today's hyperbaric oxygen treatment at [address] [facility]. In my clinical judgement, ongoing HBO therapy is  necessary at this time, given a threat to patient function, limb or life from the current condition. Supervision and attendance of Hyperbaric Oxygen Therapy provided. Continue HBO treatment as outlined in the treatment plan. Hyperbaric Oxygen: Perla Linares tolerated Treatment Number: 26 well today without complications.     Discharge Instructions were explained and given to MsTracy Brandan Caldera     Electronically signed by Mercy Granados MD on 6/23/2021 at 5:01 PM

## 2021-06-24 ENCOUNTER — HOSPITAL ENCOUNTER (OUTPATIENT)
Dept: HYPERBARIC MEDICINE | Age: 46
Setting detail: THERAPIES SERIES
Discharge: HOME | End: 2021-06-24
Payer: COMMERCIAL

## 2021-06-24 VITALS
HEART RATE: 69 BPM | RESPIRATION RATE: 18 BRPM | DIASTOLIC BLOOD PRESSURE: 60 MMHG | TEMPERATURE: 97.3 F | SYSTOLIC BLOOD PRESSURE: 132 MMHG

## 2021-06-24 DIAGNOSIS — L98.492 SKIN ULCER OF PERINEUM, WITH FAT LAYER EXPOSED (HCC): ICD-10-CM

## 2021-06-24 DIAGNOSIS — K50.119 CROHN'S DISEASE OF COLON WITH COMPLICATION (HCC): Primary | ICD-10-CM

## 2021-06-24 DIAGNOSIS — L98.492 ABDOMINAL WALL SKIN ULCER, WITH FAT LAYER EXPOSED (HCC): ICD-10-CM

## 2021-06-24 PROCEDURE — 99183 HYPERBARIC OXYGEN THERAPY: CPT | Performed by: SURGERY

## 2021-06-24 PROCEDURE — G0277 HBOT, FULL BODY CHAMBER, 30M: HCPCS

## 2021-06-25 ENCOUNTER — HOSPITAL ENCOUNTER (OUTPATIENT)
Dept: HYPERBARIC MEDICINE | Age: 46
Setting detail: THERAPIES SERIES
Discharge: HOME | End: 2021-06-25
Payer: COMMERCIAL

## 2021-06-25 VITALS
SYSTOLIC BLOOD PRESSURE: 100 MMHG | DIASTOLIC BLOOD PRESSURE: 60 MMHG | TEMPERATURE: 97.8 F | HEART RATE: 76 BPM | RESPIRATION RATE: 18 BRPM

## 2021-06-25 DIAGNOSIS — L98.492 SKIN ULCER OF PERINEUM, WITH FAT LAYER EXPOSED (HCC): ICD-10-CM

## 2021-06-25 DIAGNOSIS — K50.119 CROHN'S DISEASE OF COLON WITH COMPLICATION (HCC): Primary | ICD-10-CM

## 2021-06-25 DIAGNOSIS — L98.492 ABDOMINAL WALL SKIN ULCER, WITH FAT LAYER EXPOSED (HCC): ICD-10-CM

## 2021-06-25 PROCEDURE — G0277 HBOT, FULL BODY CHAMBER, 30M: HCPCS

## 2021-06-25 PROCEDURE — 99183 HYPERBARIC OXYGEN THERAPY: CPT | Performed by: SURGERY

## 2021-06-25 NOTE — PROGRESS NOTES
[facility]  Hyperbaric Oxygen Therapy   Progress Note    NAME: Jenniffer Louis RECORD NUMBER:  [id number]  AGE: 54 y.o. GENDER: female  : 1975  EPISODE DATE:  2021   Subjective   HBO Treatment Number: 25 out of Total Treatments: 40  HBO Diagnosis:   Problem List Items Addressed This Visit     Abdominal wall skin ulcer, with fat layer exposed ([address] (Chronic)    Skin ulcer of perineum, with fat layer exposed ([address] (Chronic)    Crohn's disease of colon with complication ([address] - Primary (Chronic)        Safety checks performed prior to treatment. See doc flowsheets for documentation. Objective       No results for input(s): GLUMET in the last 72 hours. Pre treatment Vital Signs       Temp: 97.4 °F (36.3 °C)     Pulse: 80     Resp: 16     BP: (!) 134/58     Post treatment Vital Signs  Temp: 97.6 °F (36.4 °C)  Pulse: 76  Resp: 16  BP: 130/60  Assessment      Physical Exam:  General Appearance:  alert and oriented to person, place and time, well-developed and well-nourished, in no acute distress  ENT:  tympanic membranes intact bilaterally  Pulmonary/Chest:  clear to auscultation bilaterally- no wheezes, rales or rhonchi, normal air movement, no respiratory distress  Cardiovascular:  regular rate and rhythm  Chamber #: 38  Treatment Start Time: 0953     Pressure Reached Time: 1003  RONAN : 2  Number of Air Breaks:  Treatment Status: No Air break     Decompression Time: 1135   Treatment End Time: 1142  Length of Treatment: 90 Minutes  Symptoms Noted During Treatment: None  Total Treatment Time (min): 109    Adverse Event: no    I was present on these premises and immediately available to furnish assistance & direction throughout the procedure. Jasmeet Seaman is a 54 y.o. female  did successfully complete today's hyperbaric oxygen treatment at [address] [place] [address].     In my clinical judgement, ongoing HBO therapy is  necessary at this time, given a threat to patient function, limb or life from the current condition. Supervision and attendance of Hyperbaric Oxygen Therapy provided. Continue HBO treatment as outlined in the treatment plan. Hyperbaric Oxygen: Perla Linares tolerated Treatment Number: 25 well today without complications.     Discharge Instructions were explained and given to Ms. Lizzette Appiah     Electronically signed by Audrey Hadley MD on 6/22/2021 at 8:43 PM

## 2021-06-25 NOTE — PROGRESS NOTES
[facility]  Hyperbaric Oxygen Therapy   Progress Note    NAME: Jenniffer Louis RECORD NUMBER:  [id number]  AGE: 54 y.o. GENDER: female  : 1975  EPISODE DATE:  2021   Subjective   HBO Treatment Number: 28 out of Total Treatments: 40  HBO Diagnosis:   Problem List Items Addressed This Visit     Abdominal wall skin ulcer, with fat layer exposed ([address] (Chronic)    Relevant Orders    Notify physician (specify)    Hyperbaric Oxygen Therapy    Skin ulcer of perineum, with fat layer exposed ([address] (Chronic)    Relevant Orders    Notify physician (specify)    Hyperbaric Oxygen Therapy    Crohn's disease of colon with complication ([address] - Primary (Chronic)    Relevant Orders    Notify physician (specify)    Hyperbaric Oxygen Therapy        Safety checks performed prior to treatment. See doc flowsheets for documentation. Objective       No results for input(s): GLUMET in the last 72 hours. Pre treatment Vital Signs       Temp: 97.8 °F (36.6 °C)     Pulse: 76     Resp: 18     BP: 100/60     Post treatment Vital Signs  Temp: 97.8 °F (36.6 °C)  Pulse: 76  Resp: 18  BP: 100/60  Assessment      Physical Exam:  General Appearance:  alert and oriented to person, place and time, well-developed and well-nourished, in no acute distress  ENT:  tympanic membranes intact bilaterally  Pulmonary/Chest:  clear to auscultation bilaterally- no wheezes, rales or rhonchi, normal air movement, no respiratory distress  Cardiovascular:  regular rate and rhythm  Chamber #: 38  Treatment Start Time: 0950     Pressure Reached Time: 1006  RONAN : 2  Number of Air Breaks:  Treatment Status: No Air break     Decompression Time: 1137   Treatment End Time: 1146  Length of Treatment: 90 Minutes  Symptoms Noted During Treatment: None  Total Treatment Time (min): 116    Adverse Event: no    I was present on these premises and immediately available to furnish assistance & direction throughout the procedure. Shari Zaidi is a 54 y.o. female  did successfully complete today's hyperbaric oxygen treatment at [address] In my clinical judgement, ongoing HBO therapy is  necessary at this time, given a threat to patient function, limb or life from the current condition. Supervision and attendance of Hyperbaric Oxygen Therapy provided. Continue HBO treatment as outlined in the treatment plan. Hyperbaric Oxygen: Perla Linares tolerated Treatment Number: 29 well today without complications.     Discharge Instructions were explained and given to Ms. Cuadrae Td     Electronically signed by Audrey Hadley MD on 6/25/2021 at 12:17 PM

## 2021-06-25 NOTE — PROGRESS NOTES
[facility]  Hyperbaric Oxygen Therapy   Progress Note    NAME: Jenniffer Louis RECORD NUMBER:  [id number]  AGE: 54 y.o. GENDER: female  : 1975  EPISODE DATE:  2021   Subjective   HBO Treatment Number: 27 out of Total Treatments: 40  HBO Diagnosis:   Problem List Items Addressed This Visit     Crohn's disease of colon with complication ([address] - Primary (Chronic)    Abdominal wall skin ulcer, with fat layer exposed ([address] (Chronic)    Skin ulcer of perineum, with fat layer exposed ([address] (Chronic)        Safety checks performed prior to treatment. See doc flowsheets for documentation. Objective       No results for input(s): GLUMET in the last 72 hours. Pre treatment Vital Signs       Temp: 97.4 °F (36.3 °C)     Pulse: 84     Resp: 16     BP: (!) 128/57     Post treatment Vital Signs  Temp: 97.3 °F (36.3 °C)  Pulse: 69  Resp: 18  BP: 132/60  Assessment      Physical Exam:  General Appearance:  alert and oriented to person, place and time, well-developed and well-nourished, in no acute distress  ENT:  tympanic membranes intact bilaterally  Pulmonary/Chest:  clear to auscultation bilaterally- no wheezes, rales or rhonchi, normal air movement, no respiratory distress  Cardiovascular:  regular rate and rhythm  Chamber #: 38  Treatment Start Time: 1010     Pressure Reached Time: 1020  RONAN : 2  Number of Air Breaks:  Treatment Status: No Air break     Decompression Time: 1150   Treatment End Time: 1159  Length of Treatment: 90 Minutes  Symptoms Noted During Treatment: None  Total Treatment Time (min): 109    Adverse Event: no    I was present on these premises and immediately available to furnish assistance & direction throughout the procedure. Charlotte Copeland is a 54 y.o. female  did successfully complete today's hyperbaric oxygen treatment at [address] [place] [address].     In my clinical judgement, ongoing HBO therapy is  necessary at this time, given a threat to patient function, limb or life from the current condition. Supervision and attendance of Hyperbaric Oxygen Therapy provided. Continue HBO treatment as outlined in the treatment plan. Hyperbaric Oxygen: Perla Linares tolerated Treatment Number: 32 well today without complications.     Discharge Instructions were explained and given to Ms. Pete Sly     Electronically signed by Charles Wren MD on 6/24/2021 at 7:05 PM

## 2021-06-28 ENCOUNTER — HOSPITAL ENCOUNTER (OUTPATIENT)
Dept: HYPERBARIC MEDICINE | Age: 46
Setting detail: THERAPIES SERIES
Discharge: HOME | End: 2021-06-28
Payer: COMMERCIAL

## 2021-06-28 VITALS
RESPIRATION RATE: 16 BRPM | DIASTOLIC BLOOD PRESSURE: 60 MMHG | SYSTOLIC BLOOD PRESSURE: 124 MMHG | TEMPERATURE: 97 F | HEART RATE: 74 BPM

## 2021-06-28 DIAGNOSIS — K50.119 CROHN'S DISEASE OF COLON WITH COMPLICATION (HCC): Primary | ICD-10-CM

## 2021-06-28 DIAGNOSIS — L98.492 SKIN ULCER OF PERINEUM, WITH FAT LAYER EXPOSED (HCC): ICD-10-CM

## 2021-06-28 DIAGNOSIS — L98.492 ABDOMINAL WALL SKIN ULCER, WITH FAT LAYER EXPOSED (HCC): ICD-10-CM

## 2021-06-28 PROCEDURE — 99183 HYPERBARIC OXYGEN THERAPY: CPT | Performed by: SURGERY

## 2021-06-28 PROCEDURE — G0277 HBOT, FULL BODY CHAMBER, 30M: HCPCS

## 2021-06-29 ENCOUNTER — HOSPITAL ENCOUNTER (OUTPATIENT)
Dept: HYPERBARIC MEDICINE | Age: 46
Setting detail: THERAPIES SERIES
Discharge: HOME | End: 2021-06-29
Payer: COMMERCIAL

## 2021-06-29 VITALS
DIASTOLIC BLOOD PRESSURE: 70 MMHG | HEART RATE: 76 BPM | RESPIRATION RATE: 76 BRPM | SYSTOLIC BLOOD PRESSURE: 118 MMHG | TEMPERATURE: 97.3 F

## 2021-06-29 DIAGNOSIS — K50.119 CROHN'S DISEASE OF COLON WITH COMPLICATION (HCC): Primary | ICD-10-CM

## 2021-06-29 DIAGNOSIS — L98.492 SKIN ULCER OF PERINEUM, WITH FAT LAYER EXPOSED (HCC): ICD-10-CM

## 2021-06-29 DIAGNOSIS — L98.492 ABDOMINAL WALL SKIN ULCER, WITH FAT LAYER EXPOSED (HCC): ICD-10-CM

## 2021-06-29 PROCEDURE — 99183 HYPERBARIC OXYGEN THERAPY: CPT | Performed by: SURGERY

## 2021-06-29 PROCEDURE — G0277 HBOT, FULL BODY CHAMBER, 30M: HCPCS

## 2021-06-29 NOTE — PROGRESS NOTES
[facility]  Hyperbaric Oxygen Therapy   Progress Note    NAME: Jenniffer Louis RECORD NUMBER:  [id number]  AGE: 54 y.o. GENDER: female  : 1975  EPISODE DATE:  2021   Subjective   HBO Treatment Number: 30 out of Total Treatments: 40  HBO Diagnosis:   Problem List Items Addressed This Visit     Abdominal wall skin ulcer, with fat layer exposed ([address] (Chronic)    Relevant Orders    Notify physician (specify)    Hyperbaric Oxygen Therapy    Skin ulcer of perineum, with fat layer exposed ([address] (Chronic)    Relevant Orders    Notify physician (specify)    Hyperbaric Oxygen Therapy    Crohn's disease of colon with complication ([address] - Primary (Chronic)    Relevant Orders    Notify physician (specify)    Hyperbaric Oxygen Therapy        Safety checks performed prior to treatment. See doc flowsheets for documentation. Objective       No results for input(s): GLUMET in the last 72 hours.   Pre treatment Vital Signs       Temp: 96.6 °F (35.9 °C)     Pulse: 79     Resp: 18     BP: (!) 138/59     Post treatment Vital Signs  Temp: 97.3 °F (36.3 °C)  Pulse: 76  Resp: (!) 76  BP: 118/70  Assessment      Physical Exam:  General Appearance:  alert and oriented to person, place and time, well-developed and well-nourished, in no acute distress  ENT:  tympanic membranes intact bilaterally  Pulmonary/Chest:  clear to auscultation bilaterally- no wheezes, rales or rhonchi, normal air movement, no respiratory distress  Cardiovascular:  regular rate and rhythm  Chamber #: 38  Treatment Start Time: 0948     Pressure Reached Time: 1002  RONAN : 2  Number of Air Breaks:  Treatment Status: No Air break     Decompression Time: 1133   Treatment End Time: 1143  Length of Treatment: 90 Minutes  Symptoms Noted During Treatment: None  Total Treatment Time (min): 115    Adverse Event: no    I was present on these premises and immediately available to furnish assistance & direction throughout the

## 2021-06-30 ENCOUNTER — HOSPITAL ENCOUNTER (OUTPATIENT)
Dept: HYPERBARIC MEDICINE | Age: 46
Setting detail: THERAPIES SERIES
Discharge: HOME | End: 2021-06-30
Payer: COMMERCIAL

## 2021-06-30 ENCOUNTER — HOSPITAL ENCOUNTER (OUTPATIENT)
Dept: WOUND CARE | Age: 46
Discharge: HOME | End: 2021-06-30
Payer: COMMERCIAL

## 2021-06-30 VITALS
SYSTOLIC BLOOD PRESSURE: 118 MMHG | RESPIRATION RATE: 18 BRPM | DIASTOLIC BLOOD PRESSURE: 72 MMHG | TEMPERATURE: 99.3 F | HEART RATE: 84 BPM

## 2021-06-30 DIAGNOSIS — L98.492 SKIN ULCER OF PERINEUM, WITH FAT LAYER EXPOSED (HCC): ICD-10-CM

## 2021-06-30 DIAGNOSIS — K50.119 CROHN'S DISEASE OF COLON WITH COMPLICATION (HCC): Primary | ICD-10-CM

## 2021-06-30 DIAGNOSIS — L98.492 ABDOMINAL WALL SKIN ULCER, WITH FAT LAYER EXPOSED (HCC): ICD-10-CM

## 2021-06-30 ASSESSMENT — PAIN DESCRIPTION - PAIN TYPE: TYPE: CHRONIC PAIN

## 2021-06-30 ASSESSMENT — PAIN DESCRIPTION - LOCATION: LOCATION: ABDOMEN

## 2021-06-30 ASSESSMENT — PAIN DESCRIPTION - ORIENTATION: ORIENTATION: LOWER;MID

## 2021-06-30 ASSESSMENT — PAIN SCALES - GENERAL: PAINLEVEL_OUTOF10: 7

## 2021-06-30 NOTE — PROGRESS NOTES
[facility]  Hyperbaric Oxygen Therapy   Progress Note    NAME: Jenniffer Louis RECORD NUMBER:  [id number]  AGE: 54 y.o. GENDER: female  : 1975  EPISODE DATE:  2021   Subjective   HBO Treatment Number: 14 out of Total Treatments: 40  HBO Diagnosis:   Problem List Items Addressed This Visit     Crohn's disease of colon with complication ([address] - Primary (Chronic)    Abdominal wall skin ulcer, with fat layer exposed ([address] (Chronic)    Skin ulcer of perineum, with fat layer exposed ([address] (Chronic)        Safety checks performed prior to treatment by [facility]OT staff. See doc flowsheets for documentation. Objective       No results for input(s): GLUMET in the last 72 hours. Pre treatment Vital Signs       Temp: 97.8 °F (36.6 °C)     Pulse: 78     Resp: 18     BP: 120/76     Post treatment Vital Signs  Temp: 97.6 °F (36.4 °C)  Pulse: 64  Resp: 14  BP: 122/62  Assessment      Physical Exam:  General Appearance:  alert and oriented to person, place and time, well-developed and well-nourished, in no acute distress  ENT:  tympanic membranes intact bilaterally, normal color, normal light reflex bilaterally  Pulmonary/Chest:  clear to auscultation bilaterally- no wheezes, rales or rhonchi, normal air movement, no respiratory distress  Cardiovascular:  regular rate and rhythm  Chamber #: 38  Treatment Start Time: 1002     Pressure Reached Time: 1016  RONAN : 2  Number of Air Breaks:  Treatment Status: No Air break     Decompression Time: 1147   Treatment End Time: 1158  Length of Treatment: 90 Minutes  Symptoms Noted During Treatment: None  Total Treatment Time (min): 116    Adverse Event: no    I was present on these premises and immediately available to furnish assistance & direction throughout the procedure. Purcell Libman is a 54 y.o. female  did successfully complete today's hyperbaric oxygen treatment at [address] [place] [address]     In my clinical judgement, ongoing HBO therapy is necessary at this time, given a threat to patient function, limb or life from the current condition. Supervision and attendance of Hyperbaric Oxygen Therapy provided. Continue HBO treatment as outlined in the treatment plan. Hyperbaric Oxygen: Perla Linares tolerated Treatment Number: 15 well today without complications.     Discharge Instructions were explained and given to Ms. Lealson Shashi by [facility]OT staff    Electronically signed by Ysair Perry MD

## 2021-06-30 NOTE — PROGRESS NOTES
[facility]  Hyperbaric Oxygen Therapy   Progress Note    NAME: Jenniffer Louis RECORD NUMBER:  [id number]  AGE: 54 y.o. GENDER: female  : 1975  EPISODE DATE:  2021   Subjective   HBO Treatment Number: 5 out of Total Treatments: 40  HBO Diagnosis:   Problem List Items Addressed This Visit     Crohn's disease of colon with complication ([address] - Primary (Chronic)    Abdominal wall skin ulcer, with fat layer exposed ([address] (Chronic)    Skin ulcer of perineum, with fat layer exposed ([address] (Chronic)        Safety checks performed prior to treatment by [facility]OT staff. See doc flowsheets for documentation. Objective       No results for input(s): GLUMET in the last 72 hours. Pre treatment Vital Signs       Temp: 97.7 °F (36.5 °C)     Pulse: 65     Resp: 18     BP: 119/60     Post treatment Vital Signs  Temp: 97.9 °F (36.6 °C)  Pulse: 72  Resp: 16  BP: 122/78  Assessment      Physical Exam:  General Appearance:  alert and oriented to person, place and time, well-developed and well-nourished, in no acute distress  ENT:  tympanic membranes intact bilaterally, normal color, normal light reflex bilaterally  Pulmonary/Chest:  clear to auscultation bilaterally- no wheezes, rales or rhonchi, normal air movement, no respiratory distress  Cardiovascular:  regular rate and rhythm  Chamber #: 38  Treatment Start Time: 0946     Pressure Reached Time: 0956  RONAN : 2  Number of Air Breaks:  Treatment Status: No Air break     Decompression Time: 1128   Treatment End Time: 1135  Length of Treatment: 90 Minutes  Symptoms Noted During Treatment: None  Total Treatment Time (min): 109    Adverse Event: no    I was present on these premises and immediately available to furnish assistance & direction throughout the procedure. Shwetha Yepez is a 54 y.o. female  did successfully complete today's hyperbaric oxygen treatment at [address] [place] [address]     In my clinical judgement, ongoing HBO therapy is necessary at this time, given a threat to patient function, limb or life from the current condition. Supervision and attendance of Hyperbaric Oxygen Therapy provided. Continue HBO treatment as outlined in the treatment plan. Hyperbaric Oxygen: Perla Linares tolerated Treatment Number: 5 well today without complications.     Discharge Instructions were explained and given to MsTracy Naeem Pope by [facility] staff    Electronically signed by Judy Grullon MD

## 2021-06-30 NOTE — PROGRESS NOTES
[facility]  Hyperbaric Oxygen Therapy   Progress Note    NAME: Jenniffer Louis RECORD NUMBER:  [id number]  AGE: 54 y.o. GENDER: female  : 1975  EPISODE DATE:  2021   Subjective   HBO Treatment Number: 10 out of Total Treatments: 40  HBO Diagnosis:   Problem List Items Addressed This Visit     Crohn's disease of colon with complication ([address] - Primary (Chronic)    Abdominal wall skin ulcer, with fat layer exposed ([address] (Chronic)    Skin ulcer of perineum, with fat layer exposed ([address] (Chronic)        Safety checks performed prior to treatment by [facility] staff. See doc flowsheets for documentation. Objective       No results for input(s): GLUMET in the last 72 hours. Pre treatment Vital Signs       Temp: 97.2 °F (36.2 °C)     Pulse: 84     Resp: 18     BP: 126/72     Post treatment Vital Signs  Temp: 97.6 °F (36.4 °C)  Pulse: 84  Resp: 16  BP: 112/70  Assessment      Physical Exam:  General Appearance:  alert and oriented to person, place and time, well-developed and well-nourished, in no acute distress  ENT:  tympanic membranes intact bilaterally, normal color, normal light reflex bilaterally  Pulmonary/Chest:  clear to auscultation bilaterally- no wheezes, rales or rhonchi, normal air movement, no respiratory distress  Cardiovascular:  regular rate and rhythm  Chamber #: 38  Treatment Start Time: 0943     Pressure Reached Time: 0957  RONAN : 2  Number of Air Breaks:  Treatment Status: No Air break     Decompression Time: 1128   Treatment End Time: 1135  Length of Treatment: 90 Minutes  Symptoms Noted During Treatment: None  Total Treatment Time (min): 112    Adverse Event: no    I was present on these premises and immediately available to furnish assistance & direction throughout the procedure. Andrew Petty is a 54 y.o. female  did successfully complete today's hyperbaric oxygen treatment at [address] [place] [address] my clinical judgement, ongoing HBO therapy is necessary at this time, given a threat to patient function, limb or life from the current condition. Supervision and attendance of Hyperbaric Oxygen Therapy provided. Continue HBO treatment as outlined in the treatment plan. Hyperbaric Oxygen: Perla Linares tolerated Treatment Number: 10 well today without complications.     Discharge Instructions were explained and given to Ms. Carloz Gutierrez by [facility] staff    Electronically signed by Deanne Ho MD

## 2021-06-30 NOTE — PROGRESS NOTES
[place]. In my clinical judgement, ongoing HBO therapy is necessary at this time, given a threat to patient function, limb or life from the current condition. Supervision and attendance of Hyperbaric Oxygen Therapy provided. Continue HBO treatment as outlined in the treatment plan. Hyperbaric Oxygen: Perla MER[facility] Linares tolerated Treatment Number: 9 well today without complications.     Discharge Instructions were explained and given to Ms. Lulu Caceres by [facility] staff    Electronically signed by Lyly Ross MD

## 2021-06-30 NOTE — PROGRESS NOTES
[place] [address]  Hyperbaric Oxygen Therapy   Progress Note    NAME: Jenniffer Louis RECORD NUMBER:  [id number]  AGE: 54 y.o. GENDER: female  : 1975  EPISODE DATE:  6/3/2021   Subjective   HBO Treatment Number: 13 out of Total Treatments: 40  HBO Diagnosis:   Problem List Items Addressed This Visit     Crohn's disease of colon with complication ([address] - Primary (Chronic)    Abdominal wall skin ulcer, with fat layer exposed ([address] (Chronic)    Skin ulcer of perineum, with fat layer exposed ([address] (Chronic)        Safety checks performed prior to treatment by [facility] staff. See doc flowsheets for documentation. Objective       No results for input(s): GLUMET in the last 72 hours. Pre treatment Vital Signs       Temp: 97.7 °F (36.5 °C)     Pulse: 72     Resp: 16     BP: 118/64     Post treatment Vital Signs  Temp: 97.3 °F (36.3 °C)  Pulse: 76  Resp: 16  BP: 122/76  Assessment      Physical Exam:  General Appearance:  alert and oriented to person, place and time, well-developed and well-nourished, in no acute distress  ENT:  tympanic membranes intact bilaterally, normal color, normal light reflex bilaterally  Pulmonary/Chest:  clear to auscultation bilaterally- no wheezes, rales or rhonchi, normal air movement, no respiratory distress  Cardiovascular:  regular rate and rhythm  Chamber #: 38  Treatment Start Time: 0958     Pressure Reached Time: 1013  RONAN : 2  Number of Air Breaks:  Treatment Status: Air break X 2 for 5 minutes each     Decompression Time: 1145   Treatment End Time: 1151  Length of Treatment: 90 Minutes  Symptoms Noted During Treatment: None  Total Treatment Time (min): 113    Adverse Event: no    I was present on these premises and immediately available to furnish assistance & direction throughout the procedure.    Ama Wagner is a 54 y.o. female  did successfully complete today's hyperbaric oxygen treatment at [place] [address] [facility]

## 2021-06-30 NOTE — PROGRESS NOTES
[facility]  Hyperbaric Oxygen Therapy   Progress Note    NAME: Jenniffer Louis RECORD NUMBER:  [id number]  AGE: 54 y.o. GENDER: female  : 1975  EPISODE DATE:  6/10/2021   Subjective   HBO Treatment Number: 17 out of Total Treatments: 40  HBO Diagnosis:   Problem List Items Addressed This Visit     Crohn's disease of colon with complication ([address] - Primary (Chronic)    Abdominal wall skin ulcer, with fat layer exposed ([address] (Chronic)    Skin ulcer of perineum, with fat layer exposed ([address] (Chronic)        Safety checks performed prior to treatment by HBOT staff. See doc flowsheets for documentation. Objective       No results for input(s): GLUMET in the last 72 hours. Pre treatment Vital Signs       Temp: 97.6 °F (36.4 °C)     Pulse: 62     Resp: 17     BP: 116/60     Post treatment Vital Signs  Temp: 97.2 °F (36.2 °C)  Pulse: 84  Resp: 16  BP: 122/70  Assessment      Physical Exam:  General Appearance:  alert and oriented to person, place and time, well-developed and well-nourished, in no acute distress  ENT:  tympanic membranes intact bilaterally, normal color, normal light reflex bilaterally  Pulmonary/Chest:  clear to auscultation bilaterally- no wheezes, rales or rhonchi, normal air movement, no respiratory distress  Cardiovascular:  regular rate and rhythm  Chamber #: 38  Treatment Start Time: 1025     Pressure Reached Time: 1039  RONAN : 2  Number of Air Breaks:  Treatment Status: No Air break     Decompression Time: 1208   Treatment End Time: 1218  Length of Treatment: 90 Minutes  Symptoms Noted During Treatment: None  Total Treatment Time (min): 113    Adverse Event: no    I was present on these premises and immediately available to furnish assistance & direction throughout the procedure. Ritesh Covarrubias is a 54 y.o. female  did successfully complete today's hyperbaric oxygen treatment at [address] [place] [address]     In my clinical judgement, ongoing HBO therapy is necessary at this time, given a threat to patient function, limb or life from the current condition. Supervision and attendance of Hyperbaric Oxygen Therapy provided. Continue HBO treatment as outlined in the treatment plan. Hyperbaric Oxygen: Perla Linares tolerated Treatment Number: 64 well today without complications.     Discharge Instructions were explained and given to Ms. Winston Faith by HBOT staff    Electronically signed by Ankita Mesa MD

## 2021-07-01 ENCOUNTER — HOSPITAL ENCOUNTER (OUTPATIENT)
Dept: HYPERBARIC MEDICINE | Age: 46
Setting detail: THERAPIES SERIES
Discharge: HOME | End: 2021-07-01
Payer: COMMERCIAL

## 2021-07-01 DIAGNOSIS — L98.492 SKIN ULCER OF PERINEUM, WITH FAT LAYER EXPOSED (HCC): ICD-10-CM

## 2021-07-01 DIAGNOSIS — L98.492 ABDOMINAL WALL SKIN ULCER, WITH FAT LAYER EXPOSED (HCC): ICD-10-CM

## 2021-07-01 DIAGNOSIS — K50.119 CROHN'S DISEASE OF COLON WITH COMPLICATION (HCC): Primary | ICD-10-CM

## 2021-07-01 PROCEDURE — G0277 HBOT, FULL BODY CHAMBER, 30M: HCPCS

## 2021-07-01 PROCEDURE — 99183 HYPERBARIC OXYGEN THERAPY: CPT | Performed by: SURGERY

## 2021-07-01 NOTE — PROGRESS NOTES
[facility]  Hyperbaric Oxygen Therapy   Progress Note    NAME: Jenniffer Louis RECORD NUMBER:  [id number]  AGE: 54 y.o. GENDER: female  : 1975  EPISODE DATE:  2021   Subjective   HBO Treatment Number: 23 out of Total Treatments: 40  HBO Diagnosis:   Problem List Items Addressed This Visit     Crohn's disease of colon with complication ([address] - Primary (Chronic)    Abdominal wall skin ulcer, with fat layer exposed ([address] (Chronic)    Skin ulcer of perineum, with fat layer exposed ([address] (Chronic)        Safety checks performed prior to treatment by HBOT staff. See doc flowsheets for documentation. Objective       No results for input(s): GLUMET in the last 72 hours. Pre treatment Vital Signs       Temp: 97.2 °F (36.2 °C)     Pulse: 76     Resp: 18     BP: (!) 130/55     Post treatment Vital Signs  Temp: 97.4 °F (36.3 °C)  Pulse: 67  Resp: 18  BP: (!) 140/60  Assessment      Physical Exam:  General Appearance:  alert and oriented to person, place and time, well-developed and well-nourished, in no acute distress  ENT:  tympanic membranes intact bilaterally, normal color, normal light reflex bilaterally  Pulmonary/Chest:  clear to auscultation bilaterally- no wheezes, rales or rhonchi, normal air movement, no respiratory distress  Cardiovascular:  regular rate and rhythm  Chamber #: 38  Treatment Start Time: 1012     Pressure Reached Time: 1020  RONAN : 2  Number of Air Breaks:  Treatment Status: No Air break     Decompression Time: 1200   Treatment End Time: 8568  Length of Treatment: 90 Minutes  Symptoms Noted During Treatment: None  Total Treatment Time (min): 112    Adverse Event: no    I was present on these premises and immediately available to furnish assistance & direction throughout the procedure.    Jackelyn Busby is a 54 y.o. female  did successfully complete today's hyperbaric oxygen treatment at [facility]. In my clinical judgement, ongoing HBO therapy is necessary at this time, given a threat to patient function, limb or life from the current condition. Supervision and attendance of Hyperbaric Oxygen Therapy provided. Continue HBO treatment as outlined in the treatment plan. Hyperbaric Oxygen: Perla Linares tolerated Treatment Number: 23 well today without complications.     Discharge Instructions were explained and given to Ms. Adina Barker by HBOT staff    Electronically signed by Geoffrey Chou MD

## 2021-07-01 NOTE — PROGRESS NOTES
my clinical judgement, ongoing HBO therapy is necessary at this time, given a threat to patient function, limb or life from the current condition. Supervision and attendance of Hyperbaric Oxygen Therapy provided. Continue HBO treatment as outlined in the treatment plan. Hyperbaric Oxygen: Perla Linares tolerated Treatment Number: 25 well today without complications.     Discharge Instructions were explained and given to Ms. Fabiano Morton by HBOT staff    Electronically signed by Robin Wu MD

## 2021-07-01 NOTE — PROGRESS NOTES
[facility]  Hyperbaric Oxygen Therapy   Progress Note    NAME: Jenniffer Louis RECORD NUMBER:  [id number]  AGE: 54 y.o. GENDER: female  : 1975  EPISODE DATE:  2021   Subjective   HBO Treatment Number: 18 out of Total Treatments: 40  HBO Diagnosis:   Problem List Items Addressed This Visit     Crohn's disease of colon with complication ([address] - Primary (Chronic)    Abdominal wall skin ulcer, with fat layer exposed ([address] (Chronic)    Skin ulcer of perineum, with fat layer exposed ([address] (Chronic)        Safety checks performed prior to treatment by HBOT staff. See doc flowsheets for documentation. Objective       No results for input(s): GLUMET in the last 72 hours. Pre treatment Vital Signs       Temp: 97.6 °F (36.4 °C)     Pulse: 76     Resp: 16     BP: 130/68     Post treatment Vital Signs  Temp: 97.2 °F (36.2 °C)  Pulse: 80  Resp: 18  BP: 120/68  Assessment      Physical Exam:  General Appearance:  alert and oriented to person, place and time, well-developed and well-nourished, in no acute distress  ENT:  tympanic membranes intact bilaterally, normal color, normal light reflex bilaterally  Pulmonary/Chest:  clear to auscultation bilaterally- no wheezes, rales or rhonchi, normal air movement, no respiratory distress  Cardiovascular:  regular rate and rhythm  Chamber #: 38  Treatment Start Time: 1002     Pressure Reached Time: 1013  RONAN : 2  Number of Air Breaks:  Treatment Status: No Air break     Decompression Time: 1143   Treatment End Time: 1152  Length of Treatment: 90 Minutes  Symptoms Noted During Treatment: None  Total Treatment Time (min): 110    Adverse Event: no    I was present on these premises and immediately available to furnish assistance & direction throughout the procedure. Carmen Gongora is a 54 y.o. female  did successfully complete today's hyperbaric oxygen treatment at [address] [place] [address]     In my clinical judgement, ongoing HBO therapy is necessary at this time, given a threat to patient function, limb or life from the current condition. Supervision and attendance of Hyperbaric Oxygen Therapy provided. Continue HBO treatment as outlined in the treatment plan. Hyperbaric Oxygen: Perla Linares tolerated Treatment Number: 25 well today without complications.     Discharge Instructions were explained and given to Ms. Lizeztte Appiah by HBOT staff    Electronically signed by Dunia Reis MD

## 2021-07-02 ENCOUNTER — SOCIAL WORK (OUTPATIENT)
Dept: FAMILY MEDICINE CLINIC | Age: 46
End: 2021-07-02

## 2021-07-02 ENCOUNTER — OFFICE VISIT (OUTPATIENT)
Dept: FAMILY MEDICINE CLINIC | Age: 46
End: 2021-07-02
Payer: COMMERCIAL

## 2021-07-02 ENCOUNTER — HOSPITAL ENCOUNTER (OUTPATIENT)
Dept: HYPERBARIC MEDICINE | Age: 46
Setting detail: THERAPIES SERIES
Discharge: HOME | End: 2021-07-02
Payer: COMMERCIAL

## 2021-07-02 VITALS
TEMPERATURE: 98.2 F | BODY MASS INDEX: 39.99 KG/M2 | HEART RATE: 81 BPM | SYSTOLIC BLOOD PRESSURE: 128 MMHG | DIASTOLIC BLOOD PRESSURE: 76 MMHG | RESPIRATION RATE: 16 BRPM | OXYGEN SATURATION: 99 % | HEIGHT: 69 IN | WEIGHT: 270 LBS

## 2021-07-02 VITALS
SYSTOLIC BLOOD PRESSURE: 112 MMHG | HEART RATE: 88 BPM | DIASTOLIC BLOOD PRESSURE: 70 MMHG | RESPIRATION RATE: 16 BRPM | TEMPERATURE: 98.1 F

## 2021-07-02 VITALS
SYSTOLIC BLOOD PRESSURE: 122 MMHG | DIASTOLIC BLOOD PRESSURE: 64 MMHG | TEMPERATURE: 97.6 F | RESPIRATION RATE: 16 BRPM | HEART RATE: 72 BPM

## 2021-07-02 DIAGNOSIS — K50.818 CROHN'S DISEASE OF BOTH SMALL AND LARGE INTESTINE WITH OTHER COMPLICATION (HCC): Primary | ICD-10-CM

## 2021-07-02 DIAGNOSIS — L98.8 DISORDER OF SKIN DUE TO CROHN'S DISEASE (HCC): ICD-10-CM

## 2021-07-02 DIAGNOSIS — L98.492 ABDOMINAL WALL SKIN ULCER, WITH FAT LAYER EXPOSED (HCC): ICD-10-CM

## 2021-07-02 DIAGNOSIS — K50.119 CROHN'S DISEASE OF COLON WITH COMPLICATION (HCC): Primary | ICD-10-CM

## 2021-07-02 DIAGNOSIS — L98.492 SKIN ULCER OF PERINEUM, WITH FAT LAYER EXPOSED (HCC): ICD-10-CM

## 2021-07-02 DIAGNOSIS — G89.29 OTHER CHRONIC PAIN: ICD-10-CM

## 2021-07-02 DIAGNOSIS — K50.90 DISORDER OF SKIN DUE TO CROHN'S DISEASE (HCC): ICD-10-CM

## 2021-07-02 PROCEDURE — 99183 HYPERBARIC OXYGEN THERAPY: CPT | Performed by: SURGERY

## 2021-07-02 PROCEDURE — 99214 OFFICE O/P EST MOD 30 MIN: CPT | Performed by: FAMILY MEDICINE

## 2021-07-02 PROCEDURE — G0277 HBOT, FULL BODY CHAMBER, 30M: HCPCS

## 2021-07-02 RX ORDER — ACETAMINOPHEN 500 MG
TABLET ORAL
COMMUNITY

## 2021-07-02 RX ORDER — PHENAZOPYRIDINE HYDROCHLORIDE 200 MG/1
200 TABLET, FILM COATED ORAL 3 TIMES DAILY PRN
Qty: 90 TABLET | Refills: 1 | Status: SHIPPED
Start: 2021-07-02 | End: 2022-02-23 | Stop reason: ALTCHOICE

## 2021-07-02 NOTE — PROGRESS NOTES
Referred by PCP to provide support group resource options. SW provided support group options virtually through [facility]. Provided counseling referral option for Armin Neely with [facility]. Provided SW contact information if any issues arise.

## 2021-07-06 ENCOUNTER — HOSPITAL ENCOUNTER (OUTPATIENT)
Dept: HYPERBARIC MEDICINE | Age: 46
Setting detail: THERAPIES SERIES
Discharge: HOME | End: 2021-07-06
Payer: COMMERCIAL

## 2021-07-06 VITALS
TEMPERATURE: 97.3 F | RESPIRATION RATE: 16 BRPM | HEART RATE: 88 BPM | SYSTOLIC BLOOD PRESSURE: 130 MMHG | DIASTOLIC BLOOD PRESSURE: 64 MMHG

## 2021-07-06 DIAGNOSIS — L98.492 ABDOMINAL WALL SKIN ULCER, WITH FAT LAYER EXPOSED (HCC): ICD-10-CM

## 2021-07-06 DIAGNOSIS — K50.119 CROHN'S DISEASE OF COLON WITH COMPLICATION (HCC): Primary | ICD-10-CM

## 2021-07-06 DIAGNOSIS — L98.492 SKIN ULCER OF PERINEUM, WITH FAT LAYER EXPOSED (HCC): ICD-10-CM

## 2021-07-06 PROCEDURE — 99183 HYPERBARIC OXYGEN THERAPY: CPT | Performed by: SURGERY

## 2021-07-06 PROCEDURE — G0277 HBOT, FULL BODY CHAMBER, 30M: HCPCS

## 2021-07-06 NOTE — CONSULTS
[facility]  Hyperbaric Oxygen Therapy   Progress Note    NAME: Jenniffer Louis RECORD NUMBER:  [id number]  AGE: 54 y.o. GENDER: female  : 1975  EPISODE DATE:  2021   Subjective   HBO Treatment Number: 33 out of Total Treatments: 40  HBO Diagnosis:   Problem List Items Addressed This Visit     Abdominal wall skin ulcer, with fat layer exposed ([address] (Chronic)    Relevant Orders    Notify physician (specify)    Hyperbaric Oxygen Therapy    Skin ulcer of perineum, with fat layer exposed ([address] (Chronic)    Relevant Orders    Notify physician (specify)    Hyperbaric Oxygen Therapy    Crohn's disease of colon with complication ([address] - Primary (Chronic)    Relevant Orders    Notify physician (specify)    Hyperbaric Oxygen Therapy        Safety checks performed prior to treatment. See doc flowsheets for documentation. Objective       No results for input(s): GLUMET in the last 72 hours. Pre treatment Vital Signs       Temp: 97.3 °F (36.3 °C)     Pulse: 88     Resp: 16     BP: 130/64     Post treatment Vital Signs  Temp: 97.3 °F (36.3 °C)  Pulse: 88  Resp: 16  BP: 130/64  Assessment      Physical Exam:  General Appearance:  alert and oriented to person, place and time, well-developed and well-nourished, in no acute distress  ENT:  tympanic membranes intact bilaterally  Pulmonary/Chest:  clear to auscultation bilaterally- no wheezes, rales or rhonchi, normal air movement, no respiratory distress  Cardiovascular:  regular rate and rhythm  Chamber #: 38  Treatment Start Time: 0951     Pressure Reached Time: 1005  RONAN : 2  Number of Air Breaks:  Treatment Status: Air break     Decompression Time: 1136   Treatment End Time: 9732  Length of Treatment: 90 Minutes  Symptoms Noted During Treatment: None  Total Treatment Time (min): 113    Adverse Event: no    I was present on these premises and immediately available to furnish assistance & direction throughout the procedure.    Plan Mabel Cage is a 54 y.o. female  did successfully complete today's hyperbaric oxygen treatment at [facility]. In my clinical judgement, ongoing HBO therapy is  necessary at this time, given a threat to patient function, limb or life from the current condition. Supervision and attendance of Hyperbaric Oxygen Therapy provided. Continue HBO treatment as outlined in the treatment plan. Hyperbaric Oxygen: Perla Del Rosariolsanaz tolerated Treatment Number: 35 well today without complications.     Discharge Instructions were explained and given to Ms. Melita Prather     Electronically signed by Colin Payne MD on 7/6/2021 at 2:12 PM

## 2021-07-07 ENCOUNTER — HOSPITAL ENCOUNTER (OUTPATIENT)
Dept: WOUND CARE | Age: 46
Discharge: HOME | End: 2021-07-07
Payer: COMMERCIAL

## 2021-07-07 ENCOUNTER — HOSPITAL ENCOUNTER (OUTPATIENT)
Dept: HYPERBARIC MEDICINE | Age: 46
Setting detail: THERAPIES SERIES
Discharge: HOME | End: 2021-07-07
Payer: COMMERCIAL

## 2021-07-07 VITALS
TEMPERATURE: 97.2 F | DIASTOLIC BLOOD PRESSURE: 60 MMHG | SYSTOLIC BLOOD PRESSURE: 114 MMHG | HEART RATE: 72 BPM | RESPIRATION RATE: 18 BRPM

## 2021-07-07 DIAGNOSIS — L98.492 SKIN ULCER OF PERINEUM, WITH FAT LAYER EXPOSED (HCC): ICD-10-CM

## 2021-07-07 DIAGNOSIS — K50.119 CROHN'S DISEASE OF COLON WITH COMPLICATION (HCC): Primary | ICD-10-CM

## 2021-07-07 DIAGNOSIS — L98.492 ABDOMINAL WALL SKIN ULCER, WITH FAT LAYER EXPOSED (HCC): ICD-10-CM

## 2021-07-07 DIAGNOSIS — K50.119 CROHN'S DISEASE OF COLON WITH COMPLICATION (HCC): Primary | Chronic | ICD-10-CM

## 2021-07-07 DIAGNOSIS — L98.492 ABDOMINAL WALL SKIN ULCER, WITH FAT LAYER EXPOSED (HCC): Chronic | ICD-10-CM

## 2021-07-07 PROCEDURE — 99215 OFFICE O/P EST HI 40 MIN: CPT | Performed by: SURGERY

## 2021-07-07 PROCEDURE — 99183 HYPERBARIC OXYGEN THERAPY: CPT | Performed by: SURGERY

## 2021-07-07 PROCEDURE — 99214 OFFICE O/P EST MOD 30 MIN: CPT

## 2021-07-07 PROCEDURE — G0277 HBOT, FULL BODY CHAMBER, 30M: HCPCS

## 2021-07-07 RX ORDER — GENTAMICIN SULFATE 1 MG/G
OINTMENT TOPICAL ONCE
Status: CANCELLED | OUTPATIENT
Start: 2021-07-07 | End: 2021-07-07

## 2021-07-07 RX ORDER — BETAMETHASONE DIPROPIONATE 0.05 %
OINTMENT (GRAM) TOPICAL ONCE
Status: CANCELLED | OUTPATIENT
Start: 2021-07-07 | End: 2021-07-07

## 2021-07-07 RX ORDER — LIDOCAINE HYDROCHLORIDE 20 MG/ML
JELLY TOPICAL ONCE
Status: CANCELLED | OUTPATIENT
Start: 2021-07-07 | End: 2021-07-07

## 2021-07-07 RX ORDER — GINSENG 100 MG
CAPSULE ORAL ONCE
Status: CANCELLED | OUTPATIENT
Start: 2021-07-07 | End: 2021-07-07

## 2021-07-07 RX ORDER — CLOBETASOL PROPIONATE 0.5 MG/G
OINTMENT TOPICAL ONCE
Status: CANCELLED | OUTPATIENT
Start: 2021-07-07 | End: 2021-07-07

## 2021-07-07 RX ORDER — BACITRACIN ZINC AND POLYMYXIN B SULFATE 500; 1000 [USP'U]/G; [USP'U]/G
OINTMENT TOPICAL ONCE
Status: CANCELLED | OUTPATIENT
Start: 2021-07-07 | End: 2021-07-07

## 2021-07-07 RX ORDER — LIDOCAINE 40 MG/G
CREAM TOPICAL ONCE
Status: CANCELLED | OUTPATIENT
Start: 2021-07-07 | End: 2021-07-07

## 2021-07-07 RX ORDER — LIDOCAINE HYDROCHLORIDE 40 MG/ML
SOLUTION TOPICAL ONCE
Status: CANCELLED | OUTPATIENT
Start: 2021-07-07 | End: 2021-07-07

## 2021-07-07 RX ORDER — LIDOCAINE HYDROCHLORIDE 40 MG/ML
SOLUTION TOPICAL ONCE
Status: DISCONTINUED | OUTPATIENT
Start: 2021-07-07 | End: 2021-07-07

## 2021-07-07 RX ORDER — BACITRACIN, NEOMYCIN, POLYMYXIN B 400; 3.5; 5 [USP'U]/G; MG/G; [USP'U]/G
OINTMENT TOPICAL ONCE
Status: CANCELLED | OUTPATIENT
Start: 2021-07-07 | End: 2021-07-07

## 2021-07-07 RX ORDER — LIDOCAINE HYDROCHLORIDE 40 MG/ML
SOLUTION TOPICAL ONCE
Status: DISCONTINUED | OUTPATIENT
Start: 2021-07-07 | End: 2021-07-08 | Stop reason: HOSPADM

## 2021-07-07 RX ORDER — LIDOCAINE 50 MG/G
OINTMENT TOPICAL ONCE
Status: CANCELLED | OUTPATIENT
Start: 2021-07-07 | End: 2021-07-07

## 2021-07-07 NOTE — PLAN OF CARE
Problem: Wound:  Goal: Will show signs of wound healing; wound closure and no evidence of infection  Description: Will show signs of wound healing; wound closure and no evidence of infection  Outcome: Ongoing     Problem: Weight control:  Goal: Ability to maintain an optimal weight for height and age will be supported  Description: Ability to maintain an optimal weight for height and age will be supported  Outcome: Ongoing     Problem: Pressure Ulcer:  Goal: Signs of wound healing will improve  Description: Signs of wound healing will improve  Outcome: Ongoing  Goal: Absence of new pressure ulcer  Description: Absence of new pressure ulcer  Outcome: Ongoing  Goal: Will show no infection signs and symptoms  Description: Will show no infection signs and symptoms  Outcome: Ongoing

## 2021-07-07 NOTE — DISCHARGE INSTR - COC
Continuity of Care Form    Patient Name: Zaid Shaw   :  1975  MRN:  [id number]    Admit date:  2021  Discharge date:  ***    Code Status Order: Prior   Advance Directives:     Admitting Physician:  No admitting provider for patient encounter. PCP: Gypsy Moise MD    Discharging Nurse: [facility] Unit/Room#: No information available for this encounter.   Discharging Unit Phone Number: ***    Emergency Contact:   Extended Emergency Contact Information  Primary Emergency Contact: Deena Adler  Address: [address] [place] [address] [address]  Phone: [phone]  Mobile Phone: [phone]  Relation: Spouse    Past Surgical History:  Past Surgical History:   Procedure Laterality Date    BREAST BIOPSY      Benign Cyst    CHOLECYSTECTOMY  2011    Kalee Giordano, laparoscopic     ENDOMETRIAL ABLATION  2004    ILEOSTOMY OR JEJUNOSTOMY  2019    UPPER GASTROINTESTINAL ENDOSCOPY         Immunization History:   Immunization History   Administered Date(s) Administered    Influenza 10/01/2012, 10/08/2013    Influenza, [place], 6 mo and older, IM, PF (Flulaval, Fluarix) 12/15/2018    Influenza, [place], IM, PF (6 mo and older Fluzone, Flulaval, Fluarix, and 3 yrs and older Afluria) 10/11/2019, 10/14/2020    Tdap (Boostrix, Adacel) 2014       Active Problems:  Patient Active Problem List   Diagnosis Code    GERD (gastroesophageal reflux disease) K21.9    Depression with anxiety F41.8    Exacerbation of Crohn's disease of large intestine ([address] K50.10    SVT (supraventricular tachycardia) (HCC) I47.1    Anemia D64.9    Paroxysmal supraventricular tachycardia (HCC) I47.1    Iron deficiency anemia secondary to blood loss (chronic) D50.0    Crohn's disease of colon with complication ([address] L18.292    Abdominal wall skin ulcer, with fat layer exposed ([address] L98.492    Skin ulcer of perineum, with fat layer exposed ([address] H29.368 Isolation/Infection:   Isolation          No Isolation        Patient Infection Status     None to display          Nurse Assessment:  Last Vital Signs: There were no vitals taken for this visit. Last documented pain score (0-10 scale):    Last Weight:   Wt Readings from Last 1 Encounters:   07/02/21 270 lb (122.5 kg)     Mental Status:  {IP PT MENTAL STATUS:20030}    IV Access:  { YAS IV ACCESS:259637820}    Nursing Mobility/ADLs:  Walking   {[facility] DME YTXN:427381187}  Transfer  {[facility] DME AGHI:709094983}  Bathing  {[facility] DME EWNN:918821645}  Dressing  {[facility] DME VLEZ:342546790}  Toileting  {[facility] DME XPKD:200452474}  Feeding  {[facility] DME HYZI:016022523}  Med Admin  {[facility] DME QZWW:977540762}  Med Delivery   { YAS MED Delivery:671388197}    Wound Care Documentation and Therapy:  Wound 06/17/21 Abdomen Lower #2 (Active)   Wound Length (cm) 37 cm 07/07/21 0852   Wound Width (cm) 2 cm 07/07/21 0852   Wound Depth (cm) 1.5 cm 07/07/21 0852   Wound Surface Area (cm^2) 74 cm^2 07/07/21 0852   Change in Wound Size % (l*w) -164.29 07/07/21 0852   Wound Volume (cm^3) 111 cm^3 07/07/21 0852   Wound Healing % -260 07/07/21 0852   Wound Assessment Pink/red 07/07/21 0852   Drainage Amount Moderate 07/07/21 0852   Drainage Description Serosanguinous 07/07/21 0852   Odor None 07/07/21 0852   Lakisha-wound Assessment Intact 07/07/21 0852   Number of days: 20        Elimination:  Continence:   · Bowel: {YES / IU:91925}  · Bladder: {YES / NK:73948}  Urinary Catheter: {Urinary Catheter:817623116}   Colostomy/Ileostomy/Ileal Conduit: {YES / AP:79916}       Date of Last BM: ***  No intake or output data in the 24 hours ending 07/07/21 1646  No intake/output data recorded.     Safety Concerns:     [facility] YAS Safety Concerns:092674994}    Impairments/Disabilities:      [address] Impairments/Disabilities:320844189}    Nutrition Therapy:  Current Nutrition Therapy:   [address] Diet List:951675567}    Routes of Feeding: {P GARETH Other Feedings:854199242}  Liquids: {Slp liquid thickness:95455}  Daily Fluid Restriction: {CHP DME Yes amt example:256240015}  Last Modified Barium Swallow with Video (Video Swallowing Test): {Done Not Done FBPR:361187831}    Treatments at the Time of Hospital Discharge:   Respiratory Treatments: ***  Oxygen Therapy:  {Therapy; copd oxygen:22395}  Ventilator:    {[facility] Vent GPPI:322473902}    Rehab Therapies: {THERAPEUTIC INTERVENTION:1583866927}  Weight Bearing Status/Restrictions: { CC Weight Bearin}  Other Medical Equipment (for information only, NOT a DME order):  {EQUIPMENT:169857944}  Other Treatments: ***    Patient's personal belongings (please select all that are sent with patient):  {CHP DME Belongings:406029434}    RN SIGNATURE:  {Esignature:436817450}    CASE MANAGEMENT/SOCIAL WORK SECTION    Inpatient Status Date: ***    Readmission Risk Assessment Score:  Readmission Risk              Risk of Unplanned Readmission:  0           Discharging to Facility/ Agency   · Name:   · Address:  · Phone:  · Fax:    Dialysis Facility (if applicable)   · Name:  · Address:  · Dialysis Schedule:  · Phone:  · Fax:    / signature: {Esignature:167087945}    PHYSICIAN SECTION    Prognosis: {Prognosis:2690117893}    Condition at Discharge: [facility] Patient Condition:654874936}    Rehab Potential (if transferring to Rehab): {Prognosis:9054196475}    Recommended Labs or Other Treatments After Discharge: ***    Physician Certification: I certify the above information and transfer of Sridhar Rosales  is necessary for the continuing treatment of the diagnosis listed and that she requires {Admit to Appropriate Level of Care:76836} for {GREATER/LESS:750968123} 30 days.      Update Admission H&P: {CHP DME Changes in RPZ:736606848}    PHYSICIAN SIGNATURE:  {Esignature:615561474}

## 2021-07-07 NOTE — PROGRESS NOTES
Wound Healing Center Followup Visit Note    Referring Physician : MD Jenniffer Mcfarland 132 RECORD NUMBER:  [id number]  AGE: 54 y.o. GENDER: female  : 1975  EPISODE DATE:  2021    Subjective:     Chief Complaint   Patient presents with    Wound Check      HISTORY of PRESENT ILLNESS HPI   Yudi Carvalho is a 54 y.o. female who presents today in regards to follow up evaluation and treatment of wound/ulcer. That patient's past medical, family and social hx were reviewed and changes were made if present. History of Wound Context:  Patient first developed wounds of her abdominal wall and perineum 2020. She had a biopsy done at [facility] in [address] by her GI surgeon which she was noted to have cutaneous crohns. She has history of Crohn's colitis s/p TAC 3/2019 and completion proctectomy 2020.      She has been treating with local care.  The wound has been waxing and waning but overaly not improving significantly. She has noticed some improvement since starting hyperbarics. She is no longer following with [place] in regards to wounds so she will be seen at wound center here - first visit to wound center 21      We had extensive discussion regarding importance of dressing changes and debridement. Explained that [address] is meant to pull the drainage away from the wound. So as a result you may see more drainage because its not sitting in the wound. She also understands as part of center care she will need measurements at each visit also. She is not on steroids but she is on stelara which has immunosuppression qualities.       21  · Tacrolimus, aquacell - abodminal wound  · Aquacell perineal wound  · Continue hbo    Wound/Ulcer Pain Timing/Severity: constant, waxing and waning, mild  Quality of pain: aching, burning, throbbing, shooting, tender  Severity:  4 / 10   Modifying Factors: Pain worsens with moveent, dressing changes, debridmeent  Associated Signs/Symptoms: drainage and pain    Ulcer Identification:  Ulcer Type: pressure and skin tear  Contributing Factors: chronic pressure, shear force, obesity and immunosuppression    Diabetic/Pressure/Non Pressure Ulcers only:  Ulcer: Pressure ulcer, Stage 3    Wound: N/A        PAST MEDICAL HISTORY      Diagnosis Date    Acid reflux     Anxiety and depression     Crohn's colitis ([facility] [address] 04/2018    Crohn's colitis per colonoscopy biopsies 4/9/18, 6/11/18     Past Surgical History:   Procedure Laterality Date    BREAST BIOPSY  1991    Benign Cyst    CHOLECYSTECTOMY  11/18/2011    Donnie Stokes, laparoscopic     ENDOMETRIAL ABLATION  2004    ILEOSTOMY OR JEJUNOSTOMY  04/2019    UPPER GASTROINTESTINAL ENDOSCOPY       Family History   Problem Relation Age of Onset    Kidney Disease Mother     Diabetes Father         on HD s/p MI    Heart Disease Father     High Blood Pressure Father      Social History     Tobacco Use    Smoking status: Never Smoker    Smokeless tobacco: Never Used   Vaping Use    Vaping Use: Never used   Substance Use Topics    Alcohol use: No    Drug use: No     Allergies   Allergen Reactions    Amoxil [Amoxicillin] Diarrhea    Doxycycline      Stomach upset       Current Outpatient Medications on File Prior to Encounter   Medication Sig Dispense Refill    acetaminophen (TYLENOL) 500 MG tablet Take by mouth      phenazopyridine (PYRIDIUM) 200 MG tablet Take 1 tablet by mouth 3 times daily as needed for Pain 90 tablet 1    gabapentin (NEURONTIN) 300 MG capsule TAKE 1 CAPSULE BY MOUTH THREE TIMES DAILY 90 capsule 3    oxymetazoline (AFRIN) 0.05 % nasal spray 2 sprays by Nasal route as needed for Congestion      pseudoephedrine (SUDAFED) 30 MG tablet Take 30 mg by mouth as needed for Congestion      omeprazole (PRILOSEC) 40 MG delayed release capsule TAKE 1 CAPSULE DAILY 90 capsule 3    metoprolol succinate (TOPROL XL) 25 MG extended release tablet Take 0.5 tablets by mouth daily 90 tablet 3    Multiple Vitamins-Minerals (MULTIVITAMIN ADULT PO) Take by mouth daily      ustekinumab (STELARA) 90 MG/ML SOSY prefilled syringe Inject into the skin every 28 days       DULoxetine (CYMBALTA) 30 MG extended release capsule Take 1 capsule by mouth daily 90 capsule 3    sulfamethoxazole-trimethoprim (BACTRIM DS;SEPTRA DS) 800-160 MG per tablet Take 1 tablet by mouth 2 times daily      tacrolimus (PROTOPIC) 0.1 % ointment Apply 1 each topically daily Apply topically 2 times daily.  ibuprofen (ADVIL) 200 MG CAPS Take 2 capsules by mouth 2 times daily      loperamide (IMODIUM) 1 MG/5ML solution Take 2 mg by mouth 4 times daily as needed for Diarrhea      fluticasone (FLONASE) 50 MCG/ACT nasal spray 1 spray by Nasal route daily (Patient taking differently: 1 spray by Nasal route daily as needed ) 3 Bottle 3    Cholecalciferol (VITAMIN D) 2000 UNITS TABS Take 2,000 Units by mouth daily        No current facility-administered medications on file prior to encounter. REVIEW OF SYSTEMS See HPI    Objective: There were no vitals taken for this visit.   Wt Readings from Last 3 Encounters:   07/02/21 270 lb (122.5 kg)   02/23/21 248 lb 4.8 oz (112.6 kg)   10/28/20 245 lb (111.1 kg)     PHYSICAL EXAM  CONSTITUTIONAL:   Awake, alert, cooperative   EYES:  lids and lashes normal   ENT: external ears and nose without lesions   NECK:  supple, symmetrical, trachea midline   SKIN:  Open wound Present    Assessment:     Problem List Items Addressed This Visit     Crohn's disease of colon with complication ([address] - Primary (Chronic)    Abdominal wall skin ulcer, with fat layer exposed ([address] (Chronic)    Skin ulcer of perineum, with fat layer exposed ([address] (Chronic)          Pre Debridement Measurements:  Are located in the [place]  Documentation Flow Sheet  Post Debridement Measurements:  Wound/Ulcer Descriptions are Pre Debridement except measurements:     Wound 06/17/21 Abdomen Lower #2 (Active) Wound Length (cm) 37 cm 07/07/21 0852   Wound Width (cm) 2 cm 07/07/21 0852   Wound Depth (cm) 1.5 cm 07/07/21 0852   Wound Surface Area (cm^2) 74 cm^2 07/07/21 0852   Change in Wound Size % (l*w) -164.29 07/07/21 0852   Wound Volume (cm^3) 111 cm^3 07/07/21 0852   Wound Healing % -260 07/07/21 0852   Wound Assessment Pink/red 07/07/21 0852   Drainage Amount Moderate 07/07/21 0852   Drainage Description Serosanguinous 07/07/21 0852   Odor None 07/07/21 0852   Lakisha-wound Assessment Intact 07/07/21 0852   Number of days: 19          Plan:   Treatment Note please see attached Discharge Instructions    Written patient dismissal instructions given to patient and signed by patient or POA. Discharge Instructions       Visit Discharge/Physician Orders    Discharge condition: Stable    Assessment of pain at discharge:yes    Anesthetic used: 4% lidocaine solution    Discharge to: Home    Left via:Private automobile    Accompanied by: spouse    ECF/HHA: [facility] Rutledge    Dressing Orders: LOWER ABDOMEN  -Cleanse with normal saline, apply tacrolimus and calcium alginate , dry dressing and secure. Change daily and 2x/day as needed . COCCYX and PERINEUM-Cleanse with normal saline, apply calcium alginate, dry dressing and secure. Change daily and 2x/day as needed. Treatment Orders:Eat a diet high in protein and vitamin C. Take a multiple vitamin daily unless contraindicated. Keep pressure off of site as much as possible. [facility] followup visit: 1week  _____________________________  (Please note your next appointment above and if you are unable to keep, kindly give a 24 hour notice.  Thank you.)    Physician signature:__________________________      If you experience any of the following, please call the [address] [place] [address] Road during business hours:    * Increase in Pain  * Temperature over 101  * Increase in drainage from your wound  * Drainage with a foul odor  * Bleeding  * Increase in swelling  * Need for compression bandage changes due to slippage, breakthrough drainage. If you need medical attention outside of the business hours of the [address] [place] [address] please contact your PCP or go to the nearest emergency room.         Electronically signed by Kathy Mendiola MD on 7/7/2021 at 9:58 AM

## 2021-07-08 ENCOUNTER — HOSPITAL ENCOUNTER (OUTPATIENT)
Dept: HYPERBARIC MEDICINE | Age: 46
Setting detail: THERAPIES SERIES
Discharge: HOME | End: 2021-07-08
Payer: COMMERCIAL

## 2021-07-08 VITALS
SYSTOLIC BLOOD PRESSURE: 120 MMHG | HEART RATE: 68 BPM | DIASTOLIC BLOOD PRESSURE: 62 MMHG | TEMPERATURE: 97.2 F | RESPIRATION RATE: 18 BRPM

## 2021-07-08 DIAGNOSIS — L98.492 ABDOMINAL WALL SKIN ULCER, WITH FAT LAYER EXPOSED (HCC): ICD-10-CM

## 2021-07-08 DIAGNOSIS — K50.119 CROHN'S DISEASE OF COLON WITH COMPLICATION (HCC): Primary | ICD-10-CM

## 2021-07-08 DIAGNOSIS — L98.492 SKIN ULCER OF PERINEUM, WITH FAT LAYER EXPOSED (HCC): ICD-10-CM

## 2021-07-08 PROCEDURE — G0277 HBOT, FULL BODY CHAMBER, 30M: HCPCS

## 2021-07-08 PROCEDURE — 99183 HYPERBARIC OXYGEN THERAPY: CPT | Performed by: SURGERY

## 2021-07-09 ENCOUNTER — HOSPITAL ENCOUNTER (OUTPATIENT)
Dept: HYPERBARIC MEDICINE | Age: 46
Setting detail: THERAPIES SERIES
Discharge: HOME | End: 2021-07-09
Payer: COMMERCIAL

## 2021-07-09 VITALS
TEMPERATURE: 97.2 F | SYSTOLIC BLOOD PRESSURE: 108 MMHG | RESPIRATION RATE: 18 BRPM | HEART RATE: 72 BPM | DIASTOLIC BLOOD PRESSURE: 60 MMHG

## 2021-07-09 DIAGNOSIS — L98.492 ABDOMINAL WALL SKIN ULCER, WITH FAT LAYER EXPOSED (HCC): ICD-10-CM

## 2021-07-09 DIAGNOSIS — K50.119 CROHN'S DISEASE OF COLON WITH COMPLICATION (HCC): Primary | ICD-10-CM

## 2021-07-09 DIAGNOSIS — L98.492 SKIN ULCER OF PERINEUM, WITH FAT LAYER EXPOSED (HCC): ICD-10-CM

## 2021-07-09 PROCEDURE — 99183 HYPERBARIC OXYGEN THERAPY: CPT | Performed by: SURGERY

## 2021-07-09 PROCEDURE — G0277 HBOT, FULL BODY CHAMBER, 30M: HCPCS

## 2021-07-12 ENCOUNTER — HOSPITAL ENCOUNTER (OUTPATIENT)
Dept: HYPERBARIC MEDICINE | Age: 46
Setting detail: THERAPIES SERIES
Discharge: HOME | End: 2021-07-12
Payer: COMMERCIAL

## 2021-07-12 VITALS
RESPIRATION RATE: 18 BRPM | SYSTOLIC BLOOD PRESSURE: 148 MMHG | DIASTOLIC BLOOD PRESSURE: 59 MMHG | HEART RATE: 72 BPM | TEMPERATURE: 97.4 F

## 2021-07-12 DIAGNOSIS — L98.492 SKIN ULCER OF PERINEUM, WITH FAT LAYER EXPOSED (HCC): ICD-10-CM

## 2021-07-12 DIAGNOSIS — K50.119 CROHN'S DISEASE OF COLON WITH COMPLICATION (HCC): Primary | ICD-10-CM

## 2021-07-12 DIAGNOSIS — L98.492 ABDOMINAL WALL SKIN ULCER, WITH FAT LAYER EXPOSED (HCC): ICD-10-CM

## 2021-07-12 PROCEDURE — G0277 HBOT, FULL BODY CHAMBER, 30M: HCPCS

## 2021-07-12 PROCEDURE — 99183 HYPERBARIC OXYGEN THERAPY: CPT | Performed by: SURGERY

## 2021-07-12 NOTE — PROGRESS NOTES
[facility]  Hyperbaric Oxygen Therapy   Progress Note    NAME: Jenniffer Louis RECORD NUMBER:  [id number]  AGE: 54 y.o. GENDER: female  : 1975  EPISODE DATE:  2021   Subjective   HBO Treatment Number: 37 out of Total Treatments: 40  HBO Diagnosis:   Problem List Items Addressed This Visit     Abdominal wall skin ulcer, with fat layer exposed ([address] (Chronic)    Relevant Orders    Notify physician (specify)    Hyperbaric Oxygen Therapy    Crohn's disease of colon with complication ([address] - Primary (Chronic)    Relevant Orders    Notify physician (specify)    Hyperbaric Oxygen Therapy    Skin ulcer of perineum, with fat layer exposed ([address] (Chronic)    Relevant Orders    Notify physician (specify)    Hyperbaric Oxygen Therapy        Safety checks performed prior to treatment. See doc flowsheets for documentation. Objective       No results for input(s): GLUMET in the last 72 hours.   Pre treatment Vital Signs       Temp: 97.2 °F (36.2 °C)     Pulse: 72     Resp: 18     BP: (!) 151/57     Post treatment Vital Signs  Temp: 97.4 °F (36.3 °C)  Pulse: 72  Resp: 18  BP: (!) 148/59  Assessment      Physical Exam:  General Appearance:  alert and oriented to person, place and time, well-developed and well-nourished, in no acute distress  ENT:  tympanic membranes intact bilaterally  Pulmonary/Chest:  clear to auscultation bilaterally- no wheezes, rales or rhonchi, normal air movement, no respiratory distress  Cardiovascular:  regular rate and rhythm  Chamber #: 38  Treatment Start Time: 0947     Pressure Reached Time: 0956  RONAN : 2  Number of Air Breaks:  Treatment Status: No Air break     Decompression Time: 1126   Treatment End Time: 1216  Length of Treatment: 90 Minutes  Symptoms Noted During Treatment: None  Total Treatment Time (min): 149    Adverse Event: no    I was present on these premises and immediately available to furnish assistance & direction throughout the

## 2021-07-12 NOTE — DISCHARGE INSTR - COC
Isolation/Infection:   Isolation          No Isolation        Patient Infection Status     None to display          Nurse Assessment:  Last Vital Signs: BP (!) 151/57   Pulse 72   Temp 97.2 °F (36.2 °C) (Temporal)   Resp 18     Last documented pain score (0-10 scale):    Last Weight:   Wt Readings from Last 1 Encounters:   07/02/21 270 lb (122.5 kg)     Mental Status:  {IP PT MENTAL STATUS:18387}    IV Access:  { YAS IV ACCESS:272163271}    Nursing Mobility/ADLs:  Walking   {[facility] DME JOQM:575028593}  Transfer  {[facility] DME TDZC:757837059}  Bathing  {[facility] DME QHEP:976518450}  Dressing  {[facility] DME ZTQJ:840226938}  Toileting  {[facility] DME BIKK:897586084}  Feeding  {[facility] DME SVWN:573379717}  Med Admin  {[facility] DME WRAR:216589391}  Med Delivery   { YAS MED Delivery:371374380}    Wound Care Documentation and Therapy:  Wound 06/17/21 Abdomen Lower #2 (Active)   Wound Etiology Pressure Stage  3 07/07/21 0852   Wound Length (cm) 37 cm 07/07/21 0852   Wound Width (cm) 2 cm 07/07/21 0852   Wound Depth (cm) 1.5 cm 07/07/21 0852   Wound Surface Area (cm^2) 74 cm^2 07/07/21 0852   Change in Wound Size % (l*w) -164.29 07/07/21 0852   Wound Volume (cm^3) 111 cm^3 07/07/21 0852   Wound Healing % -260 07/07/21 0852   Wound Assessment Pink/red 07/07/21 0852   Drainage Amount Moderate 07/07/21 0852   Drainage Description Serosanguinous 07/07/21 0852   Odor None 07/07/21 0852   Lakisha-wound Assessment Intact 07/07/21 0852   Number of days: 24        Elimination:  Continence:   · Bowel: {YES / MU:00181}  · Bladder: {YES / PO:52817}  Urinary Catheter: {Urinary Catheter:300125917}   Colostomy/Ileostomy/Ileal Conduit: {YES / AB:41632}       Date of Last BM: ***  No intake or output data in the 24 hours ending 07/12/21 1101  No intake/output data recorded.     Safety Concerns:     [address] YAS Safety Concerns:640951733}    Impairments/Disabilities:      [address] YAS Impairments/Disabilities:865813859}    Nutrition Therapy:  Current Nutrition Therapy:   [address] YAS Diet List:707329265}    Routes of Feeding: {CHP DME Other Feedings:719789539}  Liquids: {Slp liquid thickness:03535}  Daily Fluid Restriction: {CHP DME Yes amt example:479650422}  Last Modified Barium Swallow with Video (Video Swallowing Test): {Done Not Done WZBS:546076796}    Treatments at the Time of Hospital Discharge:   Respiratory Treatments: ***  Oxygen Therapy:  {Therapy; copd oxygen:86445}  Ventilator:    { CC Vent TRMC:357542528}    Rehab Therapies: {THERAPEUTIC INTERVENTION:1268713104}  Weight Bearing Status/Restrictions: { CC Weight Bearin}  Other Medical Equipment (for information only, NOT a DME order):  {EQUIPMENT:197020873}  Other Treatments: ***    Patient's personal belongings (please select all that are sent with patient):  {[facility] DME Belongings:648718707}    RN SIGNATURE:  {Esignature:999105520}    CASE MANAGEMENT/SOCIAL WORK SECTION    Inpatient Status Date: ***    Readmission Risk Assessment Score:  Readmission Risk              Risk of Unplanned Readmission:  0           Discharging to Facility/ Agency   · Name:   · Address:  · Phone:  · Fax:    Dialysis Facility (if applicable)   · Name:  · Address:  · Dialysis Schedule:  · Phone:  · Fax:    / signature: {Esignature:940491529}    PHYSICIAN SECTION    Prognosis: {Prognosis:1237193565}    Condition at Discharge: [address] [place] [address] Patient Condition:334740573}    Rehab Potential (if transferring to Rehab): {Prognosis:1349473176}    Recommended Labs or Other Treatments After Discharge: ***    Physician Certification: I certify the above information and transfer of Wendie Payne  is necessary for the continuing treatment of the diagnosis listed and that she requires {Admit to Appropriate Level of Care:64642} for {GREATER/LESS:567944418} 30 days.      Update Admission H&P: {CHP DME Changes in UXWUT:910389820}    PHYSICIAN SIGNATURE:  {Esignature:123712555}

## 2021-07-13 ENCOUNTER — TELEPHONE (OUTPATIENT)
Dept: FAMILY MEDICINE CLINIC | Age: 46
End: 2021-07-13

## 2021-07-13 ENCOUNTER — HOSPITAL ENCOUNTER (OUTPATIENT)
Dept: HYPERBARIC MEDICINE | Age: 46
Setting detail: THERAPIES SERIES
End: 2021-07-13
Payer: COMMERCIAL

## 2021-07-13 DIAGNOSIS — K50.818 CROHN'S DISEASE OF BOTH SMALL AND LARGE INTESTINE WITH OTHER COMPLICATION (HCC): Primary | ICD-10-CM

## 2021-07-13 NOTE — TELEPHONE ENCOUNTER
Per pt request and per recommendation of Dr. Craig Guajardo, please place referral to Dr. Holland See at [facility] for Dx: Cutaneous Crohn's disease.

## 2021-07-14 ENCOUNTER — HOSPITAL ENCOUNTER (OUTPATIENT)
Dept: HYPERBARIC MEDICINE | Age: 46
Setting detail: THERAPIES SERIES
Discharge: HOME | End: 2021-07-14
Payer: COMMERCIAL

## 2021-07-14 ENCOUNTER — HOSPITAL ENCOUNTER (OUTPATIENT)
Dept: WOUND CARE | Age: 46
Discharge: HOME | End: 2021-07-14
Payer: COMMERCIAL

## 2021-07-14 VITALS
WEIGHT: 245 LBS | HEIGHT: 69 IN | BODY MASS INDEX: 36.29 KG/M2 | TEMPERATURE: 99.2 F | RESPIRATION RATE: 18 BRPM | DIASTOLIC BLOOD PRESSURE: 80 MMHG | HEART RATE: 72 BPM | SYSTOLIC BLOOD PRESSURE: 120 MMHG

## 2021-07-14 VITALS
DIASTOLIC BLOOD PRESSURE: 84 MMHG | SYSTOLIC BLOOD PRESSURE: 144 MMHG | TEMPERATURE: 97.7 F | HEART RATE: 84 BPM | RESPIRATION RATE: 16 BRPM

## 2021-07-14 DIAGNOSIS — K50.119 CROHN'S DISEASE OF COLON WITH COMPLICATION (HCC): Primary | ICD-10-CM

## 2021-07-14 DIAGNOSIS — L98.492 SKIN ULCER OF PERINEUM, WITH FAT LAYER EXPOSED (HCC): ICD-10-CM

## 2021-07-14 DIAGNOSIS — L98.492 ABDOMINAL WALL SKIN ULCER, WITH FAT LAYER EXPOSED (HCC): ICD-10-CM

## 2021-07-14 DIAGNOSIS — L98.492 SKIN ULCER OF PERINEUM, WITH FAT LAYER EXPOSED (HCC): Primary | ICD-10-CM

## 2021-07-14 DIAGNOSIS — K50.119 CROHN'S DISEASE OF COLON WITH COMPLICATION (HCC): ICD-10-CM

## 2021-07-14 PROCEDURE — 99213 OFFICE O/P EST LOW 20 MIN: CPT | Performed by: SURGERY

## 2021-07-14 PROCEDURE — 99183 HYPERBARIC OXYGEN THERAPY: CPT | Performed by: SURGERY

## 2021-07-14 PROCEDURE — G0277 HBOT, FULL BODY CHAMBER, 30M: HCPCS

## 2021-07-14 PROCEDURE — 99213 OFFICE O/P EST LOW 20 MIN: CPT

## 2021-07-14 RX ORDER — LIDOCAINE 40 MG/G
CREAM TOPICAL ONCE
Status: CANCELLED | OUTPATIENT
Start: 2021-07-14 | End: 2021-07-14

## 2021-07-14 RX ORDER — GENTAMICIN SULFATE 1 MG/G
OINTMENT TOPICAL ONCE
Status: CANCELLED | OUTPATIENT
Start: 2021-07-14 | End: 2021-07-14

## 2021-07-14 RX ORDER — BACITRACIN ZINC AND POLYMYXIN B SULFATE 500; 1000 [USP'U]/G; [USP'U]/G
OINTMENT TOPICAL ONCE
Status: CANCELLED | OUTPATIENT
Start: 2021-07-14 | End: 2021-07-14

## 2021-07-14 RX ORDER — BACITRACIN, NEOMYCIN, POLYMYXIN B 400; 3.5; 5 [USP'U]/G; MG/G; [USP'U]/G
OINTMENT TOPICAL ONCE
Status: CANCELLED | OUTPATIENT
Start: 2021-07-14 | End: 2021-07-14

## 2021-07-14 RX ORDER — CLOBETASOL PROPIONATE 0.5 MG/G
OINTMENT TOPICAL ONCE
Status: CANCELLED | OUTPATIENT
Start: 2021-07-14 | End: 2021-07-14

## 2021-07-14 RX ORDER — GINSENG 100 MG
CAPSULE ORAL ONCE
Status: CANCELLED | OUTPATIENT
Start: 2021-07-14 | End: 2021-07-14

## 2021-07-14 RX ORDER — LIDOCAINE HYDROCHLORIDE 40 MG/ML
SOLUTION TOPICAL ONCE
Status: COMPLETED | OUTPATIENT
Start: 2021-07-14 | End: 2021-07-14

## 2021-07-14 RX ORDER — OXYCODONE HYDROCHLORIDE AND ACETAMINOPHEN 5; 325 MG/1; MG/1
1 TABLET ORAL EVERY 4 HOURS PRN
COMMUNITY
End: 2022-02-23 | Stop reason: ALTCHOICE

## 2021-07-14 RX ORDER — LIDOCAINE HYDROCHLORIDE 20 MG/ML
JELLY TOPICAL ONCE
Status: CANCELLED | OUTPATIENT
Start: 2021-07-14 | End: 2021-07-14

## 2021-07-14 RX ORDER — BETAMETHASONE DIPROPIONATE 0.05 %
OINTMENT (GRAM) TOPICAL ONCE
Status: CANCELLED | OUTPATIENT
Start: 2021-07-14 | End: 2021-07-14

## 2021-07-14 RX ORDER — LIDOCAINE HYDROCHLORIDE 40 MG/ML
SOLUTION TOPICAL ONCE
Status: CANCELLED | OUTPATIENT
Start: 2021-07-14 | End: 2021-07-14

## 2021-07-14 RX ORDER — LIDOCAINE 50 MG/G
OINTMENT TOPICAL ONCE
Status: CANCELLED | OUTPATIENT
Start: 2021-07-14 | End: 2021-07-14

## 2021-07-14 RX ADMIN — LIDOCAINE HYDROCHLORIDE: 40 SOLUTION TOPICAL at 09:13

## 2021-07-14 ASSESSMENT — PAIN DESCRIPTION - LOCATION: LOCATION: ABDOMEN;PERINEUM

## 2021-07-14 ASSESSMENT — PAIN DESCRIPTION - DESCRIPTORS: DESCRIPTORS: ACHING;CONSTANT;NAGGING

## 2021-07-14 ASSESSMENT — PAIN SCALES - GENERAL: PAINLEVEL_OUTOF10: 4

## 2021-07-14 ASSESSMENT — PAIN DESCRIPTION - PAIN TYPE: TYPE: CHRONIC PAIN

## 2021-07-14 NOTE — PROGRESS NOTES
this time, given a threat to patient function, limb or life from the current condition. Supervision and attendance of Hyperbaric Oxygen Therapy provided. Continue HBO treatment as outlined in the treatment plan. Hyperbaric Oxygen: Perla Linares tolerated Treatment Number: [facility] well today without complications.     Discharge Instructions were explained and given to Ms. Abram Richardmary     Electronically signed by Jessica Mendosa MD on 7/7/2021 at 5:34 PM

## 2021-07-14 NOTE — PROGRESS NOTES
[facility]  Hyperbaric Oxygen Therapy   Progress Note    NAME: Jenniffer Louis RECORD NUMBER:  [id number]  AGE: 51 y.o. GENDER: female  : 1975  EPISODE DATE:  2021   Subjective   HBO Treatment Number: 45 out of Total Treatments: 40  HBO Diagnosis:   Problem List Items Addressed This Visit     Crohn's disease of colon with complication ([address] - Primary (Chronic)    Relevant Orders    Notify physician (specify)    Hyperbaric Oxygen Therapy    Abdominal wall skin ulcer, with fat layer exposed ([address] (Chronic)    Relevant Orders    Notify physician (specify)    Hyperbaric Oxygen Therapy    Skin ulcer of perineum, with fat layer exposed ([address] (Chronic)    Relevant Orders    Notify physician (specify)    Hyperbaric Oxygen Therapy        Safety checks performed prior to treatment. See doc flowsheets for documentation. Objective       No results for input(s): GLUMET in the last 72 hours. Pre treatment Vital Signs       Temp: 97.9 °F (36.6 °C)     Pulse: 76     Resp: 16     BP: 128/75     Post treatment Vital Signs  Temp: 97.7 °F (36.5 °C)  Pulse: 84  Resp: 16  BP: (!) 144/84  Assessment      Physical Exam:  General Appearance:  alert and oriented to person, place and time, well-developed and well-nourished, in no acute distress  ENT:  tympanic membranes intact bilaterally  Pulmonary/Chest:  clear to auscultation bilaterally- no wheezes, rales or rhonchi, normal air movement, no respiratory distress  Cardiovascular:  regular rate and rhythm  Chamber #: 36  Treatment Start Time: 1006     Pressure Reached Time: 1017  RONAN : 2  Number of Air Breaks:  Treatment Status: No Air break     Decompression Time: 1148   Treatment End Time: 1158  Length of Treatment: 90 Minutes  Symptoms Noted During Treatment: None  Total Treatment Time (min): 112    Adverse Event: no    I was present on these premises and immediately available to furnish assistance & direction throughout the procedure. Devi Montejo is a 51 y.o. female  did successfully complete today's hyperbaric oxygen treatment at [facility]. In my clinical judgement, ongoing HBO therapy is  necessary at this time, given a threat to patient function, limb or life from the current condition. Supervision and attendance of Hyperbaric Oxygen Therapy provided. Continue HBO treatment as outlined in the treatment plan. Hyperbaric Oxygen: Perla MADRID Vijay tolerated Treatment Number: 45 well today without complications.     Discharge Instructions were explained and given to Ms. Bassam Rodgers     Electronically signed by Gabriel Santana MD on 7/14/2021 at 5:39 PM

## 2021-07-15 ENCOUNTER — HOSPITAL ENCOUNTER (OUTPATIENT)
Dept: HYPERBARIC MEDICINE | Age: 46
Setting detail: THERAPIES SERIES
Discharge: HOME | End: 2021-07-15
Payer: COMMERCIAL

## 2021-07-15 VITALS
SYSTOLIC BLOOD PRESSURE: 119 MMHG | RESPIRATION RATE: 17 BRPM | HEART RATE: 64 BPM | TEMPERATURE: 97.6 F | DIASTOLIC BLOOD PRESSURE: 60 MMHG

## 2021-07-15 DIAGNOSIS — L98.492 ABDOMINAL WALL SKIN ULCER, WITH FAT LAYER EXPOSED (HCC): ICD-10-CM

## 2021-07-15 DIAGNOSIS — K50.119 CROHN'S DISEASE OF COLON WITH COMPLICATION (HCC): Primary | ICD-10-CM

## 2021-07-15 DIAGNOSIS — L98.492 SKIN ULCER OF PERINEUM, WITH FAT LAYER EXPOSED (HCC): ICD-10-CM

## 2021-07-15 PROCEDURE — G0277 HBOT, FULL BODY CHAMBER, 30M: HCPCS

## 2021-07-15 PROCEDURE — 99183 HYPERBARIC OXYGEN THERAPY: CPT | Performed by: SURGERY

## 2021-07-15 NOTE — PROGRESS NOTES
delayed release capsule TAKE 1 CAPSULE DAILY 90 capsule 3    metoprolol succinate (TOPROL XL) 25 MG extended release tablet Take 0.5 tablets by mouth daily 90 tablet 3    Multiple Vitamins-Minerals (MULTIVITAMIN ADULT PO) Take by mouth daily      ustekinumab (STELARA) 90 MG/ML SOSY prefilled syringe Inject into the skin every 28 days       DULoxetine (CYMBALTA) 30 MG extended release capsule Take 1 capsule by mouth daily 90 capsule 3    sulfamethoxazole-trimethoprim (BACTRIM DS;SEPTRA DS) 800-160 MG per tablet Take 1 tablet by mouth 2 times daily      tacrolimus (PROTOPIC) 0.1 % ointment Apply 1 each topically daily Apply topically 2 times daily.  ibuprofen (ADVIL) 200 MG CAPS Take 2 capsules by mouth 2 times daily      loperamide (IMODIUM) 1 MG/5ML solution Take 2 mg by mouth 4 times daily as needed for Diarrhea      fluticasone (FLONASE) 50 MCG/ACT nasal spray 1 spray by Nasal route daily (Patient taking differently: 1 spray by Nasal route daily as needed ) 3 Bottle 3    Cholecalciferol (VITAMIN D) 2000 UNITS TABS Take 2,000 Units by mouth daily        No current facility-administered medications on file prior to encounter. LABS  HgBA1c:  No results found for: LABA1C         Please add comments to any \"yes\" answers. Do you have a history of: Comments   Seizure no   Congenital spherocytosis no   Optic Neuritis no   Cataracts no   Eye Surgery no   Ear problems no   Ear reconstructive surgery no   Sinus problems no   Asthma no   Bronchitis no   Emphysema no   Pneumothorax no   Tuberculosis no   Other lung problems Yes Hx of upper respiratory infections   Hypertension no   Pacemaker/AICD no   Congestive heart failure no   EF% >30% yes   Any implanted device no   Dialysis no   Current pregnancy no   Claustrophobia no   Diabetes no   Currently using these medications:     a. Disulfiram (Antabuse®) no    b. Mafenide acetate        (Sulfamylon®) no    c.  Diuretics for CHF no    d.   Amiodarone dose > 400mg/day no    e. Lanoxin/Digoxin no    f. Current Steroid use     no   Cancer:  no    a. Surgery for Cancer no    b. Radiation therapy no    c. Chemotherapy no    If yes, did you receive:     a. Doxorubicin (Adriamycin®) no    b. Cisplatin (Platinol AQ®) no    c.  Bleomycin (Blenoxane®) no     The above was reviewed with: Patient    Electronically signed by Adam Garcia RN on 7/15/2021 at 3:17 PM   Rosco Koyanagi is a 51 y.o. female has been receiving hyperbaric oxygen treatment for management of: Indication  Indications: Other (Comment) (ulcers related to crohns). Ms. Yue Malagon has completed Treatment Number: 45 out of a treatment protocol of Total Treatments: 40.    Problem List:  Patient Active Problem List   Diagnosis Code    GERD (gastroesophageal reflux disease) K21.9    Depression with anxiety F41.8    Exacerbation of Crohn's disease of large intestine ([address] K50.10    SVT (supraventricular tachycardia) (HCC) I47.1    Anemia D64.9    Paroxysmal supraventricular tachycardia (HCC) I47.1    Iron deficiency anemia secondary to blood loss (chronic) D50.0    Crohn's disease of colon with complication ([address] J86.673    Abdominal wall skin ulcer, with fat layer exposed ([address] L98.492    Skin ulcer of perineum, with fat layer exposed ([address] L98.492       Patients ID was verified. Hyperbaric oxygen therapy plan of care, patient history, outpatient fall risk assessment, nutritional assessment and daily medications were reviewed, addressed and updated as needed. Pt is tolerating hyperbaric oxygen therapy  well without complications.          Adam Garcia RN  7/15/2021  3:17 PM

## 2021-07-16 ENCOUNTER — HOSPITAL ENCOUNTER (OUTPATIENT)
Dept: HYPERBARIC MEDICINE | Age: 46
Setting detail: THERAPIES SERIES
Discharge: HOME | End: 2021-07-16
Payer: COMMERCIAL

## 2021-07-16 VITALS
RESPIRATION RATE: 18 BRPM | DIASTOLIC BLOOD PRESSURE: 80 MMHG | TEMPERATURE: 97.6 F | SYSTOLIC BLOOD PRESSURE: 150 MMHG | HEART RATE: 70 BPM

## 2021-07-16 DIAGNOSIS — L98.492 SKIN ULCER OF PERINEUM, WITH FAT LAYER EXPOSED (HCC): ICD-10-CM

## 2021-07-16 DIAGNOSIS — L98.492 ABDOMINAL WALL SKIN ULCER, WITH FAT LAYER EXPOSED (HCC): ICD-10-CM

## 2021-07-16 DIAGNOSIS — K50.119 CROHN'S DISEASE OF COLON WITH COMPLICATION (HCC): Primary | ICD-10-CM

## 2021-07-16 PROCEDURE — G0277 HBOT, FULL BODY CHAMBER, 30M: HCPCS

## 2021-07-16 PROCEDURE — 99183 HYPERBARIC OXYGEN THERAPY: CPT | Performed by: SURGERY

## 2021-07-16 RX ORDER — FLUCONAZOLE 100 MG/1
100 TABLET ORAL DAILY
Qty: 7 TABLET | Refills: 0 | Status: SHIPPED | OUTPATIENT
Start: 2021-07-16 | End: 2021-07-23

## 2021-07-16 NOTE — PROGRESS NOTES
Chief Complaint   Patient presents with    Referral - General     needs referrals        HPI:  Patient is here for follow-up of cutaneous Crohn's disease. Patient complains of severe pain and difficulty in healing. She is seen at the wound clinic for hyperbaric oxygen. They are wondering about any support groups or new GI referral to someone who specializes in it. Patient's past medical, surgical, social and/or family history reviewed, updated in chart, and are non-contributory (unless otherwise stated). Medications and allergies also reviewed and updated in chart.     Review of Systems:  Constitutional:  No fever, no fatigue, no chills, no headaches, no weight change  Dermatology:  No rash, no mole, no dry or sensitive skin  ENT:  No cough, no sore throat, no sinus pain, no runny nose, no ear pain  Cardiology:  No chest pain, no palpitations, no leg edema, no shortness of breath, no PND  Gastroenterology:  No dysphagia, no abdominal pain, no nausea, no vomiting, no constipation, no diarrhea, no heartburn  Musculoskeletal:  No joint pain, no leg cramps, no back pain, no muscle aches  Respiratory:  No shortness of breath, no orthopnea, no wheezing, no SKINNER, no hemoptysis  Urology:  No blood in the urine, no urinary frequency, no urinary incontinence, no urinary urgency, no nocturia, no dysuria  Vitals:    07/02/21 1430   BP: 128/76   Pulse: 81   Resp: 16   Temp: 98.2 °F (36.8 °C)   SpO2: 99%   Weight: 270 lb (122.5 kg)   Height: 5' 9\" (1.753 m)       General:  Patient alert and oriented x 3, NAD, pleasant  HEENT:  Atraumatic, normocephalic, PERRLA, EOMI, clear conjunctiva, TMs clear, nose-clear, throat - no erythema  Neck:  Supple, no goiter, no carotid bruits, no lymphadenopathy  Lungs:  CTA B  Heart:  RRR, no murmurs, gallops or rubs  Abdomen:  Soft/nt/nd, + bowel sounds  Extremities:  No clubbing, cyanosis or edema  Skin: unremarkable    Assessment/Plan:      Perla was seen today for referral - general.    Diagnoses and all orders for this visit:    Crohn's disease of both small and large intestine with other complication ([address]    Disorder of skin due to Crohn's disease ([address]    Other chronic pain    Other orders  -     phenazopyridine (PYRIDIUM) 200 MG tablet; Take 1 tablet by mouth 3 times daily as needed for Pain    Over 35 mins spent with the patient and > 50% was spent on counseling and care coordination. As above. Call or go to ED immediately if symptoms worsen or persist.  No follow-ups on file. , or sooner if necessary. Educational materials and/or home exercises printed for patient's review and were included in patient instructions on his/her After Visit Summary and given to patient at the end of visit. Counseled regarding above diagnosis, including possible risks and complications,  especially if left uncontrolled. Counseled regarding the possible side effects, risks, benefits and alternatives to treatment; patient and/or guardian verbalizes understanding, agrees, feels comfortable with and wishes to proceed with above treatment plan. Advised patient to call with any new medication issues, and read all Rx info from pharmacy to assure aware of all possible risks and side effects of medication before taking. Reviewed age and gender appropriate health screening exams and vaccinations. Advised patient regarding importance of keeping up with recommended health maintenance and to schedule as soon as possible if overdue, as this is important in assessing for undiagnosed pathology, especially cancer, as well as protecting against potentially harmful/life threatening disease. Patient and/or guardian verbalizes understanding and agrees with above counseling, assessment and plan. All questions answered.     Luci Donato MD  7/16/2021    I have personally reviewed and updated the chief complaint, HPI, Past Medical, Family and Social History, as well as the above Review of Systems.

## 2021-07-19 ENCOUNTER — HOSPITAL ENCOUNTER (OUTPATIENT)
Dept: HYPERBARIC MEDICINE | Age: 46
Setting detail: THERAPIES SERIES
Discharge: HOME | End: 2021-07-19
Payer: COMMERCIAL

## 2021-07-19 VITALS
HEART RATE: 74 BPM | TEMPERATURE: 97.2 F | DIASTOLIC BLOOD PRESSURE: 74 MMHG | RESPIRATION RATE: 18 BRPM | SYSTOLIC BLOOD PRESSURE: 126 MMHG

## 2021-07-19 DIAGNOSIS — K50.119 CROHN'S DISEASE OF COLON WITH COMPLICATION (HCC): Primary | ICD-10-CM

## 2021-07-19 DIAGNOSIS — L98.492 SKIN ULCER OF PERINEUM, WITH FAT LAYER EXPOSED (HCC): ICD-10-CM

## 2021-07-19 DIAGNOSIS — L98.492 ABDOMINAL WALL SKIN ULCER, WITH FAT LAYER EXPOSED (HCC): ICD-10-CM

## 2021-07-19 PROCEDURE — 99183 HYPERBARIC OXYGEN THERAPY: CPT | Performed by: SURGERY

## 2021-07-19 PROCEDURE — G0277 HBOT, FULL BODY CHAMBER, 30M: HCPCS

## 2021-07-19 NOTE — PROGRESS NOTES
Rossy Saul is a 51 y.o. female  did successfully complete today's hyperbaric oxygen treatment at [facility]. In my clinical judgement, ongoing HBO therapy is  necessary at this time, given a threat to patient function, limb or life from the current condition. Supervision and attendance of Hyperbaric Oxygen Therapy provided. Continue HBO treatment as outlined in the treatment plan. Hyperbaric Oxygen: Perlawilli Linares tolerated Treatment Number: 07 well today without complications.     Discharge Instructions were explained and given to Ms. Kenyetta Jamil     Electronically signed by Jovon Headley MD on 7/19/2021 at 1:23 PM

## 2021-07-20 ENCOUNTER — TELEPHONE (OUTPATIENT)
Dept: FAMILY MEDICINE CLINIC | Age: 46
End: 2021-07-20

## 2021-07-20 ENCOUNTER — HOSPITAL ENCOUNTER (OUTPATIENT)
Dept: HYPERBARIC MEDICINE | Age: 46
Setting detail: THERAPIES SERIES
Discharge: HOME | End: 2021-07-20
Payer: COMMERCIAL

## 2021-07-20 VITALS
SYSTOLIC BLOOD PRESSURE: 126 MMHG | HEART RATE: 72 BPM | DIASTOLIC BLOOD PRESSURE: 66 MMHG | TEMPERATURE: 97.3 F | RESPIRATION RATE: 16 BRPM

## 2021-07-20 DIAGNOSIS — Z00.00 ANNUAL PHYSICAL EXAM: Primary | ICD-10-CM

## 2021-07-20 DIAGNOSIS — L98.492 ABDOMINAL WALL SKIN ULCER, WITH FAT LAYER EXPOSED (HCC): ICD-10-CM

## 2021-07-20 DIAGNOSIS — K50.119 CROHN'S DISEASE OF COLON WITH COMPLICATION (HCC): Primary | ICD-10-CM

## 2021-07-20 DIAGNOSIS — L98.492 SKIN ULCER OF PERINEUM, WITH FAT LAYER EXPOSED (HCC): ICD-10-CM

## 2021-07-20 PROCEDURE — 99183 HYPERBARIC OXYGEN THERAPY: CPT | Performed by: SURGERY

## 2021-07-20 PROCEDURE — G0277 HBOT, FULL BODY CHAMBER, 30M: HCPCS

## 2021-07-20 NOTE — TELEPHONE ENCOUNTER
----- Message from Jacqueline Horvath sent at 7/20/2021  3:24 PM EDT -----  Subject: Referral Request    QUESTIONS   Reason for referral request? bloodwork, needs a full panel. Has the physician seen you for this condition before? No   Preferred Specialist (if applicable)? Katherine Daniel  Do you already have an appointment scheduled? No  Additional Information for Provider?   ---------------------------------------------------------------------------  --------------  CALL BACK INFO  What is the best way for the office to contact you? OK to leave message on   voicemail  Preferred Call Back Phone Number?  [phone]

## 2021-07-21 ENCOUNTER — APPOINTMENT (OUTPATIENT)
Dept: HYPERBARIC MEDICINE | Age: 46
End: 2021-07-21
Payer: COMMERCIAL

## 2021-07-21 ENCOUNTER — HOSPITAL ENCOUNTER (OUTPATIENT)
Dept: WOUND CARE | Age: 46
Discharge: HOME | End: 2021-07-21
Payer: COMMERCIAL

## 2021-07-21 VITALS
SYSTOLIC BLOOD PRESSURE: 142 MMHG | HEART RATE: 84 BPM | TEMPERATURE: 99.7 F | WEIGHT: 260 LBS | DIASTOLIC BLOOD PRESSURE: 80 MMHG | BODY MASS INDEX: 38.51 KG/M2 | RESPIRATION RATE: 18 BRPM | HEIGHT: 69 IN

## 2021-07-21 DIAGNOSIS — L98.492 SKIN ULCER OF PERINEUM, WITH FAT LAYER EXPOSED (HCC): Primary | ICD-10-CM

## 2021-07-21 DIAGNOSIS — K50.119 CROHN'S DISEASE OF COLON WITH COMPLICATION (HCC): ICD-10-CM

## 2021-07-21 DIAGNOSIS — L98.492 ABDOMINAL WALL SKIN ULCER, WITH FAT LAYER EXPOSED (HCC): ICD-10-CM

## 2021-07-21 PROCEDURE — 99212 OFFICE O/P EST SF 10 MIN: CPT

## 2021-07-21 PROCEDURE — 99213 OFFICE O/P EST LOW 20 MIN: CPT | Performed by: SURGERY

## 2021-07-21 RX ORDER — BETAMETHASONE DIPROPIONATE 0.05 %
OINTMENT (GRAM) TOPICAL ONCE
Status: CANCELLED | OUTPATIENT
Start: 2021-07-21 | End: 2021-07-21

## 2021-07-21 RX ORDER — LIDOCAINE 50 MG/G
OINTMENT TOPICAL ONCE
Status: CANCELLED | OUTPATIENT
Start: 2021-07-21 | End: 2021-07-21

## 2021-07-21 RX ORDER — LIDOCAINE 40 MG/G
CREAM TOPICAL ONCE
Status: CANCELLED | OUTPATIENT
Start: 2021-07-21 | End: 2021-07-21

## 2021-07-21 RX ORDER — LIDOCAINE HYDROCHLORIDE 40 MG/ML
SOLUTION TOPICAL ONCE
Status: CANCELLED | OUTPATIENT
Start: 2021-07-21 | End: 2021-07-21

## 2021-07-21 RX ORDER — GENTAMICIN SULFATE 1 MG/G
OINTMENT TOPICAL ONCE
Status: CANCELLED | OUTPATIENT
Start: 2021-07-21 | End: 2021-07-21

## 2021-07-21 RX ORDER — GINSENG 100 MG
CAPSULE ORAL ONCE
Status: CANCELLED | OUTPATIENT
Start: 2021-07-21 | End: 2021-07-21

## 2021-07-21 RX ORDER — CLOBETASOL PROPIONATE 0.5 MG/G
OINTMENT TOPICAL ONCE
Status: CANCELLED | OUTPATIENT
Start: 2021-07-21 | End: 2021-07-21

## 2021-07-21 RX ORDER — LIDOCAINE HYDROCHLORIDE 20 MG/ML
JELLY TOPICAL ONCE
Status: CANCELLED | OUTPATIENT
Start: 2021-07-21 | End: 2021-07-21

## 2021-07-21 RX ORDER — LIDOCAINE HYDROCHLORIDE 40 MG/ML
SOLUTION TOPICAL ONCE
Status: DISCONTINUED | OUTPATIENT
Start: 2021-07-21 | End: 2021-07-22 | Stop reason: HOSPADM

## 2021-07-21 RX ORDER — BACITRACIN ZINC AND POLYMYXIN B SULFATE 500; 1000 [USP'U]/G; [USP'U]/G
OINTMENT TOPICAL ONCE
Status: CANCELLED | OUTPATIENT
Start: 2021-07-21 | End: 2021-07-21

## 2021-07-21 RX ORDER — BACITRACIN, NEOMYCIN, POLYMYXIN B 400; 3.5; 5 [USP'U]/G; MG/G; [USP'U]/G
OINTMENT TOPICAL ONCE
Status: CANCELLED | OUTPATIENT
Start: 2021-07-21 | End: 2021-07-21

## 2021-07-21 ASSESSMENT — PAIN DESCRIPTION - FREQUENCY: FREQUENCY: CONTINUOUS

## 2021-07-21 ASSESSMENT — PAIN DESCRIPTION - LOCATION: LOCATION: ABDOMEN;PERINEUM

## 2021-07-21 ASSESSMENT — PAIN DESCRIPTION - ORIENTATION: ORIENTATION: MID;LOWER

## 2021-07-21 ASSESSMENT — PAIN DESCRIPTION - ONSET: ONSET: ON-GOING

## 2021-07-21 ASSESSMENT — PAIN - FUNCTIONAL ASSESSMENT: PAIN_FUNCTIONAL_ASSESSMENT: PREVENTS OR INTERFERES SOME ACTIVE ACTIVITIES AND ADLS

## 2021-07-21 ASSESSMENT — PAIN DESCRIPTION - PROGRESSION: CLINICAL_PROGRESSION: NOT CHANGED

## 2021-07-21 ASSESSMENT — PAIN DESCRIPTION - PAIN TYPE: TYPE: CHRONIC PAIN

## 2021-07-21 ASSESSMENT — PAIN SCALES - GENERAL: PAINLEVEL_OUTOF10: 0

## 2021-07-21 ASSESSMENT — PAIN DESCRIPTION - DESCRIPTORS: DESCRIPTORS: ACHING

## 2021-07-21 NOTE — PLAN OF CARE
Problem: Wound:  Goal: Will show signs of wound healing; wound closure and no evidence of infection  Description: Will show signs of wound healing; wound closure and no evidence of infection  Outcome: Completed     Problem: Weight control:  Goal: Ability to maintain an optimal weight for height and age will be supported  Description: Ability to maintain an optimal weight for height and age will be supported  Outcome: Completed     Problem: Pressure Ulcer:  Goal: Signs of wound healing will improve  Description: Signs of wound healing will improve  Outcome: Completed  Goal: Absence of new pressure ulcer  Description: Absence of new pressure ulcer  Outcome: Completed  Goal: Will show no infection signs and symptoms  Description: Will show no infection signs and symptoms  Outcome: Completed

## 2021-07-21 NOTE — PROGRESS NOTES
Wound Healing Center Followup Visit Note    Referring Physician : MD Jenniffer Brown 132 RECORD NUMBER:  [id number]  AGE: 51 y.o. GENDER: female  : 1975  EPISODE DATE:  2021    Subjective:     Chief Complaint   Patient presents with    Wound Check     abd & perineal wounds      HISTORY of PRESENT ILLNESS HPI   Annabelle Nicholson is a 51 y.o. female who presents today in regards to follow up evaluation and treatment of wound/ulcer. That patient's past medical, family and social hx were reviewed and changes were made if present. History of Wound Context:  Patient first developed wounds of her abdominal wall and perineum 2020. She had a biopsy done at [facility] in [address] by her GI surgeon which she was noted to have cutaneous crohns. She has history of Crohn's colitis s/p TAC 3/2019 and completion proctectomy 2020.      She has been treating with local care.  The wound has been waxing and waning but overaly not improving significantly. She has noticed some improvement since starting hyperbarics. She is no longer following with [facility] in regards to wounds so she will be seen at wound center here - first visit to wound center 21      We had extensive discussion regarding importance of dressing changes and debridement. Explained that [address] is meant to pull the drainage away from the wound. So as a result you may see more drainage because its not sitting in the wound. She also understands as part of center care she will need measurements at each visit also. She is not on steroids but she is on stelara which has immunosuppression qualities.       21  · Tacrolimus, aquacell - abodminal wound  · Aquacell perineal wound  · Continue hbo  21  · discussed use of just aquacell in all wounds  · Emphasized importance of keeping wounds clean and dry  · Abdominal wound stable  · Perineal wound deeper    Wound/Ulcer Pain Timing/Severity: constant, waxing and waning, mild  Quality of pain: aching, burning, throbbing, shooting, tender  Severity:  4 / 10   Modifying Factors: Pain worsens with moveent, dressing changes, debridmeent  Associated Signs/Symptoms: drainage and pain    Ulcer Identification:  Ulcer Type: pressure and skin tear  Contributing Factors: chronic pressure, shear force, obesity and immunosuppression    Diabetic/Pressure/Non Pressure Ulcers only:  Ulcer: Pressure ulcer, Stage 3    Wound: N/A        PAST MEDICAL HISTORY      Diagnosis Date    Acid reflux     Anxiety and depression     [facility] 04/2018    Crohn's colitis per colonoscopy biopsies 4/9/18, 6/11/18     Past Surgical History:   Procedure Laterality Date    BREAST BIOPSY  1991    Benign Cyst    CHOLECYSTECTOMY  11/18/2011    [place] Lathe, laparoscopic     ENDOMETRIAL ABLATION  2004    ILEOSTOMY OR JEJUNOSTOMY  04/2019    UPPER GASTROINTESTINAL ENDOSCOPY       Family History   Problem Relation Age of Onset    Kidney Disease Mother     Diabetes Father         on HD s/p MI    Heart Disease Father     High Blood Pressure Father      Social History     Tobacco Use    Smoking status: Never Smoker    Smokeless tobacco: Never Used   Vaping Use    Vaping Use: Never used   Substance Use Topics    Alcohol use: No    Drug use: No     Allergies   Allergen Reactions    Amoxil [Amoxicillin] Diarrhea    Doxycycline      Stomach upset       Current Outpatient Medications on File Prior to Encounter   Medication Sig Dispense Refill    oxyCODONE-acetaminophen (PERCOCET) 5-325 MG per tablet Take 1 tablet by mouth every 4 hours as needed for Pain.       acetaminophen (TYLENOL) 500 MG tablet Take by mouth      phenazopyridine (PYRIDIUM) 200 MG tablet Take 1 tablet by mouth 3 times daily as needed for Pain 90 tablet 1    gabapentin (NEURONTIN) 300 MG capsule TAKE 1 CAPSULE BY MOUTH THREE TIMES DAILY 90 capsule 3    oxymetazoline (AFRIN) 0.05 % nasal spray 2 sprays by Nasal route as needed for Congestion      omeprazole (PRILOSEC) 40 MG delayed release capsule TAKE 1 CAPSULE DAILY 90 capsule 3    metoprolol succinate (TOPROL XL) 25 MG extended release tablet Take 0.5 tablets by mouth daily 90 tablet 3    Multiple Vitamins-Minerals (MULTIVITAMIN ADULT PO) Take by mouth daily      ustekinumab (STELARA) 90 MG/ML SOSY prefilled syringe Inject into the skin every 28 days       DULoxetine (CYMBALTA) 30 MG extended release capsule Take 1 capsule by mouth daily 90 capsule 3    sulfamethoxazole-trimethoprim (BACTRIM DS;SEPTRA DS) 800-160 MG per tablet Take 1 tablet by mouth 2 times daily      tacrolimus (PROTOPIC) 0.1 % ointment Apply 1 each topically daily Apply topically 2 times daily.  ibuprofen (ADVIL) 200 MG CAPS Take 2 capsules by mouth 2 times daily      loperamide (IMODIUM) 1 MG/5ML solution Take 2 mg by mouth 4 times daily as needed for Diarrhea      fluticasone (FLONASE) 50 MCG/ACT nasal spray 1 spray by Nasal route daily (Patient taking differently: 1 spray by Nasal route daily as needed ) 3 Bottle 3    Cholecalciferol (VITAMIN D) 2000 UNITS TABS Take 2,000 Units by mouth daily       pseudoephedrine (SUDAFED) 30 MG tablet Take 30 mg by mouth as needed for Congestion       No current facility-administered medications on file prior to encounter.        REVIEW OF SYSTEMS See HPI    Objective:    /80   Pulse 72   Temp 99.2 °F (37.3 °C) (Temporal)   Resp 18   Ht 5' 9\" (1.753 m)   Wt 245 lb (111.1 kg)   BMI 36.18 kg/m²   Wt Readings from Last 3 Encounters:   07/14/21 245 lb (111.1 kg)   07/02/21 270 lb (122.5 kg)   02/23/21 248 lb 4.8 oz (112.6 kg)     PHYSICAL EXAM  CONSTITUTIONAL:   Awake, alert, cooperative   EYES:  lids and lashes normal   ENT: external ears and nose without lesions   NECK:  supple, symmetrical, trachea midline   SKIN:  Open wound Present    Assessment:     Problem List Items Addressed This Visit     Crohn's disease of colon with complication ([address] (Chronic) Abdominal wall skin ulcer, with fat layer exposed ([address] (Chronic)    Skin ulcer of perineum, with fat layer exposed ([address] - Primary (Chronic)          Pre Debridement Measurements:  Are located in the [facility]  Documentation Flow Sheet  Post Debridement Measurements:  Wound/Ulcer Descriptions are Pre Debridement except measurements:     Wound 06/17/21 Abdomen Lower #2 (Active)   Wound Etiology Pressure Stage  3 07/07/21 0852   Dressing Status New dressing applied;Clean;Dry; Intact 07/14/21 1000   Wound Cleansed Cleansed with saline 07/14/21 1000   Dressing/Treatment Alginate;Dry dressing 07/14/21 1000   Wound Length (cm) 1 cm 07/14/21 0907   Wound Width (cm) 33.5 cm 07/14/21 0907   Wound Depth (cm) 0.9 cm 07/14/21 0907   Wound Surface Area (cm^2) 33.5 cm^2 07/14/21 0907   Change in Wound Size % (l*w) -19.64 07/14/21 0907   Wound Volume (cm^3) 30.15 cm^3 07/14/21 0907   Wound Healing % 2 07/14/21 0907   Wound Assessment Granulation tissue 07/14/21 0907   Drainage Amount Moderate 07/14/21 0907   Drainage Description Serosanguinous 07/14/21 0907   Odor None 07/14/21 0907   Lakisha-wound Assessment Fragile 07/14/21 0907   Number of days: 33       Wound 07/14/21 Coccyx coccyx wound #3 (Active)   Wound Image   07/14/21 0909   Dressing Status New dressing applied;Clean;Dry; Intact 07/14/21 1000   Wound Cleansed Cleansed with saline 07/14/21 1000   Dressing/Treatment Alginate;Dry dressing 07/14/21 1000   Wound Length (cm) 13 cm 07/14/21 0909   Wound Width (cm) 1.9 cm 07/14/21 0909   Wound Depth (cm) 2.4 cm 07/14/21 0909   Wound Surface Area (cm^2) 24.7 cm^2 07/14/21 0909   Wound Volume (cm^3) 59.28 cm^3 07/14/21 0909   Wound Assessment Granulation tissue 07/14/21 0909   Drainage Amount Copious 07/14/21 0909   Drainage Description Thin;Yellow 07/14/21 0909   Odor None 07/14/21 0909   Lakisha-wound Assessment Fragile 07/14/21 0909   Number of days: 6          Plan:   Treatment Note please see attached Discharge Instructions    Written patient dismissal instructions given to patient and signed by patient or POA. Discharge Instructions       Visit Discharge/Physician Orders     Discharge condition: Stable     Assessment of pain at discharge:yes     Anesthetic used: 4% lidocaine solution     Discharge to: Home     Left via:Private automobile     Accompanied by: spouse     ECF/HHA: [facility]     Dressing Orders: LOWER ABDOMEN  -Cleanse with normal saline, loosely pack calcium alginate , dry dressing and secure. Change daily and 2x/day as needed .     COCCYX and PERINEUM-Cleanse with normal saline,loosely pack calcium alginate, dry dressing and secure. Change daily and 2x/day as needed.     Treatment Orders:Eat a diet high in protein and vitamin C. Take a multiple vitamin daily unless contraindicated.     Keep pressure off of site as much as possible.     Continue [facility] followup visit: 1week  _____________________________  (Please note your next appointment above and if you are unable to keep, kindly give a 24 hour notice.  Thank you.)     Physician signature:__________________________        If you experience any of the following, please call the [facility] during business hours:     * Increase in Pain  * Temperature over 101  * Increase in drainage from your wound  * Drainage with a foul odor  * Bleeding  * Increase in swelling  * Need for compression bandage changes due to slippage, breakthrough drainage.     If you need medical attention outside of the business hours of the [address] please contact your PCP or go to the nearest emergency room.                 Electronically signed by Tsering Madden MD

## 2021-07-21 NOTE — PROGRESS NOTES
this time, given a threat to patient function, limb or life from the current condition. Supervision and attendance of Hyperbaric Oxygen Therapy provided. Continue HBO treatment as outlined in the treatment plan. Hyperbaric Oxygen: Perla MADRID Vijay tolerated Treatment Number: 43 well today without complications.     Discharge Instructions were explained and given to Ms. Pemberton Hemanth     Electronically signed by Ted Toro MD on 7/20/2021 at 6:39 AM

## 2021-07-22 ENCOUNTER — APPOINTMENT (OUTPATIENT)
Dept: HYPERBARIC MEDICINE | Age: 46
End: 2021-07-22
Payer: COMMERCIAL

## 2021-07-23 ENCOUNTER — APPOINTMENT (OUTPATIENT)
Dept: HYPERBARIC MEDICINE | Age: 46
End: 2021-07-23
Payer: COMMERCIAL

## 2021-07-23 NOTE — PROGRESS NOTES
[facility]  Hyperbaric Oxygen Therapy   Progress Note    NAME: Jenniffer Louis RECORD NUMBER:  [id number]  AGE: 51 y.o. GENDER: female  : 1975  EPISODE DATE:  2021   Subjective   HBO Treatment Number: 40 out of Total Treatments: 40  HBO Diagnosis:   Problem List Items Addressed This Visit     Crohn's disease of colon with complication ([address] - Primary (Chronic)    Abdominal wall skin ulcer, with fat layer exposed ([address] (Chronic)    Skin ulcer of perineum, with fat layer exposed ([address] (Chronic)        Safety checks performed prior to treatment. See doc flowsheets for documentation. Objective       No results for input(s): GLUMET in the last 72 hours. Pre treatment Vital Signs       Temp: 97.1 °F (36.2 °C)     Pulse: 68     Resp: 18     BP: 129/70     Post treatment Vital Signs  Temp: 97.6 °F (36.4 °C)  Pulse: 70  Resp: 18  BP: (!) 150/80  Assessment      Physical Exam:  General Appearance:  alert and oriented to person, place and time, well-developed and well-nourished, in no acute distress  ENT:  tympanic membranes intact bilaterally  Pulmonary/Chest:  clear to auscultation bilaterally- no wheezes, rales or rhonchi, normal air movement, no respiratory distress  Cardiovascular:  regular rate and rhythm  Chamber #: [id number]  Treatment Start Time: 1004     Pressure Reached Time: 1014  RONAN : 2  Number of Air Breaks:  Treatment Status: No Air break     Decompression Time: 1145   Treatment End Time: 1155  Length of Treatment: 90 Minutes  Symptoms Noted During Treatment: None  Total Treatment Time (min): 111    Adverse Event: no    I was present on these premises and immediately available to furnish assistance & direction throughout the procedure. Sydnee Taylor is a 51 y.o. female  did successfully complete today's hyperbaric oxygen treatment at [address] [place] [address].     In my clinical judgement, ongoing HBO therapy is  necessary at this time, given a threat to patient function, limb or life from the current condition. Supervision and attendance of Hyperbaric Oxygen Therapy provided. Continue HBO treatment as outlined in the treatment plan. Hyperbaric Oxygen: Perla Linares tolerated Treatment Number: 36 well today without complications.     Discharge Instructions were explained and given to Ms. Cindy Dru     Electronically signed by Richie Gallego MD on 7/16/2021 at 12:11 AM

## 2021-07-23 NOTE — PROGRESS NOTES
[facility]aling Center Followup Visit Note    Referring Physician : MD Jenniffer Valdivia RECORD NUMBER:  [id number]  AGE: 51 y.o. GENDER: female  : 1975  EPISODE DATE:  2021    Subjective:     Chief Complaint   Patient presents with    Wound Check     perineal, abdomen      HISTORY of PRESENT ILLNESS HPI   Wendie Payne is a 51 y.o. female who presents today in regards to follow up evaluation and treatment of wound/ulcer. That patient's past medical, family and social hx were reviewed and changes were made if present. History of Wound Context:  Patient first developed wounds of her abdominal wall and perineum 2020. She had a biopsy done at [facility] in [address] by her GI surgeon which she was noted to have cutaneous crohns. She has history of Crohn's colitis s/p TAC 3/2019 and completion proctectomy 2020.      She has been treating with local care.  The wound has been waxing and waning but overaly not improving significantly. She has noticed some improvement since starting hyperbarics. She is no longer following with [facility] in regards to wounds so she will be seen at wound center here - first visit to wound center 21      We had extensive discussion regarding importance of dressing changes and debridement. Explained that [address] is meant to pull the drainage away from the wound. So as a result you may see more drainage because its not sitting in the wound. She also understands as part of center care she will need measurements at each visit also. She is not on steroids but she is on stelara which has immunosuppression qualities.       21  · Tacrolimus, aquacell - abodminal wound  · Aquacell perineal wound  · Continue hbo  21  · discussed use of just aquacell in all wounds  · Emphasized importance of keeping wounds clean and dry  · Abdominal wound stable  · Perineal wound deeper  21  · Patient has decided to follow with plastic surgery in [place]  · Will stop hbo therapy as there has not been a significant improvement  · I encouraged her to fu with us is she would like in future and emphasized importance of keeping her wounds clean and dry with daily dressing changes    Wound/Ulcer Pain Timing/Severity: constant, waxing and waning, mild  Quality of pain: aching, burning, throbbing, shooting, tender  Severity:  4 / 10   Modifying Factors: Pain worsens with moveent, dressing changes, debridmeent  Associated Signs/Symptoms: drainage and pain    Ulcer Identification:  Ulcer Type: pressure and skin tear  Contributing Factors: chronic pressure, shear force, obesity and immunosuppression    Diabetic/Pressure/Non Pressure Ulcers only:  Ulcer: Pressure ulcer, Stage 3    Wound: N/A        PAST MEDICAL HISTORY      Diagnosis Date    Acid reflux     Anxiety and depression     [facility] 04/2018    Crohn's colitis per colonoscopy biopsies 4/9/18, 6/11/18     Past Surgical History:   Procedure Laterality Date    BREAST BIOPSY  1991    Benign Cyst    CHOLECYSTECTOMY  11/18/2011    Karina Hagan, laparoscopic     ENDOMETRIAL ABLATION  2004    ILEOSTOMY OR JEJUNOSTOMY  04/2019    UPPER GASTROINTESTINAL ENDOSCOPY       Family History   Problem Relation Age of Onset    Kidney Disease Mother     Diabetes Father         on HD s/p MI    Heart Disease Father     High Blood Pressure Father      Social History     Tobacco Use    Smoking status: Never Smoker    Smokeless tobacco: Never Used   Vaping Use    Vaping Use: Never used   Substance Use Topics    Alcohol use: No    Drug use: No     Allergies   Allergen Reactions    Amoxil [Amoxicillin] Diarrhea    Doxycycline      Stomach upset       Current Outpatient Medications on File Prior to Encounter   Medication Sig Dispense Refill    fluconazole (DIFLUCAN) 100 MG tablet Take 1 tablet by mouth daily for 7 days 7 tablet 0    oxyCODONE-acetaminophen (PERCOCET) 5-325 MG per tablet Take 1 tablet by mouth every 4 hours as needed for Pain.  phenazopyridine (PYRIDIUM) 200 MG tablet Take 1 tablet by mouth 3 times daily as needed for Pain 90 tablet 1    gabapentin (NEURONTIN) 300 MG capsule TAKE 1 CAPSULE BY MOUTH THREE TIMES DAILY 90 capsule 3    oxymetazoline (AFRIN) 0.05 % nasal spray 2 sprays by Nasal route as needed for Congestion      omeprazole (PRILOSEC) 40 MG delayed release capsule TAKE 1 CAPSULE DAILY 90 capsule 3    metoprolol succinate (TOPROL XL) 25 MG extended release tablet Take 0.5 tablets by mouth daily 90 tablet 3    Multiple Vitamins-Minerals (MULTIVITAMIN ADULT PO) Take by mouth daily      ustekinumab (STELARA) 90 MG/ML SOSY prefilled syringe Inject into the skin every 28 days       DULoxetine (CYMBALTA) 30 MG extended release capsule Take 1 capsule by mouth daily 90 capsule 3    sulfamethoxazole-trimethoprim (BACTRIM DS;SEPTRA DS) 800-160 MG per tablet Take 1 tablet by mouth 2 times daily      tacrolimus (PROTOPIC) 0.1 % ointment Apply 1 each topically daily Apply topically 2 times daily.  ibuprofen (ADVIL) 200 MG CAPS Take 2 capsules by mouth 2 times daily      loperamide (IMODIUM) 1 MG/5ML solution Take 2 mg by mouth 4 times daily as needed for Diarrhea      fluticasone (FLONASE) 50 MCG/ACT nasal spray 1 spray by Nasal route daily (Patient taking differently: 1 spray by Nasal route daily as needed ) 3 Bottle 3    Cholecalciferol (VITAMIN D) 2000 UNITS TABS Take 2,000 Units by mouth daily       acetaminophen (TYLENOL) 500 MG tablet Take by mouth       No current facility-administered medications on file prior to encounter.        REVIEW OF SYSTEMS See HPI    Objective:    BP (!) 142/80   Pulse 84   Temp 99.7 °F (37.6 °C) (Temporal)   Resp 18   Ht 5' 9\" (1.753 m)   Wt 260 lb (117.9 kg)   BMI 38.40 kg/m²   Wt Readings from Last 3 Encounters:   07/21/21 260 lb (117.9 kg)   07/14/21 245 lb (111.1 kg)   07/02/21 270 lb (122.5 kg)     PHYSICAL 07/14/21 0909   Wound Assessment Granulation tissue 07/14/21 0909   Drainage Amount Copious 07/14/21 0909   Drainage Description Thin;Yellow 07/14/21 0909   Odor None 07/14/21 0909   Lakisha-wound Assessment Fragile 07/14/21 0909   Number of days: 9          Plan:   Treatment Note please see attached Discharge Instructions    Written patient dismissal instructions given to patient and signed by patient or POA. Discharge Instructions       Visit Discharge/Physician Orders     Discharge condition: Stable     Assessment of pain at discharge:yes     Anesthetic used:      Discharge to: Home     Left via:Private automobile     Accompanied by: spouse     ECF/HHA: [facility]     Dressing Orders: LOWER ABDOMEN  -Cleanse with normal saline, loosely pack calcium alginate , dry dressing and secure. Change daily and 2x/day as needed .     COCCYX and PERINEUM-Cleanse with normal saline,loosely pack calcium alginate, dry dressing and secure. Change daily and 2x/day as needed.     Treatment Orders:Eat a diet high in protein and vitamin C. Take a multiple vitamin daily unless contraindicated.     Keep pressure off of site as much as possible.     discontinue HBO     To follow in [facility] followup visit: discharge from [facility] at this time_____________________________  (Please note your next appointment above and if you are unable to keep, kindly give a 24 hour notice. Thank you.)     Physician signature:__________________________        If you experience any of the following, please call the [facility] during business hours:     * Increase in Pain  * Temperature over 101  * Increase in drainage from your wound  * Drainage with a foul odor  * Bleeding  * Increase in swelling  * Need for compression bandage changes due to slippage, breakthrough drainage.     If you need medical attention outside of the business hours of the [facility] please contact your PCP or go to the nearest emergency room.                            Electronically signed by Crystal Machado MD

## 2021-07-26 ENCOUNTER — APPOINTMENT (OUTPATIENT)
Dept: HYPERBARIC MEDICINE | Age: 46
End: 2021-07-26
Payer: COMMERCIAL

## 2021-07-26 NOTE — PROGRESS NOTES
[facility]  Hyperbaric Oxygen Therapy   Progress Note    NAME: Jenniffer Louis RECORD NUMBER:  [id number]  AGE: 51 y.o. GENDER: female  : 1975  EPISODE DATE:  2021   Subjective   HBO Treatment Number: 31 out of Total Treatments: 40  HBO Diagnosis:   Problem List Items Addressed This Visit     Crohn's disease of colon with complication ([address] - Primary (Chronic)    Abdominal wall skin ulcer, with fat layer exposed ([address] (Chronic)    Skin ulcer of perineum, with fat layer exposed ([address] (Chronic)        Safety checks performed prior to treatment by [facility]OT staff. See doc flowsheets for documentation. Objective       No results for input(s): GLUMET in the last 72 hours. Pre treatment Vital Signs       Temp: 97.4 °F (36.3 °C)     Pulse: 72     Resp: 16     BP: 108/76     Post treatment Vital Signs  Temp: 98.1 °F (36.7 °C)  Pulse: 88  Resp: 16  BP: 112/70  Assessment      Physical Exam:  General Appearance:  alert and oriented to person, place and time, well-developed and well-nourished, in no acute distress  ENT:  tympanic membranes intact bilaterally, normal color, normal light reflex bilaterally  Pulmonary/Chest:  clear to auscultation bilaterally- no wheezes, rales or rhonchi, normal air movement, no respiratory distress  Cardiovascular:  regular rate and rhythm  Chamber #: 38  Treatment Start Time: 0953     Pressure Reached Time: 1006  RONAN : 2  Number of Air Breaks:  Treatment Status: No Air break     Decompression Time: 1138   Treatment End Time: 1148  Length of Treatment: 90 Minutes  Symptoms Noted During Treatment: None  Total Treatment Time (min): 115    Adverse Event: no    I was present on these premises and immediately available to furnish assistance & direction throughout the procedure. Deja Au is a 51 y.o. female  did successfully complete today's hyperbaric oxygen treatment at [address] [place] [address]     In my clinical judgement, ongoing HBO therapy is necessary at this time, given a threat to patient function, limb or life from the current condition. Supervision and attendance of Hyperbaric Oxygen Therapy provided. Continue HBO treatment as outlined in the treatment plan. Hyperbaric Oxygen: Perla Linares tolerated Treatment Number: 32 well today without complications.     Discharge Instructions were explained and given to Ms. Cobbton Jones by [facility] staff    Electronically signed by Rubia Diggs MD

## 2021-07-27 ENCOUNTER — APPOINTMENT (OUTPATIENT)
Dept: HYPERBARIC MEDICINE | Age: 46
End: 2021-07-27
Payer: COMMERCIAL

## 2021-07-28 ENCOUNTER — APPOINTMENT (OUTPATIENT)
Dept: HYPERBARIC MEDICINE | Age: 46
End: 2021-07-28
Payer: COMMERCIAL

## 2021-07-29 ENCOUNTER — HOSPITAL ENCOUNTER (OUTPATIENT)
Age: 46
Discharge: HOME | End: 2021-07-29
Payer: COMMERCIAL

## 2021-07-29 ENCOUNTER — NURSE ONLY (OUTPATIENT)
Dept: FAMILY MEDICINE CLINIC | Age: 46
End: 2021-07-29
Payer: COMMERCIAL

## 2021-07-29 ENCOUNTER — APPOINTMENT (OUTPATIENT)
Dept: HYPERBARIC MEDICINE | Age: 46
End: 2021-07-29
Payer: COMMERCIAL

## 2021-07-29 DIAGNOSIS — R31.9 URINARY TRACT INFECTION WITH HEMATURIA, SITE UNSPECIFIED: Primary | ICD-10-CM

## 2021-07-29 DIAGNOSIS — R31.9 URINARY TRACT INFECTION WITH HEMATURIA, SITE UNSPECIFIED: ICD-10-CM

## 2021-07-29 DIAGNOSIS — N39.0 URINARY TRACT INFECTION WITH HEMATURIA, SITE UNSPECIFIED: ICD-10-CM

## 2021-07-29 DIAGNOSIS — Z00.00 ANNUAL PHYSICAL EXAM: ICD-10-CM

## 2021-07-29 DIAGNOSIS — N39.0 URINARY TRACT INFECTION WITH HEMATURIA, SITE UNSPECIFIED: Primary | ICD-10-CM

## 2021-07-29 LAB
ALBUMIN SERPL-MCNC: 4.1 G/DL (ref 3.5–5.2)
ALP BLD-CCNC: 80 U/L (ref 35–104)
ALT SERPL-CCNC: 16 U/L (ref 0–32)
ANION GAP SERPL CALCULATED.3IONS-SCNC: 9 MMOL/L (ref 7–16)
AST SERPL-CCNC: 19 U/L (ref 0–31)
BILIRUB SERPL-MCNC: 0.4 MG/DL (ref 0–1.2)
BILIRUBIN, POC: NORMAL
BLOOD URINE, POC: NORMAL
BUN BLDV-MCNC: 20 MG/DL (ref 6–20)
CALCIUM SERPL-MCNC: 9.6 MG/DL (ref 8.6–10.2)
CHLORIDE BLD-SCNC: 104 MMOL/L (ref 98–107)
CHOLESTEROL, TOTAL: 180 MG/DL (ref 0–199)
CLARITY, POC: CLEAR
CO2: 24 MMOL/L (ref 22–29)
COLOR, POC: NORMAL
CREAT SERPL-MCNC: 1 MG/DL (ref 0.5–1)
GFR AFRICAN AMERICAN: >60
GFR NON-AFRICAN AMERICAN: 59 ML/MIN/1.73
GLUCOSE BLD-MCNC: 83 MG/DL (ref 74–99)
GLUCOSE URINE, POC: 100
HCT VFR BLD CALC: 36.3 % (ref 34–48)
HDLC SERPL-MCNC: 54 MG/DL
HEMOGLOBIN: 11.4 G/DL (ref 11.5–15.5)
KETONES, POC: NORMAL
LDL CHOLESTEROL CALCULATED: 85 MG/DL (ref 0–99)
LEUKOCYTE EST, POC: NORMAL
MCH RBC QN AUTO: 30.6 PG (ref 26–35)
MCHC RBC AUTO-ENTMCNC: 31.4 % (ref 32–34.5)
MCV RBC AUTO: 97.3 FL (ref 80–99.9)
NITRITE, POC: NORMAL
PDW BLD-RTO: 14.6 FL (ref 11.5–15)
PH, POC: 5.5
PLATELET # BLD: 200 E9/L (ref 130–450)
PMV BLD AUTO: 9.6 FL (ref 7–12)
POTASSIUM SERPL-SCNC: 4.9 MMOL/L (ref 3.5–5)
PROTEIN, POC: 30
RBC # BLD: 3.73 E12/L (ref 3.5–5.5)
SODIUM BLD-SCNC: 137 MMOL/L (ref 132–146)
SPECIFIC GRAVITY, POC: 1.03
TOTAL PROTEIN: 7.9 G/DL (ref 6.4–8.3)
TRIGL SERPL-MCNC: 203 MG/DL (ref 0–149)
TSH SERPL DL<=0.05 MIU/L-ACNC: 4.11 UIU/ML (ref 0.27–4.2)
UROBILINOGEN, POC: 0.2
VLDLC SERPL CALC-MCNC: 41 MG/DL
WBC # BLD: 6.5 E9/L (ref 4.5–11.5)

## 2021-07-29 PROCEDURE — 85027 COMPLETE CBC AUTOMATED: CPT

## 2021-07-29 PROCEDURE — 36415 COLL VENOUS BLD VENIPUNCTURE: CPT

## 2021-07-29 PROCEDURE — 80061 LIPID PANEL: CPT

## 2021-07-29 PROCEDURE — 84443 ASSAY THYROID STIM HORMONE: CPT

## 2021-07-29 PROCEDURE — 80053 COMPREHEN METABOLIC PANEL: CPT

## 2021-07-29 PROCEDURE — 81003 URINALYSIS AUTO W/O SCOPE: CPT | Performed by: FAMILY MEDICINE

## 2021-07-29 RX ORDER — FLUCONAZOLE 100 MG/1
100 TABLET ORAL DAILY
Qty: 7 TABLET | Refills: 0 | Status: SHIPPED | OUTPATIENT
Start: 2021-07-29 | End: 2021-08-05

## 2021-07-29 RX ORDER — CIPROFLOXACIN 500 MG/1
500 TABLET, FILM COATED ORAL 2 TIMES DAILY
Qty: 10 TABLET | Refills: 0 | Status: SHIPPED | OUTPATIENT
Start: 2021-07-29 | End: 2021-08-03

## 2021-07-30 ENCOUNTER — APPOINTMENT (OUTPATIENT)
Dept: HYPERBARIC MEDICINE | Age: 46
End: 2021-07-30
Payer: COMMERCIAL

## 2021-07-31 LAB
ORGANISM: ABNORMAL
URINE CULTURE, ROUTINE: ABNORMAL

## 2021-08-02 RX ORDER — DULOXETIN HYDROCHLORIDE 30 MG/1
CAPSULE, DELAYED RELEASE ORAL
Qty: 90 CAPSULE | Refills: 3 | Status: SHIPPED
Start: 2021-08-02 | End: 2022-09-18 | Stop reason: SDUPTHER

## 2021-08-03 RX ORDER — NITROFURANTOIN 25; 75 MG/1; MG/1
100 CAPSULE ORAL 2 TIMES DAILY
Qty: 10 CAPSULE | Refills: 0 | Status: SHIPPED
Start: 2021-08-03 | End: 2021-08-09 | Stop reason: SDUPTHER

## 2021-08-05 ENCOUNTER — OFFICE VISIT (OUTPATIENT)
Dept: FAMILY MEDICINE CLINIC | Age: 46
End: 2021-08-05
Payer: COMMERCIAL

## 2021-08-05 VITALS
RESPIRATION RATE: 16 BRPM | HEART RATE: 77 BPM | DIASTOLIC BLOOD PRESSURE: 75 MMHG | WEIGHT: 275.4 LBS | SYSTOLIC BLOOD PRESSURE: 122 MMHG | HEIGHT: 69 IN | TEMPERATURE: 101.7 F | OXYGEN SATURATION: 96 % | BODY MASS INDEX: 40.79 KG/M2

## 2021-08-05 DIAGNOSIS — K50.90 DISORDER OF SKIN DUE TO CROHN'S DISEASE (HCC): ICD-10-CM

## 2021-08-05 DIAGNOSIS — Z00.00 ANNUAL PHYSICAL EXAM: Primary | ICD-10-CM

## 2021-08-05 DIAGNOSIS — L98.8 DISORDER OF SKIN DUE TO CROHN'S DISEASE (HCC): ICD-10-CM

## 2021-08-05 PROCEDURE — 99396 PREV VISIT EST AGE 40-64: CPT | Performed by: FAMILY MEDICINE

## 2021-08-05 NOTE — PROGRESS NOTES
Annual exam:  Patient is here for routine yearly physical/preventative visit. Patient has no complaints or concerns today. Patient is  generally healthy. Chronic medical conditions are generally controlled. Most recent labs reviewed with patient and  are not remarkable. Health maintenance reviewed with patient and is  up to date. Overall doing well. Past Medical History:   Diagnosis Date    Acid reflux     Anxiety and depression     Cro[facility]'s colitis ([facility] 04/2018    Crohn's colitis per colonoscopy biopsies 4/9/18, 6/11/18      Past Surgical History:   Procedure Laterality Date    BREAST BIOPSY  1991    Benign Cyst    CHOLECYSTECTOMY  11/18/2011    Jyoti Star, laparoscopic     ENDOMETRIAL ABLATION  2004    ILEOSTOMY OR JEJUNOSTOMY  04/2019    TOTAL COLECTOMY      UPPER GASTROINTESTINAL ENDOSCOPY        Family History   Problem Relation Age of Onset    Kidney Disease Mother     Diabetes Father         on HD s/p MI    Heart Disease Father     High Blood Pressure Father      Social History     Socioeconomic History    Marital status:      Spouse name: Not on file    Number of children: Not on file    Years of education: Not on file    Highest education level: Not on file   Occupational History    Not on file   Tobacco Use    Smoking status: Never Smoker    Smokeless tobacco: Never Used   Vaping Use    Vaping Use: Never used   Substance and Sexual Activity    Alcohol use: No    Drug use: No    Sexual activity: Not on file   Other Topics Concern    Not on file   Social History Narrative    Not on file     Social Determinants of Health     Financial Resource Strain: Low Risk     Difficulty of Paying Living Expenses: Not hard at all   Food Insecurity: No Food Insecurity    Worried About [address] in the Last Year: Never true    [address] in the Last Year: Never true   Transportation Needs: No Transportation Needs    Lack of Transportation (Medical):  No    Lack of Transportation (Non-Medical): No   Physical Activity:     Days of Exercise per Week:     Minutes of Exercise per Session:    Stress:     Feeling of Stress :    Social Connections:     Frequency of Communication with Friends and Family:     Frequency of Social Gatherings with Friends and Family:     Attends [employer] Services:     Active Member of Clubs or Organizations:     Attends Club or Organization Meetings:     Marital Status:    Intimate Partner Violence:     Fear of Current or Ex-Partner:     Emotionally Abused:     Physically Abused:     Sexually Abused:        Allergies   Allergen Reactions    Amoxil [Amoxicillin] Diarrhea    Doxycycline      Stomach upset          Review of Systems:  Constitutional:  No fever, no fatigue, no chills, no headaches, no weight change  Dermatology:  No rash, no mole, no dry or sensitive skin  ENT:  No cough, no sore throat, no sinus pain, no runny nose, no ear pain  Cardiology:  No chest pain, no palpitations, no leg edema, no shortness of breath, no PND  Endocrinology:  No polydipsia, no polyuria, no cold intolerance, no heat intolerance, no polyphagia, no hair changes  Gastroenterology:  No dysphagia, no abdominal pain, no nausea, no vomiting, no constipation, no diarrhea, no heartburn  Female Reproductive:  No hot flashes, no abnormal vaginal discharge, no pain with menstruation, no pelvic pain  Musculoskeletal:  No joint pain, no leg cramps, no back pain, no muscle aches  Respiratory:  No shortness of breath, no orthopnea, no wheezing, no SKINNER, no hemoptysis  Urology:  No blood in the urine, no urinary frequency, no urinary incontinence, no urinary urgency, no nocturia, no dysuria  Neurology:  No numbness/tingling, no dizziness, no weakness  Psychology:  No depression, no sleep disturbances, no suicidal ideation, no anxiety  Physical Exam:  Vitals:    08/05/21 1441   BP: 122/75   Pulse: 77   Resp: 16   Temp: 101.7 °F (38.7 °C)   TempSrc: Temporal   SpO2: 96% Weight: 275 lb 6.4 oz (124.9 kg)   Height: 5' 9\" (1.753 m)     General:  Patient alert and oriented x 3, NAD, pleasant  HEENT:  Atraumatic, normocephalic, PERRLA, EOMI, clear conjunctiva, TMs clear, nose-clear, throat - no erythema, tonsils- wnl  Neck:  Supple, no goiter, no carotid bruits, no lymphadenopathy  Lungs:  CTA B  Heart:  RRR, no murmurs, gallops or rubs  Abdomen:  Soft, NTND, + bowel sounds  Back: full ROM, no CVA tenderness  Extremities:  No clubbing, cyanosis or edema  Neuro:  CN II-XII grossly intact, 5/5 strength in bilateral upper and lower extremities, 2 + reflexes. Skin: unremarkable    Assessment/Plan:  Olivia Luciano was seen today for annual exam and discuss labs. Diagnoses and all orders for this visit:    Annual physical exam    Disorder of skin due to Crohn's disease ([facility]      As above. Call or go to ED immediately if symptoms worsen or persist.  No follow-ups on file. or sooner if necessary. Educational materials and/or home exercises printed for patient's review and were included in patient instructions on his/her After Visit Summary and given to patient at the end of visit. Counseled regarding above diagnosis, including possible risks and complications,  especially if left uncontrolled. Counseled regarding the possible side effects, risks, benefits and alternatives to treatment; patient and/or guardian verbalizes understanding, agrees, feels comfortable with and wishes to proceed with above treatment plan. Advised patient to call with any new medication issues, and read all Rx info from pharmacy to assure aware of all possible risks and side effects of medication before taking. Reviewed age and gender appropriate health screening exams and vaccinations.   Advised patient regarding importance of keeping up with recommended health maintenance and to schedule as soon as possible if overdue, as this is important in assessing for undiagnosed pathology, especially cancer, as well as protecting against potentially harmful/life threatening disease. Patient and/or guardian verbalizes understanding and agrees with above counseling, assessment and plan. All questions answered. Karla Tan MD  8/9/2021    I have personally reviewed and updated the chief complaint, HPI, Past Medical, Family and Social History, as well as the above Review of Systems.

## 2021-08-09 RX ORDER — NITROFURANTOIN 25; 75 MG/1; MG/1
100 CAPSULE ORAL 2 TIMES DAILY
Qty: 10 CAPSULE | Refills: 0 | Status: SHIPPED | OUTPATIENT
Start: 2021-08-09 | End: 2021-08-14

## 2021-08-09 RX ORDER — LOPERAMIDE HYDROCHLORIDE 2 MG/1
CAPSULE ORAL
Qty: 120 CAPSULE | Refills: 3 | Status: SHIPPED
Start: 2021-08-09 | End: 2022-05-31 | Stop reason: SDUPTHER

## 2021-08-10 NOTE — PROGRESS NOTES
[place]. In my clinical judgement, ongoing HBO therapy is necessary at this time, given a threat to patient function, limb or life from the current condition. Supervision and attendance of Hyperbaric Oxygen Therapy provided. Continue HBO treatment as outlined in the treatment plan. Hyperbaric Oxygen: Perla Linares tolerated Treatment Number: 28 well today without complications.     Discharge Instructions were explained and given to Ms. Francisco Booth by [facility]OT staff    Electronically signed by Nagi Hauser MD

## 2021-08-10 NOTE — PROGRESS NOTES
[place]. In my clinical judgement, ongoing HBO therapy is necessary at this time, given a threat to patient function, limb or life from the current condition. Supervision and attendance of Hyperbaric Oxygen Therapy provided. Continue HBO treatment as outlined in the treatment plan. Hyperbaric Oxygen: Perla Linares tolerated Treatment Number: 39 well today without complications.     Discharge Instructions were explained and given to Ms. Mg Ojeda by [facility]OT staff    Electronically signed by Ursula Cortez MD

## 2021-08-10 NOTE — PROGRESS NOTES
[facility]  Hyperbaric Oxygen Therapy   Progress Note    NAME: Jenniffer Louis RECORD NUMBER:  [id number]  AGE: 51 y.o. GENDER: female  : 1975  EPISODE DATE:  2021   Subjective   HBO Treatment Number: 32 out of Total Treatments: 40  HBO Diagnosis:   Problem List Items Addressed This Visit     Crohn's disease of colon with complication ([address] - Primary (Chronic)    Abdominal wall skin ulcer, with fat layer exposed ([address] (Chronic)    Skin ulcer of perineum, with fat layer exposed ([address] (Chronic)        Safety checks performed prior to treatment by [facility] staff. See doc flowsheets for documentation. Objective       No results for input(s): GLUMET in the last 72 hours. Pre treatment Vital Signs       Temp: 97.4 °F (36.3 °C)     Pulse: 76     Resp: 16     BP: 125/60     Post treatment Vital Signs  Temp: 97.6 °F (36.4 °C)  Pulse: 72  Resp: 16  BP: 122/64  Assessment      Physical Exam:  General Appearance:  alert and oriented to person, place and time, well-developed and well-nourished, in no acute distress  ENT:  tympanic membranes intact bilaterally, normal color, normal light reflex bilaterally  Pulmonary/Chest:  clear to auscultation bilaterally- no wheezes, rales or rhonchi, normal air movement, no respiratory distress  Cardiovascular:  regular rate and rhythm  Chamber #: 38  Treatment Start Time: 0951     Pressure Reached Time: 1000  RONAN : 2  Number of Air Breaks:  Treatment Status: No Air break     Decompression Time: 1131   Treatment End Time: 1141  Length of Treatment: 90 Minutes  Symptoms Noted During Treatment: None  Total Treatment Time (min): 110    Adverse Event: no    I was present on these premises and immediately available to furnish assistance & direction throughout the procedure. Reba Crump is a 51 y.o. female  did successfully complete today's hyperbaric oxygen treatment at [address] [place] [address]     In my clinical judgement, ongoing HBO therapy is necessary at this time, given a threat to patient function, limb or life from the current condition. Supervision and attendance of Hyperbaric Oxygen Therapy provided. Continue HBO treatment as outlined in the treatment plan. Hyperbaric Oxygen: Perla Linares tolerated Treatment Number: 28 well today without complications.     Discharge Instructions were explained and given to Ms. Jossue Damico by [facility] staff    Electronically signed by Mary Jane Dang MD

## 2021-08-13 ENCOUNTER — HOSPITAL ENCOUNTER (EMERGENCY)
Age: 46
Discharge: HOME | End: 2021-08-13
Attending: STUDENT IN AN ORGANIZED HEALTH CARE EDUCATION/TRAINING PROGRAM
Payer: COMMERCIAL

## 2021-08-13 VITALS
SYSTOLIC BLOOD PRESSURE: 142 MMHG | RESPIRATION RATE: 16 BRPM | WEIGHT: 275 LBS | BODY MASS INDEX: 40.73 KG/M2 | DIASTOLIC BLOOD PRESSURE: 49 MMHG | HEART RATE: 88 BPM | HEIGHT: 69 IN | TEMPERATURE: 99.4 F | OXYGEN SATURATION: 95 %

## 2021-08-13 DIAGNOSIS — N30.00 ACUTE CYSTITIS WITHOUT HEMATURIA: Primary | ICD-10-CM

## 2021-08-13 LAB
ANION GAP SERPL CALCULATED.3IONS-SCNC: 10 MMOL/L (ref 7–16)
BACTERIA: ABNORMAL /HPF
BASOPHILS ABSOLUTE: 0.05 E9/L (ref 0–0.2)
BASOPHILS RELATIVE PERCENT: 0.7 % (ref 0–2)
BILIRUBIN URINE: NEGATIVE
BLOOD, URINE: ABNORMAL
BUN BLDV-MCNC: 14 MG/DL (ref 6–20)
CALCIUM SERPL-MCNC: 8.9 MG/DL (ref 8.6–10.2)
CHLORIDE BLD-SCNC: 101 MMOL/L (ref 98–107)
CLARITY: CLEAR
CO2: 27 MMOL/L (ref 22–29)
COLOR: YELLOW
CREAT SERPL-MCNC: 1 MG/DL (ref 0.5–1)
EOSINOPHILS ABSOLUTE: 0.25 E9/L (ref 0.05–0.5)
EOSINOPHILS RELATIVE PERCENT: 3.3 % (ref 0–6)
EPITHELIAL CELLS, UA: ABNORMAL /HPF
GFR AFRICAN AMERICAN: >60
GFR NON-AFRICAN AMERICAN: 59 ML/MIN/1.73
GLUCOSE BLD-MCNC: 82 MG/DL (ref 74–99)
GLUCOSE URINE: 100 MG/DL
HCT VFR BLD CALC: 34.7 % (ref 34–48)
HEMOGLOBIN: 10.9 G/DL (ref 11.5–15.5)
IMMATURE GRANULOCYTES #: 0.02 E9/L
IMMATURE GRANULOCYTES %: 0.3 % (ref 0–5)
KETONES, URINE: NEGATIVE MG/DL
LEUKOCYTE ESTERASE, URINE: ABNORMAL
LYMPHOCYTES ABSOLUTE: 1.72 E9/L (ref 1.5–4)
LYMPHOCYTES RELATIVE PERCENT: 22.6 % (ref 20–42)
MCH RBC QN AUTO: 30.8 PG (ref 26–35)
MCHC RBC AUTO-ENTMCNC: 31.4 % (ref 32–34.5)
MCV RBC AUTO: 98 FL (ref 80–99.9)
MONOCYTES ABSOLUTE: 0.52 E9/L (ref 0.1–0.95)
MONOCYTES RELATIVE PERCENT: 6.8 % (ref 2–12)
NEUTROPHILS ABSOLUTE: 5.06 E9/L (ref 1.8–7.3)
NEUTROPHILS RELATIVE PERCENT: 66.3 % (ref 43–80)
NITRITE, URINE: POSITIVE
PDW BLD-RTO: 13.1 FL (ref 11.5–15)
PH UA: 5.5 (ref 5–9)
PLATELET # BLD: 205 E9/L (ref 130–450)
PMV BLD AUTO: 9.6 FL (ref 7–12)
POTASSIUM SERPL-SCNC: 3.9 MMOL/L (ref 3.5–5)
PROTEIN UA: ABNORMAL MG/DL
RBC # BLD: 3.54 E12/L (ref 3.5–5.5)
RBC UA: ABNORMAL /HPF (ref 0–2)
SODIUM BLD-SCNC: 138 MMOL/L (ref 132–146)
SPECIFIC GRAVITY UA: >=1.03 (ref 1–1.03)
UROBILINOGEN, URINE: 1 E.U./DL
WBC # BLD: 7.6 E9/L (ref 4.5–11.5)
WBC UA: ABNORMAL /HPF (ref 0–5)

## 2021-08-13 PROCEDURE — 81001 URINALYSIS AUTO W/SCOPE: CPT

## 2021-08-13 PROCEDURE — 87088 URINE BACTERIA CULTURE: CPT

## 2021-08-13 PROCEDURE — 6370000000 HC RX 637 (ALT 250 FOR IP): Performed by: STUDENT IN AN ORGANIZED HEALTH CARE EDUCATION/TRAINING PROGRAM

## 2021-08-13 PROCEDURE — 85025 COMPLETE CBC W/AUTO DIFF WBC: CPT

## 2021-08-13 PROCEDURE — 80048 BASIC METABOLIC PNL TOTAL CA: CPT

## 2021-08-13 PROCEDURE — 99283 EMERGENCY DEPT VISIT LOW MDM: CPT

## 2021-08-13 RX ORDER — AMOXICILLIN AND CLAVULANATE POTASSIUM 875; 125 MG/1; MG/1
1 TABLET, FILM COATED ORAL ONCE
Status: COMPLETED | OUTPATIENT
Start: 2021-08-13 | End: 2021-08-13

## 2021-08-13 RX ORDER — AMOXICILLIN AND CLAVULANATE POTASSIUM 875; 125 MG/1; MG/1
1 TABLET, FILM COATED ORAL 2 TIMES DAILY
Qty: 20 TABLET | Refills: 0 | Status: SHIPPED | OUTPATIENT
Start: 2021-08-13 | End: 2021-08-20

## 2021-08-13 RX ADMIN — AMOXICILLIN AND CLAVULANATE POTASSIUM 1 TABLET: 875; 125 TABLET, FILM COATED ORAL at 16:28

## 2021-08-13 NOTE — ED NOTES
Bed:  HALL-  Expected date:   Expected time:   Means of arrival:   Comments:  [address], RN  08/13/21 1213

## 2021-08-14 NOTE — ED PROVIDER NOTES
Department of Emergency Medicine   ED  Provider Note  Admit Date/RoomTime: 8/13/2021 12:12 PM  ED Room: [facility]/[place] [address]          History of Present Illness:  8/14/21, Time: 8:29 AM EDT  Chief Complaint   Patient presents with    Urinary Tract Infection     Pt has had 2 rounds of macrobid and UTI not getting better, sent in by PCP         Yudi Carvalho is a 51 y.o. female presenting to the ED for Urinary tract infection. Patient has completed 2 courses of Macrobid for a urinary tract infection. She states she has had recurrent urinary tract infections for quite a while now and it seems to be a somewhat longstanding issue. She is having dysuria but no other symptomatology at this time. Reviewed a culture from July 29 and the patient grew E facialis which was apparently sensitive to Macrobid at that time which she was placed on. She actually completed 2 courses of Macrobid is not feeling any better. Denies any fever chills or myalgias. She denies any regional radiation of her pain. Nothing makes it better or worse. Review of Systems:  Constitutional: Denies fevers  Eyes: Denies blurry vision  ENT: Denies sore throat  Cardiovascular: Denies chest pain  Respiratory: Denies shortness of breath  Gastrointestinal: Denies abdominal pain  Genitourinary: Positive for dysuria  Musculoskeletal: Denies myalgias  Integumentary: Denies rashes  Neurological: Denies headache    --------------------------------------------- PAST HISTORY ---------------------------------------------  Past Medical History:  has a past medical history of Acid reflux, Anxiety and depression, and Crohn's colitis ([facility] [address] Past Surgical History:  has a past surgical history that includes Breast biopsy (1991); Cholecystectomy (11/18/2011); Endometrial ablation (2004); Upper gastrointestinal endoscopy; ileostomy or jejunostomy (04/2019); and total colectomy. Social History:  reports that she has never smoked.  She has never used smokeless tobacco. She reports that she does not drink alcohol and does not use drugs. Family History: family history includes Diabetes in her father; Heart Disease in her father; High Blood Pressure in her father; Kidney Disease in her mother. . Unless otherwise noted, family history is non contributory    The patients home medications have been reviewed. Allergies: Amoxil [amoxicillin] and Doxycycline    I have reviewed the past medical history, past surgical history, social history, and family history    ---------------------------------------------------PHYSICAL EXAM--------------------------------------    Constitutional: Appears in no distress  Head: Normocephalic, atraumatic  Eyes: Non-icteric slcera, no conjunctival injection  ENT: Moist mucous membranes,  Neck: Trachea midline, no JVD  Respiratory: Nonlabored respirations  Cardiovascular: Brisk capillary refill  Gastrointestinal: Abdomen Soft, very mild suprapubic tenderness, Non distended. No rebound tenderness, guarding, or rigidity. Extremities: No lower extremity edema  Genitourinary: No CVA tenderness, no suprapubic tenderness  Musculoskeletal: Moves all extremities, no deformity  Skin: Pink, warm, dry without rash. Neurologic: Alert, symmetric facies, no aphasia    -------------------------------------------------- RESULTS -------------------------------------------------  I have personally reviewed all laboratory and imaging results for this patient. Results are listed below.      LABS: (Lab results interpreted by me)  Results for orders placed or performed during the hospital encounter of 08/13/21   Urinalysis with Microscopic   Result Value Ref Range    Color, UA Yellow Straw/Yellow    Clarity, UA Clear Clear    Glucose, Ur 100 (A) Negative mg/dL    Bilirubin Urine Negative Negative    Ketones, Urine Negative Negative mg/dL    Specific Gravity, UA >=1.030 1.005 - 1.030    Blood, Urine TRACE-LYSED Negative    pH, UA 5.5 5.0 - 9.0    Protein, UA TRACE Negative mg/dL    Urobilinogen, Urine 1.0 <2.0 E.U./dL    Nitrite, Urine POSITIVE (A) Negative    Leukocyte Esterase, Urine SMALL (A) Negative    WBC, UA 5-10 (A) 0 - 5 /HPF    RBC, UA 2-5 0 - 2 /HPF    Epithelial Cells, UA MODERATE /HPF    Bacteria, UA FEW (A) None Seen /HPF   Basic Metabolic Panel   Result Value Ref Range    Sodium 138 132 - 146 mmol/L    Potassium 3.9 3.5 - 5.0 mmol/L    Chloride 101 98 - 107 mmol/L    CO2 27 22 - 29 mmol/L    Anion Gap 10 7 - 16 mmol/L    Glucose 82 74 - 99 mg/dL    BUN 14 6 - 20 mg/dL    CREATININE 1.0 0.5 - 1.0 mg/dL    GFR Non-African American 59 >=60 mL/min/1.73    GFR African American >60     Calcium 8.9 8.6 - 10.2 mg/dL   CBC Auto Differential   Result Value Ref Range    WBC 7.6 4.5 - 11.5 E9/L    RBC 3.54 3.50 - 5.50 E12/L    Hemoglobin 10.9 (L) 11.5 - 15.5 g/dL    Hematocrit 34.7 34.0 - 48.0 %    MCV 98.0 80.0 - 99.9 fL    MCH 30.8 26.0 - 35.0 pg    MCHC 31.4 (L) 32.0 - 34.5 %    RDW 13.1 11.5 - 15.0 fL    Platelets 034 900 - 775 E9/L    MPV 9.6 7.0 - 12.0 fL    Neutrophils % 66.3 43.0 - 80.0 %    Immature Granulocytes % 0.3 0.0 - 5.0 %    Lymphocytes % 22.6 20.0 - 42.0 %    Monocytes % 6.8 2.0 - 12.0 %    Eosinophils % 3.3 0.0 - 6.0 %    Basophils % 0.7 0.0 - 2.0 %    Neutrophils Absolute 5.06 1.80 - 7.30 E9/L    Immature Granulocytes # 0.02 E9/L    Lymphocytes Absolute 1.72 1.50 - 4.00 E9/L    Monocytes Absolute 0.52 0.10 - 0.95 E9/L    Eosinophils Absolute 0.25 0.05 - 0.50 E9/L    Basophils Absolute 0.05 0.00 - 0.20 E9/L   ,       RADIOLOGY:  Interpreted by Radiologist unless otherwise specified  No orders to display         ------------------------- NURSING NOTES AND VITALS REVIEWED ---------------------------   The nursing notes within the ED encounter and vital signs as below have been reviewed by myself  BP (!) 142/49   Pulse 88   Temp 99.4 °F (37.4 °C)   Resp 16   Ht 5' 9\" (1.753 m)   Wt 275 lb (124.7 kg)   SpO2 95%   BMI 40.61 kg/m²     Oxygen Saturation Interpretation: Normal    The patients available past medical records and past encounters were reviewed. ------------------------------ ED COURSE/MEDICAL DECISION MAKING----------------------  Medications   amoxicillin-clavulanate (AUGMENTIN) 875-125 MG per tablet 1 tablet (1 tablet Oral Given 8/13/21 8518)        This patient's ED course included:a personal history and physicial examination    This patient has remained hemodynamically stable during their ED course. Consultations:  None      Medical Decision Making:   Patient presents with UTI not improving on Macrobid. Vital signs interpreted by me as hypertensive otherwise normal.    Patient presents with symptoms consistent of UTI. Basic labs are obtained to ensure the patient has no renal dysfunction or sequelae of longstanding untreated infection. Labs: Labs appear normal without leukocytosis. There is a chronic normocytic anemia at 10.9 noted. Urinalysis is remarkable for urinary tract infection. Culture is sent. I reviewed the patient sensitivities and spoke with pharmacy about this patient and she will be started on Augmentin 1 tab twice daily for 7 days. I also inquired with the patient about her allergy to amoxicillin she states that it occasionally gave her GI upset in the past but subsequent to having her ostomy placed she has not had an issue with the penicillin family of antibiotics. The patient is agreeable to this treatment plan. Counseling: The emergency provider has spoken with the patient and discussed todays results, in addition to providing specific details for the plan of care and counseling regarding the diagnosis and prognosis. Questions are answered at this time and they are agreeable with the plan.       --------------------------------- IMPRESSION AND DISPOSITION ---------------------------------    IMPRESSION  1.  Acute cystitis without hematuria        DISPOSITION  Disposition: Discharge to home  Patient condition is fair    I, Dr. Eryn Mathew, am the primary provider of record    Eryn Mathew DO  Emergency Medicine    NOTE: This report was transcribed using voice recognition software.  Every effort was made to ensure accuracy; however, inadvertent computerized transcription errors may be present         Tucker Gonzales DO  08/14/21 0831

## 2021-08-15 LAB — URINE CULTURE, ROUTINE: NORMAL

## 2021-08-16 ENCOUNTER — TELEPHONE (OUTPATIENT)
Dept: FAMILY MEDICINE CLINIC | Age: 46
End: 2021-08-16

## 2021-08-16 RX ORDER — NITROFURANTOIN 25; 75 MG/1; MG/1
100 CAPSULE ORAL 2 TIMES DAILY
Qty: 10 CAPSULE | Refills: 0 | Status: SHIPPED | OUTPATIENT
Start: 2021-08-16 | End: 2021-08-21

## 2021-08-16 RX ORDER — FLUCONAZOLE 150 MG/1
150 TABLET ORAL ONCE
Qty: 1 TABLET | Refills: 0 | Status: SHIPPED
Start: 2021-08-16 | End: 2021-10-15 | Stop reason: SDUPTHER

## 2021-08-16 NOTE — TELEPHONE ENCOUNTER
Please send in macrobid bid for 5 days and a diflucan 150 mg. She should have a fingerstick A1C done as she has a lot of sugar in her urine.

## 2021-08-16 NOTE — TELEPHONE ENCOUNTER
----- Message from Chiquita Wren sent at 8/14/2021  1:41 PM EDT -----  Subject: Message to Provider    QUESTIONS  Information for Provider? Pt had a bad reaction to the Augmentin that was   given to her in the ED for her UTI. Pt needs something for the UTI, she is   drinking Gatorade since she got diarrhea from the other medicine. Please   advise. Thanks  ---------------------------------------------------------------------------  --------------  Mary KEARNS  What is the best way for the office to contact you? OK to leave message on   voicemail  Preferred Call Back Phone Number? [phone]  ---------------------------------------------------------------------------  --------------  SCRIPT ANSWERS  Relationship to Patient?  Self

## 2021-08-16 NOTE — TELEPHONE ENCOUNTER
----- Message from Gualberto Barros sent at 2021  1:43 PM EDT -----  Subject: Message to Provider    QUESTIONS  Information for Provider? Pt requested to be put through to [facility] on call   doctor because she didn't think she could wait until Monday since the   reaction.  ---------------------------------------------------------------------------  --------------  [address]  What is the best way for the office to contact you? OK to respond with   electronic message via [employer] portal (only for patients who have   registered [employer] account)  Preferred Call Back Phone Number? [phone]  ---------------------------------------------------------------------------  --------------  SCRIPT ANSWERS  Relationship to Patient?  Self

## 2021-08-19 RX ORDER — VANCOMYCIN HYDROCHLORIDE 125 MG/1
125 CAPSULE ORAL 4 TIMES DAILY
Qty: 40 CAPSULE | Refills: 0 | Status: SHIPPED
Start: 2021-08-19 | End: 2021-10-15 | Stop reason: SDUPTHER

## 2021-10-01 ENCOUNTER — NURSE ONLY (OUTPATIENT)
Dept: FAMILY MEDICINE CLINIC | Age: 46
End: 2021-10-01
Payer: COMMERCIAL

## 2021-10-01 DIAGNOSIS — N39.0 URINARY TRACT INFECTION WITH HEMATURIA, SITE UNSPECIFIED: ICD-10-CM

## 2021-10-01 DIAGNOSIS — Z23 NEED FOR VACCINATION: Primary | ICD-10-CM

## 2021-10-01 DIAGNOSIS — R31.9 URINARY TRACT INFECTION WITH HEMATURIA, SITE UNSPECIFIED: ICD-10-CM

## 2021-10-01 LAB
BILIRUBIN, POC: NORMAL
BLOOD URINE, POC: NORMAL
CLARITY, POC: CLEAR
COLOR, POC: YELLOW
GLUCOSE URINE, POC: NORMAL
KETONES, POC: NORMAL
LEUKOCYTE EST, POC: NORMAL
NITRITE, POC: NORMAL
PH, POC: 5.5
PROTEIN, POC: NORMAL
SPECIFIC GRAVITY, POC: 1.02
UROBILINOGEN, POC: 0.2

## 2021-10-01 PROCEDURE — 90471 IMMUNIZATION ADMIN: CPT | Performed by: FAMILY MEDICINE

## 2021-10-01 PROCEDURE — 81003 URINALYSIS AUTO W/O SCOPE: CPT | Performed by: FAMILY MEDICINE

## 2021-10-01 PROCEDURE — 90694 VACC AIIV4 NO PRSRV 0.5ML IM: CPT | Performed by: FAMILY MEDICINE

## 2021-10-03 LAB — URINE CULTURE, ROUTINE: NORMAL

## 2021-10-05 ENCOUNTER — HOSPITAL ENCOUNTER (OUTPATIENT)
Age: 46
Discharge: HOME | End: 2021-10-05
Payer: COMMERCIAL

## 2021-10-05 LAB
ALBUMIN SERPL-MCNC: 4.1 G/DL (ref 3.5–5.2)
ALP BLD-CCNC: 83 U/L (ref 35–104)
ALT SERPL-CCNC: 17 U/L (ref 0–32)
ANION GAP SERPL CALCULATED.3IONS-SCNC: 11 MMOL/L (ref 7–16)
AST SERPL-CCNC: 26 U/L (ref 0–31)
BASOPHILS ABSOLUTE: 0.05 E9/L (ref 0–0.2)
BASOPHILS RELATIVE PERCENT: 0.8 % (ref 0–2)
BILIRUB SERPL-MCNC: 0.2 MG/DL (ref 0–1.2)
BUN BLDV-MCNC: 15 MG/DL (ref 6–20)
C-REACTIVE PROTEIN: 2.3 MG/DL (ref 0–0.4)
CALCIUM SERPL-MCNC: 9.5 MG/DL (ref 8.6–10.2)
CHLORIDE BLD-SCNC: 103 MMOL/L (ref 98–107)
CO2: 25 MMOL/L (ref 22–29)
CREAT SERPL-MCNC: 1 MG/DL (ref 0.5–1)
EOSINOPHILS ABSOLUTE: 0.18 E9/L (ref 0.05–0.5)
EOSINOPHILS RELATIVE PERCENT: 2.8 % (ref 0–6)
GFR AFRICAN AMERICAN: >60
GFR NON-AFRICAN AMERICAN: 59 ML/MIN/1.73
GLUCOSE BLD-MCNC: 77 MG/DL (ref 74–99)
HCT VFR BLD CALC: 39.3 % (ref 34–48)
HEMOGLOBIN: 12.4 G/DL (ref 11.5–15.5)
IMMATURE GRANULOCYTES #: 0.01 E9/L
IMMATURE GRANULOCYTES %: 0.2 % (ref 0–5)
LYMPHOCYTES ABSOLUTE: 1.54 E9/L (ref 1.5–4)
LYMPHOCYTES RELATIVE PERCENT: 24.3 % (ref 20–42)
MCH RBC QN AUTO: 29.4 PG (ref 26–35)
MCHC RBC AUTO-ENTMCNC: 31.6 % (ref 32–34.5)
MCV RBC AUTO: 93.1 FL (ref 80–99.9)
MONOCYTES ABSOLUTE: 0.43 E9/L (ref 0.1–0.95)
MONOCYTES RELATIVE PERCENT: 6.8 % (ref 2–12)
NEUTROPHILS ABSOLUTE: 4.14 E9/L (ref 1.8–7.3)
NEUTROPHILS RELATIVE PERCENT: 65.1 % (ref 43–80)
PDW BLD-RTO: 12.2 FL (ref 11.5–15)
PLATELET # BLD: 214 E9/L (ref 130–450)
PMV BLD AUTO: 10.2 FL (ref 7–12)
POTASSIUM SERPL-SCNC: 4.9 MMOL/L (ref 3.5–5)
RBC # BLD: 4.22 E12/L (ref 3.5–5.5)
SODIUM BLD-SCNC: 139 MMOL/L (ref 132–146)
TOTAL PROTEIN: 7.8 G/DL (ref 6.4–8.3)
WBC # BLD: 6.4 E9/L (ref 4.5–11.5)

## 2021-10-05 PROCEDURE — 80053 COMPREHEN METABOLIC PANEL: CPT

## 2021-10-05 PROCEDURE — 85025 COMPLETE CBC W/AUTO DIFF WBC: CPT

## 2021-10-05 PROCEDURE — 83993 ASSAY FOR CALPROTECTIN FECAL: CPT

## 2021-10-05 PROCEDURE — 86140 C-REACTIVE PROTEIN: CPT

## 2021-10-05 PROCEDURE — 36415 COLL VENOUS BLD VENIPUNCTURE: CPT

## 2021-10-09 LAB — CALPROTECTIN, FECAL: 57 UG/G

## 2021-10-18 RX ORDER — GABAPENTIN 300 MG/1
CAPSULE ORAL
Qty: 90 CAPSULE | Refills: 3 | Status: SHIPPED
Start: 2021-10-18 | End: 2022-02-16

## 2021-10-25 DIAGNOSIS — R31.9 URINARY TRACT INFECTION WITH HEMATURIA, SITE UNSPECIFIED: Primary | ICD-10-CM

## 2021-10-25 DIAGNOSIS — N39.0 URINARY TRACT INFECTION WITH HEMATURIA, SITE UNSPECIFIED: Primary | ICD-10-CM

## 2022-02-16 RX ORDER — GABAPENTIN 300 MG/1
CAPSULE ORAL
Qty: 90 CAPSULE | Refills: 3 | Status: SHIPPED
Start: 2022-02-16 | End: 2022-06-20

## 2022-02-23 ENCOUNTER — OFFICE VISIT (OUTPATIENT)
Dept: FAMILY MEDICINE CLINIC | Age: 47
End: 2022-02-23
Payer: COMMERCIAL

## 2022-02-23 VITALS — TEMPERATURE: 98.1 F | OXYGEN SATURATION: 94 % | RESPIRATION RATE: 18 BRPM | HEART RATE: 75 BPM

## 2022-02-23 DIAGNOSIS — R05.9 COUGH: Primary | ICD-10-CM

## 2022-02-23 DIAGNOSIS — R05.9 COUGH: ICD-10-CM

## 2022-02-23 DIAGNOSIS — J40 BRONCHITIS: ICD-10-CM

## 2022-02-23 DIAGNOSIS — J02.9 SORETHROAT: ICD-10-CM

## 2022-02-23 DIAGNOSIS — J03.90 ACUTE TONSILLITIS, UNSPECIFIED ETIOLOGY: ICD-10-CM

## 2022-02-23 LAB
Lab: NORMAL
PERFORMING INSTRUMENT: NORMAL
QC PASS/FAIL: NORMAL
S PYO AG THROAT QL: NORMAL
SARS-COV-2, POC: NORMAL

## 2022-02-23 PROCEDURE — 87426 SARSCOV CORONAVIRUS AG IA: CPT | Performed by: FAMILY MEDICINE

## 2022-02-23 PROCEDURE — 87880 STREP A ASSAY W/OPTIC: CPT | Performed by: FAMILY MEDICINE

## 2022-02-23 PROCEDURE — 99213 OFFICE O/P EST LOW 20 MIN: CPT | Performed by: FAMILY MEDICINE

## 2022-02-23 RX ORDER — KETOROLAC TROMETHAMINE 10 MG/1
10 TABLET, FILM COATED ORAL EVERY 6 HOURS PRN
COMMUNITY
Start: 2021-11-22 | End: 2022-02-23 | Stop reason: SDUPTHER

## 2022-02-23 RX ORDER — FOLIC ACID 1 MG/1
1 TABLET ORAL DAILY
COMMUNITY
Start: 2022-02-17

## 2022-02-23 RX ORDER — KETOROLAC TROMETHAMINE 10 MG/1
10 TABLET, FILM COATED ORAL EVERY 6 HOURS PRN
Qty: 30 TABLET | Refills: 0 | Status: SHIPPED
Start: 2022-02-23 | End: 2022-09-06

## 2022-02-23 RX ORDER — CEFDINIR 300 MG/1
300 CAPSULE ORAL 2 TIMES DAILY
Qty: 20 CAPSULE | Refills: 0 | Status: SHIPPED
Start: 2022-02-23 | End: 2022-03-04

## 2022-02-23 SDOH — ECONOMIC STABILITY: FOOD INSECURITY: WITHIN THE PAST 12 MONTHS, YOU WORRIED THAT YOUR FOOD WOULD RUN OUT BEFORE YOU GOT MONEY TO BUY MORE.: NEVER TRUE

## 2022-02-23 SDOH — ECONOMIC STABILITY: FOOD INSECURITY: WITHIN THE PAST 12 MONTHS, THE FOOD YOU BOUGHT JUST DIDN'T LAST AND YOU DIDN'T HAVE MONEY TO GET MORE.: NEVER TRUE

## 2022-02-23 ASSESSMENT — SOCIAL DETERMINANTS OF HEALTH (SDOH): HOW HARD IS IT FOR YOU TO PAY FOR THE VERY BASICS LIKE FOOD, HOUSING, MEDICAL CARE, AND HEATING?: NOT HARD AT ALL

## 2022-02-23 NOTE — PROGRESS NOTES
Congestion:  Patient is here with complaints of congestion, sinus pressure, drainage and cough for 1 week(s). Cough is not productive. Over the counter medications include none. Patient does not have a change in appetite. Patient is  drinking well. Patient does not smoke. Sick contacts include someone with strep. Patient's past medical, surgical, social and/or family history reviewed, updated in chart, and are non-contributory (unless otherwise stated). Medications and allergies also reviewed and updated in chart. Review of Systems:  Constitutional:  - fever, + fatigue, + chills, + headaches, no weight change, +reduced appetite  Dermatology:  No rash, no mole, no dry or sensitive skin  ENT:  + cough, + sore throat, + sinus pain, + runny nose, no ear pain  Cardiology:  No chest pain, no palpitations, no leg edema, no shortness of breath, no PND  Gastroenterology:  No dysphagia, no abdominal pain, no nausea, no vomiting, no constipation, no diarrhea, no heartburn  Musculoskeletal:  No joint pain, no leg cramps, no back pain, no muscle aches  Respiratory:  No shortness of breath, no orthopnea, no wheezing, no SKINNER, no hemoptysis  Urology:  No blood in the urine, no urinary frequency, no urinary incontinence, no urinary urgency, no nocturia, no dysuria  Vitals:    02/23/22 1041   Pulse: 75   Resp: 18   Temp: 98.1 °F (36.7 °C)   SpO2: 94%       General:  Patient alert and oriented x 3, NAD, pleasant  HEENT:  Atraumatic, normocephalic, PERRLA, EOMI, clear conjunctiva, TMs clear, nose-clear, throat - + erythema, purulent tonsils  Neck:  Supple, no goiter, no carotid bruits, no lymphadenopathy  Lungs:  CTA B  Heart:  RRR, no murmurs, gallops or rubs  Abdomen:  Soft/nt/nd, + bowel sounds  Extremities:  No clubbing, cyanosis or edema  Skin: unremarkable    Assessment/Plan:      Perla was seen today for pharyngitis, cough and ear fullness.     Diagnoses and all orders for this visit:    Cough  -     POCT COVID-19, Antigen  -     COVID-19 Ambulatory; Future    Sorethroat  -     POCT rapid strep A  -     COVID-19 Ambulatory; Future    Bronchitis  -     cefdinir (OMNICEF) 300 MG capsule; Take 1 capsule by mouth 2 times daily for 10 days  -     COVID-19 Ambulatory; Future    Acute tonsillitis, unspecified etiology    Other orders  -     ketorolac (TORADOL) 10 MG tablet; Take 1 tablet by mouth every 6 hours as needed for Pain      As above. Call or go to ED immediately if symptoms worsen or persist.  No follow-ups on file. , or sooner if necessary. Educational materials and/or home exercises printed for patient's review and were included in patient instructions on his/her After Visit Summary and given to patient at the end of visit. Counseled regarding above diagnosis, including possible risks and complications,  especially if left uncontrolled. Counseled regarding the possible side effects, risks, benefits and alternatives to treatment; patient and/or guardian verbalizes understanding, agrees, feels comfortable with and wishes to proceed with above treatment plan. Advised patient to call with any new medication issues, and read all Rx info from pharmacy to assure aware of all possible risks and side effects of medication before taking. Reviewed age and gender appropriate health screening exams and vaccinations. Advised patient regarding importance of keeping up with recommended health maintenance and to schedule as soon as possible if overdue, as this is important in assessing for undiagnosed pathology, especially cancer, as well as protecting against potentially harmful/life threatening disease. Patient and/or guardian verbalizes understanding and agrees with above counseling, assessment and plan. All questions answered.     Tyron Snyder MD  2/23/2022    I have personally reviewed and updated the chief complaint, HPI, Past Medical, Family and Social History, as well as the above Review of Systems.

## 2022-02-24 LAB — SARS-COV-2, PCR: NOT DETECTED

## 2022-02-25 DIAGNOSIS — R05.9 COUGH: Primary | ICD-10-CM

## 2022-02-25 RX ORDER — AZITHROMYCIN 250 MG/1
250 TABLET, FILM COATED ORAL SEE ADMIN INSTRUCTIONS
Qty: 6 TABLET | Refills: 0 | Status: SHIPPED | OUTPATIENT
Start: 2022-02-25 | End: 2022-03-02

## 2022-02-28 ENCOUNTER — PATIENT MESSAGE (OUTPATIENT)
Dept: FAMILY MEDICINE CLINIC | Age: 47
End: 2022-02-28

## 2022-02-28 NOTE — TELEPHONE ENCOUNTER
From: Makeda Rose  To: Dr. Veleria Epley: 2/28/2022 3:25 PM EST  Subject: No improvement on the z-pack    Dr. Mike Candelaria,   I've taken 3 doses of the Z-pack and although the tonsil swelling has gone down a bit, the soreness and ability to swallow has gotten worse. Instead of the white patches on the tonsils and back of the throat, it's now beat red and dark red blotches? When should I see improvement on the Z-pack, or is there another route to take?      Thank you -   Aundrea Coy

## 2022-03-01 RX ORDER — PREDNISONE 20 MG/1
20 TABLET ORAL DAILY
Qty: 5 TABLET | Refills: 0 | Status: SHIPPED | OUTPATIENT
Start: 2022-03-01 | End: 2022-03-06

## 2022-03-04 ENCOUNTER — TELEPHONE (OUTPATIENT)
Dept: FAMILY MEDICINE CLINIC | Age: 47
End: 2022-03-04

## 2022-03-04 ENCOUNTER — PATIENT MESSAGE (OUTPATIENT)
Dept: FAMILY MEDICINE CLINIC | Age: 47
End: 2022-03-04

## 2022-03-04 DIAGNOSIS — J03.90 TONSILLITIS WITH EXUDATE: Primary | ICD-10-CM

## 2022-03-04 RX ORDER — METHYLPREDNISOLONE 4 MG/1
TABLET ORAL
Qty: 1 KIT | Refills: 0 | Status: SHIPPED
Start: 2022-03-04 | End: 2022-03-04

## 2022-03-04 RX ORDER — AZITHROMYCIN 250 MG/1
TABLET, FILM COATED ORAL
Qty: 6 TABLET | Refills: 0 | Status: SHIPPED | OUTPATIENT
Start: 2022-03-04 | End: 2022-03-14

## 2022-03-04 NOTE — TELEPHONE ENCOUNTER
From: Norman Hernandez  To: Dr. Harris Boots: 3/4/2022 3:56 AM EST  Subject: Still no improvement with the steroid    Dr. Eli Coulter,    I just can't seem to kick this thing. There hasn't been any improvement with 3 doses of the prednisone. It still hurts to swallow, decreased appetite, the redness has faded a bit in the back of the throat, but there are still dark red patches, along with what looks like \"pimple looking\" bumps now appearing on the back of my throat. Just want to be ahead of this before the weekend when I'll be finishing the prednisone. I saw that may rapid Covid and Strep tests came back negative, but did my strep test get sent out for culture results? Just curious? Let me know what you feel the next step is from here. Another round of Z-Pack? Another antibiotic?, or should I make an appointment to see you on Monday or Tuesday?     Thank you so much,  Efren Arriaza

## 2022-05-31 RX ORDER — OMEPRAZOLE 40 MG/1
CAPSULE, DELAYED RELEASE ORAL
Qty: 90 CAPSULE | Refills: 3 | Status: SHIPPED | OUTPATIENT
Start: 2022-05-31

## 2022-05-31 RX ORDER — LOPERAMIDE HYDROCHLORIDE 2 MG/1
CAPSULE ORAL
Qty: 120 CAPSULE | Refills: 3 | Status: SHIPPED
Start: 2022-05-31 | End: 2022-05-31 | Stop reason: SDUPTHER

## 2022-05-31 RX ORDER — LOPERAMIDE HYDROCHLORIDE 2 MG/1
CAPSULE ORAL
Qty: 120 CAPSULE | Refills: 3 | Status: SHIPPED
Start: 2022-05-31 | End: 2022-10-28 | Stop reason: SDUPTHER

## 2022-06-09 ENCOUNTER — OFFICE VISIT (OUTPATIENT)
Dept: FAMILY MEDICINE CLINIC | Age: 47
End: 2022-06-09
Payer: COMMERCIAL

## 2022-06-09 VITALS
HEART RATE: 85 BPM | OXYGEN SATURATION: 98 % | BODY MASS INDEX: 39.44 KG/M2 | WEIGHT: 266.3 LBS | HEIGHT: 69 IN | TEMPERATURE: 97.2 F | SYSTOLIC BLOOD PRESSURE: 120 MMHG | RESPIRATION RATE: 16 BRPM | DIASTOLIC BLOOD PRESSURE: 72 MMHG

## 2022-06-09 DIAGNOSIS — Z79.899 IMMUNOCOMPROMISED STATE DUE TO DRUG THERAPY (HCC): ICD-10-CM

## 2022-06-09 DIAGNOSIS — D84.821 IMMUNOCOMPROMISED STATE DUE TO DRUG THERAPY (HCC): ICD-10-CM

## 2022-06-09 DIAGNOSIS — L03.116 CELLULITIS OF LEFT LOWER EXTREMITY: Primary | ICD-10-CM

## 2022-06-09 PROCEDURE — 99214 OFFICE O/P EST MOD 30 MIN: CPT | Performed by: FAMILY MEDICINE

## 2022-06-09 RX ORDER — TRAMADOL HYDROCHLORIDE 50 MG/1
50 TABLET ORAL DAILY PRN
COMMUNITY
Start: 2022-05-14

## 2022-06-09 RX ORDER — SULFAMETHOXAZOLE AND TRIMETHOPRIM 800; 160 MG/1; MG/1
1 TABLET ORAL 2 TIMES DAILY
Qty: 20 TABLET | Refills: 0 | Status: SHIPPED | OUTPATIENT
Start: 2022-06-09 | End: 2022-06-19

## 2022-06-09 RX ORDER — CLOBETASOL PROPIONATE 0.5 MG/G
OINTMENT TOPICAL 2 TIMES DAILY
COMMUNITY
Start: 2022-04-26 | End: 2023-04-26

## 2022-06-09 ASSESSMENT — PATIENT HEALTH QUESTIONNAIRE - PHQ9
1. LITTLE INTEREST OR PLEASURE IN DOING THINGS: 1
8. MOVING OR SPEAKING SO SLOWLY THAT OTHER PEOPLE COULD HAVE NOTICED. OR THE OPPOSITE, BEING SO FIGETY OR RESTLESS THAT YOU HAVE BEEN MOVING AROUND A LOT MORE THAN USUAL: 0
6. FEELING BAD ABOUT YOURSELF - OR THAT YOU ARE A FAILURE OR HAVE LET YOURSELF OR YOUR FAMILY DOWN: 0
7. TROUBLE CONCENTRATING ON THINGS, SUCH AS READING THE NEWSPAPER OR WATCHING TELEVISION: 1
10. IF YOU CHECKED OFF ANY PROBLEMS, HOW DIFFICULT HAVE THESE PROBLEMS MADE IT FOR YOU TO DO YOUR WORK, TAKE CARE OF THINGS AT HOME, OR GET ALONG WITH OTHER PEOPLE: 1
5. POOR APPETITE OR OVEREATING: 1
2. FEELING DOWN, DEPRESSED OR HOPELESS: 0
SUM OF ALL RESPONSES TO PHQ QUESTIONS 1-9: 5
3. TROUBLE FALLING OR STAYING ASLEEP: 1
SUM OF ALL RESPONSES TO PHQ QUESTIONS 1-9: 5
SUM OF ALL RESPONSES TO PHQ9 QUESTIONS 1 & 2: 1
SUM OF ALL RESPONSES TO PHQ QUESTIONS 1-9: 5
4. FEELING TIRED OR HAVING LITTLE ENERGY: 1
SUM OF ALL RESPONSES TO PHQ QUESTIONS 1-9: 5
9. THOUGHTS THAT YOU WOULD BE BETTER OFF DEAD, OR OF HURTING YOURSELF: 0

## 2022-06-09 NOTE — PROGRESS NOTES
Chief Complaint   Patient presents with    Cellulitis       HPI: Patient complains of possible cellulitis on left leg. Pt has some swelling and redness. Pt states she is not in pain but it is hot to touch. Pt is fasting today. HM reviewed with pt. Pt is due for a pap. Pt does not have that scheduled at this time. Patient's past medical, surgical, social and/or family history reviewed, updated in chart, and are non-contributory (unless otherwise stated). Medications and allergies also reviewed and updated in chart.     Review of Systems:  Constitutional:  No fever, no fatigue, no chills, no headaches, no weight change  Dermatology:  No rash, no mole, no dry or sensitive skin  ENT:  No cough, no sore throat, no sinus pain, no runny nose, no ear pain  Cardiology:  No chest pain, no palpitations, no leg edema, no shortness of breath, no PND  Gastroenterology:  No dysphagia, no abdominal pain, no nausea, no vomiting, no constipation, no diarrhea, no heartburn  Musculoskeletal:  No joint pain, no leg cramps, no back pain, no muscle aches  Respiratory:  No shortness of breath, no orthopnea, no wheezing, no SKINNER, no hemoptysis  Urology:  No blood in the urine, no urinary frequency, no urinary incontinence, no urinary urgency, no nocturia, no dysuria  Vitals:    06/09/22 0920   BP: 120/72   Pulse: 85   Resp: 16   Temp: 97.2 °F (36.2 °C)   TempSrc: Temporal   SpO2: 98%   Weight: 266 lb 4.8 oz (120.8 kg)   Height: 5' 9\" (1.753 m)       General:  Patient alert and oriented x 3, NAD, pleasant  HEENT:  Atraumatic, normocephalic, PERRLA, EOMI, clear conjunctiva, TMs clear, nose-clear, throat - no erythema  Neck:  Supple, no goiter, no carotid bruits, no lymphadenopathy  Lungs:  CTA B  Heart:  RRR, no murmurs, gallops or rubs  Abdomen:  Soft/nt/nd, + bowel sounds  Extremities:  No clubbing, cyanosis or edema  Skin: LLE with 4 cm area of erythema and warm to touch    Assessment/Plan:      Perla was seen today for cellulitis. Diagnoses and all orders for this visit:    Cellulitis of left lower extremity    Immunocompromised state due to drug therapy ([facility] [address]    Other orders  -     sulfamethoxazole-trimethoprim (BACTRIM DS) 800-160 MG per tablet; Take 1 tablet by mouth 2 times daily for 10 days      As above. Call or go to ED immediately if symptoms worsen or persist.  No follow-ups on file. , or sooner if necessary. Educational materials and/or home exercises printed for patient's review and were included in patient instructions on his/her After Visit Summary and given to patient at the end of visit. Counseled regarding above diagnosis, including possible risks and complications,  especially if left uncontrolled. Counseled regarding the possible side effects, risks, benefits and alternatives to treatment; patient and/or guardian verbalizes understanding, agrees, feels comfortable with and wishes to proceed with above treatment plan. Advised patient to call with any new medication issues, and read all Rx info from pharmacy to assure aware of all possible risks and side effects of medication before taking. Reviewed age and gender appropriate health screening exams and vaccinations. Advised patient regarding importance of keeping up with recommended health maintenance and to schedule as soon as possible if overdue, as this is important in assessing for undiagnosed pathology, especially cancer, as well as protecting against potentially harmful/life threatening disease. Patient and/or guardian verbalizes understanding and agrees with above counseling, assessment and plan. All questions answered. Marquita Short MD  6/9/2022    I have personally reviewed and updated the chief complaint, HPI, Past Medical, Family and Social History, as well as the above Review of Systems.

## 2022-06-20 RX ORDER — GABAPENTIN 300 MG/1
CAPSULE ORAL
Qty: 90 CAPSULE | Refills: 3 | Status: SHIPPED
Start: 2022-06-20 | End: 2022-10-14

## 2022-06-22 ENCOUNTER — TELEPHONE (OUTPATIENT)
Dept: FAMILY MEDICINE CLINIC | Age: 47
End: 2022-06-22

## 2022-06-22 RX ORDER — SULFAMETHOXAZOLE AND TRIMETHOPRIM 800; 160 MG/1; MG/1
1 TABLET ORAL 2 TIMES DAILY
Qty: 14 TABLET | Refills: 0 | Status: SHIPPED | OUTPATIENT
Start: 2022-06-22 | End: 2022-06-29

## 2022-06-22 NOTE — TELEPHONE ENCOUNTER
She left my chart message Monday. finished antibiotic Cj night. She said much better bur still has some redness and a little bit of swelling. Does she need something else?

## 2022-07-07 DIAGNOSIS — K50.119 CROHN'S DISEASE OF COLON WITH COMPLICATION (HCC): Primary | ICD-10-CM

## 2022-07-08 RX ORDER — OXYCODONE HYDROCHLORIDE 5 MG/1
5 TABLET ORAL EVERY 6 HOURS PRN
Qty: 30 TABLET | Refills: 0 | Status: SHIPPED | OUTPATIENT
Start: 2022-07-08 | End: 2022-08-07

## 2022-07-21 ENCOUNTER — OFFICE VISIT (OUTPATIENT)
Dept: CARDIOLOGY CLINIC | Age: 47
End: 2022-07-21
Payer: COMMERCIAL

## 2022-07-21 VITALS
HEART RATE: 76 BPM | DIASTOLIC BLOOD PRESSURE: 78 MMHG | WEIGHT: 253.7 LBS | RESPIRATION RATE: 18 BRPM | HEIGHT: 69 IN | SYSTOLIC BLOOD PRESSURE: 122 MMHG | BODY MASS INDEX: 37.58 KG/M2

## 2022-07-21 DIAGNOSIS — R00.2 PALPITATIONS: Primary | ICD-10-CM

## 2022-07-21 PROCEDURE — 93000 ELECTROCARDIOGRAM COMPLETE: CPT | Performed by: INTERNAL MEDICINE

## 2022-07-21 PROCEDURE — 99214 OFFICE O/P EST MOD 30 MIN: CPT | Performed by: INTERNAL MEDICINE

## 2022-07-21 RX ORDER — METOPROLOL SUCCINATE 25 MG/1
12.5 TABLET, EXTENDED RELEASE ORAL DAILY
Qty: 90 TABLET | Refills: 3 | Status: SHIPPED
Start: 2022-07-21 | End: 2022-07-22 | Stop reason: SDUPTHER

## 2022-07-21 RX ORDER — OFLOXACIN 3 MG/ML
SOLUTION/ DROPS OPHTHALMIC
COMMUNITY
Start: 2022-07-09 | End: 2022-09-06

## 2022-07-21 NOTE — PROGRESS NOTES
CHIEF COMPLAINT: SVT    HISTORY OF PRESENT ILLNESS: Patient is a 49 y.o. female seen at the request of Italo Villar MD.      No further palpitations. No CP or SOB. Unable to wear monitor due to severe reaction to patches. Past Medical History:   Diagnosis Date    Acid reflux     Anxiety and depression     Crohn's colitis ([facility] [address] 04/2018    Crohn's colitis per colonoscopy biopsies 4/9/18, 6/11/18       Patient Active Problem List   Diagnosis    GERD (gastroesophageal reflux disease)    Depression with anxiety    Exacerbation of Crohn's disease of large intestine (HCC)    SVT (supraventricular tachycardia) (HCC)    Anemia    Paroxysmal supraventricular tachycardia (HCC)    Iron deficiency anemia secondary to blood loss (chronic)    Crohn's disease of colon with complication ([address]    Abdominal wall skin ulcer, with fat layer exposed ([address]    Skin ulcer of perineum, with fat layer exposed ([address]       Allergies   Allergen Reactions    Amoxil [Amoxicillin] Diarrhea    Doxycycline      Stomach upset         Current Outpatient Medications   Medication Sig Dispense Refill    oxyCODONE (ROXICODONE) 5 MG immediate release tablet Take 1 tablet by mouth every 6 hours as needed for Pain for up to 30 days. 30 tablet 0    gabapentin (NEURONTIN) 300 MG capsule TAKE 1 CAPSULE BY MOUTH THREE TIMES DAILY 90 capsule 3    clobetasol (TEMOVATE) 0.05 % ointment Apply topically 2 times daily      traMADol (ULTRAM) 50 MG tablet Take 50 mg by mouth daily as needed. omeprazole (PRILOSEC) 40 MG delayed release capsule TAKE 1 CAPSULE DAILY 90 capsule 3    loperamide (IMODIUM) 2 MG capsule TAKE 1 CAPSULE BY MOUTH FOUR TIMES DAILY AS NEEDED FOR DIARRHEA 162 capsule 3    folic acid (FOLVITE) 1 MG tablet Take 1 mg by mouth daily      Certolizumab Pegol (CIMZIA STARTER KIT) 6 X 200 MG/ML KIT Inject 2 ml at 0, 2 and 4 weeks and then q 4 weeks      methotrexate (RHEUMATREX) 2.5 MG chemo tablet TAKE 6 TABLETS ONCE WEEKLY. Low Risk     Difficulty of Paying Living Expenses: Not hard at all   Food Insecurity: No Food Insecurity    Worried About Running Out of Food in the Last Year: Never true    Ran Out of Food in the Last Year: Never true   Transportation Needs: Not on file   Physical Activity: Not on file   Stress: Not on file   Social Connections: Not on file   Intimate Partner Violence: Not on file   Housing Stability: Not on file       Family History   Problem Relation Age of Onset    Kidney Disease Mother     Diabetes Father         on HD s/p MI    Heart Disease Father     High Blood Pressure Father        Review of Systems:  Heart: as above   Lungs: as above   Eyes: denies changes in vision or discharge. Ears: denies changes in hearing or pain. Nose: denies epistaxis or masses   Throat: denies sore throat or trouble swallowing. Neuro: denies numbness, tingling, tremors. Skin: denies rashes or itching. : denies hematuria, dysuria   GI: denies vomiting, diarrhea   Psych: denies mood changed, anxiety, depression. All other systems negative. Physical Exam   /78   Pulse 76   Resp 18   Ht 5' 9\" (1.753 m)   Wt 253 lb 11.2 oz (115.1 kg)   BMI 37.46 kg/m²   Constitutional: Oriented to person, place, and time. Well-developed and well-nourished. No distress. Head: Normocephalic and atraumatic. Eyes: EOM are normal. Pupils are equal, round, and reactive to light. Neck: Normal range of motion. Neck supple. No hepatojugular reflux and no JVD present. Carotid bruit is not present. No tracheal deviation present. No thyromegaly present. Cardiovascular: Normal rate, regular rhythm, normal heart sounds and intact distal pulses. Exam reveals no gallop and no friction rub. No murmur heard. Pulmonary/Chest: Effort normal and breath sounds normal. No respiratory distress. No wheezes. No rales. No tenderness. Abdominal: Soft. Bowel sounds are normal. No distension and no mass. No tenderness.  No rebound and no guarding. Musculoskeletal: Normal range of motion. No edema and no tenderness. Lymphadenopathy:   No cervical adenopathy. No groin adenopathy. Neurological: Alert and oriented to person, place, and time. Skin: Skin is warm and dry. No rash noted. Not diaphoretic. No erythema. Psychiatric: Normal mood and affect. Behavior is normal.     EKG:  sinus rhythm, nonspecific ST and T waves changes. ASSESSMENT AND PLAN:  Patient Active Problem List   Diagnosis    GERD (gastroesophageal reflux disease)    Depression with anxiety    Exacerbation of Crohn's disease of large intestine (HCC)    SVT (supraventricular tachycardia) (HCC)    Anemia    Paroxysmal supraventricular tachycardia (HCC)    Iron deficiency anemia secondary to blood loss (chronic)    Crohn's disease of colon with complication ([address]    Abdominal wall skin ulcer, with fat layer exposed ([address]    Skin ulcer of perineum, with fat layer exposed ([address]     1. SVT: Echo normal.     Unable to wear monitor due to severe reaction to patches. Continue BB. If recurrent then EP. 2. Lipids: Statin. 3. IBD    4. Anemia of chronic disease    Jenny Bauer D.O.   Cardiologist  Cardiology, [facility]

## 2022-07-22 RX ORDER — METOPROLOL SUCCINATE 25 MG/1
12.5 TABLET, EXTENDED RELEASE ORAL DAILY
Qty: 90 TABLET | Refills: 3 | Status: SHIPPED | OUTPATIENT
Start: 2022-07-22

## 2022-09-06 ENCOUNTER — OFFICE VISIT (OUTPATIENT)
Dept: FAMILY MEDICINE CLINIC | Age: 47
End: 2022-09-06
Payer: COMMERCIAL

## 2022-09-06 VITALS
BODY MASS INDEX: 36.14 KG/M2 | RESPIRATION RATE: 16 BRPM | HEIGHT: 69 IN | SYSTOLIC BLOOD PRESSURE: 113 MMHG | OXYGEN SATURATION: 99 % | HEART RATE: 78 BPM | TEMPERATURE: 97 F | DIASTOLIC BLOOD PRESSURE: 75 MMHG | WEIGHT: 244 LBS

## 2022-09-06 DIAGNOSIS — Z00.00 ANNUAL PHYSICAL EXAM: Primary | ICD-10-CM

## 2022-09-06 DIAGNOSIS — Z00.00 ANNUAL PHYSICAL EXAM: ICD-10-CM

## 2022-09-06 DIAGNOSIS — K50.119 CROHN'S DISEASE OF COLON WITH COMPLICATION (HCC): ICD-10-CM

## 2022-09-06 PROCEDURE — 99396 PREV VISIT EST AGE 40-64: CPT | Performed by: FAMILY MEDICINE

## 2022-09-06 NOTE — PROGRESS NOTES
Annual exam:  Patient is here for routine yearly physical/preventative visit. Patient has no complaints or concerns today. Patient is  generally healthy. Chronic medical conditions are generally controlled. Most recent labs reviewed with patient and  are not remarkable. Health maintenance reviewed with patient and is  up to date. Overall doing well. Patient needs handicap foziad. She would also like a referral for pain management due to pelvic pain.     Past Medical History:   Diagnosis Date    Acid reflux     Anxiety and depression     Crohn's colitis ([facility] [address] 04/2018    Crohn's colitis per colonoscopy biopsies 4/9/18, 6/11/18      Past Surgical History:   Procedure Laterality Date    BREAST BIOPSY  1991    Benign Cyst    CHOLECYSTECTOMY  11/18/2011    Michael Alma, laparoscopic     ENDOMETRIAL ABLATION  2004    ILEOSTOMY OR JEJUNOSTOMY  04/2019    TOTAL COLECTOMY      UPPER GASTROINTESTINAL ENDOSCOPY        Family History   Problem Relation Age of Onset    Kidney Disease Mother     Diabetes Father         on HD s/p MI    Heart Disease Father     High Blood Pressure Father      Social History     Socioeconomic History    Marital status:      Spouse name: Not on file    Number of children: Not on file    Years of education: Not on file    Highest education level: Not on file   Occupational History    Not on file   Tobacco Use    Smoking status: Never    Smokeless tobacco: Never   Vaping Use    Vaping Use: Never used   Substance and Sexual Activity    Alcohol use: No    Drug use: No    Sexual activity: Not on file   Other Topics Concern    Not on file   Social History Narrative    Not on file     Social Determinants of Health     Financial Resource Strain: Low Risk     Difficulty of Paying Living Expenses: Not hard at all   Food Insecurity: No Food Insecurity    Worried About Running Out of Food in the Last Year: Never true    Ran Out of Food in the Last Year: Never true   Transportation Needs: Not on file Physical Activity: Not on file   Stress: Not on file   Social Connections: Not on file   Intimate Partner Violence: Not on file   Housing Stability: Not on file      Allergies   Allergen Reactions    Neomycin Rash    Amoxil [Amoxicillin] Diarrhea    Doxycycline      Stomach upset          Review of Systems:  Constitutional:  No fever, no fatigue, no chills, no headaches, no weight change  Dermatology:  No rash, no mole, no dry or sensitive skin  ENT:  No cough, no sore throat, no sinus pain, no runny nose, no ear pain  Cardiology:  No chest pain, no palpitations, no leg edema, no shortness of breath, no PND  Endocrinology:  No polydipsia, no polyuria, no cold intolerance, no heat intolerance, no polyphagia, no hair changes  Gastroenterology:  No dysphagia, no abdominal pain, no nausea, no vomiting, no constipation, no diarrhea, no heartburn  Female Reproductive:  No hot flashes, no abnormal vaginal discharge, no pain with menstruation, no pelvic pain  Musculoskeletal:  No joint pain, no leg cramps, no back pain, no muscle aches  Respiratory:  No shortness of breath, no orthopnea, no wheezing, no SKINNER, no hemoptysis  Urology:  No blood in the urine, no urinary frequency, no urinary incontinence, no urinary urgency, no nocturia, no dysuria  Neurology:  No numbness/tingling, no dizziness, no weakness  Psychology:  No depression, no sleep disturbances, no suicidal ideation, no anxiety  Physical Exam:  Vitals:    09/06/22 1430   BP: 113/75   Pulse: 78   Resp: 16   Temp: 97 °F (36.1 °C)   TempSrc: Temporal   SpO2: 99%   Weight: 244 lb (110.7 kg)   Height: 5' 9\" (1.753 m)     General:  Patient alert and oriented x 3, NAD, pleasant  HEENT:  Atraumatic, normocephalic, PERRLA, EOMI, clear conjunctiva, TMs clear, nose-clear, throat - no erythema, tonsils- wnl  Neck:  Supple, no goiter, no carotid bruits, no lymphadenopathy  Lungs:  CTA B  Heart:  RRR, no murmurs, gallops or rubs  Abdomen:  Soft, NTND, + bowel sounds  Back: full ROM, no CVA tenderness  Extremities:  No clubbing, cyanosis or edema  Neuro:  CN II-XII grossly intact, 5/5 strength in bilateral upper and lower extremities, 2 + reflexes. Skin: unremarkable    Assessment/Plan:  Felicia Yoo was seen today for annual exam.    Diagnoses and all orders for this visit:    Annual physical exam  -     Lipid Panel; Future    Crohn's disease of colon with complication ([address]    Other orders  -     Handicap Placard MISC; by Does not apply route Cannot walk 200 ft. Without stopping to rest  Duration: Lifetime    As above. Call or go to ED immediately if symptoms worsen or persist.  No follow-ups on file. or sooner if necessary. Educational materials and/or home exercises printed for patient's review and were included in patient instructions on his/her After Visit Summary and given to patient at the end of visit. Counseled regarding above diagnosis, including possible risks and complications,  especially if left uncontrolled. Counseled regarding the possible side effects, risks, benefits and alternatives to treatment; patient and/or guardian verbalizes understanding, agrees, feels comfortable with and wishes to proceed with above treatment plan. Advised patient to call with any new medication issues, and read all Rx info from pharmacy to assure aware of all possible risks and side effects of medication before taking. Reviewed age and gender appropriate health screening exams and vaccinations. Advised patient regarding importance of keeping up with recommended health maintenance and to schedule as soon as possible if overdue, as this is important in assessing for undiagnosed pathology, especially cancer, as well as protecting against potentially harmful/life threatening disease. Patient and/or guardian verbalizes understanding and agrees with above counseling, assessment and plan. All questions answered.     Michelle Wahl MD  9/7/2022    I have personally reviewed and updated the chief complaint, HPI, Past Medical, Family and Social History, as well as the above Review of Systems.

## 2022-09-07 LAB
CHOLESTEROL, TOTAL: 168 MG/DL (ref 0–199)
HDLC SERPL-MCNC: 47 MG/DL
LDL CHOLESTEROL CALCULATED: 100 MG/DL (ref 0–99)
TRIGL SERPL-MCNC: 107 MG/DL (ref 0–149)
VLDLC SERPL CALC-MCNC: 21 MG/DL

## 2022-09-19 RX ORDER — DULOXETIN HYDROCHLORIDE 30 MG/1
30 CAPSULE, DELAYED RELEASE ORAL DAILY
Qty: 90 CAPSULE | Refills: 3 | Status: SHIPPED | OUTPATIENT
Start: 2022-09-19

## 2022-10-05 ENCOUNTER — APPOINTMENT (OUTPATIENT)
Dept: GENERAL RADIOLOGY | Age: 47
End: 2022-10-05
Payer: COMMERCIAL

## 2022-10-05 ENCOUNTER — APPOINTMENT (OUTPATIENT)
Dept: GENERAL RADIOLOGY | Age: 47
DRG: 493 | End: 2022-10-05
Payer: COMMERCIAL

## 2022-10-05 ENCOUNTER — HOSPITAL ENCOUNTER (INPATIENT)
Age: 47
LOS: 1 days | Discharge: HOME | DRG: 493 | End: 2022-10-08
Attending: EMERGENCY MEDICINE | Admitting: INTERNAL MEDICINE
Payer: COMMERCIAL

## 2022-10-05 ENCOUNTER — HOSPITAL ENCOUNTER (EMERGENCY)
Age: 47
Discharge: ANOTHER HOSPITAL | End: 2022-10-05
Attending: EMERGENCY MEDICINE
Payer: COMMERCIAL

## 2022-10-05 ENCOUNTER — APPOINTMENT (OUTPATIENT)
Dept: CT IMAGING | Age: 47
End: 2022-10-05
Payer: COMMERCIAL

## 2022-10-05 VITALS
DIASTOLIC BLOOD PRESSURE: 57 MMHG | RESPIRATION RATE: 16 BRPM | SYSTOLIC BLOOD PRESSURE: 135 MMHG | TEMPERATURE: 98.8 F | HEART RATE: 72 BPM | OXYGEN SATURATION: 96 %

## 2022-10-05 DIAGNOSIS — S82.892A CLOSED FRACTURE OF LEFT ANKLE, INITIAL ENCOUNTER: Primary | ICD-10-CM

## 2022-10-05 DIAGNOSIS — S82.852A CLOSED TRIMALLEOLAR FRACTURE OF LEFT ANKLE, INITIAL ENCOUNTER: Primary | ICD-10-CM

## 2022-10-05 LAB
ABO/RH: NORMAL
ALBUMIN SERPL-MCNC: 3.5 G/DL (ref 3.5–5.2)
ALP BLD-CCNC: 66 U/L (ref 35–104)
ALT SERPL-CCNC: 11 U/L (ref 0–32)
ANION GAP SERPL CALCULATED.3IONS-SCNC: 10 MMOL/L (ref 7–16)
ANTIBODY SCREEN: NORMAL
APTT: 27.8 SEC (ref 24.5–35.1)
AST SERPL-CCNC: 16 U/L (ref 0–31)
BILIRUB SERPL-MCNC: 0.9 MG/DL (ref 0–1.2)
BUN BLDV-MCNC: 8 MG/DL (ref 6–20)
CALCIUM SERPL-MCNC: 9.1 MG/DL (ref 8.6–10.2)
CHLORIDE BLD-SCNC: 103 MMOL/L (ref 98–107)
CO2: 24 MMOL/L (ref 22–29)
CREAT SERPL-MCNC: 0.8 MG/DL (ref 0.5–1)
GFR AFRICAN AMERICAN: >60
GFR NON-AFRICAN AMERICAN: >60 ML/MIN/1.73
GLUCOSE BLD-MCNC: 103 MG/DL (ref 74–99)
INR BLD: 1.2
POTASSIUM SERPL-SCNC: 3.6 MMOL/L (ref 3.5–5)
PROTHROMBIN TIME: 13 SEC (ref 9.3–12.4)
SODIUM BLD-SCNC: 137 MMOL/L (ref 132–146)
TOTAL PROTEIN: 7.4 G/DL (ref 6.4–8.3)

## 2022-10-05 PROCEDURE — 96374 THER/PROPH/DIAG INJ IV PUSH: CPT

## 2022-10-05 PROCEDURE — 85610 PROTHROMBIN TIME: CPT

## 2022-10-05 PROCEDURE — 96376 TX/PRO/DX INJ SAME DRUG ADON: CPT

## 2022-10-05 PROCEDURE — 6370000000 HC RX 637 (ALT 250 FOR IP): Performed by: NURSE PRACTITIONER

## 2022-10-05 PROCEDURE — 99285 EMERGENCY DEPT VISIT HI MDM: CPT

## 2022-10-05 PROCEDURE — 72170 X-RAY EXAM OF PELVIS: CPT

## 2022-10-05 PROCEDURE — 73610 X-RAY EXAM OF ANKLE: CPT

## 2022-10-05 PROCEDURE — 6360000002 HC RX W HCPCS: Performed by: STUDENT IN AN ORGANIZED HEALTH CARE EDUCATION/TRAINING PROGRAM

## 2022-10-05 PROCEDURE — 80053 COMPREHEN METABOLIC PANEL: CPT

## 2022-10-05 PROCEDURE — G0378 HOSPITAL OBSERVATION PER HR: HCPCS

## 2022-10-05 PROCEDURE — 6360000002 HC RX W HCPCS: Performed by: NURSE PRACTITIONER

## 2022-10-05 PROCEDURE — 86901 BLOOD TYPING SEROLOGIC RH(D): CPT

## 2022-10-05 PROCEDURE — 6360000002 HC RX W HCPCS: Performed by: EMERGENCY MEDICINE

## 2022-10-05 PROCEDURE — 85730 THROMBOPLASTIN TIME PARTIAL: CPT

## 2022-10-05 PROCEDURE — 27818 TREATMENT OF ANKLE FRACTURE: CPT

## 2022-10-05 PROCEDURE — 36415 COLL VENOUS BLD VENIPUNCTURE: CPT

## 2022-10-05 PROCEDURE — 96375 TX/PRO/DX INJ NEW DRUG ADDON: CPT

## 2022-10-05 PROCEDURE — 2580000003 HC RX 258: Performed by: NURSE PRACTITIONER

## 2022-10-05 PROCEDURE — 71045 X-RAY EXAM CHEST 1 VIEW: CPT

## 2022-10-05 PROCEDURE — 85027 COMPLETE CBC AUTOMATED: CPT

## 2022-10-05 PROCEDURE — 86900 BLOOD TYPING SEROLOGIC ABO: CPT

## 2022-10-05 PROCEDURE — 73700 CT LOWER EXTREMITY W/O DYE: CPT

## 2022-10-05 PROCEDURE — 6370000000 HC RX 637 (ALT 250 FOR IP): Performed by: STUDENT IN AN ORGANIZED HEALTH CARE EDUCATION/TRAINING PROGRAM

## 2022-10-05 PROCEDURE — 99156 MOD SED OTH PHYS/QHP 5/>YRS: CPT

## 2022-10-05 PROCEDURE — 73600 X-RAY EXAM OF ANKLE: CPT

## 2022-10-05 PROCEDURE — 86850 RBC ANTIBODY SCREEN: CPT

## 2022-10-05 PROCEDURE — 73590 X-RAY EXAM OF LOWER LEG: CPT

## 2022-10-05 RX ORDER — ENOXAPARIN SODIUM 100 MG/ML
40 INJECTION SUBCUTANEOUS DAILY
Status: DISCONTINUED | OUTPATIENT
Start: 2022-10-05 | End: 2022-10-08 | Stop reason: HOSPADM

## 2022-10-05 RX ORDER — ONDANSETRON 4 MG/1
4 TABLET, ORALLY DISINTEGRATING ORAL EVERY 8 HOURS PRN
Status: DISCONTINUED | OUTPATIENT
Start: 2022-10-05 | End: 2022-10-08 | Stop reason: HOSPADM

## 2022-10-05 RX ORDER — ONDANSETRON 2 MG/ML
4 INJECTION INTRAMUSCULAR; INTRAVENOUS EVERY 6 HOURS PRN
Status: DISCONTINUED | OUTPATIENT
Start: 2022-10-05 | End: 2022-10-08 | Stop reason: HOSPADM

## 2022-10-05 RX ORDER — METOPROLOL SUCCINATE 25 MG/1
12.5 TABLET, EXTENDED RELEASE ORAL DAILY
Status: DISCONTINUED | OUTPATIENT
Start: 2022-10-05 | End: 2022-10-08 | Stop reason: HOSPADM

## 2022-10-05 RX ORDER — ACETAMINOPHEN 325 MG/1
650 TABLET ORAL EVERY 6 HOURS PRN
Status: DISCONTINUED | OUTPATIENT
Start: 2022-10-05 | End: 2022-10-08 | Stop reason: HOSPADM

## 2022-10-05 RX ORDER — PROPOFOL 10 MG/ML
200 INJECTION, EMULSION INTRAVENOUS ONCE
Status: DISCONTINUED | OUTPATIENT
Start: 2022-10-05 | End: 2022-10-05

## 2022-10-05 RX ORDER — MORPHINE SULFATE 4 MG/ML
4 INJECTION, SOLUTION INTRAMUSCULAR; INTRAVENOUS ONCE
Status: COMPLETED | OUTPATIENT
Start: 2022-10-05 | End: 2022-10-05

## 2022-10-05 RX ORDER — MULTIVITAMIN WITH IRON
1 TABLET ORAL DAILY
Status: DISCONTINUED | OUTPATIENT
Start: 2022-10-05 | End: 2022-10-08 | Stop reason: HOSPADM

## 2022-10-05 RX ORDER — FOLIC ACID 1 MG/1
1 TABLET ORAL DAILY
Status: DISCONTINUED | OUTPATIENT
Start: 2022-10-05 | End: 2022-10-08 | Stop reason: HOSPADM

## 2022-10-05 RX ORDER — FENTANYL CITRATE 50 UG/ML
25 INJECTION, SOLUTION INTRAMUSCULAR; INTRAVENOUS ONCE
Status: COMPLETED | OUTPATIENT
Start: 2022-10-05 | End: 2022-10-05

## 2022-10-05 RX ORDER — OXYCODONE HYDROCHLORIDE AND ACETAMINOPHEN 5; 325 MG/1; MG/1
1 TABLET ORAL EVERY 6 HOURS PRN
Qty: 10 TABLET | Refills: 0 | Status: SHIPPED | OUTPATIENT
Start: 2022-10-05 | End: 2022-10-08 | Stop reason: SDUPTHER

## 2022-10-05 RX ORDER — FENTANYL CITRATE 50 UG/ML
50 INJECTION, SOLUTION INTRAMUSCULAR; INTRAVENOUS ONCE
Status: COMPLETED | OUTPATIENT
Start: 2022-10-05 | End: 2022-10-05

## 2022-10-05 RX ORDER — OXYCODONE HYDROCHLORIDE 5 MG/1
5 TABLET ORAL ONCE
Status: COMPLETED | OUTPATIENT
Start: 2022-10-05 | End: 2022-10-05

## 2022-10-05 RX ORDER — ACETAMINOPHEN 650 MG/1
650 SUPPOSITORY RECTAL EVERY 6 HOURS PRN
Status: DISCONTINUED | OUTPATIENT
Start: 2022-10-05 | End: 2022-10-08 | Stop reason: HOSPADM

## 2022-10-05 RX ORDER — PANTOPRAZOLE SODIUM 40 MG/1
40 TABLET, DELAYED RELEASE ORAL
Status: DISCONTINUED | OUTPATIENT
Start: 2022-10-06 | End: 2022-10-08 | Stop reason: HOSPADM

## 2022-10-05 RX ORDER — FLUTICASONE PROPIONATE 50 MCG
1 SPRAY, SUSPENSION (ML) NASAL DAILY
Status: DISCONTINUED | OUTPATIENT
Start: 2022-10-05 | End: 2022-10-08 | Stop reason: HOSPADM

## 2022-10-05 RX ORDER — SENNA PLUS 8.6 MG/1
1 TABLET ORAL DAILY PRN
Status: DISCONTINUED | OUTPATIENT
Start: 2022-10-05 | End: 2022-10-08 | Stop reason: HOSPADM

## 2022-10-05 RX ORDER — PROPOFOL 10 MG/ML
260 INJECTION, EMULSION INTRAVENOUS ONCE
Status: COMPLETED | OUTPATIENT
Start: 2022-10-05 | End: 2022-10-05

## 2022-10-05 RX ORDER — SODIUM CHLORIDE 0.9 % (FLUSH) 0.9 %
5-40 SYRINGE (ML) INJECTION EVERY 12 HOURS SCHEDULED
Status: DISCONTINUED | OUTPATIENT
Start: 2022-10-05 | End: 2022-10-08 | Stop reason: HOSPADM

## 2022-10-05 RX ORDER — DULOXETIN HYDROCHLORIDE 30 MG/1
30 CAPSULE, DELAYED RELEASE ORAL DAILY
Status: DISCONTINUED | OUTPATIENT
Start: 2022-10-05 | End: 2022-10-08 | Stop reason: HOSPADM

## 2022-10-05 RX ORDER — SODIUM CHLORIDE 9 MG/ML
INJECTION, SOLUTION INTRAVENOUS PRN
Status: DISCONTINUED | OUTPATIENT
Start: 2022-10-05 | End: 2022-10-08 | Stop reason: HOSPADM

## 2022-10-05 RX ORDER — GABAPENTIN 300 MG/1
300 CAPSULE ORAL 3 TIMES DAILY
Status: DISCONTINUED | OUTPATIENT
Start: 2022-10-05 | End: 2022-10-08 | Stop reason: HOSPADM

## 2022-10-05 RX ORDER — PROPOFOL 10 MG/ML
150 INJECTION, EMULSION INTRAVENOUS ONCE
Status: COMPLETED | OUTPATIENT
Start: 2022-10-05 | End: 2022-10-05

## 2022-10-05 RX ORDER — OXYCODONE HYDROCHLORIDE 5 MG/1
5 TABLET ORAL EVERY 4 HOURS PRN
Status: DISCONTINUED | OUTPATIENT
Start: 2022-10-05 | End: 2022-10-08 | Stop reason: HOSPADM

## 2022-10-05 RX ORDER — SODIUM CHLORIDE 0.9 % (FLUSH) 0.9 %
5-40 SYRINGE (ML) INJECTION PRN
Status: DISCONTINUED | OUTPATIENT
Start: 2022-10-05 | End: 2022-10-08 | Stop reason: HOSPADM

## 2022-10-05 RX ORDER — IBUPROFEN 800 MG/1
800 TABLET ORAL 2 TIMES DAILY PRN
Qty: 30 TABLET | Refills: 0 | Status: SHIPPED | OUTPATIENT
Start: 2022-10-05

## 2022-10-05 RX ORDER — PROPOFOL 10 MG/ML
200 INJECTION, EMULSION INTRAVENOUS ONCE
Status: COMPLETED | OUTPATIENT
Start: 2022-10-05 | End: 2022-10-05

## 2022-10-05 RX ADMIN — HYDROMORPHONE HYDROCHLORIDE 1 MG: 1 INJECTION, SOLUTION INTRAMUSCULAR; INTRAVENOUS; SUBCUTANEOUS at 19:57

## 2022-10-05 RX ADMIN — FENTANYL CITRATE 50 MCG: 50 INJECTION, SOLUTION INTRAMUSCULAR; INTRAVENOUS at 02:48

## 2022-10-05 RX ADMIN — OXYCODONE 5 MG: 5 TABLET ORAL at 23:32

## 2022-10-05 RX ADMIN — MULTIVITAMIN TABLET 1 TABLET: TABLET at 23:22

## 2022-10-05 RX ADMIN — PROPOFOL 150 MG: 10 INJECTION, EMULSION INTRAVENOUS at 02:25

## 2022-10-05 RX ADMIN — PROPOFOL 260 MG: 10 INJECTION, EMULSION INTRAVENOUS at 16:29

## 2022-10-05 RX ADMIN — HYDROMORPHONE HYDROCHLORIDE 1 MG: 1 INJECTION, SOLUTION INTRAMUSCULAR; INTRAVENOUS; SUBCUTANEOUS at 11:45

## 2022-10-05 RX ADMIN — OXYCODONE 5 MG: 5 TABLET ORAL at 17:10

## 2022-10-05 RX ADMIN — MORPHINE SULFATE 4 MG: 4 INJECTION, SOLUTION INTRAMUSCULAR; INTRAVENOUS at 09:47

## 2022-10-05 RX ADMIN — GABAPENTIN 300 MG: 300 CAPSULE ORAL at 23:23

## 2022-10-05 RX ADMIN — MORPHINE SULFATE 4 MG: 4 INJECTION, SOLUTION INTRAMUSCULAR; INTRAVENOUS at 07:05

## 2022-10-05 RX ADMIN — FENTANYL CITRATE 25 MCG: 50 INJECTION, SOLUTION INTRAMUSCULAR; INTRAVENOUS at 00:49

## 2022-10-05 RX ADMIN — MORPHINE SULFATE 4 MG: 4 INJECTION, SOLUTION INTRAMUSCULAR; INTRAVENOUS at 05:21

## 2022-10-05 RX ADMIN — PROPOFOL 200 MG: 10 INJECTION, EMULSION INTRAVENOUS at 02:12

## 2022-10-05 RX ADMIN — HYDROMORPHONE HYDROCHLORIDE 1 MG: 1 INJECTION, SOLUTION INTRAMUSCULAR; INTRAVENOUS; SUBCUTANEOUS at 14:01

## 2022-10-05 RX ADMIN — SODIUM CHLORIDE, PRESERVATIVE FREE 10 ML: 5 INJECTION INTRAVENOUS at 23:23

## 2022-10-05 ASSESSMENT — PAIN DESCRIPTION - LOCATION
LOCATION: ANKLE
LOCATION: FOOT;ANKLE;LEG
LOCATION: LEG;ANKLE
LOCATION: ANKLE
LOCATION: ANKLE
LOCATION: LEG

## 2022-10-05 ASSESSMENT — PAIN DESCRIPTION - ORIENTATION
ORIENTATION: LEFT;LOWER
ORIENTATION: LEFT
ORIENTATION: LOWER;LEFT
ORIENTATION: LEFT
ORIENTATION: LEFT
ORIENTATION: LEFT;LOWER

## 2022-10-05 ASSESSMENT — PAIN SCALES - GENERAL
PAINLEVEL_OUTOF10: 9
PAINLEVEL_OUTOF10: 8
PAINLEVEL_OUTOF10: 8
PAINLEVEL_OUTOF10: 7
PAINLEVEL_OUTOF10: 5
PAINLEVEL_OUTOF10: 8
PAINLEVEL_OUTOF10: 7

## 2022-10-05 ASSESSMENT — PAIN DESCRIPTION - DESCRIPTORS
DESCRIPTORS: SHARP;SORE
DESCRIPTORS: THROBBING
DESCRIPTORS: SHARP;THROBBING
DESCRIPTORS: THROBBING;SORE;DISCOMFORT
DESCRIPTORS: THROBBING;SHARP
DESCRIPTORS: THROBBING

## 2022-10-05 ASSESSMENT — ENCOUNTER SYMPTOMS
COLOR CHANGE: 0
ABDOMINAL PAIN: 0
VOMITING: 0
DIARRHEA: 0
SHORTNESS OF BREATH: 0
BACK PAIN: 0
NAUSEA: 0
COUGH: 0

## 2022-10-05 ASSESSMENT — PAIN DESCRIPTION - PAIN TYPE: TYPE: ACUTE PAIN

## 2022-10-05 ASSESSMENT — PAIN - FUNCTIONAL ASSESSMENT
PAIN_FUNCTIONAL_ASSESSMENT: 0-10
PAIN_FUNCTIONAL_ASSESSMENT: ACTIVITIES ARE NOT PREVENTED
PAIN_FUNCTIONAL_ASSESSMENT: 0-10
PAIN_FUNCTIONAL_ASSESSMENT: 0-10

## 2022-10-05 NOTE — DISCHARGE INSTRUCTIONS
Department of Orthopaedic Trauma  [address], DO         Dr. Lila Glow, MD Dr. Muriel Popper, MD Cletus Kocher, PA-C Debara Euler PA-C Kelsie Dama PA-C    Orthopaedics Discharge Instructions   Weight bearing Status - Non-weight bearing - on left lower Extremity  Pain medication Per Prescriptions  Contact Office for Medication Refill- [phone]  Office can refill pain med every 7 days  If patient discharging to facility then pain control will be continued per facility physician  Ice to operative/injured site for 15-30 minutes of each hour for next 5 days    Recommend that you continue to ice the area 2-3 times per day after this   Elevate operative/injured limb on 2 pillows at home  Goal is to have limb above the heart if able  Continue DVT Prophylaxis (blood clot prevention) as Prescribed: Aspirin 81 twice daily   Wound care - Keep dressings in place until seen in office. Fracture Care -  Remain non-weight bearing to operative extremity. Keep ex-fix in place and tight. Follow Up in Office in 2 weeks. Your first post op appointment is often with one of our PAs. Call the office at [phone] for directions or with any questions. Watch for these significant complications. Call physician if they or any other problems occur:  Fever over 101°, redness, swelling or warmth at the operative site  Unrelieved nausea    Foul smelling or cloudy drainage at the operative site   Unrelieved pain    Blood soaked dressing. (Some oozing may be normal)     Numb, pale, blue, cold or tingling extremity    No future appointments.     Directions to Orthopaedic Trauma Office at [facility]:   [address] [facility], [address] through the ED parking lot off [address]  At far side of the parking lot is [address] (large blue awning)-- Enter here  Our office is straight ahead through this entrance and check in will be on your

## 2022-10-05 NOTE — ED NOTES
Report called to Josh Will RN at [facility] ED      [facility]  10/05/22 0767

## 2022-10-05 NOTE — ED PROVIDER NOTES
[facility]  ED Provider Note  Department of Emergency Medicine     ED Room:       Written by: Abby Coyle DO  Patient Name: Darrin Murray  Attending Provider: Katalina Hubbard DO  Admit Date: 10/5/2022 12:30 AM  MRN: [id number]    : 1975        Chief Complaint   Patient presents with    Fall     3-5 steps, mechanical fall    Ankle Pain     L ankle deformity    - Chief complaint    HPI   Darrin Murray is a 49 y.o. female presenting to the ED for evaluation of Fall (3-5 steps, mechanical fall) and Ankle Pain (L ankle deformity)    Patient is a 21-year-old female with past medical history of Crohn's, paroxysmal SVT, presenting to the ED for evaluation of left ankle injury. Patient states she was at home when she slipped and fell down 3 or 4 steps, it was a mechanical fall she did not have any preceding dizziness, weakness, chest pain or lightheadedness. She did not hit her head, she did not lose consciousness. Patient does not take any blood thinning medications. Does report left ankle deformity, pain bruising and swelling. Reports a small abrasion over the left medial ankle as well. Denies any numbness or tingling. Denies any injuries elsewhere. Complaints overall are severe in severity, pain of her ankle is worse with palpation or movement of her left ankle, no specific alleviating factors and began shortly prior to arrival.    Review of Systems   Constitutional:  Negative for chills and fever. HENT:  Negative for congestion and sore throat. Eyes:  Negative for visual disturbance. Respiratory:  Negative for cough and shortness of breath. Cardiovascular:  Negative for chest pain and palpitations. Gastrointestinal:  Negative for abdominal pain, nausea and vomiting. Musculoskeletal:  Negative for back pain and neck pain. Left ankle pain, swelling, deformity     Skin:  Positive for wound (small abrasion left medial ankle).  Negative for rash.   Neurological:  Negative for numbness and headaches. Psychiatric/Behavioral:  Negative for confusion. All other systems reviewed and are negative. Physical Exam  Vitals and nursing note reviewed. Constitutional:       General: She is not in acute distress. Appearance: She is obese. She is not toxic-appearing. HENT:      Head: Normocephalic and atraumatic. Right Ear: External ear normal.      Left Ear: External ear normal.      Nose: Nose normal. No rhinorrhea. Mouth/Throat:      Mouth: Mucous membranes are moist.      Pharynx: Oropharynx is clear. Eyes:      Extraocular Movements: Extraocular movements intact. Conjunctiva/sclera: Conjunctivae normal.      Pupils: Pupils are equal, round, and reactive to light. Cardiovascular:      Rate and Rhythm: Normal rate and regular rhythm. Pulses: Normal pulses. Heart sounds: Normal heart sounds. Pulmonary:      Effort: Pulmonary effort is normal. No respiratory distress. Breath sounds: Normal breath sounds. No wheezing or rales. Abdominal:      General: There is no distension. Palpations: Abdomen is soft. Tenderness: There is no abdominal tenderness. There is no guarding or rebound. Musculoskeletal:         General: Swelling (left ankle; bruising, small ~2 cm superficial abrasion left medial ankle, no laceration, no bleeding), tenderness (diffusely to left ankle, worse medially), deformity (left ankle, slight inversion with swelling) and signs of injury (left ankle) present. Cervical back: Normal range of motion and neck supple. No rigidity. Comments: Distal pulses 2+, normal sensation throughout     Skin:     General: Skin is warm and dry. Capillary Refill: Capillary refill takes less than 2 seconds. Coloration: Skin is not jaundiced or pale. Findings: Bruising (left ankle) present. No rash. Neurological:      General: No focal deficit present.       Mental Status: She is alert and oriented to person, place, and time. Sensory: No sensory deficit. Psychiatric:         Mood and Affect: Mood normal.         Behavior: Behavior normal.        Procedures     Conscious Sedation Procedure Note    Indication: fracture dislocation left ankle    Consent: I have discussed with the patient and/or the patient representative the indication, alternatives, and the possible risks and/or complications of the planned procedure and the anesthesia methods. The patient and/or patient representative appear to understand and agree to proceed. Physician Involvement: The attending physician was present and supervising this procedure. Pre-Sedation Documentation and Exam:  Time: 0210  I have personally completed a history, physical exam & review of systems for this patient (see notes). Airway Assessment: normal    Prior History of Anesthesia Complications: none    ASA Classification: Class 2 - A normal healthy patient with mild systemic disease    Sedation/ Anesthesia Plan: intravenous sedation    Medications Used: propofol intravenously    Monitoring and Safety: The patient was placed on a cardiac monitor and vital signs, pulse oximetry and level of consciousness were continuously evaluated throughout the procedure. The patient was closely monitored until recovery from the medications was complete and the patient had returned to baseline status. Respiratory therapy was on standby at all times during the procedure. (The following sections must be completed)  Post-Sedation Vital Signs: Vital signs were reviewed and were stable after the procedure (see flow sheet for vitals)            Post-Sedation Exam:   Time: 4374.    Lungs: clear and Cardiovascular: normal           Complications: none      Sedation start time: 02:11:59  Sedation end time: 02:33:54    --------------------------------------------------------------------------------------------    Fracture Reduction Procedure Note    Indication: fracture    Consent: The patient was counseled regarding the procedure, it's indications, risks, potential complications and alternatives and any questions were answered. Consent was obtained. Procedure: The pre-reduction exam showed distal perfusion & neurologic function to be normal. The patient was placed in the appropriate position. Anesthesia/pain control was obtained using conscious sedation -SEE CONSCIOUS SEDATION NOTE FOR DETAILS. Reduction of the left ankle was performed by direct traction and direct manipulation. Post reduction films were obtained and revealed some reduction but overall continued dislocation and so patient was transported to [facility] for orthopedic surgery evaluation. A post-reduction exam revealed distal perfusion & neurologic function to be normal. The affected area was immobilized with a posterior short leg plaster splint. The patient tolerated the procedure well. Complications: Unsatisfactory reduction      MDM       ED Course as of 10/06/22 1934   Wed Oct 05, 2022   06 Spoke with Dr. Jarod Marie at [facility] ED, discussed case, patient is accepted for ED to ED transfer. [VG]   6691 Spoke with Dr. Raphael Haji (ortho), discussed case, he reviewed patient's scans, patient is still displaced/dislocated so he recommends transfer to [facility] and they will see the patient there. [VG]      ED Course User Index  [VG] David Grounds, DO         Medical Decision Makin-year-old female presents for evaluation of left ankle pain and deformity after mechanical fall down a few steps at home shortly prior to arrival.  Exam as noted above. She is nontoxic in appearance. She is alert and oriented. There is positive left ankle deformity, swelling, slight inversion, bruising, no open fracture; there is a very mild superficial abrasion noted over his ankle.   There is no bleeding, no laceration, distal pulses 2+ throughout, normal sensation, patient neurovascular intact throughout. Compartments are soft. XR obtained and shows displaced fibular medial malleoli fractures with disruption of the ankle and distal talofibular joints. Decision was made to consciously sedate this patient for fracture reduction. Procedure was performed as noted above. Initial bedside x-ray showed some interval improvement, however in the process of splinting patient did move somewhat and we did obtain a CT to confirm post reduction and ankle still shows lateral displacement of the talar dome relative to the tibial plafond, mildly displaced fracture of the posterior malleolus and and mild lateral displacement of the distal fracture component of the lateral malleoli. Given this, Ortho was consulted; they reviewed the patient's scans, the recommend transferring patient to [facility] ED for orthopedic consult/evaluation. This was explained to the patient, she is amenable to this plan. Spoke with the ED provider at [facility], she is accepted for transfer. See ED COURSE for additional MDM. Labs & imaging were reviewed and interpreted, see RESULTS. I have personally reviewed all laboratory and imaging results for this patient. I have discussed this patient with my attending, who has seen the patient and agrees with this disposition. Patient was seen and evaluated by myself and my attending Katalina Hubbard DO. Assessment and Plan discussed with attending provider, please see attestation for final plan of care. ED Course as of 10/06/22 1942   Wed Oct 05, 2022   0612 Spoke with Dr. Nikhil Sosa at [address] [facility] ED, discussed case, patient is accepted for ED to ED transfer.   [VG]   9862 Spoke with Dr. Gómez [address] (ortho), discussed case, he reviewed patient's scans, patient is still displaced/dislocated so he recommends transfer to [facility] and they will see the patient there. [VG]      ED Course User Index  [VG] Tyra Juarez DO       --------------------------------------------- PAST HISTORY ---------------------------------------------  Past Medical History:  has a past medical history of Acid reflux, Anxiety and depression, and Crohn's colitis ([facility] [address] Past Surgical History:  has a past surgical history that includes Breast biopsy (1991); Cholecystectomy (11/18/2011); Endometrial ablation (2004); Upper gastrointestinal endoscopy; Jejunostomy (04/2019); and total colectomy. Social History:  reports that she has never smoked. She has never used smokeless tobacco. She reports that she does not drink alcohol and does not use drugs. Family History: family history includes Diabetes in her father; Heart Disease in her father; High Blood Pressure in her father; Kidney Disease in her mother. Unless otherwise noted, family history is non contributory. The patients home medications have been reviewed. Allergies: Neomycin, Amoxil [amoxicillin], and Doxycycline    -------------------------------------------------- RESULTS -------------------------------------------------    Lab  No results found for this visit on 10/05/22. Radiology  CT ANKLE LEFT WO CONTRAST   Final Result   Acute trimalleolar fracture. XR ANKLE LEFT (2 VIEWS)   Final Result   Status post interval reduction and splinting. XR ANKLE LEFT (MIN 3 VIEWS)   Final Result   Displaced fibular medial malleolar fractures with disruption of the ankle and   distal tibiofibular joints. XR TIBIA FIBULA LEFT (2 VIEWS)   Final Result   Displaced distal fibular shaft fracture. Displaced medial malleolar fracture. Disruption of the ankle joint. Widening of the distal tibiofibular joint         XR CHEST PORTABLE   Final Result   No acute process. XR PELVIS (1-2 VIEWS)   Final Result   No acute abnormality of the pelvis.              Interpreted by the radiologist unless otherwise specified.    ------------------------- NURSING NOTES AND VITALS REVIEWED ---------------------------  Date / Time Roomed:  10/5/2022 12:30 AM  ED Bed Assignment:  28/28    The nursing notes within the ED encounter and vital signs as below have been reviewed by myself. No data found. Oxygen Saturation Interpretation: Normal    The patients available past medical records and past encounters were reviewed. ------------------------------------------ PROGRESS NOTES ------------------------------------------  Re-evaluation(s):  Please see ED course    I have spoken with the patient and   and discussed todays results, in addition to providing specific details for the plan of care and counseling regarding the diagnosis and prognosis. Their questions are answered at this time and they are agreeable with the plan. I have discussed the risks and benefits of transfer and they wish to proceed with the transfer. --------------------------------- ADDITIONAL PROVIDER NOTES ---------------------------------  Consultations:  Please see ED course    Reason for transfer: orthopedic surgery consult, services not available at this facility, unstable ankle fracture. This patient's ED course included: a personal history and physicial examination, re-evaluation prior to disposition, multiple bedside re-evaluations, IV medications, cardiac monitoring, continuous pulse oximetry, and complex medical decision making and emergency management    This patient has remained hemodynamically stable during their ED course. Please note that the withdrawal or failure to initiate urgent interventions for this patient would likely result in a life threatening deterioration or permanent disability. Accordingly this patient received 30 minutes of critical care time, excluding separately billable procedures. Clinical Impression  1. Closed fracture of left ankle, initial encounter          Disposition  Patient's disposition: Transfer to [facility].   Transferred by: BLS.  Patient's condition is stable. Ondina Reilly D.O. PGY-3     Resident Physician     Emergency Medicine      10/5/2022 2:10 AM      NOTE: This report was transcribed using voice recognition software.  Every effort was made to ensure accuracy; however, inadvertent computerized transcription errors may be present             DO Lupe Cabrera  10/06/22 2002

## 2022-10-05 NOTE — ED PROVIDER NOTES
HPI   This is a 58-year-old female patient presented to emergency department status post fall, complaining of left ankle pain with deformity. She was transferred from [facility] she was found to have a displaced trimalleolar fracture. reduction was attempted at prior facility however they were unable to completely reduce the injury so patient was transferred here for orthopedic consultation. Denies any head or neck injury. No loss of consciousness. Patient complaining sudden onset, severe in severity. Review of Systems   Constitutional:  Negative for chills and fever. HENT:  Negative for congestion. Respiratory:  Negative for cough and shortness of breath. Cardiovascular:  Negative for chest pain. Gastrointestinal:  Negative for abdominal pain, diarrhea, nausea and vomiting. Genitourinary:  Negative for difficulty urinating, dysuria and hematuria. Musculoskeletal:  Negative for back pain. Left ankle pain and swelling. Skin:  Negative for color change. All other systems reviewed and are negative. Physical Exam  Vitals and nursing note reviewed. Constitutional:       Appearance: Normal appearance. HENT:      Head: Normocephalic and atraumatic. Nose: Nose normal. No congestion. Mouth/Throat:      Mouth: Mucous membranes are moist.      Pharynx: Oropharynx is clear. Eyes:      Conjunctiva/sclera: Conjunctivae normal.      Pupils: Pupils are equal, round, and reactive to light. Neck:      Comments: No cervical spine tenderness to palpation  Cardiovascular:      Rate and Rhythm: Normal rate and regular rhythm. Pulses: Normal pulses. Heart sounds: Normal heart sounds. Pulmonary:      Effort: Pulmonary effort is normal. No respiratory distress. Breath sounds: Normal breath sounds. Abdominal:      General: Bowel sounds are normal. There is no distension. Tenderness: There is no abdominal tenderness.    Musculoskeletal:         General: Normal range of motion. Cervical back: Normal range of motion and neck supple. Comments: Tenderness to palpation of the lumbar spine or thoracic spine. No step-offs or crepitus. Skin:     General: Skin is warm and dry. Capillary Refill: Capillary refill takes less than 2 seconds. Comments: Cap refill in the left toes present. Splint in place. Splint taken down. Generalized swelling, small abrasion present. Neurological:      General: No focal deficit present. Mental Status: She is alert. Procedures     MDM  Patient was transferred here for orthopedic reduction. We performed conscious sedation here, orthopedic resident at bedside he was able to improve the reduction. Please see his note for reduction and splinting. Patient was intact with reduction. Patient was given outpatient follow-up with orthopedic surgery, pain medicine. return precautions discussed. Conscious Sedation Procedure Note    Indication: ankle fx     Consent: I have discussed with the patient and/or the patient representative the indication, alternatives, and the possible risks and/or complications of the planned procedure and the anesthesia methods. The patient and/or patient representative appear to understand and agree to proceed. Physician Involvement: The attending physician was present and supervising this procedure. Pre-Sedation Documentation and Exam:  Time: 349pm  I have personally completed a history, physical exam & review of systems for this patient (see notes). Airway Assessment: normal    Prior History of Anesthesia Complications: Patient states that when she underwent general anesthesia she had a run of SVT.     ASA Classification: Class 2 - A normal healthy patient with mild systemic disease    Sedation/ Anesthesia Plan: intravenous sedation    Medications Used: propofol intravenously, 260 mg total    Monitoring and Safety: The patient was placed on a cardiac monitor and vital signs, pulse oximetry and level of consciousness were continuously evaluated throughout the procedure. The patient was closely monitored until recovery from the medications was complete and the patient had returned to baseline status. Respiratory therapy was on standby at all times during the procedure. (The following sections must be completed)  Post-Sedation Vital Signs: Vital signs were reviewed and were stable after the procedure (see flow sheet for vitals)            Post-Sedation Exam:   Time: 425pm.   Lungs: clear and Cardiovascular: normal           Complications: none               --------------------------------------------- PAST HISTORY ---------------------------------------------  Past Medical History:  has a past medical history of Acid reflux, Anxiety and depression, and Crohn's colitis ([facility] [address] Past Surgical History:  has a past surgical history that includes Breast biopsy (1991); Cholecystectomy (11/18/2011); Endometrial ablation (2004); Upper gastrointestinal endoscopy; Jejunostomy (04/2019); and total colectomy. Social History:  reports that she has never smoked. She has never used smokeless tobacco. She reports that she does not drink alcohol and does not use drugs. Family History: family history includes Diabetes in her father; Heart Disease in her father; High Blood Pressure in her father; Kidney Disease in her mother. The patients home medications have been reviewed. Allergies: Neomycin, Amoxil [amoxicillin], and Doxycycline    -------------------------------------------------- RESULTS -------------------------------------------------  Labs:  No results found for this visit on 10/05/22. Radiology:  XR ANKLE LEFT (MIN 3 VIEWS)   Final Result   Status post close reduction of distal fibula and medial malleolus fractures.              ------------------------- NURSING NOTES AND VITALS REVIEWED ---------------------------  Date / Time Roomed:  10/5/2022 11:25 AM  ED Bed Assignment:  06/06    The nursing notes within the ED encounter and vital signs as below have been reviewed. BP (!) 140/69   Pulse 76   Temp 98.2 °F (36.8 °C)   Resp 16   SpO2 100%   Oxygen Saturation Interpretation: Normal      ------------------------------------------ PROGRESS NOTES ------------------------------------------  4:51 PM EDT  I have spoken with the patient and discussed todays results, in addition to providing specific details for the plan of care and counseling regarding the diagnosis and prognosis. Their questions are answered at this time and they are agreeable with the plan. I discussed at length with them reasons for immediate return here for re evaluation. They will followup with their orthopedic physician by calling their office tomorrow. --------------------------------- ADDITIONAL PROVIDER NOTES ---------------------------------  At this time the patient is without objective evidence of an acute process requiring hospitalization or inpatient management. They have remained hemodynamically stable throughout their entire ED visit and are stable for discharge with outpatient follow-up. The plan has been discussed in detail and they are aware of the specific conditions for emergent return, as well as the importance of follow-up. New Prescriptions    IBUPROFEN (ADVIL;MOTRIN) 800 MG TABLET    Take 1 tablet by mouth 2 times daily as needed for Pain       Diagnosis:  1. Closed trimalleolar fracture of left ankle, initial encounter        Disposition:  Patient's disposition: Discharge to home  Patient's condition is stable.         Kojo Dominguez DO  Resident  10/05/22 [id number]

## 2022-10-05 NOTE — CONSULTS
Department of Orthopedic Trauma Surgery  Resident consult note      CHIEF COMPLAINT:   Chief Complaint   Patient presents with    Ankle Pain    Other     Tx from [address] [place] [address]. Patient fell down stairs left ankle fx unable to be reduced at bedside here for ortho consult       HISTORY OF PRESENT ILLNESS:                Patient is a 49 y.o. female who presents with left ankle pain. Patient presented to [facility] last night after a fall down 3 or 4 steps. She had immediate pain to her left ankle and inability to ambulate. Reduction was attempted at [facility] however additional treatment was necessary and she was transferred to [facility].  She denies hitting her head or having LOC. Her only complaint is her left ankle. Denies numbness/tingling/paresthesias. Denies any other orthopedic complaints at this time. Past Medical History:        Diagnosis Date    Acid reflux     Anxiety and depression     Crohn's colitis ([facility] [address] 04/2018    Crohn's colitis per colonoscopy biopsies 4/9/18, 6/11/18     Past Surgical History:        Procedure Laterality Date    BREAST BIOPSY  1991    Benign Cyst    CHOLECYSTECTOMY  11/18/2011    Kim Rodriguez, laparoscopic     ENDOMETRIAL ABLATION  2004    ILEOSTOMY OR JEJUNOSTOMY  04/2019    TOTAL COLECTOMY      UPPER GASTROINTESTINAL ENDOSCOPY       Current Medications:   Current Facility-Administered Medications: propofol injection 200 mg, 200 mg, IntraVENous, Once  Allergies:  Neomycin, Amoxil [amoxicillin], and Doxycycline    Social History:   TOBACCO:   reports that she has never smoked. She has never used smokeless tobacco.  ETOH:   reports no history of alcohol use. DRUGS:   reports no history of drug use.   ACTIVITIES OF DAILY LIVING:    OCCUPATION:    Family History:       Problem Relation Age of Onset    Kidney Disease Mother     Diabetes Father         on HD s/p MI    Heart Disease Father     High Blood Pressure Father        REVIEW OF SYSTEMS:   Skin: no acute changes  Eyes: no acute changes  Ears/Nose/Throat: no acute changes  Respiratory: No increased work of breathing, no coughing  Cardiovascular: Brisk capillary refill bilaterally, well perfused extremities  Gastrointestinal: no acute changes  Neurologic: no acute changes  MUSCULOSKELETAL:  positive for  pain      PHYSICAL EXAM:    VITALS:  /67   Pulse 81   Temp 98.2 °F (36.8 °C)   Resp 16   SpO2 100%   Constitutional: Oriented to person, place, and time; Answer questions appropriately  HENT: Head: Atraumatic. Eyes: EOMI  Neck: Trachea midline  Cardiovascular: Brisk capillary refill to all extremities, extremities well perfused  Pulmonary/Chest: No increased work of breathing, no cough  Abdominal: Non-distended  Neurologic:  Awake, alert and oriented in three planes. No gross deficits   MUSCULOSKELETAL:  Left lower Extremity:  Significant swelling about the left ankle. There is a small superficial abrasion that is not deep to the dermis over the medial malleolus. There is no active bleeding from this wound. There is crepitus and mild deformity about the ankle. +TTP about the ankle   compartments soft and compressible  Palpable dorsalis pedis and posterior tibialis pulse, brisk cap refill to toes, foot warm and perfused  Sensation intact to light touch in sural/deep peroneal/superficial peroneal/saphenous/posterior tibial nerve distributions to foot/ankle  Demonstrates active ankle plantar/dorsiflexion/great toe extension      Secondary Exam:   bilateralUE: No obvious signs of trauma. -TTP to fingers, hand, wrist, forearm, elbow, humerus, shoulder or clavicle. -- Patient able to flex/extend fingers, wrist, elbow and shoulder with active and passive ROM without pain, +2/4 Radial pulse, compartments soft and compressible. rightLE: No obvious signs of trauma.    -TTP to foot, ankle, leg, knee, thigh, hip.-- Patient able to flex/extend toes, ankle, knee and hip with active and passive ROM without pain,+2/4 DP & PT pulses, compartments soft and compressible. Pelvis: -TTP, -Log roll, -Heel strike         DATA:    CBC:   Lab Results   Component Value Date/Time    WBC 6.4 10/05/2021 01:00 PM    RBC 4.22 10/05/2021 01:00 PM    HGB 12.4 10/05/2021 01:00 PM    HCT 39.3 10/05/2021 01:00 PM    MCV 93.1 10/05/2021 01:00 PM    MCH 29.4 10/05/2021 01:00 PM    MCHC 31.6 10/05/2021 01:00 PM    RDW 12.2 10/05/2021 01:00 PM     10/05/2021 01:00 PM    MPV 10.2 10/05/2021 01:00 PM     PT/INR:    Lab Results   Component Value Date/Time    PROTIME 12.8 08/19/2020 07:18 AM    PROTIME 11.0 10/14/2011 01:50 AM    INR 1.1 08/19/2020 07:18 AM       Radiology Review:  X-ray left ankle demonstrates a trimalleolar fracture dislocation. There is a transverse medial malleolus fracture that is displaced. There is a long oblique lateral malleolus fracture. There is a small fragment of the posterior malleolus displaced. There is lateral subluxation of the talus. There are no other fractures or dislocations noted. X-ray left ankle postreduction demonstrates same above fracture with improved alignment within a 3 sided splint. CT left ankle demonstrates the above fracture. No other fractures or dislocations noted.     IMPRESSION:  Closed, left ankle trimalleolar fracture with lateral talar subluxation    PLAN:  Nonweightbearing left lower extremity  After reduction a well-padded 3 sided splint was applied  Strict ice and elevation to decrease swelling and pain  Pain control per emergency physician discharge  No acute orthopedic intervention planned at this time  Follow-up with Dr. Carey Bowling in 1 week  Discuss with attending

## 2022-10-05 NOTE — PROGRESS NOTES
[address] [facility], RN, [address]    Transfer [facility] ED to [facility] ED    Dx: tri-mal ankle fracture    Dr. Andino Artist Rohini Roach) spoke with Dr. Florence Parham, accepting provider. PAS ETA [id number].

## 2022-10-06 ENCOUNTER — APPOINTMENT (OUTPATIENT)
Dept: GENERAL RADIOLOGY | Age: 47
DRG: 493 | End: 2022-10-06
Payer: COMMERCIAL

## 2022-10-06 ENCOUNTER — ANESTHESIA EVENT (OUTPATIENT)
Dept: OPERATING ROOM | Age: 47
DRG: 493 | End: 2022-10-06
Payer: COMMERCIAL

## 2022-10-06 ENCOUNTER — ANESTHESIA (OUTPATIENT)
Dept: OPERATING ROOM | Age: 47
DRG: 493 | End: 2022-10-06
Payer: COMMERCIAL

## 2022-10-06 LAB
ALBUMIN SERPL-MCNC: 3.5 G/DL (ref 3.5–5.2)
ALP BLD-CCNC: 67 U/L (ref 35–104)
ALT SERPL-CCNC: 13 U/L (ref 0–32)
ANION GAP SERPL CALCULATED.3IONS-SCNC: 13 MMOL/L (ref 7–16)
APTT: 27.3 SEC (ref 24.5–35.1)
AST SERPL-CCNC: 17 U/L (ref 0–31)
BASOPHILS ABSOLUTE: 0.04 E9/L (ref 0–0.2)
BASOPHILS RELATIVE PERCENT: 0.5 % (ref 0–2)
BILIRUB SERPL-MCNC: 1 MG/DL (ref 0–1.2)
BILIRUBIN DIRECT: <0.2 MG/DL (ref 0–0.3)
BILIRUBIN, INDIRECT: NORMAL MG/DL (ref 0–1)
BUN BLDV-MCNC: 7 MG/DL (ref 6–20)
CALCIUM SERPL-MCNC: 9.2 MG/DL (ref 8.6–10.2)
CHLORIDE BLD-SCNC: 102 MMOL/L (ref 98–107)
CO2: 24 MMOL/L (ref 22–29)
CREAT SERPL-MCNC: 0.8 MG/DL (ref 0.5–1)
EOSINOPHILS ABSOLUTE: 0.34 E9/L (ref 0.05–0.5)
EOSINOPHILS RELATIVE PERCENT: 4.2 % (ref 0–6)
GFR AFRICAN AMERICAN: >60
GFR NON-AFRICAN AMERICAN: >60 ML/MIN/1.73
GLUCOSE BLD-MCNC: 99 MG/DL (ref 74–99)
HCT VFR BLD CALC: 30.2 % (ref 34–48)
HCT VFR BLD CALC: 35.5 % (ref 34–48)
HEMOGLOBIN: 10.3 G/DL (ref 11.5–15.5)
HEMOGLOBIN: 9.3 G/DL (ref 11.5–15.5)
IMMATURE GRANULOCYTES #: 0.03 E9/L
IMMATURE GRANULOCYTES %: 0.4 % (ref 0–5)
INR BLD: 1.2
LYMPHOCYTES ABSOLUTE: 1.53 E9/L (ref 1.5–4)
LYMPHOCYTES RELATIVE PERCENT: 18.7 % (ref 20–42)
MAGNESIUM: 1.6 MG/DL (ref 1.6–2.6)
MCH RBC QN AUTO: 27.2 PG (ref 26–35)
MCH RBC QN AUTO: 27.8 PG (ref 26–35)
MCHC RBC AUTO-ENTMCNC: 29 % (ref 32–34.5)
MCHC RBC AUTO-ENTMCNC: 30.8 % (ref 32–34.5)
MCV RBC AUTO: 88.3 FL (ref 80–99.9)
MCV RBC AUTO: 95.7 FL (ref 80–99.9)
MONOCYTES ABSOLUTE: 0.71 E9/L (ref 0.1–0.95)
MONOCYTES RELATIVE PERCENT: 8.7 % (ref 2–12)
NEUTROPHILS ABSOLUTE: 5.53 E9/L (ref 1.8–7.3)
NEUTROPHILS RELATIVE PERCENT: 67.5 % (ref 43–80)
PDW BLD-RTO: 16.9 FL (ref 11.5–15)
PDW BLD-RTO: 17.2 FL (ref 11.5–15)
PLATELET # BLD: 177 E9/L (ref 130–450)
PLATELET # BLD: 196 E9/L (ref 130–450)
PMV BLD AUTO: 10.9 FL (ref 7–12)
PMV BLD AUTO: 9.9 FL (ref 7–12)
POTASSIUM REFLEX MAGNESIUM: 3.5 MMOL/L (ref 3.5–5)
PROTHROMBIN TIME: 13.6 SEC (ref 9.3–12.4)
RBC # BLD: 3.42 E12/L (ref 3.5–5.5)
RBC # BLD: 3.71 E12/L (ref 3.5–5.5)
SODIUM BLD-SCNC: 139 MMOL/L (ref 132–146)
TOTAL PROTEIN: 7.1 G/DL (ref 6.4–8.3)
WBC # BLD: 8.2 E9/L (ref 4.5–11.5)
WBC # BLD: 9.9 E9/L (ref 4.5–11.5)

## 2022-10-06 PROCEDURE — 7100000001 HC PACU RECOVERY - ADDTL 15 MIN: Performed by: ORTHOPAEDIC SURGERY

## 2022-10-06 PROCEDURE — 83735 ASSAY OF MAGNESIUM: CPT

## 2022-10-06 PROCEDURE — 0QHK35Z INSERTION OF EXTERNAL FIXATION DEVICE INTO LEFT FIBULA, PERCUTANEOUS APPROACH: ICD-10-PCS | Performed by: ORTHOPAEDIC SURGERY

## 2022-10-06 PROCEDURE — 64445 NJX AA&/STRD SCIATIC NRV IMG: CPT | Performed by: ANESTHESIOLOGY

## 2022-10-06 PROCEDURE — 36415 COLL VENOUS BLD VENIPUNCTURE: CPT

## 2022-10-06 PROCEDURE — 2500000003 HC RX 250 WO HCPCS

## 2022-10-06 PROCEDURE — 6370000000 HC RX 637 (ALT 250 FOR IP): Performed by: NURSE PRACTITIONER

## 2022-10-06 PROCEDURE — 6360000002 HC RX W HCPCS

## 2022-10-06 PROCEDURE — 2720000010 HC SURG SUPPLY STERILE: Performed by: ORTHOPAEDIC SURGERY

## 2022-10-06 PROCEDURE — 6360000002 HC RX W HCPCS: Performed by: ANESTHESIOLOGY

## 2022-10-06 PROCEDURE — 97530 THERAPEUTIC ACTIVITIES: CPT

## 2022-10-06 PROCEDURE — 3700000000 HC ANESTHESIA ATTENDED CARE: Performed by: ORTHOPAEDIC SURGERY

## 2022-10-06 PROCEDURE — 97161 PT EVAL LOW COMPLEX 20 MIN: CPT

## 2022-10-06 PROCEDURE — 99219 PR INITIAL OBSERVATION CARE/DAY 50 MINUTES: CPT | Performed by: ORTHOPAEDIC SURGERY

## 2022-10-06 PROCEDURE — 85610 PROTHROMBIN TIME: CPT

## 2022-10-06 PROCEDURE — 2580000003 HC RX 258: Performed by: NURSE PRACTITIONER

## 2022-10-06 PROCEDURE — 6370000000 HC RX 637 (ALT 250 FOR IP): Performed by: PHYSICAL THERAPY ASSISTANT

## 2022-10-06 PROCEDURE — 2709999900 HC NON-CHARGEABLE SUPPLY: Performed by: ORTHOPAEDIC SURGERY

## 2022-10-06 PROCEDURE — 80048 BASIC METABOLIC PNL TOTAL CA: CPT

## 2022-10-06 PROCEDURE — 7100000000 HC PACU RECOVERY - FIRST 15 MIN: Performed by: ORTHOPAEDIC SURGERY

## 2022-10-06 PROCEDURE — 20690 APPL UNIPLN UNI EXT FIXJ SYS: CPT | Performed by: ORTHOPAEDIC SURGERY

## 2022-10-06 PROCEDURE — 3209999900 FLUORO FOR SURGICAL PROCEDURES

## 2022-10-06 PROCEDURE — 85025 COMPLETE CBC W/AUTO DIFF WBC: CPT

## 2022-10-06 PROCEDURE — 3600000015 HC SURGERY LEVEL 5 ADDTL 15MIN: Performed by: ORTHOPAEDIC SURGERY

## 2022-10-06 PROCEDURE — G0378 HOSPITAL OBSERVATION PER HR: HCPCS

## 2022-10-06 PROCEDURE — 6360000002 HC RX W HCPCS: Performed by: PHYSICAL THERAPY ASSISTANT

## 2022-10-06 PROCEDURE — 6360000002 HC RX W HCPCS: Performed by: NURSE PRACTITIONER

## 2022-10-06 PROCEDURE — 73610 X-RAY EXAM OF ANKLE: CPT

## 2022-10-06 PROCEDURE — C1713 ANCHOR/SCREW BN/BN,TIS/BN: HCPCS | Performed by: ORTHOPAEDIC SURGERY

## 2022-10-06 PROCEDURE — 2580000003 HC RX 258: Performed by: PHYSICAL THERAPY ASSISTANT

## 2022-10-06 PROCEDURE — 97165 OT EVAL LOW COMPLEX 30 MIN: CPT

## 2022-10-06 PROCEDURE — 0QHH35Z INSERTION OF EXTERNAL FIXATION DEVICE INTO LEFT TIBIA, PERCUTANEOUS APPROACH: ICD-10-PCS | Performed by: ORTHOPAEDIC SURGERY

## 2022-10-06 PROCEDURE — 64447 NJX AA&/STRD FEMORAL NRV IMG: CPT | Performed by: ANESTHESIOLOGY

## 2022-10-06 PROCEDURE — 27818 TREATMENT OF ANKLE FRACTURE: CPT | Performed by: ORTHOPAEDIC SURGERY

## 2022-10-06 PROCEDURE — 80076 HEPATIC FUNCTION PANEL: CPT

## 2022-10-06 PROCEDURE — 2580000003 HC RX 258

## 2022-10-06 PROCEDURE — 3700000001 HC ADD 15 MINUTES (ANESTHESIA): Performed by: ORTHOPAEDIC SURGERY

## 2022-10-06 PROCEDURE — 3600000005 HC SURGERY LEVEL 5 BASE: Performed by: ORTHOPAEDIC SURGERY

## 2022-10-06 PROCEDURE — 85730 THROMBOPLASTIN TIME PARTIAL: CPT

## 2022-10-06 RX ORDER — MIDAZOLAM HYDROCHLORIDE 1 MG/ML
INJECTION INTRAMUSCULAR; INTRAVENOUS
Status: COMPLETED
Start: 2022-10-06 | End: 2022-10-06

## 2022-10-06 RX ORDER — MIDAZOLAM HYDROCHLORIDE 2 MG/2ML
1 INJECTION, SOLUTION INTRAMUSCULAR; INTRAVENOUS EVERY 5 MIN PRN
Status: DISCONTINUED | OUTPATIENT
Start: 2022-10-06 | End: 2022-10-06 | Stop reason: HOSPADM

## 2022-10-06 RX ORDER — NEOSTIGMINE METHYLSULFATE 1 MG/ML
INJECTION, SOLUTION INTRAVENOUS PRN
Status: DISCONTINUED | OUTPATIENT
Start: 2022-10-06 | End: 2022-10-06 | Stop reason: SDUPTHER

## 2022-10-06 RX ORDER — ONDANSETRON 2 MG/ML
4 INJECTION INTRAMUSCULAR; INTRAVENOUS
Status: DISCONTINUED | OUTPATIENT
Start: 2022-10-06 | End: 2022-10-06 | Stop reason: HOSPADM

## 2022-10-06 RX ORDER — MORPHINE SULFATE 2 MG/ML
2 INJECTION, SOLUTION INTRAMUSCULAR; INTRAVENOUS EVERY 4 HOURS PRN
Status: DISCONTINUED | OUTPATIENT
Start: 2022-10-06 | End: 2022-10-08 | Stop reason: HOSPADM

## 2022-10-06 RX ORDER — ROPIVACAINE HYDROCHLORIDE 5 MG/ML
30 INJECTION, SOLUTION EPIDURAL; INFILTRATION; PERINEURAL
Status: DISCONTINUED | OUTPATIENT
Start: 2022-10-06 | End: 2022-10-06

## 2022-10-06 RX ORDER — GLYCOPYRROLATE 0.2 MG/ML
INJECTION INTRAMUSCULAR; INTRAVENOUS PRN
Status: DISCONTINUED | OUTPATIENT
Start: 2022-10-06 | End: 2022-10-06 | Stop reason: SDUPTHER

## 2022-10-06 RX ORDER — LIDOCAINE HYDROCHLORIDE 20 MG/ML
INJECTION, SOLUTION INTRAVENOUS PRN
Status: DISCONTINUED | OUTPATIENT
Start: 2022-10-06 | End: 2022-10-06 | Stop reason: SDUPTHER

## 2022-10-06 RX ORDER — FENTANYL CITRATE 50 UG/ML
INJECTION, SOLUTION INTRAMUSCULAR; INTRAVENOUS PRN
Status: DISCONTINUED | OUTPATIENT
Start: 2022-10-06 | End: 2022-10-06 | Stop reason: SDUPTHER

## 2022-10-06 RX ORDER — ROPIVACAINE HYDROCHLORIDE 5 MG/ML
40 INJECTION, SOLUTION EPIDURAL; INFILTRATION; PERINEURAL
Status: COMPLETED | OUTPATIENT
Start: 2022-10-06 | End: 2022-10-06

## 2022-10-06 RX ORDER — ROPIVACAINE HYDROCHLORIDE 5 MG/ML
INJECTION, SOLUTION EPIDURAL; INFILTRATION; PERINEURAL
Status: COMPLETED | OUTPATIENT
Start: 2022-10-06 | End: 2022-10-06

## 2022-10-06 RX ORDER — DEXAMETHASONE SODIUM PHOSPHATE 10 MG/ML
INJECTION INTRAMUSCULAR; INTRAVENOUS PRN
Status: DISCONTINUED | OUTPATIENT
Start: 2022-10-06 | End: 2022-10-06 | Stop reason: SDUPTHER

## 2022-10-06 RX ORDER — ASPIRIN 81 MG/1
81 TABLET ORAL 2 TIMES DAILY WITH MEALS
Qty: 60 TABLET | Refills: 0 | Status: SHIPPED | OUTPATIENT
Start: 2022-10-06 | End: 2022-11-05

## 2022-10-06 RX ORDER — SODIUM CHLORIDE 9 MG/ML
INJECTION, SOLUTION INTRAVENOUS PRN
Status: DISCONTINUED | OUTPATIENT
Start: 2022-10-06 | End: 2022-10-06 | Stop reason: HOSPADM

## 2022-10-06 RX ORDER — SODIUM CHLORIDE 0.9 % (FLUSH) 0.9 %
5-40 SYRINGE (ML) INJECTION EVERY 12 HOURS SCHEDULED
Status: DISCONTINUED | OUTPATIENT
Start: 2022-10-06 | End: 2022-10-06 | Stop reason: HOSPADM

## 2022-10-06 RX ORDER — ROCURONIUM BROMIDE 10 MG/ML
INJECTION, SOLUTION INTRAVENOUS PRN
Status: DISCONTINUED | OUTPATIENT
Start: 2022-10-06 | End: 2022-10-06 | Stop reason: SDUPTHER

## 2022-10-06 RX ORDER — SODIUM CHLORIDE 0.9 % (FLUSH) 0.9 %
5-40 SYRINGE (ML) INJECTION PRN
Status: DISCONTINUED | OUTPATIENT
Start: 2022-10-06 | End: 2022-10-06 | Stop reason: HOSPADM

## 2022-10-06 RX ORDER — ONDANSETRON 2 MG/ML
INJECTION INTRAMUSCULAR; INTRAVENOUS PRN
Status: DISCONTINUED | OUTPATIENT
Start: 2022-10-06 | End: 2022-10-06 | Stop reason: SDUPTHER

## 2022-10-06 RX ORDER — PROPOFOL 10 MG/ML
INJECTION, EMULSION INTRAVENOUS PRN
Status: DISCONTINUED | OUTPATIENT
Start: 2022-10-06 | End: 2022-10-06 | Stop reason: SDUPTHER

## 2022-10-06 RX ADMIN — MULTIVITAMIN TABLET 1 TABLET: TABLET at 08:32

## 2022-10-06 RX ADMIN — METOPROLOL SUCCINATE 12.5 MG: 25 TABLET, EXTENDED RELEASE ORAL at 08:32

## 2022-10-06 RX ADMIN — GABAPENTIN 300 MG: 300 CAPSULE ORAL at 08:32

## 2022-10-06 RX ADMIN — GABAPENTIN 300 MG: 300 CAPSULE ORAL at 14:00

## 2022-10-06 RX ADMIN — ROCURONIUM BROMIDE 30 MG: 10 INJECTION, SOLUTION INTRAVENOUS at 10:21

## 2022-10-06 RX ADMIN — FENTANYL CITRATE 50 MCG: 50 INJECTION, SOLUTION INTRAMUSCULAR; INTRAVENOUS at 10:46

## 2022-10-06 RX ADMIN — GABAPENTIN 300 MG: 300 CAPSULE ORAL at 20:12

## 2022-10-06 RX ADMIN — PANTOPRAZOLE SODIUM 40 MG: 40 TABLET, DELAYED RELEASE ORAL at 05:48

## 2022-10-06 RX ADMIN — MORPHINE SULFATE 2 MG: 2 INJECTION, SOLUTION INTRAMUSCULAR; INTRAVENOUS at 05:48

## 2022-10-06 RX ADMIN — ROPIVACAINE HYDROCHLORIDE 25 ML: 5 INJECTION EPIDURAL; INFILTRATION; PERINEURAL at 10:01

## 2022-10-06 RX ADMIN — CEFAZOLIN 2000 MG: 2 INJECTION, POWDER, FOR SOLUTION INTRAMUSCULAR; INTRAVENOUS at 10:33

## 2022-10-06 RX ADMIN — MIDAZOLAM HYDROCHLORIDE 2 MG: 1 INJECTION, SOLUTION INTRAMUSCULAR; INTRAVENOUS at 10:00

## 2022-10-06 RX ADMIN — FENTANYL CITRATE 50 MCG: 50 INJECTION, SOLUTION INTRAMUSCULAR; INTRAVENOUS at 10:26

## 2022-10-06 RX ADMIN — FOLIC ACID 1 MG: 1 TABLET ORAL at 08:32

## 2022-10-06 RX ADMIN — MORPHINE SULFATE 2 MG: 2 INJECTION, SOLUTION INTRAMUSCULAR; INTRAVENOUS at 14:31

## 2022-10-06 RX ADMIN — DULOXETINE HYDROCHLORIDE 30 MG: 30 CAPSULE, DELAYED RELEASE ORAL at 08:32

## 2022-10-06 RX ADMIN — PROPOFOL 150 MG: 10 INJECTION, EMULSION INTRAVENOUS at 10:26

## 2022-10-06 RX ADMIN — MORPHINE SULFATE 2 MG: 2 INJECTION, SOLUTION INTRAMUSCULAR; INTRAVENOUS at 20:13

## 2022-10-06 RX ADMIN — DEXAMETHASONE SODIUM PHOSPHATE 10 MG: 10 INJECTION INTRAMUSCULAR; INTRAVENOUS at 10:39

## 2022-10-06 RX ADMIN — FLUTICASONE PROPIONATE 1 SPRAY: 50 SPRAY, METERED NASAL at 08:32

## 2022-10-06 RX ADMIN — ONDANSETRON HYDROCHLORIDE 4 MG: 2 SOLUTION INTRAMUSCULAR; INTRAVENOUS at 11:12

## 2022-10-06 RX ADMIN — OXYCODONE 5 MG: 5 TABLET ORAL at 08:32

## 2022-10-06 RX ADMIN — SODIUM CHLORIDE: 9 INJECTION, SOLUTION INTRAVENOUS at 10:27

## 2022-10-06 RX ADMIN — SODIUM CHLORIDE, PRESERVATIVE FREE 10 ML: 5 INJECTION INTRAVENOUS at 08:35

## 2022-10-06 RX ADMIN — MIDAZOLAM HYDROCHLORIDE 2 MG: 2 INJECTION, SOLUTION INTRAMUSCULAR; INTRAVENOUS at 10:00

## 2022-10-06 RX ADMIN — HYDROMORPHONE HYDROCHLORIDE 0.5 MG: 1 INJECTION, SOLUTION INTRAMUSCULAR; INTRAVENOUS; SUBCUTANEOUS at 02:10

## 2022-10-06 RX ADMIN — SODIUM CHLORIDE, PRESERVATIVE FREE 10 ML: 5 INJECTION INTRAVENOUS at 20:12

## 2022-10-06 RX ADMIN — ROPIVACAINE HYDROCHLORIDE 40 ML: 5 INJECTION, SOLUTION EPIDURAL; INFILTRATION; PERINEURAL at 10:06

## 2022-10-06 RX ADMIN — GLYCOPYRROLATE 0.4 MG: 1 INJECTION INTRAMUSCULAR; INTRAVENOUS at 11:13

## 2022-10-06 RX ADMIN — CEFAZOLIN 2000 MG: 2 INJECTION, POWDER, FOR SOLUTION INTRAMUSCULAR; INTRAVENOUS at 20:12

## 2022-10-06 RX ADMIN — SODIUM CHLORIDE, PRESERVATIVE FREE 10 ML: 5 INJECTION INTRAVENOUS at 02:12

## 2022-10-06 RX ADMIN — Medication 2 MG: at 11:13

## 2022-10-06 RX ADMIN — LIDOCAINE HYDROCHLORIDE 100 MG: 20 INJECTION, SOLUTION INTRAVENOUS at 10:26

## 2022-10-06 RX ADMIN — OXYCODONE 5 MG: 5 TABLET ORAL at 03:48

## 2022-10-06 ASSESSMENT — PAIN - FUNCTIONAL ASSESSMENT
PAIN_FUNCTIONAL_ASSESSMENT: ACTIVITIES ARE NOT PREVENTED
PAIN_FUNCTIONAL_ASSESSMENT: PREVENTS OR INTERFERES SOME ACTIVE ACTIVITIES AND ADLS
PAIN_FUNCTIONAL_ASSESSMENT: ACTIVITIES ARE NOT PREVENTED
PAIN_FUNCTIONAL_ASSESSMENT: ACTIVITIES ARE NOT PREVENTED

## 2022-10-06 ASSESSMENT — PAIN SCALES - GENERAL
PAINLEVEL_OUTOF10: 8
PAINLEVEL_OUTOF10: 5
PAINLEVEL_OUTOF10: 0
PAINLEVEL_OUTOF10: 5
PAINLEVEL_OUTOF10: 7
PAINLEVEL_OUTOF10: 0
PAINLEVEL_OUTOF10: 4
PAINLEVEL_OUTOF10: 0
PAINLEVEL_OUTOF10: 10
PAINLEVEL_OUTOF10: 5
PAINLEVEL_OUTOF10: 3
PAINLEVEL_OUTOF10: 0
PAINLEVEL_OUTOF10: 8

## 2022-10-06 ASSESSMENT — PAIN SCALES - WONG BAKER
WONGBAKER_NUMERICALRESPONSE: 2
WONGBAKER_NUMERICALRESPONSE: 2

## 2022-10-06 ASSESSMENT — PAIN DESCRIPTION - DESCRIPTORS
DESCRIPTORS: ACHING;DISCOMFORT;NAGGING
DESCRIPTORS: THROBBING;SHARP
DESCRIPTORS: THROBBING;STABBING;DISCOMFORT
DESCRIPTORS: THROBBING;DISCOMFORT;SORE
DESCRIPTORS: ACHING
DESCRIPTORS: ACHING;SORE;DISCOMFORT

## 2022-10-06 ASSESSMENT — PAIN DESCRIPTION - ORIENTATION
ORIENTATION: LEFT

## 2022-10-06 ASSESSMENT — ENCOUNTER SYMPTOMS
BACK PAIN: 0
VOMITING: 0
COUGH: 0
SHORTNESS OF BREATH: 0
NAUSEA: 0
SORE THROAT: 0
ABDOMINAL PAIN: 0

## 2022-10-06 ASSESSMENT — PAIN DESCRIPTION - LOCATION
LOCATION: LEG
LOCATION: LEG
LOCATION: ANKLE
LOCATION: BACK
LOCATION: LEG
LOCATION: FOOT
LOCATION: ANKLE

## 2022-10-06 NOTE — PROGRESS NOTES
[address]      Date:10/6/2022                                                Patient Name: Nancy Vaughn  MRN: [id number]  : 1975  Room: [address] [place] [address]     Evaluating OT:Bonny Vieira, OTR/L   License #  [id number]       Referring Provider: Robe Enrique DO    Specific Provider Orders/Date: OT evaluation & treatment        Diagnosis: s/p Fall. 1.  Closed trimalleolar fracture of left ankle, initial encounter  2. 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 Surgery: 10-6-22:   1. Closed treatment left trimalleolar ankle fracture with manipulation  2. Application of uniplanar external fixator left ankle      Past Surgical History:  has a past surgical history that includes Breast biopsy (); Cholecystectomy (2011); Endometrial ablation (); Upper gastrointestinal endoscopy; Jejunostomy (2019); and total colectomy. Precautions:  Fall Risk, NWB L LE, ileostomy, Crohn's dz. Assessment of current deficits    [x] Functional mobility            [x]ADLs           [x] Strength                  [x]Cognition    [x] Functional transfers          [x] IADLs         [x] Safety Awareness   [x]Endurance    [x] Fine Coordination                         [x] Balance      [] Vision/perception   [x]Sensation      []Gross Motor Coordination             [] ROM           [] Delirium                   [] Motor Control      OT PLAN OF CARE   OT POC based on physician orders, patient diagnosis and results of clinical assessment     Frequency/Duration: 2-4 days/wk for 2 weeks PRN   Specific OT Treatment Interventions to include:    Instruction/training on adapted ADL techniques and AE recommendations to increase functional independence within precautions  Training on energy conservation strategies, correct breathing pattern and techniques to improve independence/tolerance for self-care routine  Functional transfer/mobility training/DME recommendations for increased independence, safety, and fall prevention  Patient/Family education to increase follow through with safety techniques and functional independence  Recommendation of environmental modifications for increased safety with functional transfers/mobility and ADLs  Visual-perceptual training to improve environmental scanning, visual attention/focus, and oculomotor skills for increased safety/independence with functional transfers/mobility and ADLs  Therapeutic exercise to improve motor endurance, ROM, and functional strength for ADLs/functional transfers  Therapeutic activities to facilitate/challenge dynamic balance, stand tolerance for increased safety and independence with ADLs  Therapeutic activities to facilitate gross/fine motor skills for increased independence with ADLs  Positioning to improve skin integrity, interaction with environment and functional independence     Recommended Adaptive Equipment:  TBD      Home Living: Pt lives with  in a 2 story with 3 steps to enter with 1 HR. B&B on 2nd level but reports can stay on 1st level with recliner & 1/2 bath  Bathroom setup: walk in shower with bench 2nd floor   Equipment owned: shower bench, crutches     Prior Level of Function: Ind. with ADLs , Ind. with IADLs; ambulated no A.D.    Driving: active  Occupation: none stated     Pain Level: minimal low back pain  Cognition: A&O: 4/4; Follows multi- step directions              Memory:  G              Sequencing:  G              Problem solving:  G              Judgement/safety:  F+                Functional Assessment:  AM-PAC Daily Activity Raw Score: 16/24    Initial Eval Status  Date: 10-6-22 Treatment Status  Date: STGs = LTGs  Time frame: 10-14 days   Feeding Ind.   Mod I/ Ind   Grooming Set up   Modified [place]    UB Dressing SBA with dom cornelius seated EOB   Modified [place]    LB Dressing Max A   Modified [place]    Bathing Mod A with sim. task   Modified [place]    Toileting NT   Modified [place]    Bed Mobility  Supine to sit: Min A for L LE  Sit to supine: Min A for L LE   Supine to sit: Modified [place]   Sit to supine: Modified [place]    Functional Transfers Min A x2 with sit <> stand using ww, maintaining NWB L LE   Modified [place]    Functional Mobility Min A x2 with few steps near EOB using ww maintaining   NWB L LE   Modified [place]    Balance Sitting:     Static:  Sup    Dynamic:SBA  Standing: Min Ax2       Activity Tolerance F   G   Visual/  Perceptual Glasses: yes  Pt. reports blurred vision, follows with [facility]/ [facility]          Vitals spO2 & HR WFL   WFL      Hand Dominance R    AROM (PROM) Strength Additional Info:    RUE  WFL 4+/5 good  and wfl FMC/dexterity noted during ADL tasks      LUE WFL 4+/5 good  and wfl FMC/dexterity noted during ADL tasks         Hearing: [facility]   Sensation:  No c/o numbness or tingling B UE  Tone: WFL B UE  Edema: none noted B UE     Comments: Upon arrival patient supine in bed, agreeable to OT, cleared by Nursing. Therapist facilitated bed mobility/ADLs/functional transfers/mobility with focus on safety, technique & precautions. Pt. Instructed RE: safe transfers/mobility, ADLs, role of OT, treatment plan, recs. , prec. At end of session, patient returned to bed with L LE elevated & ice applied, all needs met, RN notified, with call light and phone within reach, all lines and tubes intact. Overall patient demonstrated decreased strength, balance, independence & safety during completion of ADL/functional transfer/mobility tasks.   Pt would benefit from continued skilled OT to increase safety and independence with completion of ADL/IADL tasks for functional independence and quality of life. Treatment: OT treatment provided this date includes:   Instruction/training on safety and adapted techniques for completion of ADLs: to increase [place] in self care   Instruction/training on safe functional mobility/transfer techniques: with focus on safety, technique & precautions   Instruction/training on energy conservation/work simplification for completion of ADLs: techniques to increase [place] with self care ADLs & iADLs, work simplification to improve endurance   Proper Positioning/Alignment: for optimal healing, skin integrity to prevent breakdown, decrease edema  Skilled monitoring of vitals: to include BP, spO2 & HR during session  Sitting/standing Balance/Tolerance- to increased balance & activity tolerance during ADLs as well as facilitate proper posture and/or positioning. Rehab Potential: Good for established goals     Patient / Family Goal: to return home with family       Patient and/or family were instructed on functional diagnosis, prognosis/goals and OT plan of care. Demonstrated G understanding. Eval Complexity: Low     Time In: 15:30  Time Out: 15:50  Total Treatment Time: eval only    Min Units   OT Eval Low 89026  x     OT Eval Medium 02701       OT Eval High [id number]       OT Re-Eval [id number]       Therapeutic Ex [id number]       Therapeutic Activities 90026       ADL/Self Care 26570       Orthotic Management [id number]       Manual [id number]       Neuro Re-Ed [id number]       Non-Billable Time          Evaluation Time additionally includes thorough review of current medical information, gathering information on past medical history/social history and prior level of function, interpretation of standardized testing/informal observation of tasks, assessment of data and development of plan of care and goals. Bonny Vieira, OTR/L   License #  [id number]

## 2022-10-06 NOTE — ANESTHESIA PRE PROCEDURE
Department of Anesthesiology  Preprocedure Note       Name:  Rome Ariza   Age:  49 y.o.  :  1975                                          MRN:  [id number]         Date:  10/6/2022      Surgeon: Donn Mathew):  Aiden Dillard MD    Procedure: Procedure(s):  EXTERNAL FIXATOR VS ANKLE OPEN REDUCTION INTERNAL FIXATION  LEG EXTERNAL FIXATOR APPLICATION    Medications prior to admission:   Prior to Admission medications    Medication Sig Start Date End Date Taking? Authorizing Provider   ibuprofen (ADVIL;MOTRIN) 800 MG tablet Take 1 tablet by mouth 2 times daily as needed for Pain 10/5/22  Yes Jeewl Coleman DO   oxyCODONE-acetaminophen (PERCOCET) 5-325 MG per tablet Take 1 tablet by mouth every 6 hours as needed for Pain for up to 3 days. Intended supply: 3 days. Take lowest dose possible to manage pain 10/5/22 10/8/22 Yes Jewel Coleman DO   DULoxetine (CYMBALTA) 30 MG extended release capsule Take 1 capsule by mouth daily 22   Maria Guadalupe Foster MD   Handicap Placard MISC by Does not apply route Cannot walk 200 ft. Without stopping to rest  Duration: Lifetime 22   Maria Guadalupe Foster MD   metoprolol succinate (TOPROL XL) 25 MG extended release tablet Take 0.5 tablets by mouth in the morning. 22   Celine Moreno DO   gabapentin (NEURONTIN) 300 MG capsule TAKE 1 CAPSULE BY MOUTH THREE TIMES DAILY 22  Maria Guadalupe Foster MD   clobetasol (TEMOVATE) 0.05 % ointment Apply topically 2 times daily 22  Historical Provider, MD   traMADol (ULTRAM) 50 MG tablet Take 50 mg by mouth daily as needed.  22   Historical Provider, MD   omeprazole (PRILOSEC) 40 MG delayed release capsule TAKE 1 CAPSULE DAILY 22   Maria Guadalupe Foster MD   loperamide (IMODIUM) 2 MG capsule TAKE 1 CAPSULE BY MOUTH FOUR TIMES DAILY AS NEEDED FOR DIARRHEA 22   Maria Guadalupe Foster MD   folic acid (FOLVITE) 1 MG tablet Take 1 mg by mouth daily 22   Historical Provider, MD Certolizumab Pegol (CIMZIA STARTER KIT) 6 X 200 MG/ML KIT Inject 2 ml at 0, 2 and 4 weeks and then q 4 weeks 12/5/21   Historical Provider, MD   methotrexate (RHEUMATREX) 2.5 MG chemo tablet TAKE 6 TABLETS ONCE WEEKLY. 12/15/21   Historical Provider, MD   acetaminophen (TYLENOL) 500 MG tablet Take by mouth   Patient not taking: Reported on 10/5/2022    Historical Provider, MD   Multiple Vitamins-Minerals (MULTIVITAMIN ADULT PO) Take by mouth daily    Historical Provider, MD   tacrolimus (PROTOPIC) 0.1 % ointment Apply 1 each topically daily Apply topically 2 times daily.     Historical Provider, MD   fluticasone [facility]) 50 MCG/ACT nasal spray 1 spray by Nasal route daily  Patient not taking: Reported on 10/5/2022 5/5/15   Susan Peralta MD   Cholecalciferol (VITAMIN D) 2000 UNITS TABS Take 2,000 Units by mouth daily     Historical Provider, MD       Current medications:    Current Facility-Administered Medications   Medication Dose Route Frequency Provider Last Rate Last Admin    morphine (PF) injection 2 mg  2 mg IntraVENous Q4H PRN FELIPA Cooper - CNP   2 mg at 10/06/22 0548    DULoxetine (CYMBALTA) extended release capsule 30 mg  30 mg Oral Daily FELIPA Cooper - CNP   30 mg at 10/06/22 0832    fluticasone (FLONASE) 50 MCG/ACT nasal spray 1 spray  1 spray Nasal Daily Malinda Aragon APRN - CNP   1 spray at 67/23/19 7453    folic acid (FOLVITE) tablet 1 mg  1 mg Oral Daily Malinda Aragon APRN - CNP   1 mg at 10/06/22 2428    gabapentin (NEURONTIN) capsule 300 mg  300 mg Oral TID Malinda Aragon APRN - CNP   300 mg at 10/06/22 1400    metoprolol succinate (TOPROL XL) extended release tablet 12.5 mg  12.5 mg Oral Daily FELIPA Cooper - CNP   12.5 mg at 10/06/22 8670    multivitamin 1 tablet  1 tablet Oral Daily Malinda Aragon APRN - CNP   1 tablet at 10/06/22 0832    pantoprazole (PROTONIX) tablet 40 mg  40 mg Oral QAM FELIPA Minor - CNP   40 mg at 10/06/22 2649    sodium chloride flush 0.9 % injection 5-40 mL  5-40 mL IntraVENous 2 times per day FELIPA Anaya CNP   10 mL at 10/06/22 0835    sodium chloride flush 0.9 % injection 5-40 mL  5-40 mL IntraVENous PRN FELIPA Anaya CNP   10 mL at 10/06/22 0212    0.9 % sodium chloride infusion   IntraVENous PRN FELIPA Anaya CNP        [Held by provider] enoxaparin (LOVENOX) injection 40 mg  40 mg SubCUTAneous Daily FELIPA Anaya CNP        ondansetron (ZOFRAN-ODT) disintegrating tablet 4 mg  4 mg Oral Q8H PRN FELIPA Anaya CNP        Or    ondansetron (ZOFRAN) injection 4 mg  4 mg IntraVENous Q6H PRN FELIPA Anaya CNP        acetaminophen (TYLENOL) tablet 650 mg  650 mg Oral Q6H PRN FELIPA Anaya CNP        Or    acetaminophen (TYLENOL) suppository 650 mg  650 mg Rectal Q6H PRN FELIPA Anaya CNP        senna (SENOKOT) tablet 8.6 mg  1 tablet Oral Daily PRN FELIPA Anaya CNP        oxyCODONE (ROXICODONE) immediate release tablet 5 mg  5 mg Oral Q4H PRN FELIPA Anaya CNP   5 mg at 10/06/22 7322    ceFAZolin (ANCEF) 2,000 mg in sterile water 20 mL IV syringe  2,000 mg IntraVENous See Admin Instructions Janice Donnelly DO           Allergies:     Allergies   Allergen Reactions    Neomycin Rash    Amoxil [Amoxicillin] Diarrhea    Doxycycline      Stomach upset         Problem List:    Patient Active Problem List   Diagnosis Code    GERD (gastroesophageal reflux disease) K21.9    Depression with anxiety F41.8    Exacerbation of Crohn's disease of large intestine (HCC) K50.10    SVT (supraventricular tachycardia) (HCC) I47.1    Anemia D64.9    Paroxysmal supraventricular tachycardia (HCC) I47.1    Iron deficiency anemia secondary to blood loss (chronic) D50.0    Crohn's disease of colon with complication ([facility] [address] W37.854    Abdominal wall skin ulcer, with fat layer exposed ([address] L98.492    Skin ulcer of perineum, with fat layer exposed ([facility] L98.492    Trimalleolar fracture of ankle, closed, left, initial encounter S80.966T       Past Medical History:        Diagnosis Date    Acid reflux     Anxiety and depression     Crohn's colitis ([facility] 04/2018    Crohn's colitis per colonoscopy biopsies 4/9/18, 6/11/18       Past Surgical History:        Procedure Laterality Date    BREAST BIOPSY  1991    Benign Cyst    CHOLECYSTECTOMY  11/18/2011    Kavin Michael, laparoscopic     ENDOMETRIAL ABLATION  2004    ILEOSTOMY OR JEJUNOSTOMY  04/2019    TOTAL COLECTOMY      UPPER GASTROINTESTINAL ENDOSCOPY         Social History:    Social History     Tobacco Use    Smoking status: Never    Smokeless tobacco: Never   Substance Use Topics    Alcohol use: No                                Counseling given: Not Answered      Vital Signs (Current):   Vitals:    10/06/22 0240 10/06/22 0348 10/06/22 0548 10/06/22 0832   BP:    (!) 124/56   Pulse:    81   Resp: 16 18 16 16   Temp:    100 °F (37.8 °C)   TempSrc:    Temporal   SpO2:    94%   Weight:       Height:                                                  BP Readings from Last 3 Encounters:   10/06/22 (!) 124/56   10/05/22 (!) 135/57   09/06/22 113/75       NPO Status:                                                                                 BMI:   Wt Readings from Last 3 Encounters:   10/06/22 242 lb (109.8 kg)   09/06/22 244 lb (110.7 kg)   07/21/22 253 lb 11.2 oz (115.1 kg)     Body mass index is 35.74 kg/m².     CBC:   Lab Results   Component Value Date/Time    WBC 8.2 10/06/2022 04:31 AM    RBC 3.71 10/06/2022 04:31 AM    HGB 10.3 10/06/2022 04:31 AM    HCT 35.5 10/06/2022 04:31 AM    MCV 95.7 10/06/2022 04:31 AM    RDW 17.2 10/06/2022 04:31 AM     10/06/2022 04:31 AM       CMP:   Lab Results   Component Value Date/Time     10/06/2022 04:31 AM    K 3.5 10/06/2022 04:31 AM     10/06/2022 04:31 AM    CO2 24 10/06/2022 04:31 AM    BUN 7 10/06/2022 04:31 AM    CREATININE 0.8 10/06/2022 04:31 AM    GFRAA >60 10/06/2022 04:31 AM    LABGLOM >60 10/06/2022 04:31 AM    GLUCOSE 99 10/06/2022 04:31 AM    GLUCOSE 81 02/09/2021 03:13 PM    PROT 7.1 10/06/2022 04:31 AM    CALCIUM 9.2 10/06/2022 04:31 AM    BILITOT 1.0 10/06/2022 04:31 AM    ALKPHOS 67 10/06/2022 04:31 AM    AST 17 10/06/2022 04:31 AM    ALT 13 10/06/2022 04:31 AM       POC Tests: No results for input(s): POCGLU, POCNA, POCK, POCCL, POCBUN, POCHEMO, POCHCT in the last 72 hours. Coags:   Lab Results   Component Value Date/Time    PROTIME 13.6 10/06/2022 04:31 AM    PROTIME 11.0 10/14/2011 01:50 AM    INR 1.2 10/06/2022 04:31 AM    APTT 27.3 10/06/2022 04:31 AM       HCG (If Applicable):   Lab Results   Component Value Date    PREGTESTUR NEGATIVE 12/12/2018        ABGs: No results found for: PHART, PO2ART, EPY2QEZ, IHG1TVP, BEART, X6SQWYYL     Type & Screen (If Applicable):  No results found for: LABABO, LABRH    Drug/Infectious Status (If Applicable):  No results found for: HIV, HEPCAB    COVID-19 Screening (If Applicable):   Lab Results   Component Value Date/Time    COVID19 Not Detected 02/23/2022 12:00 PM    COVID19 Not-Detected 02/23/2022 11:08 AM     ekg 7/21/22  Sinus  Rhythm   -RSR(V1) -nondiagnostic. PROBABLY NORMAL    ekg 2/23/21  Sinus  Bradycardia   -Old anteroseptal infarct. ABNORMAL       Echo 5/28/19   Concentric LV remodeling. Visually estimated LVEF is 65 %. No valvular dysfunction. Normal diastolic function. Dilated IVC with > 50 % collapsibility. Estimated RAP is 8 mm Hg.   Mildly dilated LA              Anesthesia Evaluation  Patient summary reviewed and Nursing notes reviewed no history of anesthetic complications:   Airway: Mallampati: II  TM distance: >3 FB   Neck ROM: full  Mouth opening: > = 3 FB   Dental:          Pulmonary:Negative Pulmonary ROS and normal exam  breath sounds clear to auscultation                             Cardiovascular:    (+) hypertension:, dysrhythmias: SVT,       ECG reviewed  Rhythm: regular    Echocardiogram reviewed         Beta Blocker:  Dose within 24 Hrs         Neuro/Psych:   (+) psychiatric history:depression/anxiety             GI/Hepatic/Renal:   (+) GERD:, PUD ( abdominal skin wall ulcer ),          ROS comment: chrons on methotrexate and tacrolimus    Total colectomy with ileostomy . Endo/Other:    (+) blood dyscrasia: anemia:., .                 Abdominal:             Vascular: Other Findings:           Anesthesia Plan      general and regional     ASA 3     (Pregnancy test not done, unable to obtain a urine sample today mornng, she is waving the test, consent for general anesthesia and regional  Blocks. 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 MIPS: Postoperative opioids intended and Prophylactic antiemetics administered. Anesthetic plan and risks discussed with patient. Use of blood products discussed with patient whom consented to blood products. Plan discussed with JOSE and attending. Nan Kathleen MD   10/6/2022    Chart reviewed, findings discussed with anesthesiologist . Anesthesia plan of care discussed with pt.     Ehsan Paulson CRNA

## 2022-10-06 NOTE — CONSULTS
Department of Orthopedic Trauma Surgery  Resident consult note      CHIEF COMPLAINT:   Chief Complaint   Patient presents with    Ankle Pain    Other     Tx from [address] [place] [address]. Patient fell down stairs left ankle fx unable to be reduced at bedside here for ortho consult       HISTORY OF PRESENT ILLNESS:                Patient is a 49 y.o. female who presents with left ankle pain. Patient presented to [facility] last night after a fall down 3 or 4 steps. She had immediate pain to her left ankle and inability to ambulate. Reduction was attempted at [facility] however additional treatment was necessary and she was transferred to [facility].  She denies hitting her head or having LOC. Her only complaint is her left ankle. Denies numbness/tingling/paresthesias. Denies any other orthopedic complaints at this time.          Past Medical History:        Diagnosis Date    Acid reflux     Anxiety and depression     Crohn's colitis ([facility] [address] 04/2018    Crohn's colitis per colonoscopy biopsies 4/9/18, 6/11/18     Past Surgical History:        Procedure Laterality Date    BREAST BIOPSY  1991    Benign Cyst    CHOLECYSTECTOMY  11/18/2011    Suly Pham, laparoscopic     ENDOMETRIAL ABLATION  2004    ILEOSTOMY OR JEJUNOSTOMY  04/2019    TOTAL COLECTOMY      UPPER GASTROINTESTINAL ENDOSCOPY       Current Medications:   Current Facility-Administered Medications: morphine (PF) injection 2 mg, 2 mg, IntraVENous, Q4H PRN  midazolam PF (VERSED) injection 1 mg, 1 mg, IntraVENous, Q5 Min PRN  DULoxetine (CYMBALTA) extended release capsule 30 mg, 30 mg, Oral, Daily  fluticasone (FLONASE) 50 MCG/ACT nasal spray 1 spray, 1 spray, Nasal, Daily  folic acid (FOLVITE) tablet 1 mg, 1 mg, Oral, Daily  gabapentin (NEURONTIN) capsule 300 mg, 300 mg, Oral, TID  metoprolol succinate (TOPROL XL) extended release tablet 12.5 mg, 12.5 mg, Oral, Daily  multivitamin 1 tablet, 1 tablet, Oral, Daily  pantoprazole (PROTONIX) tablet 40 mg, 40 mg, Oral, QAM AC  sodium chloride flush 0.9 % injection 5-40 mL, 5-40 mL, IntraVENous, 2 times per day  sodium chloride flush 0.9 % injection 5-40 mL, 5-40 mL, IntraVENous, PRN  0.9 % sodium chloride infusion, , IntraVENous, PRN  [Held by provider] enoxaparin (LOVENOX) injection 40 mg, 40 mg, SubCUTAneous, Daily  ondansetron (ZOFRAN-ODT) disintegrating tablet 4 mg, 4 mg, Oral, Q8H PRN **OR** ondansetron (ZOFRAN) injection 4 mg, 4 mg, IntraVENous, Q6H PRN  acetaminophen (TYLENOL) tablet 650 mg, 650 mg, Oral, Q6H PRN **OR** acetaminophen (TYLENOL) suppository 650 mg, 650 mg, Rectal, Q6H PRN  senna (SENOKOT) tablet 8.6 mg, 1 tablet, Oral, Daily PRN  oxyCODONE (ROXICODONE) immediate release tablet 5 mg, 5 mg, Oral, Q4H PRN  Allergies:  Neomycin, Amoxil [amoxicillin], and Doxycycline    Social History:   TOBACCO:   reports that she has never smoked. She has never used smokeless tobacco.  ETOH:   reports no history of alcohol use. DRUGS:   reports no history of drug use. ACTIVITIES OF DAILY LIVING:    OCCUPATION:    Family History:       Problem Relation Age of Onset    Kidney Disease Mother     Diabetes Father         on HD s/p MI    Heart Disease Father     High Blood Pressure Father        REVIEW OF SYSTEMS:   Skin: no acute changes  Eyes: no acute changes  Ears/Nose/Throat: no acute changes  Respiratory: No increased work of breathing, no coughing  Cardiovascular: Brisk capillary refill bilaterally, well perfused extremities  Gastrointestinal: no acute changes  Neurologic: no acute changes  MUSCULOSKELETAL:  positive for  pain      PHYSICAL EXAM:    VITALS:  /64   Pulse 78   Temp 100 °F (37.8 °C) (Temporal)   Resp 13   Ht 5' 9\" (1.753 m)   Wt 242 lb (109.8 kg)   SpO2 95%   BMI 35.74 kg/m²   Constitutional: Oriented to person, place, and time; Answer questions appropriately  HENT: Head: Atraumatic.    Eyes: EOMI  Neck: Trachea midline  Cardiovascular: Brisk capillary refill to all extremities, extremities well perfused  Pulmonary/Chest: No increased work of breathing, no cough  Abdominal: Non-distended  Neurologic:  Awake, alert and oriented in three planes. No gross deficits   MUSCULOSKELETAL:  Left lower Extremity:  Significant swelling about the left ankle. There is a small superficial abrasion that is not deep to the dermis over the medial malleolus. There is no active bleeding from this wound. There is crepitus and mild deformity about the ankle. +TTP about the ankle   compartments soft and compressible  Palpable dorsalis pedis and posterior tibialis pulse, brisk cap refill to toes, foot warm and perfused  Sensation intact to light touch in sural/deep peroneal/superficial peroneal/saphenous/posterior tibial nerve distributions to foot/ankle  Demonstrates active ankle plantar/dorsiflexion/great toe extension      Secondary Exam:   bilateralUE: No obvious signs of trauma. -TTP to fingers, hand, wrist, forearm, elbow, humerus, shoulder or clavicle. -- Patient able to flex/extend fingers, wrist, elbow and shoulder with active and passive ROM without pain, +2/4 Radial pulse, compartments soft and compressible. rightLE: No obvious signs of trauma. -TTP to foot, ankle, leg, knee, thigh, hip.-- Patient able to flex/extend toes, ankle, knee and hip with active and passive ROM without pain,+2/4 DP & PT pulses, compartments soft and compressible.     Pelvis: -TTP, -Log roll, -Heel strike         DATA:    CBC:   Lab Results   Component Value Date/Time    WBC 8.2 10/06/2022 04:31 AM    RBC 3.71 10/06/2022 04:31 AM    HGB 10.3 10/06/2022 04:31 AM    HCT 35.5 10/06/2022 04:31 AM    MCV 95.7 10/06/2022 04:31 AM    MCH 27.8 10/06/2022 04:31 AM    MCHC 29.0 10/06/2022 04:31 AM    RDW 17.2 10/06/2022 04:31 AM     10/06/2022 04:31 AM    MPV 9.9 10/06/2022 04:31 AM     PT/INR:    Lab Results   Component Value Date/Time    PROTIME 13.6 10/06/2022 04:31 AM    PROTIME 11.0 10/14/2011 01:50 AM INR 1.2 10/06/2022 04:31 AM       Radiology Review:  X-ray left ankle demonstrates a trimalleolar fracture dislocation. There is a transverse medial malleolus fracture that is displaced. There is a long oblique lateral malleolus fracture. There is a small fragment of the posterior malleolus displaced. There is lateral subluxation of the talus. There are no other fractures or dislocations noted. X-ray left ankle postreduction demonstrates same above fracture with improved alignment within a 3 sided splint. CT left ankle demonstrates the above fracture. No other fractures or dislocations noted. IMPRESSION:  Closed, left ankle trimalleolar fracture with lateral talar subluxation    PLAN:  Nonweightbearing left lower extremity  After reduction a well-padded 3 sided splint was applied  Strict ice and elevation to decrease swelling and pain  Pain control per emergency physician discharge  No acute orthopedic intervention planned at this time  Follow-up with Dr. Clare Gupta in 1 week  Discuss with attending          I have seen and evaluated the patient and agree with the above assessment on today's visit. I have performed the key components of the history and physical examination and concur completely with the findings and plans as documented. Agree with ROS, examination, FMH, PMH, PSH, SocHx, and allergies as above. Been physically seen and examined. Patient status fall down 3 or 4 stairs came in with a left trimalleolar ankle fracture dislocation. She was appropriately reduced and splinted. She does have history of cutaneous Crohn's disease or autoimmune condition. She has difficulty with her skin even in this area. The decision was made to set her up for an external fixator for temporizing the fracture until we can do definitive management. She understands this. She is accompanied by her family we talked about this in detail about this being a staged treatment.   We talked about the risk with her medical history of nonhealing especially due to to her immune modulators of the bone and the tissue. They understand this. Talked with the risk of surgery in detail as well. I explained the risks and complications of surgery with the patient including but not limited to death from anesthesia, possible neurovascular damage, possible infection, possible nonunion, possible hardware failure, possible need for further surgery, etc.  Patient understood this, asked appropriate questions and decided to go forward with the procedure. Past Medical History:   Diagnosis Date    Acid reflux     Anxiety and depression     Crohn's colitis ([facility] [address] 04/2018    Crohn's colitis per colonoscopy biopsies 4/9/18, 6/11/18     . psh  Family History   Problem Relation Age of Onset    Kidney Disease Mother     Diabetes Father         on HD s/p MI    Heart Disease Father     High Blood Pressure Father      . soch  Allergies   Allergen Reactions    Neomycin Rash    Amoxil [Amoxicillin] Diarrhea    Doxycycline      Stomach upset             Physical Examination:   General appearance: alert, well appearing, and in no distress,  normal appearing weight. No visible signs of trauma   Mental status: alert, oriented to person, place, and time, normal mood, behavior, speech, dress, motor activity, and thought processes  Abdomen: soft, nondistended  Resp:   resp easy and unlabored, no audible wheezes note, normal symmetrical expansion of both hemithoraces  Cardiac: distal pulses palpable, skin and extremities well perfused  Neurological: alert, oriented X3, normal speech, no focal findings or movement disorder noted, motor and sensory grossly normal bilaterally, normal muscle tone, no tremors  HEENT: normochephalic atraumatic, external ears and eyes normal, sclera normal, neck supple, no nasal discharge.    Extremities:   peripheral pulses normal, no edema, redness or tenderness in the calves   Skin: normal coloration, no rashes or open wounds, no suspicious skin lesions noted  Psych: Affect euthymic   Musculoskeletal:   Extremity:  Above examination. Compartment soft compressible. Patient does have moderate swelling. She wiggles her toes. She has gross sensation intact distally and palpable pulses. Plan staged external fixation today followed by definitive fixation once we adjust her immune modulators and follow her soft tissues        ELECTRONICALLY signed by:    Idalia Mercer MD  10/6/22   This is been dictated utilizing voice recognition software. All efforts have been made to make the note accurate although inadvertent errors may be present.

## 2022-10-06 NOTE — OP NOTE
Operative Note      Patient: Rome Ariza  YOB: 1975  MRN: [id number]    Date of Procedure: 10/6/2022    Pre-Op Diagnosis:   1. Closed trimalleolar fracture of left ankle, initial encounter    2. 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 Closed treatment left trimalleolar ankle fracture with manipulation    2. Application of uniplanar external fixator left ankle            Surgeon(s):  Aiden Dillard MD    Assistant:   Resident: Marina West DO; Jesse Olivera DO    Anesthesia: General    Estimated Blood Loss (mL): Minimal    Complications: None    Specimens:   * No specimens in log *    Implants:  Implant Name Type Inv. Item Serial No.  Lot No. LRB No. Used Action   schanz- synthes       Left 1 Implanted   schanz-synthes      Left 1 Implanted   schan-synthes      Left 2 Implanted         Drains:   Ileostomy Ileostomy RUQ (Active)       Findings: Good overall alignment with external fixator to secure. Patient had started developing fracture blisters in the medial side of the ankle overnight. Detailed Description of Procedure:   Patient was brought to the operating room in a supine position on a hospital bed. Patient was transferred to the operating room table by multiple individuals in a safe fashion with anesthesia in control of the patient's C-spine and airway. Once on the operating table, all points of pressure were identified and well-padded. A tourniquet was applied to the patient's left upper thigh although not used throughout the case. The patient's left lower extremity was sterilely prepped and draped in the standard orthopedic fashion. A timeout was performed indicating the appropriate identification of the patient, the procedure to be performed, and the side to be performed upon. This was agreed upon by all individuals in the room.   A timeout to 5.0 mm Schanz pins were placed in the medial anterior surface of the tibia. These were checked under fluoroscopy to be bicortical.  Attention was turned to the lateral view and entrance calcaneal pin was then placed from lateral to medial.  A 4.0 mm Schanz pin was placed in the medial side of the first metatarsal.  A delta frame was then assembled. Under fluoroscopic guidance the ankle fracture was manipulated into a near anatomic position. There was comminution of the fibula but overall alignment of the mortise and distal tibiotalar joint restored. The frame was then securely locked down. The compartments were soft compressible. Leg was cleansed and Hibiclens soap closets placed around the pins. The leg was then wrapped and the patient was reversed of anesthesia without complication. Patient was taken to the PACU in stable condition. Postoperative plan:  1. Strict nonweightbearing left lower extremity    2. DVT prophylaxis in the form of aspirin orally    3. We will consult with her rheumatologist for appropriate adjustment of her medications to help with wound and bone healing    4. We will follow her soft tissues for resolution of swelling allowing us to definitively fix the ankle.       Electronically signed by Power Paul MD on 10/6/2022 at 11:07 AM

## 2022-10-06 NOTE — PROGRESS NOTES
Physical Therapy  Physical Therapy Initial Assessment     Name: Holland Saleh  : 1975  MRN: [id number]      Date of Service: 10/6/2022    Evaluating PT:  Edu Villarreal, PT, DPT [id number]    Room #:  5246/5532-K  Diagnosis:  Closed trimalleolar fracture of left ankle, initial encounter [W26.224K]  Trimalleolar fracture of ankle, closed, left, initial encounter [A57.098H]  PMHx/PSHx:  Acid reflux, Crohn's colitis, anxiety, depression  Procedure/Surgery:  Closed treatment left trimalleolar ankle fracture with manipulation; Application of uniplanar external fixator left ankle (10/6/2022)  Precautions:  Falls, NWB LLE  Equipment Needs:  FWW; TBD  Equipment Owned:  Axillary crutches    SUBJECTIVE:    Pt lives with her  in a 2-story home with 3 stair(s) to enter and 1 rail(s). Pt is planning to stay on 1st floor. Pt ambulated with no AD PTA. OBJECTIVE:   Initial Evaluation  Date: 10/6/2022 Treatment Short Term/ Long Term   Goals   AM-PAC 6 Clicks 60/79     Was pt agreeable to Eval/treatment? Yes     Does pt have pain? Unrated back     Bed Mobility  Rolling: NT  Supine to sit: Mily  Sit to supine: Mily  Scooting: Mily (seated)  Rolling: Independent  Supine to sit: Independent  Sit to supine: Independent  Scooting: Independent   Transfers Sit to stand: Mily x2  Stand to sit: Mily x2  Stand pivot: NT  Sit to stand: Mod I  Stand to sit: Mod I  Stand pivot: Mod I with AAD   Ambulation    5 feet with FWW Mily x2  50 feet with AAD Mod I   Stair negotiation: ascended and descended NT  3 step(s) with 1 rail(s) + AAD Mod I or 3 step(s) with 0 rail(s) + AAD Mod I   ROM BUE:  See OT note  BLE:  WFL     Strength BUE:  See OT note  BLE:  Grossly 5/5     Balance Sitting EOB:  SBA  Dynamic Standing:  Mily x2 with FWW  Sitting EOB:  Independent  Dynamic Standing: Mod I with AAD     Pt is A & O x 4  Sensation:  Pt reports numbness to LLE  Edema:  Unremarkable    Vitals:   HR 90, SpO2 94% with activity.     Patient education  Pt educated on role of PT, weight bearing status, safety during functional mobility, use of call light for assistance. Patient response to education:   Pt verbalized understanding Pt demonstrated skill Pt requires further education in this area   Yes Yes Yes     ASSESSMENT:    Conditions Requiring Skilled Therapeutic Intervention:    [x]Decreased strength     []Decreased ROM  [x]Decreased functional mobility  [x]Decreased balance   [x]Decreased endurance   []Decreased posture  [x]Decreased sensation  []Decreased coordination   []Decreased vision  [x]Decreased safety awareness   [x]Increased pain       Comments:  Patient in semi-Leo's position upon arrival; agreeable to PT evaluation with OT collaboration. Pt educated on weight bearing status at start of session. Pt required increased time and assistance with LLE to complete bed mobility to sit at EOB. Patient sat EOB for extended period of time. Pt completed sit>[facility] with cueing required to maintain weight bearing status. Static standing performed for short duration. Pt ambulated short distance at EOB via RLE hopping/shuffling. Pt returned to semi-Leo's position in bed with assistance of LLE provided. Patient required physical assistance and verbal cues to maintain NWB LLE during session. Patient left in semi-Leo's position with LLE elevated and call light in reach. Instructed not to get up on own - pt verbalized understanding of this. Patient would benefit from continued skilled PT services to address functional deficits and prevent deconditioning. Treatment:  Patient practiced and was instructed in the following treatment:    Bed mobility - verbal cueing provided to ensure proper positioning and sequencing of movement. Physical assistance provided. Functional transfers - cueing provided for proper hand/foot placement. Physical assistance provided.    Static sitting - performed to promote upright tolerance and improve sitting balance. Static standing - performed to promote activity tolerance and balance maintenance. Ambulation - cueing provided to ensure safe FWW approximation and to maintain weight bearing status. Physical assistance provided throughout activity. Skilled positioning - patient left in optimal position to maintain skin/joint integrity and promote comfort. Pt's/ family goals   1. None stated    Prognosis is good for reaching above PT goals. Patient and or family understand(s) diagnosis, prognosis, and plan of care. Yes    PHYSICAL THERAPY PLAN OF CARE:    PT POC is established based on physician order and patient diagnosis     Referring provider/PT Order:    Start   Ordering Provider    10/05/22 2100  PT eval and treat  Start:  10/05/22 2100,   End:  10/05/22 2100,   ONE TIME,   Standing Count:  1 Occurrences,   R            Tao Holden,       Diagnosis:  Closed trimalleolar fracture of left ankle, initial encounter [S82.852A]  Trimalleolar fracture of ankle, closed, left, initial encounter [W24.644M]  Specific instructions for next treatment:  Continue to facilitate safe performance of functional mobility; progress as appropriate.     Current Treatment Recommendations:     [x] Strengthening to improve independence with functional mobility   [x] ROM to improve independence with functional mobility   [x] Balance Training to improve static/dynamic balance and to reduce fall risk  [x] Endurance Training to improve activity tolerance during functional mobility   [x] Transfer Training to improve safety and independence with all functional transfers   [x] Gait Training to improve gait mechanics, endurance and assess need for appropriate assistive device  [x] Stair Training in preparation for safe discharge home and/or into the community   [x] Positioning to prevent skin breakdown and contractures  [x] Safety and Education Training   [x] Patient/Caregiver Education   [] HEP  [] Other     PT long term treatment goals are located in above grid    Frequency of treatments: 2-5x/week x 1-2 weeks. Time in  1535  Time out  1555    Total Treatment Time  10 minutes     Evaluation Time includes thorough review of current medical information, gathering information on past medical history/social history and prior level of function, completion of standardized testing/informal observation of tasks, assessment of data and education on plan of care and goals.     CPT codes:  [x] Low Complexity PT evaluation 82811  [] Moderate Complexity PT evaluation 44096  [] High Complexity PT evaluation 07676  [] PT Re-evaluation 37464  [] Gait training 09677 0 minutes  [] Manual therapy 69868 0 minutes  [x] Therapeutic activities 85008 10 minutes  [] Therapeutic exercises 02066 0 minutes  [] Neuromuscular reeducation 04750 0 minutes     Valentina Trevino, PT, DPT  [id number]  Adelina Meadows, SPT

## 2022-10-06 NOTE — H&P
[facility]  History and Physical      CHIEF COMPLAINT:    Chief Complaint   Patient presents with    Ankle Pain    Other     Tx from [address] [place] [address]. Patient fell down stairs left ankle fx unable to be reduced at bedside here for ortho consult        Patient of Zoey Baird MD presents with:  Trimalleolar fracture of ankle, closed, left, initial encounter    History of Present Illness:   Patient is a 60-year-old female with past medical history of acid reflux, anxiety and depression, Crohn's colitis. Patient presented to [facility] ED with pain in her left ankle after a mechanical fall. Patient was found to have displaced trimalleolar fracture  Reduction was attempted and they were unable to completely reduce the injuries appearance was transferred to [facility]. Patient is alert and oriented on examination. Patient denies any chest pain, shortness of breath, fever, chills, headache, dizziness, blurred vision, and abdominal pain. Patient states it was just a mechanical fall she tripped over her own feet. Patient denied hitting her head as she is not on OAC's. Patient had surgical procedure on 10/6 with orthopedic surgery with external fixator placement and return in 2 weeks for definitive fixation. On evaluation she is resting comfortably in no apparent acute distress, has returned from [facility]      REVIEW OF SYSTEMS:  Pertinent negatives are above in HPI. 10 point ROS otherwise negative.       Past Medical History:   Diagnosis Date    Acid reflux     Anxiety and depression     Crohn's colitis ([facility] [address] 04/2018    Crohn's colitis per colonoscopy biopsies 4/9/18, 6/11/18         Past Surgical History:   Procedure Laterality Date    BREAST BIOPSY  1991    Benign Cyst    CHOLECYSTECTOMY  11/18/2011    Archana Hernandez laparoscopic     ENDOMETRIAL ABLATION  2004    ILEOSTOMY OR JEJUNOSTOMY  04/2019    TOTAL COLECTOMY      UPPER GASTROINTESTINAL ENDOSCOPY         Medications Prior to Admission:    Medications Prior to Admission: DULoxetine (CYMBALTA) 30 MG extended release capsule, Take 1 capsule by mouth daily  Handicap Placard MISC, by Does not apply route Cannot walk 200 ft. Without stopping to rest Duration: Lifetime  metoprolol succinate (TOPROL XL) 25 MG extended release tablet, Take 0.5 tablets by mouth in the morning.  gabapentin (NEURONTIN) 300 MG capsule, TAKE 1 CAPSULE BY MOUTH THREE TIMES DAILY  clobetasol (TEMOVATE) 0.05 % ointment, Apply topically 2 times daily  traMADol (ULTRAM) 50 MG tablet, Take 50 mg by mouth daily as needed. omeprazole (PRILOSEC) 40 MG delayed release capsule, TAKE 1 CAPSULE DAILY  loperamide (IMODIUM) 2 MG capsule, TAKE 1 CAPSULE BY MOUTH FOUR TIMES DAILY AS NEEDED FOR DIARRHEA  folic acid (FOLVITE) 1 MG tablet, Take 1 mg by mouth daily  Certolizumab Pegol (CIMZIA STARTER KIT) 6 X 200 MG/ML KIT, Inject 2 ml at 0, 2 and 4 weeks and then q 4 weeks  methotrexate (RHEUMATREX) 2.5 MG chemo tablet, TAKE 6 TABLETS ONCE WEEKLY. acetaminophen (TYLENOL) 500 MG tablet, Take by mouth  (Patient not taking: Reported on 10/5/2022)  Multiple Vitamins-Minerals (MULTIVITAMIN ADULT PO), Take by mouth daily  tacrolimus (PROTOPIC) 0.1 % ointment, Apply 1 each topically daily Apply topically 2 times daily. fluticasone (FLONASE) 50 MCG/ACT nasal spray, 1 spray by Nasal route daily (Patient not taking: Reported on 10/5/2022)  Cholecalciferol (VITAMIN D) 2000 UNITS TABS, Take 2,000 Units by mouth daily     Note that the patient's home medications were reviewed and the above list is accurate to the best of my knowledge at the time of the exam.    Allergies:    Neomycin, Amoxil [amoxicillin], and Doxycycline    Social History:    reports that she has never smoked. She has never used smokeless tobacco. She reports that she does not drink alcohol and does not use drugs.     Family History:   family history includes Diabetes in her father; Heart Disease in her father; High Blood Pressure in her father; Kidney Disease in her mother. PHYSICAL EXAM:    Vitals:  /64   Pulse 78   Temp 100 °F (37.8 °C) (Temporal)   Resp 13   Ht 5' 9\" (1.753 m)   Wt 242 lb (109.8 kg)   SpO2 95%   BMI 35.74 kg/m²       General appearance: NAD, conversant  Eyes: Sclerae anicteric, PERRLA  HEENT: AT/NC, MMM  Neck: FROM, supple, no thyromegaly  Lymph: No cervical / supraclavicular lymphadenopathy  Lungs: Clear to auscultation, WOB normal  CV: RRR, no MRGs, no lower extremity edema  Abdomen: Soft, non-tender; no masses or HSM, +BS, ostomy, wounds  Extremities: FROM without synovitis. No clubbing or cyanosis of the hands, LLE with external fixator and surgical dressing  Skin: abdominal wounds  Psych: Calm and cooperative. Normal judgement and insight. Normal mood and affect. Neuro: Alert and interactive, face symmetric, speech fluent. LABS:  All labs reviewed.   Of note:  CBC with Differential:    Lab Results   Component Value Date/Time    WBC 8.2 10/06/2022 04:31 AM    RBC 3.71 10/06/2022 04:31 AM    HGB 10.3 10/06/2022 04:31 AM    HCT 35.5 10/06/2022 04:31 AM     10/06/2022 04:31 AM    MCV 95.7 10/06/2022 04:31 AM    MCH 27.8 10/06/2022 04:31 AM    MCHC 29.0 10/06/2022 04:31 AM    RDW 17.2 10/06/2022 04:31 AM    NRBC 0.0 08/19/2020 07:18 AM    NRBC 0.0 03/11/2019 06:27 PM    SEGSPCT 70 12/04/2012 08:45 AM    LYMPHOPCT 18.7 10/06/2022 04:31 AM    MONOPCT 8.7 10/06/2022 04:31 AM    BASOPCT 0.5 10/06/2022 04:31 AM    MONOSABS 0.71 10/06/2022 04:31 AM    LYMPHSABS 1.53 10/06/2022 04:31 AM    EOSABS 0.34 10/06/2022 04:31 AM    BASOSABS 0.04 10/06/2022 04:31 AM     CMP:    Lab Results   Component Value Date/Time     10/06/2022 04:31 AM    K 3.5 10/06/2022 04:31 AM     10/06/2022 04:31 AM    CO2 24 10/06/2022 04:31 AM    BUN 7 10/06/2022 04:31 AM    CREATININE 0.8 10/06/2022 04:31 AM    GFRAA >60 10/06/2022 04:31 AM    LABGLOM >60 10/06/2022 04:31 AM    GLUCOSE 99 10/06/2022 04:31 AM    GLUCOSE 81 02/09/2021 03:13 PM    PROT 7.1 10/06/2022 04:31 AM    LABALBU 3.5 10/06/2022 04:31 AM    LABALBU 4.0 02/09/2021 03:13 PM    CALCIUM 9.2 10/06/2022 04:31 AM    BILITOT 1.0 10/06/2022 04:31 AM    ALKPHOS 67 10/06/2022 04:31 AM    AST 17 10/06/2022 04:31 AM    ALT 13 10/06/2022 04:31 AM       Imaging:  X-ray left tib-fib: Displaced distal fibular shaft fracture. Displaced medial malleolus fracture. Disruption of the ankle joint. Widening of the distal tibiofibular joint. EKG:  I've personally reviewed the patient's EKG:  NSR     Telemetry:  I've personally reviewed the patient's telemetry:      ASSESSMENT/PLAN:  Principal Problem:    Trimalleolar fracture of ankle, closed, left, initial encounter  Resolved Problems:    * No resolved hospital problems. *    48-year-old female with a history of Crohn's presents to the ED with ankle pain after a mechanical fall at home and is admitted to [place] [address] unit with    Trimalleolar fracture of ankle  Pain management  Orthopedic surgery-s/p 10/6/2022-application of uniplanar external fixator  Patient to have definitive fixation in approximately 2 weeks  ISP  Bowel regimen  Nonweightbearing right lower extremity      Discharge planning   PT OT   DVT prophylaxis-aspirin    ode status: Full  Requires inpatient level of care  FELIPA Oreilly CNP    10:33 AM  10/6/2022     Above note edited to reflect my thoughts     I personally saw, examined and provided care for the patient. Radiographs, labs and medication list were reviewed by me independently. The case was discussed in detail and plans for care were established. Review of Mary MILLER CNP, documentation was conducted and revisions were made as appropriate directly by me. I agree with the above documented exam, problem list, and plan of care.      Doguie Ferro MD  10/6/2022

## 2022-10-06 NOTE — CARE COORDINATION
Met with the pt at the bedside to discuss transition of care. The pt lives with her  in a 2 story home. She does have a first floor set up. She would like to return home, if possible. Will work with PT/OT . She has a shower chair and walk in shower upstairs. She would [facility]  Celine Heredia RN

## 2022-10-06 NOTE — ANESTHESIA POSTPROCEDURE EVALUATION
Department of Anesthesiology  Postprocedure Note    Patient: Ned Munson  MRN: [id number]  YOB: 1975  Date of evaluation: 10/6/2022      Procedure Summary     Date: 10/06/22 Room / Location: [facility] OR 08 / [facility]    Anesthesia Start: 1018 Anesthesia Stop: 1132    Procedure: LEFT ANKLE EXTERNAL FIXATION (Left: Leg Lower) Diagnosis:       Closed trimalleolar fracture of left ankle, initial encounter      (Closed trimalleolar fracture of left ankle, initial encounter)    Surgeons: Davonte Ireland MD Responsible Provider: Norman Ochoa MD    Anesthesia Type: general, regional ASA Status: 3          Anesthesia Type: No value filed.     Berenice Phase I: Berenice Score: 7    Berenice Phase II: Berenice Score: 5      Anesthesia Post Evaluation    Patient location during evaluation: PACU  Patient participation: complete - patient participated  Level of consciousness: awake  Pain score: 0  Airway patency: patent  Nausea & Vomiting: no nausea  Complications: no  Cardiovascular status: hemodynamically stable  Respiratory status: acceptable  Hydration status: stable  Multimodal analgesia pain management approach

## 2022-10-06 NOTE — PROGRESS NOTES
Physical Therapy      Name: Janessa Christian  : 1975  MRN: [id number]  Date of Service: 10/6/2022  Room #:  [facility]    PT order received. PT held - patient off unit for procedure. PT will follow up as appropriate.     Danay Singh, PT, DPT  [id number]

## 2022-10-06 NOTE — ANESTHESIA PROCEDURE NOTES
Peripheral Block    Patient location during procedure: procedure area  Reason for block: post-op pain management and at surgeon's request  Start time: 10/6/2022 10:03 AM  End time: 10/6/2022 10:06 AM  Staffing  Performed: anesthesiologist   Preanesthetic Checklist  Completed: patient identified, IV checked, site marked, risks and benefits discussed, surgical/procedural consents, equipment checked, pre-op evaluation, timeout performed, anesthesia consent given, oxygen available and monitors applied/VS acknowledged  Peripheral Block   Patient position: supine  Prep: alcohol swabs  Provider prep: mask and sterile gloves  Patient monitoring: cardiac monitor, continuous pulse ox and frequent blood pressure checks  Block type: Femoral  Adductor canal  Laterality: left  Injection technique: single-shot  Guidance: ultrasound guided  Infiltration strength: 0 %  Dose: 0 mL    Needle   Needle type: short-bevel   Needle gauge: 22 G  Needle localization: ultrasound guidance  Needle length: 8 cm  Assessment   Injection assessment: negative aspiration for heme, no paresthesia on injection and local visualized surrounding nerve on ultrasound  Paresthesia pain: none  Slow fractionated injection: yes  Hemodynamics: stable  Real-time US image taken/store: yes  Outcomes: uncomplicated    Additional Notes  Mixture of 0.5% Ropivacaine 25 mL in incremental of 5 mL after negative blood aspiration.

## 2022-10-06 NOTE — PROGRESS NOTES
OCCUPATIONAL THERAPY TREATMENT NOTE     [facility]      OT BEDSIDE TREATMENT NOTE      Date:10/6/2022  Patient Name: Rik Thomas  MRN: [id number]  : 1975  Room: [facility]rice Dank       OT orders received & appreciated, chart reviewed, pt. currently off unit in OR. Will follow. Thank you,  Deb Vieira, OTR/L   License #  [id number]

## 2022-10-07 PROBLEM — S82.852G: Status: ACTIVE | Noted: 2022-10-07

## 2022-10-07 PROCEDURE — G0378 HOSPITAL OBSERVATION PER HR: HCPCS

## 2022-10-07 PROCEDURE — 97530 THERAPEUTIC ACTIVITIES: CPT

## 2022-10-07 PROCEDURE — 2580000003 HC RX 258: Performed by: PHYSICAL THERAPY ASSISTANT

## 2022-10-07 PROCEDURE — 97535 SELF CARE MNGMENT TRAINING: CPT

## 2022-10-07 PROCEDURE — 96375 TX/PRO/DX INJ NEW DRUG ADDON: CPT

## 2022-10-07 PROCEDURE — 6360000002 HC RX W HCPCS: Performed by: PHYSICAL THERAPY ASSISTANT

## 2022-10-07 PROCEDURE — 6370000000 HC RX 637 (ALT 250 FOR IP): Performed by: PHYSICAL THERAPY ASSISTANT

## 2022-10-07 PROCEDURE — 1200000000 HC SEMI PRIVATE

## 2022-10-07 RX ADMIN — CEFAZOLIN 2000 MG: 2 INJECTION, POWDER, FOR SOLUTION INTRAMUSCULAR; INTRAVENOUS at 11:06

## 2022-10-07 RX ADMIN — MORPHINE SULFATE 2 MG: 2 INJECTION, SOLUTION INTRAMUSCULAR; INTRAVENOUS at 23:51

## 2022-10-07 RX ADMIN — MULTIVITAMIN TABLET 1 TABLET: TABLET at 08:37

## 2022-10-07 RX ADMIN — FOLIC ACID 1 MG: 1 TABLET ORAL at 08:36

## 2022-10-07 RX ADMIN — SODIUM CHLORIDE, PRESERVATIVE FREE 10 ML: 5 INJECTION INTRAVENOUS at 22:16

## 2022-10-07 RX ADMIN — MORPHINE SULFATE 2 MG: 2 INJECTION, SOLUTION INTRAMUSCULAR; INTRAVENOUS at 08:37

## 2022-10-07 RX ADMIN — ACETAMINOPHEN 650 MG: 325 TABLET ORAL at 22:15

## 2022-10-07 RX ADMIN — MORPHINE SULFATE 2 MG: 2 INJECTION, SOLUTION INTRAMUSCULAR; INTRAVENOUS at 03:11

## 2022-10-07 RX ADMIN — OXYCODONE 5 MG: 5 TABLET ORAL at 14:57

## 2022-10-07 RX ADMIN — GABAPENTIN 300 MG: 300 CAPSULE ORAL at 22:15

## 2022-10-07 RX ADMIN — GABAPENTIN 300 MG: 300 CAPSULE ORAL at 08:36

## 2022-10-07 RX ADMIN — GABAPENTIN 300 MG: 300 CAPSULE ORAL at 13:03

## 2022-10-07 RX ADMIN — OXYCODONE 5 MG: 5 TABLET ORAL at 10:08

## 2022-10-07 RX ADMIN — OXYCODONE 5 MG: 5 TABLET ORAL at 22:15

## 2022-10-07 RX ADMIN — SODIUM CHLORIDE, PRESERVATIVE FREE 10 ML: 5 INJECTION INTRAVENOUS at 08:45

## 2022-10-07 RX ADMIN — MORPHINE SULFATE 2 MG: 2 INJECTION, SOLUTION INTRAMUSCULAR; INTRAVENOUS at 13:13

## 2022-10-07 RX ADMIN — PANTOPRAZOLE SODIUM 40 MG: 40 TABLET, DELAYED RELEASE ORAL at 08:36

## 2022-10-07 RX ADMIN — ACETAMINOPHEN 650 MG: 325 TABLET ORAL at 11:06

## 2022-10-07 RX ADMIN — METOPROLOL SUCCINATE 12.5 MG: 25 TABLET, EXTENDED RELEASE ORAL at 08:36

## 2022-10-07 RX ADMIN — DULOXETINE HYDROCHLORIDE 30 MG: 30 CAPSULE, DELAYED RELEASE ORAL at 08:37

## 2022-10-07 RX ADMIN — MORPHINE SULFATE 2 MG: 2 INJECTION, SOLUTION INTRAMUSCULAR; INTRAVENOUS at 19:15

## 2022-10-07 RX ADMIN — CEFAZOLIN 2000 MG: 2 INJECTION, POWDER, FOR SOLUTION INTRAMUSCULAR; INTRAVENOUS at 03:11

## 2022-10-07 ASSESSMENT — PAIN - FUNCTIONAL ASSESSMENT

## 2022-10-07 ASSESSMENT — PAIN DESCRIPTION - FREQUENCY
FREQUENCY: CONTINUOUS

## 2022-10-07 ASSESSMENT — PAIN SCALES - GENERAL
PAINLEVEL_OUTOF10: 7
PAINLEVEL_OUTOF10: 8
PAINLEVEL_OUTOF10: 7
PAINLEVEL_OUTOF10: 8
PAINLEVEL_OUTOF10: 9
PAINLEVEL_OUTOF10: 8
PAINLEVEL_OUTOF10: 4
PAINLEVEL_OUTOF10: 8

## 2022-10-07 ASSESSMENT — PAIN DESCRIPTION - ONSET
ONSET: ON-GOING

## 2022-10-07 ASSESSMENT — PAIN DESCRIPTION - DESCRIPTORS
DESCRIPTORS: BURNING;SORE;DISCOMFORT
DESCRIPTORS: ACHING;DISCOMFORT;NAGGING
DESCRIPTORS: DISCOMFORT;SORE;BURNING

## 2022-10-07 ASSESSMENT — PAIN DESCRIPTION - LOCATION
LOCATION: LEG
LOCATION: BUTTOCKS;LEG
LOCATION: LEG;BUTTOCKS
LOCATION: BUTTOCKS;LEG
LOCATION: LEG;BUTTOCKS
LOCATION: BUTTOCKS;LEG
LOCATION: BUTTOCKS;LEG

## 2022-10-07 ASSESSMENT — PAIN DESCRIPTION - ORIENTATION
ORIENTATION: LEFT

## 2022-10-07 ASSESSMENT — PAIN DESCRIPTION - PAIN TYPE
TYPE: SURGICAL PAIN

## 2022-10-07 NOTE — PROGRESS NOTES
Occupational Therapy  OT BEDSIDE TREATMENT NOTE   [facility] [address] [address]      Date:10/7/2022  Patient Name: Shiv Carver  MRN: [id number]  : 1975  Room: [address] [place] [address]     Evaluating OT:Bonny Vieira OTR/L   License #  [id number]        Referring Provider: Brant Blevins DO    Specific Provider Orders/Date: OT evaluation & treatment        Diagnosis: s/p Fall. 1.  Closed trimalleolar fracture of left ankle, initial encounter  2. 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 Surgery: 10-6-22:   1. Closed treatment left trimalleolar ankle fracture with manipulation  2. Application of uniplanar external fixator left ankle       Past Surgical History:  has a past surgical history that includes Breast biopsy (); Cholecystectomy (2011); Endometrial ablation (); Upper gastrointestinal endoscopy; Jejunostomy (2019); and total colectomy. Precautions:  Fall Risk, NWB L LE, ileostomy, Crohn's dz. Assessment of current deficits    [x] Functional mobility            [x]ADLs           [x] Strength                  [x]Cognition    [x] Functional transfers          [x] IADLs         [x] Safety Awareness   [x]Endurance    [x] Fine Coordination                         [x] Balance      [] Vision/perception   [x]Sensation      []Gross Motor Coordination             [] ROM           [] Delirium                   [] Motor Control      OT PLAN OF CARE   OT POC based on physician orders, patient diagnosis and results of clinical assessment     Frequency/Duration: 2-4 days/wk for 2 weeks PRN   Specific OT Treatment Interventions to include:    Instruction/training on adapted ADL techniques and AE recommendations to increase functional independence within precautions  Training on energy conservation strategies, correct breathing pattern and techniques to improve independence/tolerance for self-care routine  Functional transfer/mobility training/DME recommendations for increased independence, safety, and fall prevention  Patient/Family education to increase follow through with safety techniques and functional independence  Recommendation of environmental modifications for increased safety with functional transfers/mobility and ADLs  Visual-perceptual training to improve environmental scanning, visual attention/focus, and oculomotor skills for increased safety/independence with functional transfers/mobility and ADLs  Therapeutic exercise to improve motor endurance, ROM, and functional strength for ADLs/functional transfers  Therapeutic activities to facilitate/challenge dynamic balance, stand tolerance for increased safety and independence with ADLs  Therapeutic activities to facilitate gross/fine motor skills for increased independence with ADLs  Positioning to improve skin integrity, interaction with environment and functional independence     Recommended Adaptive Equipment:  TBD      Home Living: Pt lives with  in a 2 story with 3 steps to enter with 1 HR. B&B on 2nd level but reports can stay on 1st level with recliner & 1/2 bath  Bathroom setup: walk in shower with bench 2nd floor   Equipment owned: shower bench, crutches     Prior Level of Function: Ind. with ADLs , Ind. with IADLs; ambulated no A.D.    Driving: active  Occupation: none stated     Pain Level: minimal low back pain  Cognition: A&O: 4/4; Follows multi- step directions              Memory:  G              Sequencing:  G              Problem solving:  G              Judgement/safety:  G-                Functional Assessment:  AM-PAC Daily Activity Raw Score: 19/24    Initial Eval Status  Date: 10-6-22 Treatment Status  Date: 10/7/22 STGs = LTGs  Time frame: 10-14 days   Feeding Ind.  ind Mod I/ Ind   Grooming Set up Set up  seated  Modified [place]    UB Dressing SBA with dom cornelius seated EOB Per last tx  Modified [place]    LB Dressing Max A  Min A  To don pants seated EOB and standing to pull over hips Modified [place]    Bathing Mod A with sim. task Min A  Simulated seated EOB  Modified [place]    Toileting NT Per last tx  Modified [place]    Bed Mobility  Supine to sit: Min A for L LE  Sit to supine: Min A for L LE  Per last tx Supine to sit: Modified [place]   Sit to supine: Modified [place]    Functional Transfers Min A x2 with sit <> stand using ww, maintaining NWB L LE  Min A- sit<->stand  Cuing for hand placement  Min A- toilet transfer (bedside commode) Modified [place]    Functional Mobility Min A x2 with few steps near EOB using ww maintaining   NWB L LE Min A  Home distance using w/w  Modified [place]    Balance Sitting:     Static:  Sup    Dynamic:SBA  Standing: Min Ax2 Sitting:     Static: ind    Dynamic: ind  Standing: Min A      Activity Tolerance F  Fair G   Visual/  Perceptual Glasses: yes  Pt. reports blurred vision, follows with [facility]          Vitals spO2 & HR WFL   WFL       Comments: Upon arrival pt seated in wheel chair. Pt educated on techniques to increase independence and safety during ADL's, and functional transfers. Discussed home set up with pt, giving suggestions to increase safety at discharge. At end of session pt left seated in bedside chair,  present. Pt has made fair+ progress towards set goals.      Continue with current plan of care    Treatment Time In: 11:30            Treatment Time Out: 11:45             Treatment Charges: Mins Units   Ther Ex  55549     Manual Therapy 90253     Thera Activities 52685     ADL/Home Mgt 57719 15 1   Neuro Re-ed 94925     Group Therapy      Orthotic manage/training  14195     Non-Billable Time     Total Timed Treatment 15 [address]

## 2022-10-07 NOTE — CARE COORDINATION
10/7 Care Coordination: Met with pt and her . Discharge plan is still to Home. PT/OT to work with pt today and get OOB with [facility].Order for [address] [place], [place] [address]jerry Matias called to [phone] [facility]. CM/SW will continue to follow for discharge planning.    Janis ARGUETA,RN-CV-BC  [phone]

## 2022-10-07 NOTE — PROGRESS NOTES
[facility]   Progress note      Subjective: The patient is awake and alert. Patient is having significant pain, is about to get pain medication on my evaluation  Denies any acute complaints otherwise    Objective:    /63   Pulse 82   Temp 98.1 °F (36.7 °C) (Temporal)   Resp 16   Ht 5' 9\" (1.753 m)   Wt 242 lb (109.8 kg)   SpO2 98%   BMI 35.74 kg/m²     In: 1560 [P.O.:660; I.V.:900]  Out: -   In: 1560   Out: -     General appearance: NAD, conversant  HEENT: AT/NC, MMM  Neck: FROM, supple  Lungs: Clear to auscultation  CV: RRR, no MRGs  Vasc: Radial pulses 2+  Abdomen: Soft, non-tender; no masses or HSM  Extremities: Left leg external fixator in place  Skin: no rash, lesions or ulcers  Psych: Alert and oriented to person, place and time  Neuro: Alert and interactive     Recent Labs     10/05/22  2145 10/06/22  0431   WBC 9.9 8.2   HGB 9.3* 10.3*   HCT 30.2* 35.5    196       Recent Labs     10/05/22  2145 10/06/22  0431    139   K 3.6 3.5    102   CO2 24 24   BUN 8 7   CREATININE 0.8 0.8   CALCIUM 9.1 9.2       Assessment:    Principal Problem:    Trimalleolar fracture of ankle, closed, left, initial encounter  Active Problems:    Closed trimalleolar fracture of left ankle with delayed healing  Resolved Problems:    * No resolved hospital problems.  *      Plan:    76-year-old  woman with known history of Crohn's disease, on biologic agents, admitted status post fall and sudden acute onset pain-found to have trimalleolar fracture of left ankle    10/7/2022  Status post OR with ORIF and external fixator placement 10/6/2022  Continue pain control  DVT prophylaxis  Resume home medications-May need to bring Biologics from home if not on formulary  Orthopedic management per Ortho surgery  Check a.m. labs    Code Status: Full  Consultants: Orthopedics  DVT Prophylaxis   PT/OT  Discharge planning           Rody Joseph MD  3:07 PM  10/7/2022

## 2022-10-07 NOTE — PROGRESS NOTES
Comprehensive Nutrition Assessment    Type and Reason for Visit:  Initial, Positive Nutrition Screen    Nutrition Recommendations/Plan:   Continue Diet. Will Start ONS and monitor. Malnutrition Assessment:  Malnutrition Status: At risk for malnutrition (Comment) (10/07/22 1254)    Context:  Acute Illness     Findings of the 6 clinical characteristics of malnutrition:  Energy Intake:  Mild decrease in energy intake (Comment) (sporadic intake since adm)  Weight Loss:  Unable to assess (2/2 poor EMR wt hx since adm currently)     Body Fat Loss:  No significant body fat loss     Muscle Mass Loss:  No significant muscle mass loss    Fluid Accumulation:  No significant fluid accumulation     Strength:  Not Performed    Nutrition Assessment:    Pt adm w/ Lt ankle pain s/p fall down ~3-4 steps x ~1d pta. PMHx Chron's, s/p total colectomy/ileostomy. Adm w/ Lt Ankle Frx, s/p Lt Ankle Ex Fix 10/6. At nutritional risk d/t increased needs for wound healing post-op. Will Start ONS and monitor. Nutrition Related Findings:    A&O, dentition WNL, Abd/BS WDL, +Ileostomy, no edema, +I/O's Wound Type: Surgical Incision       Current Nutrition Intake & Therapies:    Average Meal Intake: 51-75%  Average Supplements Intake: None Ordered  ADULT DIET; Regular; Low Sodium (2 gm)    Anthropometric Measures:  Height: 5' 9\" (175.3 cm)  Ideal Body Weight (IBW): 145 lbs (66 kg)    Admission Body Weight: 242 lb (109.8 kg) (unknown method 10/6)  Current Body Weight: 242 lb (109.8 kg) (unknown method 10/6),   IBW.  Weight Source: Not Specified  Current BMI (kg/m2): 35.7  Usual Body Weight: 266 lb (120.7 kg) (actual 6/9/22 per EMR; noted possible wt loss however unable to properly assess/confirm currently d/t poor EMR wt hx w/ no actual wt's since adm at this time)  % Weight Change (Calculated): -9                    BMI Categories: Obese Class 2 (BMI 35.0 -39.9)    Estimated Daily Nutrient Needs:  Energy Requirements Based On: Formula  Weight Used for Energy Requirements: Current  Energy (kcal/day):   Weight Used for Protein Requirements: Ideal  Protein (g/day): 1.5-1.8gm/kg IBW= 100-120  Method Used for Fluid Requirements: 1 ml/kcal  Fluid (ml/day):     Nutrition Diagnosis:   Increased nutrient needs related to increase demand for energy/nutrients as evidenced by wounds    Nutrition Interventions:   Food and/or Nutrient Delivery: Continue Current Diet, Start Oral Nutrition Supplement (Continue Diet. Will Start ONS and monitor.)  Nutrition Education/Counseling: Education not indicated  Coordination of Nutrition Care: Continue to monitor while inpatient       Goals:     Goals: PO intake 75% or greater, by next RD assessment       Nutrition Monitoring and Evaluation:   Behavioral-Environmental Outcomes: None Identified  Food/Nutrient Intake Outcomes: Food and Nutrient Intake, Supplement Intake  Physical Signs/Symptoms Outcomes: Biochemical Data, GI Status, Fluid Status or Edema, Nutrition Focused Physical Findings, Skin, Weight    Discharge Planning:     Too soon to determine     Umesh Gaxiola RD, LD  Contact: ext [phone]

## 2022-10-07 NOTE — PROGRESS NOTES
Physical Therapy  Physical Therapy Treatment     Name: Rosy Leal  : 1975  MRN: [id number]      Date of Service: 10/7/2022    Evaluating PT:  Bebe Vasques, PT, DPT [id number]    Room #:  2324/0797-G  Diagnosis:  Closed trimalleolar fracture of left ankle, initial encounter [S82.852A]  Trimalleolar fracture of ankle, closed, left, initial encounter [S82.852A]  Closed trimalleolar fracture of left ankle with delayed healing [S82.852G]  PMHx/PSHx:  Acid reflux, Crohn's colitis, anxiety, depression  Procedure/Surgery:  Closed treatment left trimalleolar ankle fracture with manipulation; Application of uniplanar external fixator left ankle (10/6/2022)  Precautions:  Falls, NWB LLE  Equipment Needs:  FWW; TBD  Equipment Owned:  Axillary crutches    SUBJECTIVE:    Pt lives with her  in a 2-story home with 3 stair(s) to enter and 1 rail(s). Pt is planning to stay on 1st floor. Pt ambulated with no AD PTA. OBJECTIVE:   Initial Evaluation  Date: 10/6/2022 Treatment  10/7/2022 Short Term/ Long Term   Goals   AM-PAC 6 Clicks 75/90     Was pt agreeable to Eval/treatment? Yes Yes    Does pt have pain? Unrated back 8/10 L ankle    Bed Mobility  Rolling: NT  Supine to sit: Mily  Sit to supine: Mily  Scooting: Mily (seated) Rolling: NT  Supine to sit: Mily (HOB elevated)  Sit to supine: Mily (HOB elevated)  Scooting: Mily (seated) Rolling: Independent  Supine to sit: Independent  Sit to supine: Independent  Scooting: Independent   Transfers Sit to stand: Mily x2  Stand to sit: Mily x2  Stand pivot: NT Sit to stand: Mily  Stand to sit: Mily  Stand pivot: NT Sit to stand: Mod I  Stand to sit: Mod I  Stand pivot:  Mod I with AAD   Ambulation    5 feet with FWW Mily x2 5', 3', 10', 5' with [employer] Mily 50 feet with AAD Mod I   Stair negotiation: ascended and descended NT NT 3 step(s) with 1 rail(s) + AAD Mod I or 3 step(s) with 0 rail(s) + AAD Mod I   ROM BUE:  See OT note  BLE:  WFL     Strength BUE:  See OT note  BLE:  Grossly 5/5     Balance Sitting EOB:  SBA  Dynamic Standing:  Mily x2 with FWW Sitting EOB:  SBA  Dynamic Standing:  Mily with [employer] Sitting EOB:  Independent  Dynamic Standing: Mod I with AAD     Pt is A & O x 4  Sensation:  No reports of numbness/tingling to extremities  Edema:  Unremarkable    Vitals:   HR 96, SpO2 98% with activity. Patient education  Pt educated on weight bearing status, safety during functional mobility, use of call light for assistance. Patient response to education:   Pt verbalized understanding Pt demonstrated skill Pt requires further education in this area   Yes Yes Yes     ASSESSMENT:    Comments:  Patient in Leo's position upon arrival; agreeable to PT session. Required increased time, assistance of LLE to perform bed mobility. Required increased time, verbal cues related to positioning/sequencing to ensure safety during functional transfers. Ambulated via RLE hopping with decreased speed, mild unsteadiness. Ambulated from bed to wheelchair by which patient was transported to stairs. Patient educated on proper positioning and sequencing during stair negotiation through explanation and demonstration. Ambulated short distance from wheelchair to stairs. Stair negotiation not attempted due to safety concerns. Patient transported by wheelchair closer to room; subsequent ambulation bout performed. Maintained NWB LLE during activity. Care transferred to OT upon patient returning to room and sitting on BSC. Patient left standing at [facility] with OT present. Upon returning to room at later time, patient sitting in chair; requested to return to bed. Required increased time to perform functional transfers. Ambulated from chair to bed. Required increased time, assistance of LLE to perform bed mobility. Maintained NWB LLE during activity. Patient left in Leo's position with LLE elevated, call light in reach.     Treatment:  Patient practiced and was instructed in the following treatment:    Bed mobility - verbal cues to facilitate proper positioning; physical assistance provided as needed during activity  Functional transfers - verbal cues to facilitate proper positioning and sequencing, particularly related to hand/foot placement; physical assistance provided as needed during activity  Ambulation - verbal cues to facilitate proper positioning/sequencing; physical assistance provided as needed during activity  Patient education provided through explanation and demonstration regarding proper positioning and sequencing during stair negotiation  Skilled positioning - patient positioned optimally to protect skin/joint integrity and promote comfort    PLAN:    Patient is making good progress towards established goals. Will continue with current POC.       Time in  1100  Time out  1135  Time in  1155  Time out  1200    Total Treatment Time  40 minutes     CPT codes:  [] Gait training 55086 0 minutes  [] Manual therapy 89603 0 minutes  [x] Therapeutic activities 64754 40 minutes  [] Therapeutic exercises 22950 0 minutes  [] Neuromuscular reeducation 80927 0 minutes    Yany Damon, PT, DPT  [id number]

## 2022-10-07 NOTE — PROGRESS NOTES
Department of Orthopedic Surgery  Resident Progress Note    POD 1. S/P Right ankle external fixator. Patient seen and examined. Pain controlled on current regimen. No new complaints. Denies chest pain, shortness of breath, dizziness/lightheadedness.     VITALS:  /74   Pulse 84   Temp 98.2 °F (36.8 °C) (Temporal)   Resp 16   Ht 5' 9\" (1.753 m)   Wt 242 lb (109.8 kg)   SpO2 98%   BMI 35.74 kg/m²     General: Alert and in no acute distress    MUSCULOSKELETAL:   right lower extremity:  Dressing C/D/I  Compartments soft and compressible  +PF/DF/EHL  +2/4 DP & PT pulses, Brisk Cap refill, Toes warm and perfused  Distal sensation grossly intact to Peroneals, Sural, Saphenous, and tibial nrs    CBC:   Lab Results   Component Value Date/Time    WBC 8.2 10/06/2022 04:31 AM    HGB 10.3 10/06/2022 04:31 AM    HCT 35.5 10/06/2022 04:31 AM     10/06/2022 04:31 AM     PT/INR:    Lab Results   Component Value Date/Time    PROTIME 13.6 10/06/2022 04:31 AM    PROTIME 11.0 10/14/2011 01:50 AM    INR 1.2 10/06/2022 04:31 AM       ASSESSMENT  S/P Right ankle spanning external fixation - 10/6/22    PLAN    Nonweightbearing right lower extremity  DVT prophylaxis: Aspirin  Ice and elevation to reduce swelling and pain  Appreciate PT/OT  Will follow her soft tissues for resolution of swelling to allow for definitive fixation  Follow up in 2 weeks

## 2022-10-08 VITALS
DIASTOLIC BLOOD PRESSURE: 57 MMHG | HEART RATE: 64 BPM | TEMPERATURE: 97.3 F | RESPIRATION RATE: 16 BRPM | SYSTOLIC BLOOD PRESSURE: 110 MMHG | BODY MASS INDEX: 35.84 KG/M2 | HEIGHT: 69 IN | OXYGEN SATURATION: 96 % | WEIGHT: 242 LBS

## 2022-10-08 LAB
ANION GAP SERPL CALCULATED.3IONS-SCNC: 13 MMOL/L (ref 7–16)
BASOPHILS ABSOLUTE: 0.03 E9/L (ref 0–0.2)
BASOPHILS RELATIVE PERCENT: 0.4 % (ref 0–2)
BUN BLDV-MCNC: 12 MG/DL (ref 6–20)
CALCIUM SERPL-MCNC: 9.4 MG/DL (ref 8.6–10.2)
CHLORIDE BLD-SCNC: 104 MMOL/L (ref 98–107)
CO2: 24 MMOL/L (ref 22–29)
CREAT SERPL-MCNC: 0.8 MG/DL (ref 0.5–1)
EOSINOPHILS ABSOLUTE: 0.34 E9/L (ref 0.05–0.5)
EOSINOPHILS RELATIVE PERCENT: 4.9 % (ref 0–6)
GFR AFRICAN AMERICAN: >60
GFR NON-AFRICAN AMERICAN: >60 ML/MIN/1.73
GLUCOSE BLD-MCNC: 110 MG/DL (ref 74–99)
HCT VFR BLD CALC: 32.4 % (ref 34–48)
HEMOGLOBIN: 10.1 G/DL (ref 11.5–15.5)
IMMATURE GRANULOCYTES #: 0.02 E9/L
IMMATURE GRANULOCYTES %: 0.3 % (ref 0–5)
LYMPHOCYTES ABSOLUTE: 2.09 E9/L (ref 1.5–4)
LYMPHOCYTES RELATIVE PERCENT: 30 % (ref 20–42)
MCH RBC QN AUTO: 28.5 PG (ref 26–35)
MCHC RBC AUTO-ENTMCNC: 31.2 % (ref 32–34.5)
MCV RBC AUTO: 91.3 FL (ref 80–99.9)
MONOCYTES ABSOLUTE: 0.74 E9/L (ref 0.1–0.95)
MONOCYTES RELATIVE PERCENT: 10.6 % (ref 2–12)
NEUTROPHILS ABSOLUTE: 3.74 E9/L (ref 1.8–7.3)
NEUTROPHILS RELATIVE PERCENT: 53.8 % (ref 43–80)
PDW BLD-RTO: 17.1 FL (ref 11.5–15)
PLATELET # BLD: 219 E9/L (ref 130–450)
PMV BLD AUTO: 10 FL (ref 7–12)
POTASSIUM SERPL-SCNC: 3.8 MMOL/L (ref 3.5–5)
RBC # BLD: 3.55 E12/L (ref 3.5–5.5)
SODIUM BLD-SCNC: 141 MMOL/L (ref 132–146)
WBC # BLD: 7 E9/L (ref 4.5–11.5)

## 2022-10-08 PROCEDURE — 80048 BASIC METABOLIC PNL TOTAL CA: CPT

## 2022-10-08 PROCEDURE — 6370000000 HC RX 637 (ALT 250 FOR IP): Performed by: PHYSICAL THERAPY ASSISTANT

## 2022-10-08 PROCEDURE — 85025 COMPLETE CBC W/AUTO DIFF WBC: CPT

## 2022-10-08 PROCEDURE — 2580000003 HC RX 258: Performed by: PHYSICAL THERAPY ASSISTANT

## 2022-10-08 PROCEDURE — 36415 COLL VENOUS BLD VENIPUNCTURE: CPT

## 2022-10-08 PROCEDURE — 6360000002 HC RX W HCPCS: Performed by: PHYSICAL THERAPY ASSISTANT

## 2022-10-08 RX ORDER — OXYCODONE HYDROCHLORIDE AND ACETAMINOPHEN 5; 325 MG/1; MG/1
1 TABLET ORAL EVERY 6 HOURS PRN
Qty: 10 TABLET | Refills: 0 | Status: SHIPPED | OUTPATIENT
Start: 2022-10-08 | End: 2022-10-11

## 2022-10-08 RX ADMIN — METOPROLOL SUCCINATE 12.5 MG: 25 TABLET, EXTENDED RELEASE ORAL at 09:11

## 2022-10-08 RX ADMIN — GABAPENTIN 300 MG: 300 CAPSULE ORAL at 09:10

## 2022-10-08 RX ADMIN — ACETAMINOPHEN 650 MG: 325 TABLET ORAL at 05:14

## 2022-10-08 RX ADMIN — OXYCODONE 5 MG: 5 TABLET ORAL at 05:14

## 2022-10-08 RX ADMIN — OXYCODONE 5 MG: 5 TABLET ORAL at 13:53

## 2022-10-08 RX ADMIN — SODIUM CHLORIDE, PRESERVATIVE FREE 10 ML: 5 INJECTION INTRAVENOUS at 09:11

## 2022-10-08 RX ADMIN — PANTOPRAZOLE SODIUM 40 MG: 40 TABLET, DELAYED RELEASE ORAL at 05:14

## 2022-10-08 RX ADMIN — MORPHINE SULFATE 2 MG: 2 INJECTION, SOLUTION INTRAMUSCULAR; INTRAVENOUS at 11:52

## 2022-10-08 RX ADMIN — OXYCODONE 5 MG: 5 TABLET ORAL at 09:10

## 2022-10-08 RX ADMIN — DULOXETINE HYDROCHLORIDE 30 MG: 30 CAPSULE, DELAYED RELEASE ORAL at 09:11

## 2022-10-08 RX ADMIN — GABAPENTIN 300 MG: 300 CAPSULE ORAL at 13:53

## 2022-10-08 RX ADMIN — MULTIVITAMIN TABLET 1 TABLET: TABLET at 09:11

## 2022-10-08 RX ADMIN — FOLIC ACID 1 MG: 1 TABLET ORAL at 09:11

## 2022-10-08 ASSESSMENT — PAIN DESCRIPTION - LOCATION
LOCATION: ANKLE

## 2022-10-08 ASSESSMENT — PAIN DESCRIPTION - DESCRIPTORS
DESCRIPTORS: SORE;SHARP;SHOOTING
DESCRIPTORS: ACHING;SORE;SPASM

## 2022-10-08 ASSESSMENT — PAIN - FUNCTIONAL ASSESSMENT
PAIN_FUNCTIONAL_ASSESSMENT: PREVENTS OR INTERFERES SOME ACTIVE ACTIVITIES AND ADLS

## 2022-10-08 ASSESSMENT — PAIN SCALES - GENERAL
PAINLEVEL_OUTOF10: 8
PAINLEVEL_OUTOF10: 7
PAINLEVEL_OUTOF10: 0
PAINLEVEL_OUTOF10: 0
PAINLEVEL_OUTOF10: 8
PAINLEVEL_OUTOF10: 9
PAINLEVEL_OUTOF10: 0

## 2022-10-08 ASSESSMENT — PAIN DESCRIPTION - ORIENTATION
ORIENTATION: LEFT

## 2022-10-08 ASSESSMENT — PAIN SCALES - WONG BAKER
WONGBAKER_NUMERICALRESPONSE: 0

## 2022-10-08 NOTE — PROGRESS NOTES
Department of Orthopedic Surgery  Resident Progress Note    POD 2 S/P Right ankle external fixator. Patient seen and examined. Pain controlled on current regimen. No new complaints. Overall doing well. VITALS:  BP (!) 120/58   Pulse 66   Temp 98.3 °F (36.8 °C) (Temporal)   Resp 16   Ht 5' 9\" (1.753 m)   Wt 242 lb (109.8 kg)   SpO2 98%   BMI 35.74 kg/m²     General: Alert and in no acute distress    MUSCULOSKELETAL:   right lower extremity:  Dressing C/D/I, some strikethrough about the pin sites. This is normal for her stage of healing. Ex-Fix pins in place and well fixed  Compartments soft and compressible  +PF/DF/EHL  Brisk Cap refill, Toes warm and perfused  Distal sensation grossly intact to Peroneals, Sural, Saphenous, and tibial nrs    CBC:   Lab Results   Component Value Date/Time    WBC 7.0 10/08/2022 04:51 AM    HGB 10.1 10/08/2022 04:51 AM    HCT 32.4 10/08/2022 04:51 AM     10/08/2022 04:51 AM     PT/INR:    Lab Results   Component Value Date/Time    PROTIME 13.6 10/06/2022 04:31 AM    PROTIME 11.0 10/14/2011 01:50 AM    INR 1.2 10/06/2022 04:31 AM       ASSESSMENT  S/P Right ankle spanning external fixation - 10/6/22    PLAN    Nonweightbearing right lower extremity  DVT prophylaxis: Aspirin 81 mg twice daily  Ice and elevation to reduce swelling and pain  Appreciate PT/OT  Will follow her soft tissues for resolution of swelling to allow for definitive fixation. She is to follow-up next week with Dr. Jonatan Ball. Ok to discharge from orthopedic standpoint. Orthopedics will follow the patient peripherally for the remainder of their inpatient stay. Please call with questions or concerns.

## 2022-10-08 NOTE — CARE COORDINATION
RN informed that ww was not delivered to room yet and Pt is discharging. Shailesh SERVIN. WW will be delivered to room in the next 2 hours. Informed RN.      Tiffanie Kelsey, ODILIA.S.W.  [phone]

## 2022-10-08 NOTE — DISCHARGE SUMMARY
[facility]   Discharge summary   Patient ID:  Peggy Wakefield  [id number]  49 y.o.  1975    Admit date: 10/5/2022    Discharge date and time: 10/8/2022    Admission Diagnoses:   Patient Active Problem List   Diagnosis    GERD (gastroesophageal reflux disease)    Depression with anxiety    Exacerbation of Crohn's disease of large intestine (HCC)    SVT (supraventricular tachycardia) (HCC)    Anemia    Paroxysmal supraventricular tachycardia (HCC)    Iron deficiency anemia secondary to blood loss (chronic)    Crohn's disease of colon with complication ([address]    Abdominal wall skin ulcer, with fat layer exposed ([address]    Skin ulcer of perineum, with fat layer exposed ([address]    Trimalleolar fracture of ankle, closed, left, initial encounter    Closed trimalleolar fracture of left ankle with delayed healing       Discharge Diagnoses: Left ankle fracture    Consults: Orthopedics    Procedures: ORIF with external fixator placement 10/6/2022    Hospital Course: 58-year-old  woman with known history of Crohn's disease, on biologic agents, admitted status post fall and sudden acute onset pain-found to have trimalleolar fracture of left ankle     10/7/2022  Status post OR with ORIF and external fixator placement 10/6/2022  Continue pain control-see below on discharge  DVT prophylaxis-per orthopedics on discharge  Resume home medications including Biologics that she is on for ongoing Crohn's disease  Check a.m. labs-stable on discharge    Recent Labs     10/05/22  2145 10/06/22  0431 10/08/22  0451   WBC 9.9 8.2 7.0   HGB 9.3* 10.3* 10.1*   HCT 30.2* 35.5 32.4*    196 219       Recent Labs     10/05/22  2145 10/06/22  0431 10/08/22  0451    139 141   K 3.6 3.5 3.8    102 104   CO2 24 24 24   BUN 8 7 12   CREATININE 0.8 0.8 0.8   CALCIUM 9.1 9.2 9.4       XR PELVIS (1-2 VIEWS)    Result Date: 10/5/2022  EXAMINATION: ONE XRAY VIEW OF THE PELVIS 10/5/2022 1:17 am COMPARISON: None. HISTORY: ORDERING SYSTEM PROVIDED HISTORY: trauma TECHNOLOGIST PROVIDED HISTORY: Reason for exam:->trauma FINDINGS: No evidence of pelvic fracture. Bilateral hips demonstrate normal alignment. No focal osseous lesion. SI joints are symmetric. No acute abnormality of the pelvis. XR TIBIA FIBULA LEFT (2 VIEWS)    Result Date: 10/5/2022  EXAMINATION: XRAY VIEWS OF THE LEFT TIBIA AND FIBULA 10/5/2022 1:17 am COMPARISON: None. HISTORY: ORDERING SYSTEM PROVIDED HISTORY: fall TECHNOLOGIST PROVIDED HISTORY: Reason for exam:->fall FINDINGS: Displaced fracture distal shaft of the fibula. Displaced medial malleolar fracture. Disruption of the ankle joint. Widening of the distal tibiofibular joint. No focal osseous lesion. No focal soft tissue abnormality. Displaced distal fibular shaft fracture. Displaced medial malleolar fracture. Disruption of the ankle joint. Widening of the distal tibiofibular joint     XR ANKLE LEFT (2 VIEWS)    Result Date: 10/5/2022  EXAMINATION: 2 XRAY VIEWS OF THE LEFT ANKLE 10/5/2022 2:36 am COMPARISON: None. HISTORY: ORDERING SYSTEM PROVIDED HISTORY: post-fx reduction TECHNOLOGIST PROVIDED HISTORY: Reason for exam:->post-fx reduction FINDINGS: Status post interval reduction and splinting of left ankle fracture dislocation, now in improved anatomic alignment. There is slight posterior displacement of the talar dome relative to the tibial plafond. There is circumferential soft tissue swelling. Status post interval reduction and splinting.      XR ANKLE LEFT (MIN 3 VIEWS)    Result Date: 10/6/2022  EXAMINATION: THREE XRAY VIEWS OF THE LEFT ANKLE 10/6/2022 11:04 am COMPARISON: 5 October 2022 HISTORY: ORDERING SYSTEM PROVIDED HISTORY: post op TECHNOLOGIST PROVIDED HISTORY: Reason for exam:->post op What reading provider will be dictating this exam?->CRC FINDINGS: External fixation device has been placed since the prior exam.  Alignment of fracture fragments has been restored to near anatomic. No widening of the ankle mortise. No new abnormal findings. Near anatomic alignment of fracture fragments at the ankle with no mortise widening. XR ANKLE LEFT (MIN 3 VIEWS)    Result Date: 10/5/2022  EXAMINATION: THREE XRAY VIEWS OF THE LEFT ANKLE 10/5/2022 4:17 pm COMPARISON: 10/05/2022. HISTORY: ORDERING SYSTEM PROVIDED HISTORY: Reduction images please TECHNOLOGIST PROVIDED HISTORY: Reason for exam:->Reduction images please What reading provider will be dictating this exam?->CRC FINDINGS: There has been closed reduction of fractures of the distal fibula and the medial malleolus. Bony alignment is improved. A splint is present. Status post close reduction of distal fibula and medial malleolus fractures. XR ANKLE LEFT (MIN 3 VIEWS)    Result Date: 10/5/2022  EXAMINATION: THREE XRAY VIEWS OF THE LEFT ANKLE 10/5/2022 1:17 am COMPARISON: None. HISTORY: ORDERING SYSTEM PROVIDED HISTORY: fall pain TECHNOLOGIST PROVIDED HISTORY: Reason for exam:->fall pain FINDINGS: Displaced distal fibular and medial malleolar fractures with disruption of the ankle and distal tibiofibular joints. Plantar calcaneal spur of joint effusion. Displaced fibular medial malleolar fractures with disruption of the ankle and distal tibiofibular joints. XR CHEST PORTABLE    Result Date: 10/5/2022  EXAMINATION: ONE XRAY VIEW OF THE CHEST 10/5/2022 1:17 am COMPARISON: 05/25/2019 HISTORY: ORDERING SYSTEM PROVIDED HISTORY: fall TECHNOLOGIST PROVIDED HISTORY: Reason for exam:->fall FINDINGS: The lungs are without acute focal process. There is no effusion or pneumothorax. The cardiomediastinal silhouette is without acute process. The osseous structures are without acute process. No acute process.      CT ANKLE LEFT WO CONTRAST    Result Date: 10/5/2022  EXAMINATION: CT OF THE LEFT ANKLE WITHOUT CONTRAST 10/5/2022 3:21 am TECHNIQUE: CT of the left ankle was performed without the administration of intravenous contrast.  Multiplanar reformatted images are provided for review. Automated exposure control, iterative reconstruction, and/or weight based adjustment of the mA/kV was utilized to reduce the radiation dose to as low as reasonably achievable. COMPARISON: Same day radiographs HISTORY ORDERING SYSTEM PROVIDED HISTORY: ankle fracture/dislocation s/p reduction TECHNOLOGIST PROVIDED HISTORY: Reason for exam:->ankle fracture/dislocation s/p reduction Decision Support Exception - unselect if not a suspected or confirmed emergency medical condition->Emergency Medical Condition (MA) FINDINGS: Bones: There are comminuted fractures of the medial and lateral malleoli, with mild lateral displacement of the distal fracture components. There is a mildly displaced fracture of the posterior malleolus. There is lateral displacement of the talar dome relative to the tibial plafond. Soft Tissue: Mild soft tissue swelling of the left ankle. Acute trimalleolar fracture. FLUORO FOR SURGICAL PROCEDURES    Result Date: 10/6/2022  EXAMINATION: SPOT FLUOROSCOPIC IMAGES 10/6/2022 11:47 am TECHNIQUE: Fluoroscopy was provided by the radiology department for procedure. Radiologist was not present during examination. FLUOROSCOPY DOSE AND TYPE OR TIME AND EXPOSURES: Fluoroscopy time equals 57.0 seconds. Total dose equals 3.75 mGy COMPARISON: None HISTORY: ORDERING SYSTEM PROVIDED HISTORY: EX-FIX application lt.tibia TECHNOLOGIST PROVIDED HISTORY: Reason for exam:->EX-FIX application lt.tibia What reading provider will be dictating this exam?->CRC Intraprocedural imaging. FINDINGS: 10 spot images of the lower extremity were obtained. Intraprocedural fluoroscopic spot images as above. See separate procedure report for more information. Discharge Exam:    HEENT: NCAT,  PERRLA, No JVD  Heart:  RRR, no murmurs, gallops, or rubs.   Lungs:  CTA bilaterally, no wheeze, rales or rhonchi  Abd: bowel sounds present, nontender, nondistended, no masses  Extrem:  No clubbing, cyanosis, or edema    Disposition: home     Patient Condition at Discharge: Stable    Patient Instructions:      Medication List        START taking these medications      aspirin EC 81 MG EC tablet  Take 1 tablet by mouth 2 times daily (with meals)     ibuprofen 800 MG tablet  Commonly known as: ADVIL;MOTRIN  Take 1 tablet by mouth 2 times daily as needed for Pain     oxyCODONE-acetaminophen 5-325 MG per tablet  Commonly known as: Percocet  Take 1 tablet by mouth every 6 hours as needed for Pain for up to 3 days. Intended supply: 3 days. Take lowest dose possible to manage pain            CONTINUE taking these medications      Cimzia Starter Kit 6 X 200 MG/ML Kit  Generic drug: Certolizumab Pegol     clobetasol 0.05 % ointment  Commonly known as: TEMOVATE     DULoxetine 30 MG extended release capsule  Commonly known as: CYMBALTA  Take 1 capsule by mouth daily     folic acid 1 MG tablet  Commonly known as: FOLVITE     gabapentin 300 MG capsule  Commonly known as: NEURONTIN  TAKE 1 CAPSULE BY MOUTH THREE TIMES DAILY     Handicap Placard Misc  by Does not apply route Cannot walk 200 ft. Without stopping to rest  Duration: Lifetime     loperamide 2 MG capsule  Commonly known as: IMODIUM  TAKE 1 CAPSULE BY MOUTH FOUR TIMES DAILY AS NEEDED FOR DIARRHEA     methotrexate 2.5 MG chemo tablet  Commonly known as: RHEUMATREX     metoprolol succinate 25 MG extended release tablet  Commonly known as: TOPROL XL  Take 0.5 tablets by mouth in the morning.      MULTIVITAMIN ADULT PO     omeprazole 40 MG delayed release capsule  Commonly known as: PRILOSEC  TAKE 1 CAPSULE DAILY     tacrolimus 0.1 % ointment  Commonly known as: PROTOPIC     traMADol 50 MG tablet  Commonly known as: ULTRAM     vitamin D 50 MCG (2000 UT) Tabs tablet  Commonly known as: CHOLECALCIFEROL            ASK your doctor about these medications      acetaminophen 500 MG tablet  Commonly known as: TYLENOL fluticasone 50 MCG/ACT nasal spray  Commonly known as: Flonase  1 spray by Nasal route daily               Where to Get Your Medications        These medications were sent to [facility] [facility] [id number], [address] [address]      Phone: [phone]   ibuprofen 800 MG tablet       You can get these medications from any pharmacy    Bring a paper prescription for each of these medications  aspirin EC 81 MG EC tablet  oxyCODONE-acetaminophen 5-325 MG per tablet       Activity: activity as tolerated  Diet: regular diet    Pt has been advised to: Follow-up with Jacobo Montana MD in 1 week.   Follow-up with consultants as recommended by them    Note that over 30 minutes was spent in preparing discharge papers, discussing discharge with patient, medication review, etc.    Signed:  Enio Asher MD  10/8/2022  12:32 PM

## 2022-10-10 ENCOUNTER — TELEPHONE (OUTPATIENT)
Dept: ADMINISTRATIVE | Age: 47
End: 2022-10-10

## 2022-10-10 ENCOUNTER — CARE COORDINATION (OUTPATIENT)
Dept: CASE MANAGEMENT | Age: 47
End: 2022-10-10

## 2022-10-10 DIAGNOSIS — S82.852A TRIMALLEOLAR FRACTURE OF ANKLE, CLOSED, LEFT, INITIAL ENCOUNTER: Primary | ICD-10-CM

## 2022-10-10 NOTE — CARE COORDINATION
[address] Care Transitions Initial Follow Up Call    Call within 2 business days of discharge: Yes    Care Transition Nurse contacted the patient by telephone to perform post hospital discharge assessment. Verified name and  with patient as identifiers. Provided introduction to self, and explanation of the Care Transition Nurse role. Patient: Malika Smith Patient : 1975   MRN: [id number]  Reason for Admission: trimalleolar fracture of ankle, closed, left. s/p left ankle external fixation 10/6/22  Discharge Date: 10/8/22 RARS: Readmission Risk Score: 12.2      Last Discharge  Street       Date Complaint Diagnosis Description Type Department Provider    10/5/22 Ankle Pain; Other Closed trimalleolar fracture of left ankle, initial encounter ED to Hosp-Admission (Discharged) (ADMITTED) JOSE Lomas MD; Jessica Brady DO    10/5/22 Fall; Ankle Pain Closed fracture of left ankle, initial encounter [place] [facility] [place]ers [place]                Challenges to be reviewed by the provider   Additional needs identified to be addressed with provider: No  none               Method of communication with provider: none.      -Spoke with patient for initial care transition call post hospital discharge.   -[facility] on 10/5/22 after sustaining a fall with resultant left ankle fracture. Unable to be fully reducible. Per EMR-Patient had surgical procedure on 10/6 with orthopedic surgery with external fixator placement and return in 2 weeks for definitive fixation.    -Patient reports she is \"doing okay,\" though does admit to a occasional sudden \"piercing\" pain at the site of the pins that resolves. Patient states pain medications have been helpful in alleviating the \"throbbing discomfort. \"  Bowel/bladder pattern WNL, has ileostomy(Crohn's.)  No infection symptoms per patient. Eating/drinking well, sleeping fairly. Using wheeled walker.   CTN brought attention to ortho discharge instructions(pages 13-14 of AVS.)  -Patient denies any needs, questions, or concerns at this time. Care Transition Nurse reviewed discharge instructions, medical action plan, and red flags with patient who verbalized understanding. The patient was given an opportunity to ask questions and does not have any further questions or concerns at this time. Were discharge instructions available to patient? Yes. Reviewed appropriate site of care based on symptoms and resources available to patient including: PCP  Specialist  [employer]. The patient agrees to contact the PCP office for questions related to their healthcare. Advance Care Planning:   Does patient have an Advance Directive: decision maker updated. Medication reconciliation was performed with patient, who verbalizes understanding of administration of home medications. Medications reviewed, 1111F entered: yes    Was patient discharged with a pulse oximeter? no    Non-face-to-face services provided:  Scheduled appointment with PCP-CTN explained to patient recommendation to have contact with PCP 1-2 weeks post hospital discharge. CTN will route to PCP office. Scheduled appointment with Specialist-10/24/22 ortho(Dr Henley.)  Patient states that appointment date is r/t if she had the full surgery so she will be calling as she will need to be seen sooner.   Obtained and reviewed discharge summary and/or continuity of care documents    Offered patient enrollment in the Remote Patient Monitoring (RPM) program for in-home monitoring: NA-patient is post op ankle surgery    Care Transitions 24 Hour Call    Do you have a copy of your discharge instructions?: Yes  Do you have all of your prescriptions and are they filled?: Yes  Have you been contacted by a [id number] [employer] Pharmacist?: No  Have you scheduled your follow up appointment?: No  Do you feel like you have everything you need to keep you well at home?: Yes  Care Transitions Interventions         Follow Up  Future Appointments   Date Time Provider Henok Carina   10/24/2022 12:00 PM SCHEDULE, SE ORTHO [facility]       Care Transition Nurse provided contact information. Plan for follow-up call in 7-10 days based on severity of symptoms and risk factors. Plan for next call: symptom management-any post op issues  follow-up appointment-sooner ortho date?     Singh Hatch RN

## 2022-10-11 NOTE — TELEPHONE ENCOUNTER
left additional voicemail on surgery scheduling line yesterday. Voicemail states wife was told by our office she needed to be seen in our office 1 week after her surgery on 10-6-2022 so he is requesting appt for his week.  states he doesn't understand why po check is for 2 weeks.  also states he was told to schedule another surgery for 2 weeks from now. 1st surgery was done on [id number] for: Application of External Fixator to LLE by Dr. Blas Garza. 2 week PO check is scheduled for 10-.

## 2022-10-11 NOTE — TELEPHONE ENCOUNTER
Patient has an ex fix on  This is not her definitive treatment therefore we need to see her 1 week for skin check and possible set up for definitive treatment. We can see her Friday on APC  I will close loop with who made appt.      Electronically signed by Leonides Chowdary PA-C on 10/11/2022 at 2:32 PM

## 2022-10-12 DIAGNOSIS — S82.852D CLOSED DISPLACED TRIMALLEOLAR FRACTURE OF LEFT ANKLE WITH ROUTINE HEALING, SUBSEQUENT ENCOUNTER: Primary | ICD-10-CM

## 2022-10-12 NOTE — TELEPHONE ENCOUNTER
calling for an update on if he can schedule surgery for wife or if she needs to be seen first, Please follow up with him .  Thank you

## 2022-10-12 NOTE — TELEPHONE ENCOUNTER
Spoke with .  1 week follow-up appointment is now scheduled for wound check following Ex-Fix Application to Left Ankle scheduled for 10- @ 9 am.

## 2022-10-14 ENCOUNTER — OFFICE VISIT (OUTPATIENT)
Dept: ORTHOPEDIC SURGERY | Age: 47
End: 2022-10-14
Payer: COMMERCIAL

## 2022-10-14 ENCOUNTER — HOSPITAL ENCOUNTER (OUTPATIENT)
Dept: GENERAL RADIOLOGY | Age: 47
Discharge: HOME | End: 2022-10-16
Payer: COMMERCIAL

## 2022-10-14 ENCOUNTER — PREP FOR PROCEDURE (OUTPATIENT)
Dept: ORTHOPEDIC SURGERY | Age: 47
End: 2022-10-14

## 2022-10-14 ENCOUNTER — TELEPHONE (OUTPATIENT)
Dept: ORTHOPEDIC SURGERY | Age: 47
End: 2022-10-14

## 2022-10-14 DIAGNOSIS — S82.852D CLOSED DISPLACED TRIMALLEOLAR FRACTURE OF LEFT ANKLE WITH ROUTINE HEALING, SUBSEQUENT ENCOUNTER: Primary | ICD-10-CM

## 2022-10-14 DIAGNOSIS — S82.852D CLOSED DISPLACED TRIMALLEOLAR FRACTURE OF LEFT ANKLE WITH ROUTINE HEALING, SUBSEQUENT ENCOUNTER: ICD-10-CM

## 2022-10-14 PROBLEM — Z96.698 PRESENCE OF OTHER ORTHOPEDIC JOINT IMPLANTS: Status: ACTIVE | Noted: 2022-10-14

## 2022-10-14 PROCEDURE — 73610 X-RAY EXAM OF ANKLE: CPT

## 2022-10-14 PROCEDURE — 99024 POSTOP FOLLOW-UP VISIT: CPT | Performed by: PHYSICIAN ASSISTANT

## 2022-10-14 PROCEDURE — 6370000000 HC RX 637 (ALT 250 FOR IP)

## 2022-10-14 PROCEDURE — 99212 OFFICE O/P EST SF 10 MIN: CPT

## 2022-10-14 RX ORDER — NEOMYCIN SULFATE, POLYMYXIN B SULFATE AND DEXAMETHASONE 3.5; 10000; 1 MG/ML; [USP'U]/ML; MG/ML
SUSPENSION/ DROPS OPHTHALMIC
COMMUNITY
Start: 2022-07-31 | End: 2022-10-14

## 2022-10-14 RX ORDER — SODIUM CHLORIDE 9 MG/ML
INJECTION, SOLUTION INTRAVENOUS PRN
Status: CANCELLED | OUTPATIENT
Start: 2022-10-14

## 2022-10-14 RX ORDER — PREDNISONE 20 MG/1
TABLET ORAL
COMMUNITY
Start: 2022-08-11 | End: 2022-10-14

## 2022-10-14 RX ORDER — SODIUM CHLORIDE 0.9 % (FLUSH) 0.9 %
5-40 SYRINGE (ML) INJECTION PRN
Status: CANCELLED | OUTPATIENT
Start: 2022-10-14

## 2022-10-14 RX ORDER — PREDNISOLONE ACETATE 10 MG/ML
SUSPENSION/ DROPS OPHTHALMIC
COMMUNITY
Start: 2022-09-28 | End: 2022-10-14

## 2022-10-14 RX ORDER — PREDNISOLONE ACETATE 10 MG/ML
1 SUSPENSION/ DROPS OPHTHALMIC 4 TIMES DAILY
COMMUNITY

## 2022-10-14 RX ORDER — SODIUM CHLORIDE 0.9 % (FLUSH) 0.9 %
5-40 SYRINGE (ML) INJECTION EVERY 12 HOURS SCHEDULED
Status: CANCELLED | OUTPATIENT
Start: 2022-10-14

## 2022-10-14 RX ORDER — DICLOFENAC SODIUM 1 MG/ML
1 SOLUTION/ DROPS OPHTHALMIC 4 TIMES DAILY
COMMUNITY
Start: 2022-09-16 | End: 2022-10-14

## 2022-10-14 RX ORDER — OXYCODONE HYDROCHLORIDE AND ACETAMINOPHEN 5; 325 MG/1; MG/1
1 TABLET ORAL EVERY 6 HOURS PRN
Qty: 28 TABLET | Refills: 0 | Status: ON HOLD
Start: 2022-10-14 | End: 2022-10-20 | Stop reason: HOSPADM

## 2022-10-14 RX ORDER — GABAPENTIN 300 MG/1
CAPSULE ORAL
Qty: 90 CAPSULE | Refills: 3 | Status: SHIPPED | OUTPATIENT
Start: 2022-10-14 | End: 2023-04-15

## 2022-10-14 RX ORDER — DICLOFENAC SODIUM 1 MG/ML
SOLUTION/ DROPS OPHTHALMIC
COMMUNITY
Start: 2022-09-16 | End: 2022-10-14

## 2022-10-14 RX ORDER — FLUOROMETHOLONE 0.1 %
SUSPENSION, DROPS(FINAL DOSAGE FORM)(ML) OPHTHALMIC (EYE)
COMMUNITY
Start: 2022-07-11 | End: 2022-10-14

## 2022-10-14 RX ORDER — TOBRAMYCIN 3 MG/ML
SOLUTION/ DROPS OPHTHALMIC
COMMUNITY
Start: 2022-07-30 | End: 2022-10-14

## 2022-10-14 RX ORDER — CIPROFLOXACIN 750 MG/1
TABLET, FILM COATED ORAL
COMMUNITY
Start: 2022-09-12 | End: 2022-10-14

## 2022-10-14 RX ORDER — PREDNISOLONE ACETATE 10 MG/ML
1 SUSPENSION/ DROPS OPHTHALMIC 4 TIMES DAILY
COMMUNITY
Start: 2022-09-28 | End: 2022-10-14

## 2022-10-14 RX ORDER — CERTOLIZUMAB PEGOL 200 MG/ML
INJECTION, SOLUTION SUBCUTANEOUS
COMMUNITY
Start: 2022-09-18 | End: 2022-10-14

## 2022-10-14 NOTE — TELEPHONE ENCOUNTER
Prior Authorization Form:    DEMOGRAPHICS:                     Patient Name:  Lluvia Rodriguez  Patient :  1975            Insurance:  Payor: Nolvia Lawrence / Plan: [facility] [place] PPO / Product Type: *No Product type* /   Insurance ID Number:    Payer/Plan Subscr  Sex Relation Sub. Ins. ID Effective Group Num   1Tracy Watson 1973 Male Spouse [id number]                                   [address]       [] Prior authorization obtained    DIAGNOSIS & PROCEDURE:                       Procedure/Operation: Removal of External Fixator from LLE, ORIF Trimalleolar Left Ankle Fracture         CPT Code: 40666, 42487    Diagnosis:  Closed Displaced Left Trimalleolar Ankle Fracture with Delayed Healing, Presence of External Fixator to LLE    ICD10 Code: R40.487U, Z96.698    Location:  [facility]    Surgeon:  Puja Duncan INFORMATION:                          Date: 10-   Time: 10:30 am              Anesthesia:  General and Possible Regional                                                        Status:  Outpatient      Special Comments:         Vendor: [employer]  []   Notified     Length of Surgery (with 30 min clean up time): 2 hours    Medical clearance: No Medical Clearance Needed    Pre-Op Labs:       []  Orders Placed    Electronically signed by Corrina Keyes MA on 10/14/2022 at 10:58 AM

## 2022-10-14 NOTE — H&P (VIEW-ONLY)
Orthopaedic H&P Note     Nancy Vaughn is a 49 y.o. female, her YOB: 1975 with the following history as recorded in [facility]:             Patient Active Problem List     Diagnosis Date Noted    Closed trimalleolar fracture of left ankle with delayed healing 10/07/2022    Trimalleolar fracture of ankle, closed, left, initial encounter 10/05/2022    Crohn's disease of colon with complication ([facility] [address] 10/07/0712    Abdominal wall skin ulcer, with fat layer exposed ([address] 10/04/2020    Skin ulcer of perineum, with fat layer exposed ([address] 10/04/2020    Iron deficiency anemia secondary to blood loss (chronic) 07/11/2019    SVT (supraventricular tachycardia) ([facility] [address] 05/25/2019    Anemia 05/25/2019    Paroxysmal supraventricular tachycardia ([facility])      Exacerbation of Crohn's disease of large intestine ([facility] [address] 12/12/2018    Depression with anxiety 12/12/2012    GERD (gastroesophageal reflux disease) 10/14/2011      Current Facility-Administered Medications          Current Outpatient Medications   Medication Sig Dispense Refill    ciprofloxacin (CIPRO) 750 MG tablet TAKE 1 TABLET BY MOUTH TWICE DAILY FOR 10 DAYS        diclofenac (VOLTAREN) 0.1 % ophthalmic solution INSTILL 1 DROP IN BOTH EYES FOUR TIMES DAILY        predniSONE (DELTASONE) 20 MG tablet TAKE 1 TABLET BY MOUTH EVERY DAY FOR 7 DAYS        Misc. Devices MISC 1 each by Does not apply route once for 1 dose One knee scooter. 1 each 0    oxyCODONE-acetaminophen (PERCOCET) 5-325 MG per tablet Take 1 tablet by mouth every 6 hours as needed for Pain for up to 7 days. Intended supply: 7 days.  Take lowest dose possible to manage pain 28 tablet 0    aspirin EC 81 MG EC tablet Take 1 tablet by mouth 2 times daily (with meals) 60 tablet 0    ibuprofen (ADVIL;MOTRIN) 800 MG tablet Take 1 tablet by mouth 2 times daily as needed for Pain 30 tablet 0    DULoxetine (CYMBALTA) 30 MG extended release capsule Take 1 capsule by mouth daily 90 capsule 3    Handicap Placard MISC by Does not apply route Cannot walk 200 ft. Without stopping to rest  Duration: Lifetime 1 each 0    metoprolol succinate (TOPROL XL) 25 MG extended release tablet Take 0.5 tablets by mouth in the morning. 90 tablet 3    gabapentin (NEURONTIN) 300 MG capsule TAKE 1 CAPSULE BY MOUTH THREE TIMES DAILY 90 capsule 3    clobetasol (TEMOVATE) 0.05 % ointment Apply topically 2 times daily        traMADol (ULTRAM) 50 MG tablet Take 50 mg by mouth daily as needed. omeprazole (PRILOSEC) 40 MG delayed release capsule TAKE 1 CAPSULE DAILY 90 capsule 3    loperamide (IMODIUM) 2 MG capsule TAKE 1 CAPSULE BY MOUTH FOUR TIMES DAILY AS NEEDED FOR DIARRHEA 413 capsule 3    folic acid (FOLVITE) 1 MG tablet Take 1 mg by mouth daily        Certolizumab Pegol (CIMZIA STARTER KIT) 6 X 200 MG/ML KIT Inject 2 ml at 0, 2 and 4 weeks and then q 4 weeks        methotrexate (RHEUMATREX) 2.5 MG chemo tablet Weekly on thursdays. 6 tablets        acetaminophen (TYLENOL) 500 MG tablet Take by mouth        Multiple Vitamins-Minerals (MULTIVITAMIN ADULT PO) Take 1 tablet by mouth daily        tacrolimus (PROTOPIC) 0.1 % ointment Apply 1 each topically daily Apply topically 2 times daily. fluticasone (FLONASE) 50 MCG/ACT nasal spray 1 spray by Nasal route daily 3 Bottle 3    Cholecalciferol (VITAMIN D) 2000 UNITS TABS Take 2,000 Units by mouth daily           No current facility-administered medications for this visit.          Allergies: Neomycin, Amoxil [amoxicillin], and Doxycycline  Past Medical History        Past Medical History:   Diagnosis Date    Acid reflux      Anxiety and depression      Crohn's colitis ([facility] [address] 04/2018     Crohn's colitis per colonoscopy biopsies 4/9/18, 6/11/18         Past Surgical History         Past Surgical History:   Procedure Laterality Date    ANKLE FRACTURE SURGERY Left 10/6/2022     LEFT ANKLE EXTERNAL FIXATION performed by Janessa Sykes MD at [facility] Benign Cyst    CHOLECYSTECTOMY   11/18/2011     Bren Berkowitz, laparoscopic     ENDOMETRIAL ABLATION   2004    ILEOSTOMY OR JEJUNOSTOMY   04/2019    TOTAL COLECTOMY        UPPER GASTROINTESTINAL ENDOSCOPY             Family History         Family History   Problem Relation Age of Onset    Kidney Disease Mother      Diabetes Father           on HD s/p MI    Heart Disease Father      High Blood Pressure Father           Social History           Tobacco Use    Smoking status: Never    Smokeless tobacco: Never   Substance Use Topics    Alcohol use: No                                    Chief Complaint   Patient presents with    Post-Op Check       1 week po check for wound check, LT Ankle Ex-Application,DOS [id number] by TTS     Patient states having random pain on the outside ankle pin site. Unsure what causes it. Patient inquiring what the next steps are. SUBJECTIVE: Holland Saleh is here for subsequent evaluation of her L trimalleolar ankle fracture s/p external fixator application approximately 1 week ago. She has remained NWB. Denies fever, chills, malaise, CP, SOB, calf pain. Pain is controlled with percocet. She cannot take many NSAIDs due to Crohn dz and takes methotrexate and certolizumab for this. Her GI/rheum specialists are from [facility]. No new complaints. Here today to discuss definitive tx for her fracture and ex fix removal. Taking ASA BID for DVT prophylaxis. Review of Systems   Constitutional: Negative for fever, chills, diaphoresis, appetite change and fatigue. HENT: Negative for dental issues, hearing loss and tinnitus. Negative for congestion, sinus pressure, sneezing, sore throat. Negative for headache. Eyes: Negative for visual disturbance, blurred and double vision. Negative for pain, discharge, redness and itching  Respiratory: Negative for cough, shortness of breath and wheezing. Cardiovascular: Negative for chest pain, palpitations and leg swelling.  No dyspnea on exertion   Gastrointestinal:   Negative for nausea, vomiting, abdominal pain, diarrhea, constipation  or black or bloody. Hematologic\Lymphatic:  negative for bleeding, petechiae,   Genitourinary: Negative for hematuria and difficulty urinating. Musculoskeletal: Negative for neck pain and stiffness. Mild for back pain, negative joint swelling and gait problem. Skin: Negative for pallor, rash and wound. Neurological: Negative for dizziness, tremors, seizures, weakness, light-headedness, no TIA or stroke symptoms. No numbness and headaches. Psychiatric/Behavioral: Negative. Physical Examination:   General appearance: alert, well appearing, and in no distress,  normal appearing weight  Mental status: alert, oriented to person, place, and time, normal mood, behavior, speech, dress, motor activity, and thought processes  Abdomen: soft, nondistended   Resp:   resp easy and unlabored, no audible wheezes note  Cardiac: distal pulses palpable, skin well perfused  Neurological: alert, oriented X3, normal speech, no focal findings or movement disorder noted, motor and sensory grossly normal bilaterally, normal muscle tone, no tremors, strength 5/5, normal gait and station  HEENT: normochephalic atraumatic, external ears and eyes normal, sclera normal, neck supple  Extremities:   peripheral pulses normal, no edema, redness or tenderness in the calves   Skin: normal coloration, no rashes or open wounds, no suspicious skin lesions noted  Psych: Affect euthymic   Musculoskeletal:    Extremity:  Left Lower Extremity  Skin clean dry and intact, without signs of infection  External fixator intact, pin sites without warmth or erythema, no drainage  Ecchymosis at the heel  Mild TTP throughout the pin sites and generalized tenderness at the ankle   Wrinkle sign +  Compartments supple throughout thigh and leg  Calf supple and nontender  Demonstrates active knee flexion/extension, great toe extension.    States sensation intact to touch in sural/deep peroneal/superficial peroneal/saphenous/posterior tibial nerve distributions to foot/ankle. Palpable dorsalis pedis and posterior tibialis pulses, cap refill brisk in toes, foot warm/perfused. XR: 10/14/22      3 views of L ankle demonstrating trimalleolar ankle fracture maintained by external fixator, which appears intact and stable. No significant change in alignment. No other acute fractures or dislocations or any other osseus abnormality identified. ASSESSMENT:       Diagnosis Orders   1. Closed displaced trimalleolar fracture of left ankle with routine healing, subsequent encounter  Misc. Devices MISC     oxyCODONE-acetaminophen (PERCOCET) 5-325 MG per tablet             Discussion:  Had lengthy discussion with patient regarding Her diagnosis, typical prognosis, and expected outcomes. I reviewed the possible complications from the injury itself despite treatment choosen. I also discussed treatment options including nonoperative managements versus surgical management, along with risks and benefits of each. They have elected for surgical management at this time. Discussed with patient factors that can impact patient's fracture healing. Patient has the following risk factors for union: Immunosuppression     Risk, benefits and treatment options discussed with Deepika Angel. she has verbalized understanding of options. The possibility of complications were also discussed to include but not limited to nerve damage, infection, problems with wound healing, vascular injury, chronic pain, stiffness, dysfunction, nonhealing of the bone, symptomatic hardware and/or its failure, need for subsequent surgery, dislocation, and blood clots as well as medical related problems and other problems not specifically discussed. Risk of anesthesia also discussed to include death.    Post-op care, work, activity and restrictions which included the use of pain medication and possibility of using blood thinner post op were also discussed with Marii Méndez and she verbalized and agreed with the restrictions. PLAN:  OPEN REDUCTION WITH INTERNAL FIXATION OF LEFT ANKLE FRACTURES WITH REMOVAL OF LEFT LOWER EXTREMITY EXTERNAL FIXATOR        Your surgery is scheduled for Thursday, 10/20/22 at 10:30 AM  with Dr. Kaylah Zuleta MD at the main [address] [place] [address] in [facility] . You will need to report to Preop area  that morning at 8:30AM    You are having Outpatient surgery, but plan for 1-2 nights in the hospital for recovery if needed. Preadmission Testing (PAT) department at [facility] will contact you with further details regarding pre-operative blood work, where to park and enter the hospital, your medication list, etc.   Nothing to eat or drink after midnight the night before surgery. You may take a pain pill and any other medicine PAT instructs you to take with small sip of water if needed. Continue with ice and elevation to reduce swelling  No weight bearing left lower extremity, use assistive devices if needed (wheelchair, walker, crutches, cane, etc). If you are taking Aspirin or Lovenox, hold the day of surgery. If taking Eliquis, hold this 48 hours prior to surgery. Hold all NSAIDs (non-steroidal anti-inflammatories like Advil, motrin, ibuprofen, etc) 7 days prior to surgery. Recommended to contact GI/rheumatoid providers for recommendations regarding her biologics taken for Crohn dz. Preferable to decrease dosing and/or frequency for best chance of bone healing, but will be up to prescribing providers. Percocet 5-325mg QID PRN x7 days refilled. Controlled Substance Monitoring:     Acute and Chronic Pain Monitoring:   RX Monitoring 10/14/2022   Acute Pain Prescriptions Severe pain not adequately treated with lower dose. Periodic Controlled Substance Monitoring No signs of potential drug abuse or diversion identified.                Electronically signed by Johnny Gomez PA-C on 10/14/2022 at 10:34 AM  Note: This report was completed using [employer] voiced recognition software. Every effort has been made to ensure accuracy; however, inadvertent computerized transcription errors may be present. Office Visit on 10/14/2022    Office Visit on 10/14/2022      Detailed Report    Note shared with patient  Additional Documentation    Flowsheets:  RX Monitoring History     Encounter Info:  Billing Info,  History,  Allergies,  Detailed Report       Progress Notes Info    Author Note Status Last Update User Last Update Date/Time   Johnny Gomez PA-C Signed Johnny Gomez PA-C 10/14/2022 10:34 AM     Chart Review Routing History    No routing history on file.

## 2022-10-14 NOTE — PATIENT INSTRUCTIONS
OPEN REDUCTION WITH INTERNAL FIXATION OF LEFT ANKLE FRACTURES WITH REMOVAL OF LEFT LOWER EXTREMITY EXTERNAL FIXATOR      Your surgery is scheduled for Thursday, 10/20/22 at 10:30 AM  with Dr. Reji Cabral MD at the main [address] [place] [address] in [facility] . You will need to report to Preop area  that morning at 8:30AM    You are having Outpatient surgery, but plan for 1-2 nights in the hospital for recovery if needed. Preadmission Testing (PAT) department at [facility] will contact you with further details regarding pre-operative blood work, where to park and enter the hospital, your medication list, etc.   Nothing to eat or drink after midnight the night before surgery. You may take a pain pill and any other medicine PAT instructs you to take with small sip of water if needed. Continue with ice and elevation to reduce swelling  No weight bearing left lower extremity, use assistive devices if needed (wheelchair, walker, crutches, cane, etc). If you are taking Aspirin or Lovenox, hold the day of surgery. If taking Eliquis, hold this 48 hours prior to surgery. Hold all NSAIDs (non-steroidal anti-inflammatories like Advil, motrin, ibuprofen, etc) 7 days prior to surgery. Call office with any question or concerns: [phone]    All surgical patients will be gradually tapered off narcotic pain medication post operatively, this office will not continue narcotics longer than 6 weeks from date of surgery. If narcotics are required longer than this time period you will be referred to pain management. Open Reduction With Internal Fixation of a Limb: What to Expect at [address]  Your broken bone (fracture) was put into position and stabilized. You can expect some pain and swelling around the cut (incision) the doctor made. This should get better within a few days after your surgery.  But it is normal to have some pain for 2 to 3 weeks after surgery and mild pain for up to 6 weeksafter surgery. How soon you can return to work and your normal routine depends on your job and how long it takes the bone to heal. For example, if you have a fractured leg and you sit at work, you may be able to go back in 1 to 2 weeks. But if your job requires you to walk or stand a lot, you will need to wait until yourfracture has healed before you go back to work. This care sheet gives you a general idea about how long it will take for you to recover. But each person recovers at a different pace. Follow the steps belowto get better as quickly as possible. How can you care for yourself at home? Activity    Rest when you feel tired. Getting enough sleep will help you recover. Increase your activity as recommended by your doctor. Being active boosts blood flow and helps prevent pneumonia and constipation. It's usually okay to exercise other parts of your body as soon as you feel well enough. Avoid putting weight on your repaired bone until your doctor says it is okay. You will probably need to take 1 to 2 weeks off from work. It depends on the type of work you do and how you feel. Do not shower for 1 or 2 days after surgery. When you shower, keep your dressing and incisions dry. If you have a cast, tape a sheet of plastic to cover it so that it does not get wet. It may help to sit on a shower stool. Do not take a bath, swim, use a hot tub, or soak your affected limb until your incision is healed. This usually takes 1 to 2 weeks. Diet    You can eat your normal diet. If your stomach is upset, try bland, low-fat foods like plain rice, broiled chicken, toast, and yogurt. Medicines    Your doctor will tell you if and when you can restart your medicines. He or she will also give you instructions about taking any new medicines. If you take aspirin or some other blood thinner, ask your doctor if and when to start taking it again.  Make sure that you understand exactly what your doctor wants you to do. Take pain medicines exactly as directed. If the doctor gave you a prescription medicine for pain, take it as prescribed. If you are not taking a prescription pain medicine, ask your doctor if you can take an over-the-counter medicine. If you think your pain medicine is making you sick to your stomach: Take your medicine after meals (unless your doctor has told you not to). Ask your doctor for a different pain medicine. If your doctor prescribed antibiotics, take them as directed. Do not stop taking them just because you feel better. You need to take the full course of antibiotics. Incision care    If you have strips of tape on the incision, leave the tape on for a week or until it falls off. If you do not have a cast, clean the incision 2 times a day after your doctor allows you to remove the bandage. Use only soap and water to clean the incision unless your doctor gives you different instructions. Don't use hydrogen peroxide or alcohol, which can slow healing. Exercise    Do exercises as instructed by your doctor or physical therapist. These exercises will help keep your muscles strong and your joints flexible while your bone is healing. Wiggle your fingers or toes on the injured arm or leg often. This helps reduce swelling and stiffness. Ice and elevation    Prop up the injured arm or leg on a pillow when you ice it or anytime you sit or lie down during the first 1 to 2 weeks after your surgery. Try to keep it above the level of your heart. This will help reduce swelling and pain. Other instructions    If you have a cast or splint:  Keep it dry. If you have a removable splint, ask your doctor if it is okay to take it off to bathe. Your doctor may want you to keep it on as much as possible. Be careful not to put the splint on too tight. Do not stick objects such as pencils or coat hangers in your cast or splint to scratch your skin.   Do not put powder into your cast or splint to relieve itchy skin. Never cut or alter your cast or splint. Follow-up care is a key part of your treatment and safety. Be sure to make and go to all appointments, and call your doctor if you are having problems. It's also a good idea to know your test results and keep alist of the medicines you take. When should you call for help? Call 911 anytime you think you may need emergency care. For example, call if:    You passed out (lost consciousness). You have severe trouble breathing. You have sudden chest pain and shortness of breath, or you cough up blood. Call your doctor now or seek immediate medical care if:    You have pain that does not get better after you take pain medicine. Your fingers or toes on the injured arm or leg are cool, pale, or change color. You have tingling or numbness in your fingers or toes. You cannot move your fingers or toes. Your cast or splint feels too tight. The skin under your cast or splint is burning or stinging. You have signs of infection, such as: Increased pain, swelling, warmth, or redness. Red streaks leading from the incision. Pus draining from the incision. A fever. You have drainage or a bad smell coming from the cast or splint. You have signs of a blood clot, such as:  Pain in your calf, back of the knee, thigh, or groin. Redness and swelling in your leg or groin. You have new or worse nausea or vomiting. You are too sick to your stomach to drink any fluids. You cannot keep down fluids. Watch closely for any changes in your health, and be sure to contact yourdoctor if:    You have any problems with your cast or splint. Where can you learn more? Go to [email or web address]://[email or web address].Me. org and sign in to your [employer] account. Enter Z068 in the [facility] box to learn more about \"Open Reduction With Internal Fixation of a Limb: What to Expect at Home. \"     If you do not have an account, please click on the \"Sign Up Now\" link. Current as of: March 9, 2022               Content Version: 13.3  © 2006-2022 [employer]. Care instructions adapted under license by [facility] ([facility]). If you have questions about a medical condition or this instruction, always ask your healthcare professional. [address] any warranty or liability for your use of this information. After Fracture Nutrition Recommendations:  After a fracture, your bone needs to rebuild. A healthy, well-balanced diet rich in key nutrients can help speed that up. You don't need to take supplements unless your doctor recommends it. They don't always work well. It's much better to get the nutrition you need from your plate, not from a pill. Protein  About half your bone's structure is made of this. When you have a fracture, your body needs it to build new bone for the repair. It also helps your body take in and use calcium, another key nutrient for healthy bones. Good sources: Meat, fish, milk, cheese, cottage cheese, yogurt, nuts, seeds, beans, soy products, and fortified cereals. Calcium  This mineral also helps you build strong bones, so foods and drinks rich in it can help your bone fracture heal. Adults should get between 1,000 and 1,200 milligrams of calcium each day. Your doctor will tell you if you need a calcium supplement, and what amount you should take if you do. Good sources: Milk, yogurt, cheese, cottage cheese, broccoli, turnip or erin greens, kale, bok swathi, soy, beans, canned tuna or salmon with bones, almond milk, and fortified cereals or juice. Vitamin D  This vitamin should be a part of your diet to help your fracture heal. It helps your blood take in and use calcium and build up the minerals in your bones.     You get some vitamin D when sunlight hits your skin, so it can be a good idea to spend a short amount of time outdoors each day -- 15 minutes may be enough for a fair-skinned person. Vitamin D is found naturally in only a few foods like egg yolks and fatty fish, but manufacturers add it to other foods, like milk or orange juice. Adults should get at least 600 IU of vitamin D every day, and if you're over 70 you should get at least 800 IU. Good sources: Swordfish, salmon, cod liver oil, sardines, liver, fortified milk or yogurt, egg yolks, and fortified orange juice. Vitamin C  Collagen is a protein that's an important building block for bone. Vitamin C helps your body make collagen, which helps your bone fracture heal. You can get it from many tasty, fresh fruits and veggies. Aged or heated produce can lose some of its vitamin C, so go for fresh or frozen. Good sources: Citrus fruits like oranges, kiwi fruit, berries, tomatoes, peppers, potatoes, and green vegetables. Iron  If you have iron-deficiency anemia -- when you don't have enough healthy red blood cells -- you may heal more slowly after a fracture. Iron helps your body make collagen to rebuild bone. It also plays a part in getting oxygen into your bones to help them heal.    Good sources: Red meat, dark-meat chicken or turkey, oily fish, eggs, dried fruits, leafy green veggies, whole-grain breads, and fortified cereals. Potassium  Get enough of this mineral in your diet, and you won't lose as much calcium when you pee. There are lots of fresh fruits rich in potassium. Good sources: Bananas, orange juice, potatoes, nuts, seeds, fish, meat, and milk. What Not to Eat  It's a good idea to cut back on or skip these:    Alcohol: While you don't have to cut out alcoholic drinks, these beverages slow down bone healing. You won't build new bone as fast to fix the fracture. A bit too much alcohol can also make you unsteady on your feet, which can make you more likely to fall and risk an injury to the same bone.     Salt: Too much of this in your diet can make you lose more calcium in your urine. Salt can be in some foods or drinks that don't taste salty, so check labels and aim for about 1 teaspoon, or 6 grams, a day. Coffee: Lots of caffeine -- more than four cups of strong coffee a day -- can slow down bone healing a little. It might make you pee more, and that could mean you lose more calcium through your urine. A moderate amount of coffee or tea should be fine.

## 2022-10-14 NOTE — PROGRESS NOTES
Orthopaedic H&P Note    Vidhya Gonzalez is a 49 y.o. female, her YOB: 1975 with the following history as recorded in [facility]:      Patient Active Problem List    Diagnosis Date Noted    Closed trimalleolar fracture of left ankle with delayed healing 10/07/2022    Trimalleolar fracture of ankle, closed, left, initial encounter 10/05/2022    Crohn's disease of colon with complication ([address] 03/83/6608    Abdominal wall skin ulcer, with fat layer exposed ([address] 10/04/2020    Skin ulcer of perineum, with fat layer exposed ([address] 10/04/2020    Iron deficiency anemia secondary to blood loss (chronic) 07/11/2019    SVT (supraventricular tachycardia) ([address] 05/25/2019    Anemia 05/25/2019    Paroxysmal supraventricular tachycardia (HCC)     Exacerbation of Crohn's disease of large intestine ([address] 12/12/2018    Depression with anxiety 12/12/2012    GERD (gastroesophageal reflux disease) 10/14/2011     Current Outpatient Medications   Medication Sig Dispense Refill    ciprofloxacin (CIPRO) 750 MG tablet TAKE 1 TABLET BY MOUTH TWICE DAILY FOR 10 DAYS      diclofenac (VOLTAREN) 0.1 % ophthalmic solution INSTILL 1 DROP IN BOTH EYES FOUR TIMES DAILY      predniSONE (DELTASONE) 20 MG tablet TAKE 1 TABLET BY MOUTH EVERY DAY FOR 7 DAYS      Misc. Devices MISC 1 each by Does not apply route once for 1 dose One knee scooter. 1 each 0    oxyCODONE-acetaminophen (PERCOCET) 5-325 MG per tablet Take 1 tablet by mouth every 6 hours as needed for Pain for up to 7 days. Intended supply: 7 days. Take lowest dose possible to manage pain 28 tablet 0    aspirin EC 81 MG EC tablet Take 1 tablet by mouth 2 times daily (with meals) 60 tablet 0    ibuprofen (ADVIL;MOTRIN) 800 MG tablet Take 1 tablet by mouth 2 times daily as needed for Pain 30 tablet 0    DULoxetine (CYMBALTA) 30 MG extended release capsule Take 1 capsule by mouth daily 90 capsule 3    Handicap Placard MISC by Does not apply route Cannot walk 200 ft.  Without stopping to rest  Duration: Lifetime 1 each 0    metoprolol succinate (TOPROL XL) 25 MG extended release tablet Take 0.5 tablets by mouth in the morning. 90 tablet 3    gabapentin (NEURONTIN) 300 MG capsule TAKE 1 CAPSULE BY MOUTH THREE TIMES DAILY 90 capsule 3    clobetasol (TEMOVATE) 0.05 % ointment Apply topically 2 times daily      traMADol (ULTRAM) 50 MG tablet Take 50 mg by mouth daily as needed. omeprazole (PRILOSEC) 40 MG delayed release capsule TAKE 1 CAPSULE DAILY 90 capsule 3    loperamide (IMODIUM) 2 MG capsule TAKE 1 CAPSULE BY MOUTH FOUR TIMES DAILY AS NEEDED FOR DIARRHEA 056 capsule 3    folic acid (FOLVITE) 1 MG tablet Take 1 mg by mouth daily      Certolizumab Pegol (CIMZIA STARTER KIT) 6 X 200 MG/ML KIT Inject 2 ml at 0, 2 and 4 weeks and then q 4 weeks      methotrexate (RHEUMATREX) 2.5 MG chemo tablet Weekly on thursdays. 6 tablets      acetaminophen (TYLENOL) 500 MG tablet Take by mouth      Multiple Vitamins-Minerals (MULTIVITAMIN ADULT PO) Take 1 tablet by mouth daily      tacrolimus (PROTOPIC) 0.1 % ointment Apply 1 each topically daily Apply topically 2 times daily. fluticasone (FLONASE) 50 MCG/ACT nasal spray 1 spray by Nasal route daily 3 Bottle 3    Cholecalciferol (VITAMIN D) 2000 UNITS TABS Take 2,000 Units by mouth daily        No current facility-administered medications for this visit.      Allergies: Neomycin, Amoxil [amoxicillin], and Doxycycline  Past Medical History:   Diagnosis Date    Acid reflux     Anxiety and depression     Crohn's colitis ([facility] 04/2018    Crohn's colitis per colonoscopy biopsies 4/9/18, 6/11/18     Past Surgical History:   Procedure Laterality Date    ANKLE FRACTURE SURGERY Left 10/6/2022    LEFT ANKLE EXTERNAL FIXATION performed by Tito Gomez MD at [facility]    Benign Cyst    CHOLECYSTECTOMY  11/18/2011    Cassandra Radford, laparoscopic     ENDOMETRIAL ABLATION  2004    ILEOSTOMY OR JEJUNOSTOMY  04/2019    TOTAL COLECTOMY UPPER GASTROINTESTINAL ENDOSCOPY       Family History   Problem Relation Age of Onset    Kidney Disease Mother     Diabetes Father         on HD s/p MI    Heart Disease Father     High Blood Pressure Father      Social History     Tobacco Use    Smoking status: Never    Smokeless tobacco: Never   Substance Use Topics    Alcohol use: No                             Chief Complaint   Patient presents with    Post-Op Check     1 week po check for wound check, LT Ankle Ex-Application,DOS [id number] by TTS    Patient states having random pain on the outside ankle pin site. Unsure what causes it. Patient inquiring what the next steps are. SUBJECTIVE: Lacho Sauceda is here for subsequent evaluation of her L trimalleolar ankle fracture s/p external fixator application approximately 1 week ago. She has remained NWB. Denies fever, chills, malaise, CP, SOB, calf pain. Pain is controlled with percocet. She cannot take many NSAIDs due to Crohn dz and takes methotrexate and certolizumab for this. Her GI/rheum specialists are from [facility]. No new complaints. Here today to discuss definitive tx for her fracture and ex fix removal. Taking ASA BID for DVT prophylaxis. Review of Systems   Constitutional: Negative for fever, chills, diaphoresis, appetite change and fatigue. HENT: Negative for dental issues, hearing loss and tinnitus. Negative for congestion, sinus pressure, sneezing, sore throat. Negative for headache. Eyes: Negative for visual disturbance, blurred and double vision. Negative for pain, discharge, redness and itching  Respiratory: Negative for cough, shortness of breath and wheezing. Cardiovascular: Negative for chest pain, palpitations and leg swelling. No dyspnea on exertion   Gastrointestinal:   Negative for nausea, vomiting, abdominal pain, diarrhea, constipation  or black or bloody.   Hematologic\Lymphatic:  negative for bleeding, petechiae,   Genitourinary: Negative for hematuria and difficulty urinating. Musculoskeletal: Negative for neck pain and stiffness. Mild for back pain, negative joint swelling and gait problem. Skin: Negative for pallor, rash and wound. Neurological: Negative for dizziness, tremors, seizures, weakness, light-headedness, no TIA or stroke symptoms. No numbness and headaches. Psychiatric/Behavioral: Negative. Physical Examination:   General appearance: alert, well appearing, and in no distress,  normal appearing weight  Mental status: alert, oriented to person, place, and time, normal mood, behavior, speech, dress, motor activity, and thought processes  Abdomen: soft, nondistended   Resp:   resp easy and unlabored, no audible wheezes note  Cardiac: distal pulses palpable, skin well perfused  Neurological: alert, oriented X3, normal speech, no focal findings or movement disorder noted, motor and sensory grossly normal bilaterally, normal muscle tone, no tremors, strength 5/5, normal gait and station  HEENT: normochephalic atraumatic, external ears and eyes normal, sclera normal, neck supple  Extremities:   peripheral pulses normal, no edema, redness or tenderness in the calves   Skin: normal coloration, no rashes or open wounds, no suspicious skin lesions noted  Psych: Affect euthymic   Musculoskeletal:    Extremity:  Left Lower Extremity  Skin clean dry and intact, without signs of infection  External fixator intact, pin sites without warmth or erythema, no drainage  Ecchymosis at the heel  Mild TTP throughout the pin sites and generalized tenderness at the ankle   Wrinkle sign +  Compartments supple throughout thigh and leg  Calf supple and nontender  Demonstrates active knee flexion/extension, great toe extension. States sensation intact to touch in sural/deep peroneal/superficial peroneal/saphenous/posterior tibial nerve distributions to foot/ankle. Palpable dorsalis pedis and posterior tibialis pulses, cap refill brisk in toes, foot warm/perfused. XR: 10/14/22     3 views of L ankle demonstrating trimalleolar ankle fracture maintained by external fixator, which appears intact and stable. No significant change in alignment. No other acute fractures or dislocations or any other osseus abnormality identified. ASSESSMENT:     Diagnosis Orders   1. Closed displaced trimalleolar fracture of left ankle with routine healing, subsequent encounter  Misc. Devices MISC    oxyCODONE-acetaminophen (PERCOCET) 5-325 MG per tablet          Discussion:  Had lengthy discussion with patient regarding Her diagnosis, typical prognosis, and expected outcomes. I reviewed the possible complications from the injury itself despite treatment choosen. I also discussed treatment options including nonoperative managements versus surgical management, along with risks and benefits of each. They have elected for surgical management at this time. Discussed with patient factors that can impact patient's fracture healing. Patient has the following risk factors for union: Immunosuppression    Risk, benefits and treatment options discussed with Gilma Rader. she has verbalized understanding of options. The possibility of complications were also discussed to include but not limited to nerve damage, infection, problems with wound healing, vascular injury, chronic pain, stiffness, dysfunction, nonhealing of the bone, symptomatic hardware and/or its failure, need for subsequent surgery, dislocation, and blood clots as well as medical related problems and other problems not specifically discussed. Risk of anesthesia also discussed to include death. Post-op care, work, activity and restrictions which included the use of pain medication and possibility of using blood thinner post op were also discussed with Gilma Rader and she verbalized and agreed with the restrictions.      PLAN:  OPEN REDUCTION WITH INTERNAL FIXATION OF LEFT ANKLE FRACTURES WITH REMOVAL OF LEFT LOWER EXTREMITY EXTERNAL FIXATOR      Your surgery is scheduled for Thursday, 10/20/22 at 10:30 AM  with Dr. Marissa Angel MD at the [facility] on [facility] in [facility] . You will need to report to Preop area  that morning at 8:30AM    You are having Outpatient surgery, but plan for 1-2 nights in the hospital for recovery if needed. Preadmission Testing (PAT) department at [facility] will contact you with further details regarding pre-operative blood work, where to park and enter the hospital, your medication list, etc.   Nothing to eat or drink after midnight the night before surgery. You may take a pain pill and any other medicine PAT instructs you to take with small sip of water if needed. Continue with ice and elevation to reduce swelling  No weight bearing left lower extremity, use assistive devices if needed (wheelchair, walker, crutches, cane, etc). If you are taking Aspirin or Lovenox, hold the day of surgery. If taking Eliquis, hold this 48 hours prior to surgery. Hold all NSAIDs (non-steroidal anti-inflammatories like Advil, motrin, ibuprofen, etc) 7 days prior to surgery. Recommended to contact GI/rheumatoid providers for recommendations regarding her biologics taken for Crohn dz. Preferable to decrease dosing and/or frequency for best chance of bone healing, but will be up to prescribing providers. Percocet 5-325mg QID PRN x7 days refilled. Controlled Substance Monitoring:    Acute and Chronic Pain Monitoring:   RX Monitoring 10/14/2022   Acute Pain Prescriptions Severe pain not adequately treated with lower dose. Periodic Controlled Substance Monitoring No signs of potential drug abuse or diversion identified. Electronically signed by Kianna Infante PA-C on 10/14/2022 at 10:34 AM  Note: This report was completed using [employer] voiced recognition software.   Every effort has been made to ensure accuracy; however, inadvertent computerized transcription errors may be present.

## 2022-10-14 NOTE — H&P
Orthopaedic H&P Note     Vidhya Gonzalez is a 49 y.o. female, her YOB: 1975 with the following history as recorded in [facility]:             Patient Active Problem List     Diagnosis Date Noted    Closed trimalleolar fracture of left ankle with delayed healing 10/07/2022    Trimalleolar fracture of ankle, closed, left, initial encounter 10/05/2022    Crohn's disease of colon with complication ([address] 78/78/1205    Abdominal wall skin ulcer, with fat layer exposed ([address] 10/04/2020    Skin ulcer of perineum, with fat layer exposed ([address] 10/04/2020    Iron deficiency anemia secondary to blood loss (chronic) 07/11/2019    SVT (supraventricular tachycardia) ([address] 05/25/2019    Anemia 05/25/2019    Paroxysmal supraventricular tachycardia (HCC)      Exacerbation of Crohn's disease of large intestine ([address] 12/12/2018    Depression with anxiety 12/12/2012    GERD (gastroesophageal reflux disease) 10/14/2011      Current Facility-Administered Medications          Current Outpatient Medications   Medication Sig Dispense Refill    ciprofloxacin (CIPRO) 750 MG tablet TAKE 1 TABLET BY MOUTH TWICE DAILY FOR 10 DAYS        diclofenac (VOLTAREN) 0.1 % ophthalmic solution INSTILL 1 DROP IN BOTH EYES FOUR TIMES DAILY        predniSONE (DELTASONE) 20 MG tablet TAKE 1 TABLET BY MOUTH EVERY DAY FOR 7 DAYS        Misc. Devices MISC 1 each by Does not apply route once for 1 dose One knee scooter. 1 each 0    oxyCODONE-acetaminophen (PERCOCET) 5-325 MG per tablet Take 1 tablet by mouth every 6 hours as needed for Pain for up to 7 days. Intended supply: 7 days.  Take lowest dose possible to manage pain 28 tablet 0    aspirin EC 81 MG EC tablet Take 1 tablet by mouth 2 times daily (with meals) 60 tablet 0    ibuprofen (ADVIL;MOTRIN) 800 MG tablet Take 1 tablet by mouth 2 times daily as needed for Pain 30 tablet 0    DULoxetine (CYMBALTA) 30 MG extended release capsule Take 1 capsule by mouth daily 90 capsule 3    Handicap Placard MISC by Does not apply route Cannot walk 200 ft. Without stopping to rest  Duration: Lifetime 1 each 0    metoprolol succinate (TOPROL XL) 25 MG extended release tablet Take 0.5 tablets by mouth in the morning. 90 tablet 3    gabapentin (NEURONTIN) 300 MG capsule TAKE 1 CAPSULE BY MOUTH THREE TIMES DAILY 90 capsule 3    clobetasol (TEMOVATE) 0.05 % ointment Apply topically 2 times daily        traMADol (ULTRAM) 50 MG tablet Take 50 mg by mouth daily as needed. omeprazole (PRILOSEC) 40 MG delayed release capsule TAKE 1 CAPSULE DAILY 90 capsule 3    loperamide (IMODIUM) 2 MG capsule TAKE 1 CAPSULE BY MOUTH FOUR TIMES DAILY AS NEEDED FOR DIARRHEA 486 capsule 3    folic acid (FOLVITE) 1 MG tablet Take 1 mg by mouth daily        Certolizumab Pegol (CIMZIA STARTER KIT) 6 X 200 MG/ML KIT Inject 2 ml at 0, 2 and 4 weeks and then q 4 weeks        methotrexate (RHEUMATREX) 2.5 MG chemo tablet Weekly on thursdays. 6 tablets        acetaminophen (TYLENOL) 500 MG tablet Take by mouth        Multiple Vitamins-Minerals (MULTIVITAMIN ADULT PO) Take 1 tablet by mouth daily        tacrolimus (PROTOPIC) 0.1 % ointment Apply 1 each topically daily Apply topically 2 times daily. fluticasone (FLONASE) 50 MCG/ACT nasal spray 1 spray by Nasal route daily 3 Bottle 3    Cholecalciferol (VITAMIN D) 2000 UNITS TABS Take 2,000 Units by mouth daily           No current facility-administered medications for this visit.          Allergies: Neomycin, Amoxil [amoxicillin], and Doxycycline  Past Medical History        Past Medical History:   Diagnosis Date    Acid reflux      Anxiety and depression      Crohn's colitis ([facility] 04/2018     Crohn's colitis per colonoscopy biopsies 4/9/18, 6/11/18         Past Surgical History         Past Surgical History:   Procedure Laterality Date    ANKLE FRACTURE SURGERY Left 10/6/2022     LEFT ANKLE EXTERNAL FIXATION performed by Tomasa Rivera MD at [facility] Benign Cyst    CHOLECYSTECTOMY   11/18/2011     Amy Miles, laparoscopic     ENDOMETRIAL ABLATION   2004    ILEOSTOMY OR JEJUNOSTOMY   04/2019    TOTAL COLECTOMY        UPPER GASTROINTESTINAL ENDOSCOPY             Family History         Family History   Problem Relation Age of Onset    Kidney Disease Mother      Diabetes Father           on HD s/p MI    Heart Disease Father      High Blood Pressure Father           Social History           Tobacco Use    Smoking status: Never    Smokeless tobacco: Never   Substance Use Topics    Alcohol use: No                                    Chief Complaint   Patient presents with    Post-Op Check       1 week po check for wound check, LT Ankle Ex-Application,DOS [id number] by TTS     Patient states having random pain on the outside ankle pin site. Unsure what causes it. Patient inquiring what the next steps are. SUBJECTIVE: Nancy Vaughn is here for subsequent evaluation of her L trimalleolar ankle fracture s/p external fixator application approximately 1 week ago. She has remained NWB. Denies fever, chills, malaise, CP, SOB, calf pain. Pain is controlled with percocet. She cannot take many NSAIDs due to Crohn dz and takes methotrexate and certolizumab for this. Her GI/rheum specialists are from [facility]. No new complaints. Here today to discuss definitive tx for her fracture and ex fix removal. Taking ASA BID for DVT prophylaxis. Review of Systems   Constitutional: Negative for fever, chills, diaphoresis, appetite change and fatigue. HENT: Negative for dental issues, hearing loss and tinnitus. Negative for congestion, sinus pressure, sneezing, sore throat. Negative for headache. Eyes: Negative for visual disturbance, blurred and double vision. Negative for pain, discharge, redness and itching  Respiratory: Negative for cough, shortness of breath and wheezing. Cardiovascular: Negative for chest pain, palpitations and leg swelling.  No dyspnea on exertion   Gastrointestinal:   Negative for nausea, vomiting, abdominal pain, diarrhea, constipation  or black or bloody. Hematologic\Lymphatic:  negative for bleeding, petechiae,   Genitourinary: Negative for hematuria and difficulty urinating. Musculoskeletal: Negative for neck pain and stiffness. Mild for back pain, negative joint swelling and gait problem. Skin: Negative for pallor, rash and wound. Neurological: Negative for dizziness, tremors, seizures, weakness, light-headedness, no TIA or stroke symptoms. No numbness and headaches. Psychiatric/Behavioral: Negative. Physical Examination:   General appearance: alert, well appearing, and in no distress,  normal appearing weight  Mental status: alert, oriented to person, place, and time, normal mood, behavior, speech, dress, motor activity, and thought processes  Abdomen: soft, nondistended   Resp:   resp easy and unlabored, no audible wheezes note  Cardiac: distal pulses palpable, skin well perfused  Neurological: alert, oriented X3, normal speech, no focal findings or movement disorder noted, motor and sensory grossly normal bilaterally, normal muscle tone, no tremors, strength 5/5, normal gait and station  HEENT: normochephalic atraumatic, external ears and eyes normal, sclera normal, neck supple  Extremities:   peripheral pulses normal, no edema, redness or tenderness in the calves   Skin: normal coloration, no rashes or open wounds, no suspicious skin lesions noted  Psych: Affect euthymic   Musculoskeletal:    Extremity:  Left Lower Extremity  Skin clean dry and intact, without signs of infection  External fixator intact, pin sites without warmth or erythema, no drainage  Ecchymosis at the heel  Mild TTP throughout the pin sites and generalized tenderness at the ankle   Wrinkle sign +  Compartments supple throughout thigh and leg  Calf supple and nontender  Demonstrates active knee flexion/extension, great toe extension.    States sensation intact to touch in sural/deep peroneal/superficial peroneal/saphenous/posterior tibial nerve distributions to foot/ankle. Palpable dorsalis pedis and posterior tibialis pulses, cap refill brisk in toes, foot warm/perfused. XR: 10/14/22      3 views of L ankle demonstrating trimalleolar ankle fracture maintained by external fixator, which appears intact and stable. No significant change in alignment. No other acute fractures or dislocations or any other osseus abnormality identified. ASSESSMENT:       Diagnosis Orders   1. Closed displaced trimalleolar fracture of left ankle with routine healing, subsequent encounter  Misc. Devices MISC     oxyCODONE-acetaminophen (PERCOCET) 5-325 MG per tablet             Discussion:  Had lengthy discussion with patient regarding Her diagnosis, typical prognosis, and expected outcomes. I reviewed the possible complications from the injury itself despite treatment choosen. I also discussed treatment options including nonoperative managements versus surgical management, along with risks and benefits of each. They have elected for surgical management at this time. Discussed with patient factors that can impact patient's fracture healing. Patient has the following risk factors for union: Immunosuppression     Risk, benefits and treatment options discussed with Rik Thomas. she has verbalized understanding of options. The possibility of complications were also discussed to include but not limited to nerve damage, infection, problems with wound healing, vascular injury, chronic pain, stiffness, dysfunction, nonhealing of the bone, symptomatic hardware and/or its failure, need for subsequent surgery, dislocation, and blood clots as well as medical related problems and other problems not specifically discussed. Risk of anesthesia also discussed to include death.    Post-op care, work, activity and restrictions which included the use of pain medication and possibility of using blood thinner post op were also discussed with Carmen West and she verbalized and agreed with the restrictions. PLAN:  OPEN REDUCTION WITH INTERNAL FIXATION OF LEFT ANKLE FRACTURES WITH REMOVAL OF LEFT LOWER EXTREMITY EXTERNAL FIXATOR        Your surgery is scheduled for Thursday, 10/20/22 at 10:30 AM  with Dr. Anu Boyce MD at the main [address] [place] [address] in [facility] . You will need to report to Preop area  that morning at 8:30AM    You are having Outpatient surgery, but plan for 1-2 nights in the hospital for recovery if needed. Preadmission Testing (PAT) department at [facility] will contact you with further details regarding pre-operative blood work, where to park and enter the hospital, your medication list, etc.   Nothing to eat or drink after midnight the night before surgery. You may take a pain pill and any other medicine PAT instructs you to take with small sip of water if needed. Continue with ice and elevation to reduce swelling  No weight bearing left lower extremity, use assistive devices if needed (wheelchair, walker, crutches, cane, etc). If you are taking Aspirin or Lovenox, hold the day of surgery. If taking Eliquis, hold this 48 hours prior to surgery. Hold all NSAIDs (non-steroidal anti-inflammatories like Advil, motrin, ibuprofen, etc) 7 days prior to surgery. Recommended to contact GI/rheumatoid providers for recommendations regarding her biologics taken for Crohn dz. Preferable to decrease dosing and/or frequency for best chance of bone healing, but will be up to prescribing providers. Percocet 5-325mg QID PRN x7 days refilled. Controlled Substance Monitoring:     Acute and Chronic Pain Monitoring:   RX Monitoring 10/14/2022   Acute Pain Prescriptions Severe pain not adequately treated with lower dose. Periodic Controlled Substance Monitoring No signs of potential drug abuse or diversion identified.                Electronically signed by Desirae Hawk PA-C on 10/14/2022 at 10:34 AM  Note: This report was completed using [employer] voiced recognition software. Every effort has been made to ensure accuracy; however, inadvertent computerized transcription errors may be present. Office Visit on 10/14/2022    Office Visit on 10/14/2022      Detailed Report    Note shared with patient  Additional Documentation    Flowsheets:  RX Monitoring History     Encounter Info:  Billing Info,  History,  Allergies,  Detailed Report       Progress Notes Info    Author Note Status Last Update User Last Update Date/Time   Desirae Hawk PA-C Signed Desirae Hawk PA-C 10/14/2022 10:34 AM     Chart Review Routing History    No routing history on file.

## 2022-10-14 NOTE — PROGRESS NOTES
[address]   PRE-ADMISSION TESTING GENERAL INSTRUCTIONS  [facility] Phone Number: [phone]      GENERAL INSTRUCTIONS:    [x] Antibacterial Soap Shower Night before or AM of surgery. [] CHG Wipes instruction sheet and wipes given. [] Hibiclens shower the night before and the morning of surgery.   -Do not use Hibiclens on your face or head. [x] Do not wear makeup, lotions, powders, deodorant. [x] Nothing by mouth after midnight; including gum, candy, mints, or water. [x] You may brush your teeth, gargle, but do NOT swallow water. [x] No tobacco products, illegal drugs, or alcohol within 24 hours of your surgery. [x] Jewelry or valuables should not be brought to the hospital. All body and/or tongue piercing's must be removed prior to arriving to hospital. No contact lens or hair pins. [x] Arrange transportation with a responsible adult  to and from the hospital. Arrange for someone to be with you for the remainder of the day and for 24 hours after your procedure due to having had anesthesia when discharged         -Who will be your  for transportation? Fredrick-         -Who will be staying with you for 24 hrs after your procedure? Dan-  [x] Bring insurance card and photo ID.  [] Bring copy of living will or healthcare power of  papers to be placed in your electronic record. [x] Urine Pregnancy test will be preformed the day of surgery. A specimen sample may be brought from home. [] Transfusion Bracelet: Please bring with you to hospital, day of surgery. PARKING INSTRUCTIONS:     [x] ARRIVAL DATE & TIME:  10/20 at 8:45 am   [x] Enter into the [facility]. Two people may accompany you. Masks are not required but are recommended. [x] Parking Lot \"I\" is where you will park. It is located on the corner of [address] [address] and [address]. The entrance is on [address].    Upon entering the parking lot, a voucher ticket will print    EDUCATION INSTRUCTIONS:        [] Knee or Hip replacement booklet & exercise pamphlets given. [] [address] placed in chart. [] Pre-admission Testing educational folder given  [] Incentive Spirometry,coughing & deep breathing exercises reviewed. [] Medication information sheet(s)   [] Fluoroscopy-Xray used in surgery reviewed with patient. Educational pamphlet placed in chart. [] Pain: Post-op pain is normal and to be expected. You will be asked to rate your pain from 0-10. [] Joint camp offered. [] Joint replacement booklets given.  [] Spine Navigator to see in PAT. [] Other:___________________________    MEDICATION INSTRUCTIONS:    [x] Bring a complete list of your medications, please write the last time you took the medicine, give this list to the nurse in Pre-Op. [x] Take only the following medications the morning of surgery with 1-2 ounces of water:  Neurontin; Cymbalta; Toprol XL; Prilosec; Percocet if needed    [x] Stop all herbal supplements and vitamins 5 days before surgery. Stop NSAIDS 7 days before surgery. [] DO NOT take any diabetic medicine the morning of surgery. Follow instructions for insulin the day before surgery. [] If you are diabetic and your blood sugar is low or you feel symptomatic, you may drink 1-2 ounces of apple juice or take a glucose tablet.            -The morning of your procedure, you may call the pre-op area if you have concerns about your blood sugar [phone]. [] Use your inhalers the morning of surgery. Bring your emergency inhaler with you day of surgery. [x] Follow physician instructions regarding any blood thinners you may be taking. WHAT TO EXPECT:    [x] The day of surgery you will be greeted and checked in by the [employer].  In addition, you will be registered in the [facility] by a Patient Access Representative. Please bring your photo ID and insurance card.  A nurse will greet you in accordance to the time you are needed in the pre-op area to prepare you for surgery. Please do not be discouraged if you are not greeted in the order you arrive as there are many variables that are involved in patient preparation. Your patience is greatly appreciated as you wait for your nurse. Please bring in items such as: books, magazines, newspapers, electronics, or any other items  to occupy your time in the waiting area. [x]  Delays may occur with surgery and staff will make a sincere effort to keep you informed of delays. If any delays occur with your procedure, we apologize ahead of time for your inconvenience as we recognize the value of your time.

## 2022-10-18 RX ORDER — PREDNISONE 20 MG/1
20 TABLET ORAL DAILY
COMMUNITY

## 2022-10-19 ENCOUNTER — ANESTHESIA EVENT (OUTPATIENT)
Dept: OPERATING ROOM | Age: 47
End: 2022-10-19
Payer: COMMERCIAL

## 2022-10-19 ENCOUNTER — CARE COORDINATION (OUTPATIENT)
Dept: CASE MANAGEMENT | Age: 47
End: 2022-10-19

## 2022-10-19 NOTE — CARE COORDINATION
[facility] Care Transitions Follow Up Call        Patient: Lluvia Rodriguez  Patient : 1975   MRN: [id number]  Reason for Admission: trimalleolar fracture of ankle, closed, left. s/p left ankle external fixation 10/6/22  Discharge Date: 10/8/22 RARS: Readmission Risk Score: 12.2    -First attempt to reach the patient for subsequent Care Transition call.  Message left with CTN's contact information requesting return phone call.   -Noted in EMR patient completed ortho appointment on 10/14/22 and is scheduled for  LEFT ANKLE OPEN REDUCTION INTERNAL FIXATION,EXTERNAL FIXATOR REMOVAL FROM LEFT ANKLE, NEEDS SYNTHES tomorrow(10/20/22.)          Follow Up  Future Appointments   Date Time Provider Henok Lim   2022  9:30 AM SCHEDULE, SE ORTHO [facility]              Lucila Castellanos RN

## 2022-10-20 ENCOUNTER — HOSPITAL ENCOUNTER (OUTPATIENT)
Age: 47
Setting detail: OUTPATIENT SURGERY
Discharge: HOME | End: 2022-10-20
Attending: ORTHOPAEDIC SURGERY | Admitting: ORTHOPAEDIC SURGERY
Payer: COMMERCIAL

## 2022-10-20 ENCOUNTER — ANESTHESIA (OUTPATIENT)
Dept: OPERATING ROOM | Age: 47
End: 2022-10-20
Payer: COMMERCIAL

## 2022-10-20 ENCOUNTER — APPOINTMENT (OUTPATIENT)
Dept: GENERAL RADIOLOGY | Age: 47
End: 2022-10-20
Attending: ORTHOPAEDIC SURGERY
Payer: COMMERCIAL

## 2022-10-20 VITALS
DIASTOLIC BLOOD PRESSURE: 66 MMHG | OXYGEN SATURATION: 99 % | RESPIRATION RATE: 17 BRPM | SYSTOLIC BLOOD PRESSURE: 133 MMHG | WEIGHT: 244 LBS | TEMPERATURE: 98 F | HEART RATE: 73 BPM | BODY MASS INDEX: 36.14 KG/M2 | HEIGHT: 69 IN

## 2022-10-20 DIAGNOSIS — Z01.812 PRE-OPERATIVE LABORATORY EXAMINATION: Primary | ICD-10-CM

## 2022-10-20 DIAGNOSIS — Z96.698 PRESENCE OF OTHER ORTHOPEDIC JOINT IMPLANTS: ICD-10-CM

## 2022-10-20 DIAGNOSIS — S82.852G CLOSED TRIMALLEOLAR FRACTURE OF LEFT ANKLE WITH DELAYED HEALING: ICD-10-CM

## 2022-10-20 LAB — METER GLUCOSE: 118 MG/DL (ref 74–99)

## 2022-10-20 PROCEDURE — C1713 ANCHOR/SCREW BN/BN,TIS/BN: HCPCS | Performed by: ORTHOPAEDIC SURGERY

## 2022-10-20 PROCEDURE — 7100000001 HC PACU RECOVERY - ADDTL 15 MIN: Performed by: ORTHOPAEDIC SURGERY

## 2022-10-20 PROCEDURE — 82962 GLUCOSE BLOOD TEST: CPT

## 2022-10-20 PROCEDURE — 7100000011 HC PHASE II RECOVERY - ADDTL 15 MIN: Performed by: ORTHOPAEDIC SURGERY

## 2022-10-20 PROCEDURE — 73610 X-RAY EXAM OF ANKLE: CPT

## 2022-10-20 PROCEDURE — 6360000002 HC RX W HCPCS: Performed by: ANESTHESIOLOGY

## 2022-10-20 PROCEDURE — 20694 RMVL EXT FIXJ SYS UNDER ANES: CPT | Performed by: ORTHOPAEDIC SURGERY

## 2022-10-20 PROCEDURE — 3700000000 HC ANESTHESIA ATTENDED CARE: Performed by: ORTHOPAEDIC SURGERY

## 2022-10-20 PROCEDURE — 2709999900 HC NON-CHARGEABLE SUPPLY: Performed by: ORTHOPAEDIC SURGERY

## 2022-10-20 PROCEDURE — 77071 MNL APPL STRS JT RADIOGRAPHY: CPT | Performed by: ORTHOPAEDIC SURGERY

## 2022-10-20 PROCEDURE — 3209999900 FLUORO FOR SURGICAL PROCEDURES

## 2022-10-20 PROCEDURE — 7100000000 HC PACU RECOVERY - FIRST 15 MIN: Performed by: ORTHOPAEDIC SURGERY

## 2022-10-20 PROCEDURE — 27822 TREATMENT OF ANKLE FRACTURE: CPT | Performed by: ORTHOPAEDIC SURGERY

## 2022-10-20 PROCEDURE — 2580000003 HC RX 258: Performed by: PHYSICIAN ASSISTANT

## 2022-10-20 PROCEDURE — 3600000005 HC SURGERY LEVEL 5 BASE: Performed by: ORTHOPAEDIC SURGERY

## 2022-10-20 PROCEDURE — 2580000003 HC RX 258

## 2022-10-20 PROCEDURE — 3600000015 HC SURGERY LEVEL 5 ADDTL 15MIN: Performed by: ORTHOPAEDIC SURGERY

## 2022-10-20 PROCEDURE — 6360000002 HC RX W HCPCS

## 2022-10-20 PROCEDURE — 6360000002 HC RX W HCPCS: Performed by: PHYSICIAN ASSISTANT

## 2022-10-20 PROCEDURE — 2720000010 HC SURG SUPPLY STERILE: Performed by: ORTHOPAEDIC SURGERY

## 2022-10-20 PROCEDURE — 6370000000 HC RX 637 (ALT 250 FOR IP): Performed by: ORTHOPAEDIC SURGERY

## 2022-10-20 PROCEDURE — 3700000001 HC ADD 15 MINUTES (ANESTHESIA): Performed by: ORTHOPAEDIC SURGERY

## 2022-10-20 PROCEDURE — 64445 NJX AA&/STRD SCIATIC NRV IMG: CPT | Performed by: ANESTHESIOLOGY

## 2022-10-20 PROCEDURE — 6370000000 HC RX 637 (ALT 250 FOR IP): Performed by: ANESTHESIOLOGY

## 2022-10-20 PROCEDURE — 7100000010 HC PHASE II RECOVERY - FIRST 15 MIN: Performed by: ORTHOPAEDIC SURGERY

## 2022-10-20 DEVICE — SCREW BNE L16MM DIA2.7MM CORT S STL ST FULL THRD FOR SM: Type: IMPLANTABLE DEVICE | Site: ANKLE | Status: FUNCTIONAL

## 2022-10-20 DEVICE — SCREW BNE L46MM DIA4MM S STL CANN SHT 1/3 THRD SM HEX SOCK: Type: IMPLANTABLE DEVICE | Site: ANKLE | Status: FUNCTIONAL

## 2022-10-20 DEVICE — SCREW BNE L16MM DIA3.5MM CORT S STL ST NONCANNULATED LOK: Type: IMPLANTABLE DEVICE | Site: ANKLE | Status: FUNCTIONAL

## 2022-10-20 DEVICE — SCREW BNE L14MM DIA3.5MM CORT S STL ST LOK FULL THRD: Type: IMPLANTABLE DEVICE | Site: ANKLE | Status: FUNCTIONAL

## 2022-10-20 DEVICE — PLATE BNE L117MM 10 H S STL 1/3 TBLR LOK COMPR W/ CLLR FOR: Type: IMPLANTABLE DEVICE | Site: ANKLE | Status: FUNCTIONAL

## 2022-10-20 DEVICE — SCREW BNE L14MM DIA4MM CANC S STL ST CANN NONLOCKING FULL: Type: IMPLANTABLE DEVICE | Site: ANKLE | Status: FUNCTIONAL

## 2022-10-20 DEVICE — SCREW CORTES 3.5MM SELF-TAPPING 18MM: Type: IMPLANTABLE DEVICE | Site: ANKLE | Status: FUNCTIONAL

## 2022-10-20 RX ORDER — ACETAMINOPHEN 650 MG
TABLET, EXTENDED RELEASE ORAL PRN
Status: DISCONTINUED | OUTPATIENT
Start: 2022-10-20 | End: 2022-10-20 | Stop reason: ALTCHOICE

## 2022-10-20 RX ORDER — MIDAZOLAM HYDROCHLORIDE 1 MG/ML
INJECTION INTRAMUSCULAR; INTRAVENOUS PRN
Status: DISCONTINUED | OUTPATIENT
Start: 2022-10-20 | End: 2022-10-20 | Stop reason: SDUPTHER

## 2022-10-20 RX ORDER — FENTANYL CITRATE 50 UG/ML
INJECTION, SOLUTION INTRAMUSCULAR; INTRAVENOUS PRN
Status: DISCONTINUED | OUTPATIENT
Start: 2022-10-20 | End: 2022-10-20 | Stop reason: SDUPTHER

## 2022-10-20 RX ORDER — MEPERIDINE HYDROCHLORIDE 25 MG/ML
12.5 INJECTION INTRAMUSCULAR; INTRAVENOUS; SUBCUTANEOUS EVERY 5 MIN PRN
Status: DISCONTINUED | OUTPATIENT
Start: 2022-10-20 | End: 2022-10-20 | Stop reason: HOSPADM

## 2022-10-20 RX ORDER — DEXAMETHASONE SODIUM PHOSPHATE 10 MG/ML
INJECTION INTRAMUSCULAR; INTRAVENOUS PRN
Status: DISCONTINUED | OUTPATIENT
Start: 2022-10-20 | End: 2022-10-20 | Stop reason: SDUPTHER

## 2022-10-20 RX ORDER — PROPOFOL 10 MG/ML
INJECTION, EMULSION INTRAVENOUS PRN
Status: DISCONTINUED | OUTPATIENT
Start: 2022-10-20 | End: 2022-10-20 | Stop reason: SDUPTHER

## 2022-10-20 RX ORDER — OXYCODONE HYDROCHLORIDE AND ACETAMINOPHEN 5; 325 MG/1; MG/1
1 TABLET ORAL
Status: COMPLETED | OUTPATIENT
Start: 2022-10-20 | End: 2022-10-20

## 2022-10-20 RX ORDER — ROPIVACAINE HYDROCHLORIDE 5 MG/ML
30 INJECTION, SOLUTION EPIDURAL; INFILTRATION; PERINEURAL
Status: COMPLETED | OUTPATIENT
Start: 2022-10-20 | End: 2022-10-20

## 2022-10-20 RX ORDER — ROPIVACAINE HYDROCHLORIDE 5 MG/ML
INJECTION, SOLUTION EPIDURAL; INFILTRATION; PERINEURAL
Status: COMPLETED | OUTPATIENT
Start: 2022-10-20 | End: 2022-10-20

## 2022-10-20 RX ORDER — MORPHINE SULFATE 2 MG/ML
2 INJECTION, SOLUTION INTRAMUSCULAR; INTRAVENOUS EVERY 5 MIN PRN
Status: DISCONTINUED | OUTPATIENT
Start: 2022-10-20 | End: 2022-10-20 | Stop reason: HOSPADM

## 2022-10-20 RX ORDER — BACITRACIN ZINC 500 [USP'U]/G
OINTMENT TOPICAL PRN
Status: DISCONTINUED | OUTPATIENT
Start: 2022-10-20 | End: 2022-10-20 | Stop reason: ALTCHOICE

## 2022-10-20 RX ORDER — SODIUM CHLORIDE 9 MG/ML
INJECTION, SOLUTION INTRAVENOUS PRN
Status: DISCONTINUED | OUTPATIENT
Start: 2022-10-20 | End: 2022-10-20 | Stop reason: HOSPADM

## 2022-10-20 RX ORDER — SODIUM CHLORIDE 9 MG/ML
INJECTION, SOLUTION INTRAVENOUS CONTINUOUS PRN
Status: DISCONTINUED | OUTPATIENT
Start: 2022-10-20 | End: 2022-10-20 | Stop reason: SDUPTHER

## 2022-10-20 RX ORDER — SODIUM CHLORIDE 0.9 % (FLUSH) 0.9 %
5-40 SYRINGE (ML) INJECTION PRN
Status: DISCONTINUED | OUTPATIENT
Start: 2022-10-20 | End: 2022-10-20 | Stop reason: HOSPADM

## 2022-10-20 RX ORDER — LIDOCAINE HYDROCHLORIDE 20 MG/ML
INJECTION, SOLUTION INTRAVENOUS PRN
Status: DISCONTINUED | OUTPATIENT
Start: 2022-10-20 | End: 2022-10-20 | Stop reason: SDUPTHER

## 2022-10-20 RX ORDER — MIDAZOLAM HYDROCHLORIDE 2 MG/2ML
2 INJECTION, SOLUTION INTRAMUSCULAR; INTRAVENOUS ONCE
Status: DISCONTINUED | OUTPATIENT
Start: 2022-10-20 | End: 2022-10-20 | Stop reason: HOSPADM

## 2022-10-20 RX ORDER — OXYCODONE HYDROCHLORIDE AND ACETAMINOPHEN 5; 325 MG/1; MG/1
1 TABLET ORAL EVERY 6 HOURS PRN
Qty: 28 TABLET | Refills: 0 | Status: SHIPPED | OUTPATIENT
Start: 2022-10-20 | End: 2022-10-27

## 2022-10-20 RX ORDER — SODIUM CHLORIDE 0.9 % (FLUSH) 0.9 %
5-40 SYRINGE (ML) INJECTION EVERY 12 HOURS SCHEDULED
Status: DISCONTINUED | OUTPATIENT
Start: 2022-10-20 | End: 2022-10-20 | Stop reason: HOSPADM

## 2022-10-20 RX ORDER — ONDANSETRON 2 MG/ML
INJECTION INTRAMUSCULAR; INTRAVENOUS PRN
Status: DISCONTINUED | OUTPATIENT
Start: 2022-10-20 | End: 2022-10-20 | Stop reason: SDUPTHER

## 2022-10-20 RX ORDER — MORPHINE SULFATE 2 MG/ML
1 INJECTION, SOLUTION INTRAMUSCULAR; INTRAVENOUS EVERY 5 MIN PRN
Status: DISCONTINUED | OUTPATIENT
Start: 2022-10-20 | End: 2022-10-20 | Stop reason: HOSPADM

## 2022-10-20 RX ORDER — FENTANYL CITRATE 50 UG/ML
100 INJECTION, SOLUTION INTRAMUSCULAR; INTRAVENOUS ONCE
Status: DISCONTINUED | OUTPATIENT
Start: 2022-10-20 | End: 2022-10-20 | Stop reason: HOSPADM

## 2022-10-20 RX ADMIN — ONDANSETRON 4 MG: 2 INJECTION INTRAMUSCULAR; INTRAVENOUS at 09:28

## 2022-10-20 RX ADMIN — LIDOCAINE HYDROCHLORIDE 80 MG: 20 INJECTION, SOLUTION INTRAVENOUS at 09:38

## 2022-10-20 RX ADMIN — SODIUM CHLORIDE: 9 INJECTION, SOLUTION INTRAVENOUS at 09:09

## 2022-10-20 RX ADMIN — DEXAMETHASONE SODIUM PHOSPHATE 10 MG: 10 INJECTION INTRAMUSCULAR; INTRAVENOUS at 09:45

## 2022-10-20 RX ADMIN — MORPHINE SULFATE 2 MG: 2 INJECTION, SOLUTION INTRAMUSCULAR; INTRAVENOUS at 11:33

## 2022-10-20 RX ADMIN — SODIUM CHLORIDE: 9 INJECTION, SOLUTION INTRAVENOUS at 08:30

## 2022-10-20 RX ADMIN — PROPOFOL 200 MG: 10 INJECTION, EMULSION INTRAVENOUS at 09:38

## 2022-10-20 RX ADMIN — FENTANYL CITRATE 100 MCG: 50 INJECTION, SOLUTION INTRAMUSCULAR; INTRAVENOUS at 09:25

## 2022-10-20 RX ADMIN — OXYCODONE AND ACETAMINOPHEN 1 TABLET: 5; 325 TABLET ORAL at 13:06

## 2022-10-20 RX ADMIN — HYDROMORPHONE HYDROCHLORIDE 0.5 MG: 1 INJECTION, SOLUTION INTRAMUSCULAR; INTRAVENOUS; SUBCUTANEOUS at 12:01

## 2022-10-20 RX ADMIN — HYDROMORPHONE HYDROCHLORIDE 0.5 MG: 1 INJECTION, SOLUTION INTRAMUSCULAR; INTRAVENOUS; SUBCUTANEOUS at 11:51

## 2022-10-20 RX ADMIN — ROPIVACAINE HYDROCHLORIDE 30 ML: 5 INJECTION EPIDURAL; INFILTRATION; PERINEURAL at 09:28

## 2022-10-20 RX ADMIN — MORPHINE SULFATE 2 MG: 2 INJECTION, SOLUTION INTRAMUSCULAR; INTRAVENOUS at 11:28

## 2022-10-20 RX ADMIN — HYDROMORPHONE HYDROCHLORIDE 0.5 MG: 1 INJECTION, SOLUTION INTRAMUSCULAR; INTRAVENOUS; SUBCUTANEOUS at 11:44

## 2022-10-20 RX ADMIN — SODIUM CHLORIDE: 9 INJECTION, SOLUTION INTRAVENOUS at 10:35

## 2022-10-20 RX ADMIN — ROPIVACAINE HYDROCHLORIDE 30 ML: 5 INJECTION, SOLUTION EPIDURAL; INFILTRATION; PERINEURAL at 09:28

## 2022-10-20 RX ADMIN — CEFAZOLIN 2000 MG: 2 INJECTION, POWDER, FOR SOLUTION INTRAMUSCULAR; INTRAVENOUS at 09:45

## 2022-10-20 RX ADMIN — MIDAZOLAM 2 MG: 1 INJECTION INTRAMUSCULAR; INTRAVENOUS at 09:25

## 2022-10-20 ASSESSMENT — PAIN DESCRIPTION - PAIN TYPE: TYPE: SURGICAL PAIN

## 2022-10-20 ASSESSMENT — PAIN DESCRIPTION - LOCATION
LOCATION: LEG

## 2022-10-20 ASSESSMENT — PAIN SCALES - GENERAL
PAINLEVEL_OUTOF10: 9
PAINLEVEL_OUTOF10: 5
PAINLEVEL_OUTOF10: 4
PAINLEVEL_OUTOF10: 0
PAINLEVEL_OUTOF10: 9
PAINLEVEL_OUTOF10: 8
PAINLEVEL_OUTOF10: 9
PAINLEVEL_OUTOF10: 3
PAINLEVEL_OUTOF10: 9
PAINLEVEL_OUTOF10: 5

## 2022-10-20 ASSESSMENT — PAIN DESCRIPTION - FREQUENCY: FREQUENCY: INTERMITTENT

## 2022-10-20 ASSESSMENT — PAIN DESCRIPTION - DESCRIPTORS
DESCRIPTORS: ACHING
DESCRIPTORS: ACHING;DISCOMFORT;SORE
DESCRIPTORS: ACHING

## 2022-10-20 ASSESSMENT — PAIN - FUNCTIONAL ASSESSMENT: PAIN_FUNCTIONAL_ASSESSMENT: 0-10

## 2022-10-20 NOTE — INTERVAL H&P NOTE
Update History & Physical    The patient's History and Physical of October 14, 2022 was reviewed with the patient and I examined the patient. There was no change. The surgical site was confirmed by the patient and me. Plan: The risks, benefits, expected outcome, and alternative to the recommended procedure have been discussed with the patient. Patient understands and wants to proceed with the procedure. Did discuss with her and her  once again about the risk of infection and wound healing with her immune modulators due to her Crohn's disease. They understand this. Her ankle does need to fix her fibula is unstable they understand this as well.     Electronically signed by Janessa Sykes MD on 10/20/2022 at 8:57 AM

## 2022-10-20 NOTE — ANESTHESIA PRE PROCEDURE
Department of Anesthesiology  Preprocedure Note       Name:  Rosy Leal   Age:  49 y.o.  :  1975                                          MRN:  [id number]         Date:  10/20/2022      Surgeon: Jodie Navarro):  Christell Romberg, MD    Procedure: Procedure(s):  LEFT ANKLE OPEN REDUCTION INTERNAL FIXATION,EXTERNAL FIXATOR REMOVAL FROM LEFT ANKLE, NEEDS SYNTHES    Medications prior to admission:   Prior to Admission medications    Medication Sig Start Date End Date Taking? Authorizing Provider   predniSONE (DELTASONE) 20 MG tablet Take 20 mg by mouth daily X7 days   Yes Historical Provider, MD   prednisoLONE acetate (PRED FORTE) 1 % ophthalmic suspension 1 drop 4 times daily   Yes Historical Provider, MD   gabapentin (NEURONTIN) 300 MG capsule TAKE 1 CAPSULE BY MOUTH THREE TIMES DAILY 10/14/22 4/15/23  MD Chely Almeidac. Devices MISC 1 each by Does not apply route once for 1 dose One knee scooter. 10/14/22 10/14/22  Lashaun Stephens PA-C   oxyCODONE-acetaminophen (PERCOCET) 5-325 MG per tablet Take 1 tablet by mouth every 6 hours as needed for Pain for up to 7 days. Intended supply: 7 days. Take lowest dose possible to manage pain 10/14/22 10/21/22  Lashaun Stephens PA-C   aspirin EC 81 MG EC tablet Take 1 tablet by mouth 2 times daily (with meals) 10/6/22 11/5/22  Marisol Bermudez DO   ibuprofen (ADVIL;MOTRIN) 800 MG tablet Take 1 tablet by mouth 2 times daily as needed for Pain 10/5/22   Nuno Lui DO   DULoxetine (CYMBALTA) 30 MG extended release capsule Take 1 capsule by mouth daily 22   Elvira Vaughn MD   Handicap Placard MISC by Does not apply route Cannot walk 200 ft.  Without stopping to rest  Duration: Lifetime 22   Elvira Vaughn MD   metoprolol succinate (TOPROL XL) 25 MG extended release tablet Take 0.5 tablets by mouth in the morning. 22   Keiko Long DO   clobetasol (TEMOVATE) 0.05 % ointment Apply topically 2 times daily 22 4/26/23  Historical Provider, MD   traMADol (ULTRAM) 50 MG tablet Take 50 mg by mouth daily as needed. 5/14/22   Historical Provider, MD   omeprazole (PRILOSEC) 40 MG delayed release capsule TAKE 1 CAPSULE DAILY 5/31/22   Elia Lynne MD   loperamide (IMODIUM) 2 MG capsule TAKE 1 CAPSULE BY MOUTH FOUR TIMES DAILY AS NEEDED FOR DIARRHEA 5/31/22   Elia Lynne MD   folic acid (FOLVITE) 1 MG tablet Take 1 mg by mouth daily 2/17/22   Historical Provider, MD   Certolizumab Pegol (CIMZIA STARTER KIT) 6 X 200 MG/ML KIT Inject 2 ml at 0, 2 and 4 weeks and then q 4 weeks 12/5/21   Historical Provider, MD   methotrexate (RHEUMATREX) 2.5 MG chemo tablet Weekly on thursdays. 6 tablets 12/15/21   Historical Provider, MD   acetaminophen (TYLENOL) 500 MG tablet Take by mouth    Historical Provider, MD   Multiple Vitamins-Minerals (MULTIVITAMIN ADULT PO) Take 1 tablet by mouth daily    Historical Provider, MD   tacrolimus (PROTOPIC) 0.1 % ointment Apply 1 each topically daily Apply topically 2 times daily. Historical Provider, MD   fluticasone [facility]) 50 MCG/ACT nasal spray 1 spray by Nasal route daily 5/5/15   Elia Lynne MD   Cholecalciferol (VITAMIN D) 2000 UNITS TABS Take 2,000 Units by mouth daily     Historical Provider, MD       Current medications:    Current Facility-Administered Medications   Medication Dose Route Frequency Provider Last Rate Last Admin    sodium chloride flush 0.9 % injection 5-40 mL  5-40 mL IntraVENous 2 times per day MARIEL Yung        sodium chloride flush 0.9 % injection 5-40 mL  5-40 mL IntraVENous PRN MARIEL Yung        0.9 % sodium chloride infusion   IntraVENous PRN MARIEL Yung        ceFAZolin (ANCEF) 2,000 mg in sterile water 20 mL IV syringe  2,000 mg IntraVENous On Call to [address], [place]           Allergies:     Allergies   Allergen Reactions    Neomycin Rash    Amoxil [Amoxicillin] Diarrhea    Doxycycline Stomach upset         Problem List:    Patient Active Problem List   Diagnosis Code    GERD (gastroesophageal reflux disease) K21.9    Depression with anxiety F41.8    Exacerbation of Crohn's disease of large intestine ([address] K50.10    SVT (supraventricular tachycardia) (HCC) I47.1    Anemia D64.9    Paroxysmal supraventricular tachycardia (HCC) I47.1    Iron deficiency anemia secondary to blood loss (chronic) D50.0    Crohn's disease of colon with complication ([address] H87.485    Abdominal wall skin ulcer, with fat layer exposed ([address] L98.492    Skin ulcer of perineum, with fat layer exposed ([address] L98.492    Trimalleolar fracture of ankle, closed, left, initial encounter S82.852A    Closed trimalleolar fracture of left ankle with delayed healing S82.852G    Presence of other orthopedic joint implants B32.820       Past Medical History:        Diagnosis Date    Acid reflux     Anxiety and depression     Crohn's colitis ([address] 04/2018    Crohn's colitis per colonoscopy biopsies 4/9/18, 6/11/18       Past Surgical History:        Procedure Laterality Date    ANKLE FRACTURE SURGERY Left 10/6/2022    LEFT ANKLE EXTERNAL FIXATION performed by Lamin Chacon MD at [address] [place] [address]    Benign Cyst    CHOLECYSTECTOMY  11/18/2011    Justin Phalen, laparoscopic     ENDOMETRIAL ABLATION  2004    ILEOSTOMY OR JEJUNOSTOMY  04/2019    TOTAL COLECTOMY      UPPER GASTROINTESTINAL ENDOSCOPY         Social History:    Social History     Tobacco Use    Smoking status: Never    Smokeless tobacco: Never   Substance Use Topics    Alcohol use:  No                                Counseling given: Not Answered      Vital Signs (Current):   Vitals:    10/14/22 1511 10/20/22 0834   BP:  125/62   Pulse:  88   Resp:  18   Temp:  36.4 °C (97.6 °F)   TempSrc:  Temporal   SpO2:  94%   Weight: 244 lb (110.7 kg) 244 lb (110.7 kg)   Height: 5' 9\" (1.753 m) 5' 9\" (1.753 m) BP Readings from Last 3 Encounters:   10/20/22 125/62   10/08/22 (!) 110/57   10/05/22 (!) 135/57       NPO Status: Time of last liquid consumption: 0600                        Time of last solid consumption: 2000                        Date of last liquid consumption: 10/20/22                        Date of last solid food consumption: 10/19/22    BMI:   Wt Readings from Last 3 Encounters:   10/20/22 244 lb (110.7 kg)   10/06/22 242 lb (109.8 kg)   09/06/22 244 lb (110.7 kg)     Body mass index is 36.03 kg/m². CBC:   Lab Results   Component Value Date/Time    WBC 7.0 10/08/2022 04:51 AM    RBC 3.55 10/08/2022 04:51 AM    HGB 10.1 10/08/2022 04:51 AM    HCT 32.4 10/08/2022 04:51 AM    MCV 91.3 10/08/2022 04:51 AM    RDW 17.1 10/08/2022 04:51 AM     10/08/2022 04:51 AM       CMP:   Lab Results   Component Value Date/Time     10/08/2022 04:51 AM    K 3.8 10/08/2022 04:51 AM    K 3.5 10/06/2022 04:31 AM     10/08/2022 04:51 AM    CO2 24 10/08/2022 04:51 AM    BUN 12 10/08/2022 04:51 AM    CREATININE 0.8 10/08/2022 04:51 AM    GFRAA >60 10/08/2022 04:51 AM    LABGLOM >60 10/08/2022 04:51 AM    GLUCOSE 110 10/08/2022 04:51 AM    GLUCOSE 81 02/09/2021 03:13 PM    PROT 7.1 10/06/2022 04:31 AM    CALCIUM 9.4 10/08/2022 04:51 AM    BILITOT 1.0 10/06/2022 04:31 AM    ALKPHOS 67 10/06/2022 04:31 AM    AST 17 10/06/2022 04:31 AM    ALT 13 10/06/2022 04:31 AM       POC Tests: No results for input(s): POCGLU, POCNA, POCK, POCCL, POCBUN, POCHEMO, POCHCT in the last 72 hours.     Coags:   Lab Results   Component Value Date/Time    PROTIME 13.6 10/06/2022 04:31 AM    PROTIME 11.0 10/14/2011 01:50 AM    INR 1.2 10/06/2022 04:31 AM    APTT 27.3 10/06/2022 04:31 AM       HCG (If Applicable):   Lab Results   Component Value Date    PREGTESTUR NEGATIVE 12/12/2018        ABGs: No results found for: PHART, PO2ART, CRW5WLR, GKS1LLV, BEART, Y8WLMHLN     Type & Screen (If Applicable):  No results found for: LABABO, [address]    Drug/Infectious Status (If Applicable):  No results found for: HIV, HEPCAB    COVID-19 Screening (If Applicable):   Lab Results   Component Value Date/Time    COVID19 Not Detected 02/23/2022 12:00 PM    COVID19 Not-Detected 02/23/2022 11:08 AM     ekg 7/21/22  Sinus  Rhythm   -RSR(V1) -nondiagnostic. PROBABLY NORMAL    ekg 2/23/21  Sinus  Bradycardia   -Old anteroseptal infarct. ABNORMAL       Echo 5/28/19   Concentric LV remodeling. Visually estimated LVEF is 65 %. No valvular dysfunction. Normal diastolic function. Dilated IVC with > 50 % collapsibility. Estimated RAP is 8 mm Hg. Mildly dilated LA              Anesthesia Evaluation  Patient summary reviewed and Nursing notes reviewed no history of anesthetic complications:   Airway: Mallampati: II  TM distance: >3 FB   Neck ROM: full  Mouth opening: > = 3 FB   Dental:          Pulmonary:Negative Pulmonary ROS and normal exam  breath sounds clear to auscultation                             Cardiovascular:    (+) hypertension:, dysrhythmias: SVT,       ECG reviewed  Rhythm: regular    Echocardiogram reviewed         Beta Blocker:  Dose within 24 Hrs         Neuro/Psych:   (+) psychiatric history:depression/anxiety             GI/Hepatic/Renal:   (+) GERD:, PUD ( abdominal skin wall ulcer ),          ROS comment: chrons on methotrexate and tacrolimus    Total colectomy with ileostomy . Endo/Other:    (+) blood dyscrasia: anemia:., .                 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 MIPS: Postoperative opioids intended and Prophylactic antiemetics administered. Anesthetic plan and risks discussed with patient. Use of blood products discussed with patient whom consented to blood products. Plan discussed with CRNA and attending.                     Lanie Juan, RN   10/20/2022      Pt seen, examined, chart reviewed, plan discussed.   Courtney Verdugo MD  10/20/2022  10:20 AM

## 2022-10-20 NOTE — OP NOTE
Operative Note      Patient: Holland Saleh  YOB: 1975  MRN: [id number]    Date of Procedure: 10/20/2022    Pre-Op Diagnosis:   1. Left trimalleolar ankle fracture    2. Retained external fixator left ankle    3. Autoimmune disease on immunosuppression    Post-Op Diagnosis: Same         Procedure:  1. Open reduction internal fixation left trimalleolar ankle fracture without fixation of the posterior malleolus    2. Removal of external fixator under anesthesia left lower extremity    3. Stress examination of the syndesmosis under anesthesia with fluoroscopy          Surgeon(s):  Chaitanya Ugarte MD    Assistant:   Resident: Lidia Ruiz DPM; Ileana Szymanski DO; Shaniqua Og DO; Abby Steve DO    Anesthesia: General    Estimated Blood Loss (mL): Minimal    Complications: None    Specimens:   * No specimens in log *    Implants:  Implant Name Type Inv.  Item Serial No.  Lot No. LRB No. Used Action   SCREW BNE L16MM DIA2.7MM SANTY S STL ST FULL THRD FOR  - [id number]  SCREW BNE L16MM DIA2.7MM SANTY S STL ST FULL THRD FOR   [employer]WD  Left 1 Implanted   SCREW BNE L14MM DIA3.5MM SANTY S STL ST HALLE FULL THRD - [id number]  SCREW BNE L14MM DIA3.5MM SANTY S STL ST HALLE FULL THRD  DEPU[employer] [place]-WD  Left 2 Implanted   SCREW BNE L16MM DIA3.5MM SANTY S STL ST NONCANNULATED HALLE - [id number]  SCREW BNE L16MM DIA3.5MM SANTY S STL ST NONCANNULATED HALLE  DEPUY [employer] [place]-  Left 3 Implanted   SCREW GONZALEZ 3.5MM SELF-TAPPING 18MM - [id number]  SCREW GONZALEZ 3.5MM SELF-TAPPING 18MM  DEPUY [employer] [place]-WD  Left 1 Implanted   PLATE BNE [id number] 10 H S STL 1/3 TBLR HALLE COMPR W/ CLLR FOR - [id number]  PLATE BNE [id number] 10 H S STL 1/3 TBLR HALLE COMPR W/ CLLR FOR  DEPUY [employer] [place]-  Left 1 Implanted   SCREW BNE L14MM DIA4MM CANC S STL ST PRADIP NONLOCKING FULL - [id number]  SCREW BNE L14MM DIA4MM CANC S STL ST PRADIP NONLOCKING FULL  DEPU[employer] [place]-WD  Left 1 Implanted   SCREW BNE L46MM DIA4MM S STL PRADIP SHT 1/3 THRD SM HEX SOCK - [id number]  SCREW BNE L46MM DIA4MM S STL PRADIP SHT 1/3 THRD SM HEX SOCK  [employer] [place]-WD  Left 1 Implanted         Drains:   Ileostomy Ileostomy RUQ (Active)       Findings: Syndesmosis was stable on stress testing. Utilized a one third tubular plate on the fibula with a lag screw as well as for a cannulated screw of the medial malleolus. Posterior malleolus reduced nicely with fixation of the bimalleolar fracture portion    Detailed Description of Procedure:   Patient was brought to the operating room in a supine position on a hospital bed. Patient was transferred to the operating room table by multiple individuals in a safe fashion with anesthesia in control of the patient's C-spine and airway. Once on the operating table, all points of pressure were identified and well-padded. A tourniquet was applied to the patient's left upper thigh. The patient's left lower extremity was sterilely prepped and draped in the standard orthopedic fashion. A timeout was performed indicating the appropriate identification of the patient, the procedure to be performed, and the side to be performed upon. This was agreed upon by all individuals in the room. After the timeout, the external fixator was safely removed. The leg was reprepped and all holes from the external fixator pin sites were covered with Guinevere Emily. A lateral malleoli are incision as well as a medial malleoli are incision marked out. Esmarch applied tourniquet was elevated to 275 mmHg. A 10 blade is then used to make a lateral incision. Careful dissection was carried to identify and protect the superficial peroneal nerve. Subperiosteal dissection was throughout the fracture site. The fracture site was cleaned out with Brendalyn Mayor and curettes. Is then anatomically reduced with pointed reduction clamps. A 2.7 millimeter screw was then placed from anterior to posterior by technique in lag fashion.   Once it was lagged a 10 hole one third tubular plate was then contoured and placed on the fibula. Bicortical fixation was placed proximally, distally we placed 1 cancellous screw to bring the plate down to bone and 2 locking screws in the distal fragment. Once these were checked on fluoroscopy and in appropriate position our attention was turned the medial malleolus. With the patient's cutaneous Crohn's disease we attempted percutaneous reduction of the medial malleolus which was successful we therefore inserted a 1.25 mm K wire. We then drilled over this tract once in a pro position and placed a medial malleolus screw. This point time we observe the fact that the posterior malleolus was well reduced. On a mortise view we stressed with external rotation stress under fluoroscopy showing no medial clear space widening. Therefore the incisions were adri irrigated sterile normal saline. She was then closed with 0 Vicryl in the deep fascia of the ankle, 2-0 Monocryl in subtenons tissue, and 3-0 nylon in the skin. The medial incision was closed with 3-0 nylon suture. Her compartments are soft compressible. Bacitracin Xeroform and a well-padded 3 sided splint were put in position. Patient was reversed of anesthesia without complication taken to the PACU in stable condition. Postoperative plan:  1. Strict nonweightbearing left lower extremity    2. DVT prophylaxis in the form of aspirin    3. Follow in the office in 2 weeks for wound check, suture removal and x-rays of left ankle and we will try to keep her off of her methotrexate and prednisone for wound healing and fracture healing.       Electronically signed by Macrina Giang MD on 10/20/2022 at 10:38 AM

## 2022-10-20 NOTE — DISCHARGE INSTRUCTIONS
Department of Orthopaedic Trauma  [address]    DO Dr. Anu Cancino MD Dr. Leanord Barrow, MD Rejeana Moloney, PA-C Paige Gavia PA-C Graeme Poche PA-C    Orthopaedics Discharge Instructions   Weight bearing Status - Non-weight bearing - on left lower Extremity  Pain medication Per Prescriptions  Contact Office for Medication Refill- [phone]  Office can refill pain med every 7 days  If patient discharging to facility then pain control will be continued per facility physician  Ice to operative/injured site for 15-30 minutes of each hour for next 5 days    Recommend that you continue to ice the area 2-3 times per day after this   Elevate operative/injured limb on 2 pillows at home  Goal is to have limb above the heart if able  Continue DVT Prophylaxis (blood clot prevention) as Prescribed: aspirin 81mg BID   Wound care - Can take off the dressing at the surgical site seven days after the date of surgery. Can just peel off. After, do daily dressing changes as needed until the drainage from the surgical site ceases    Follow Up in Office in 2 weeks. Your first post op appointment is often with one of our PAs. Call the office at [phone] for directions or with any questions. Watch for these significant complications. Call physician if they or any other problems occur:  Fever over 101°, redness, swelling or warmth at the operative site  Unrelieved nausea    Foul smelling or cloudy drainage at the operative site   Unrelieved pain    Blood soaked dressing.  (Some oozing may be normal)     Numb, pale, blue, cold or tingling extremity    Future Appointments   Date Time Provider Henok Lim   11/7/2022  9:30 AM SCHEDULE, SE [facility]       Directions to Orthopaedic Trauma Office at [facility]:   [address] [place] [address], [address] through the ED parking lot off [address]  At far side of the parking lot is [address] (large blue awning)-- Enter here  Our office is straight ahead through this entrance and check in will be on your left        It is the Department of Orthopaedic Trauma's standard of practice that providers will de-escalate(wean) all patients from narcotic(opioid) medications during the post-operative period. We provide multimodal pain control but opioid medications are tapered in all of our patients. If patient requires referral to pain management for prolonged taper off of opioid pain medication we will facilitate this process.

## 2022-10-20 NOTE — ANESTHESIA PROCEDURE NOTES
Peripheral Block    Patient location during procedure: pre-op  Reason for block: post-op pain management and at surgeon's request  Start time: 10/20/2022 9:23 AM  End time: 10/20/2022 9:30 AM  Staffing  Performed: anesthesiologist   Anesthesiologist: Cesilia Lowry MD  Resident/CRNA: FELIPA Mcguire - JOSE  Other anesthesia staff: Ida Saleh RN  Preanesthetic Checklist  Completed: patient identified, IV checked, site marked, risks and benefits discussed, surgical/procedural consents, equipment checked, pre-op evaluation, timeout performed, anesthesia consent given, oxygen available, monitors applied/VS acknowledged, fire risk safety assessment completed and verbalized and blood product R/B/A discussed and consented  Peripheral Block   Patient position: supine  Prep: ChloraPrep  Provider prep: mask and sterile gloves  Patient monitoring: cardiac monitor, continuous pulse ox, frequent blood pressure checks and IV access  Block type: Sciatic  Popliteal  Laterality: left  Injection technique: single-shot  Guidance: ultrasound guided    Needle   Needle type: insulated echogenic nerve stimulator needle   Needle gauge: 20 G  Needle localization: ultrasound guidance  Needle length: 10 cm  Assessment   Injection assessment: negative aspiration for heme, no paresthesia on injection and local visualized surrounding nerve on ultrasound  Paresthesia pain: none  Slow fractionated injection: yes  Hemodynamics: stable  Real-time US image taken/store: yes  Outcomes: uncomplicated and patient tolerated procedure well    Additional Notes  Time out performed.          Medications Administered  ropivacaine (NAROPIN) injection 0.5% - Perineural   30 mL - 10/20/2022 9:28:00 AM

## 2022-10-20 NOTE — ANESTHESIA POSTPROCEDURE EVALUATION
Department of Anesthesiology  Postprocedure Note    Patient: Mahendra Lowry  MRN: [id number]  YOB: 1975  Date of evaluation: 10/20/2022      Procedure Summary     Date: 10/20/22 Room / Location: [facility] OR 08 / [facility]    Anesthesia Start: 4326 Anesthesia Stop:     Procedure: LEFT ANKLE OPEN REDUCTION INTERNAL FIXATION,EXTERNAL FIXATOR REMOVAL FROM LEFT ANKLE (Left: Ankle) Diagnosis:       Closed displaced trimalleolar fracture of left lower leg with delayed healing      Presence of other orthopedic joint implants      (Closed displaced trimalleolar fracture of left lower leg with delayed healing [S82.852G])      (Presence of other orthopedic joint implants [T48.461])    Surgeons: Tommy Alvarado MD Responsible Provider: Terry Whyte MD    Anesthesia Type: general ASA Status: 3          Anesthesia Type: No value filed.     Berenice Phase I: Berenice Score: 10    Berenice Phase II:        Anesthesia Post Evaluation    Patient location during evaluation: PACU  Patient participation: complete - patient participated  Level of consciousness: awake  Pain score: 3  Airway patency: patent  Nausea & Vomiting: no nausea and no vomiting  Complications: no  Cardiovascular status: blood pressure returned to baseline  Respiratory status: acceptable  Hydration status: euvolemic

## 2022-10-24 ENCOUNTER — CARE COORDINATION (OUTPATIENT)
Dept: CASE MANAGEMENT | Age: 47
End: 2022-10-24

## 2022-10-24 NOTE — CARE COORDINATION
[facility] Care Transitions Follow Up Call      Patient: Kalyani Rand  Patient : 1975   MRN: [id number]  Reason for Admission: trimalleolar fracture of ankle, closed, left. s/p left ankle external fixation 10/6/22  Discharge Date: 10/20/22 RARS: Readmission Risk Score: 12.2    -Second  attempt to reach the patient for subsequent Care Transition call. Message left with CTN's contact information requesting return phone call. -CTN to sign off.         Follow Up  Future Appointments   Date Time Provider Henok Lim   2022  9:30 AM SCHEDULE, SE [facility]                Eleazar Dumont RN

## 2022-10-26 ENCOUNTER — TELEPHONE (OUTPATIENT)
Dept: ORTHOPEDIC SURGERY | Age: 47
End: 2022-10-26

## 2022-10-26 NOTE — TELEPHONE ENCOUNTER
Patient's  Ziyad Barry called with questions about patient using a knee scooter. He wants to know if she is okay to start using one or should she wait till her post op appointment? Procedure: 10/20/22    1. Open reduction internal fixation left trimalleolar ankle fracture without fixation of the posterior malleolus    2. Removal of external fixator under anesthesia left lower extremity    3.   Stress examination of the syndesmosis under anesthesia with fluoroscopy      Future Appointments   Date Time Provider Henok Lim   11/7/2022  9:30 AM SCHEDULE, [facility]     Routed

## 2022-10-26 NOTE — TELEPHONE ENCOUNTER
Called and updated Jose Browning that it is okay to start using knee scooter. Verbalized understanding.

## 2022-10-28 RX ORDER — LOPERAMIDE HYDROCHLORIDE 2 MG/1
CAPSULE ORAL
Qty: 120 CAPSULE | Refills: 3 | Status: SHIPPED | OUTPATIENT
Start: 2022-10-28

## 2022-10-28 RX ORDER — METOPROLOL SUCCINATE 25 MG/1
12.5 TABLET, EXTENDED RELEASE ORAL DAILY
Qty: 90 TABLET | Refills: 3 | OUTPATIENT
Start: 2022-10-28

## 2022-10-31 DIAGNOSIS — S82.852D CLOSED DISPLACED TRIMALLEOLAR FRACTURE OF LEFT ANKLE WITH ROUTINE HEALING, SUBSEQUENT ENCOUNTER: Primary | ICD-10-CM

## 2022-10-31 RX ORDER — OXYCODONE HYDROCHLORIDE AND ACETAMINOPHEN 5; 325 MG/1; MG/1
1 TABLET ORAL EVERY 6 HOURS PRN
Qty: 28 TABLET | Refills: 0 | Status: SHIPPED
Start: 2022-10-31 | End: 2022-11-07 | Stop reason: SDUPTHER

## 2022-10-31 NOTE — TELEPHONE ENCOUNTER
Patient left a voice message requesting refill on Percocet    Date of Procedure: 10/20/2022     Pre-Op Diagnosis:   1. Left trimalleolar ankle fracture  2. Retained external fixator left ankle  3. Autoimmune disease on immunosuppression     Post-Op Diagnosis: Same    Procedure:  1. Open reduction internal fixation left trimalleolar ankle fracture without fixation of the posterior malleolus  2. Removal of external fixator under anesthesia left lower extremity  3. Stress examination of the syndesmosis under anesthesia with fluoroscopy  Surgeon(s):  Janessa Sykes MD    Future Appointments   Date Time Provider Henok Lim   11/7/2022  9:30 AM SCHEDULE, [facility]         Order pended and routed for decision and signature.      Pharmacy: [id number]

## 2022-10-31 NOTE — TELEPHONE ENCOUNTER
Rome Ariza is 1.5 weeks from the following Surgery:    Date of Procedure: 10/20/2022  1. Open reduction internal fixation left trimalleolar ankle fracture without fixation of the posterior malleolus  2. Removal of external fixator under anesthesia left lower extremity  3. Stress examination of the syndesmosis under anesthesia with fluoroscopy    OARRS report reviewed  Last RX filled on 10/20/22  Plan for wean: Refilled at every 6 hours, wean to every 8 with next RX    Controlled Substance Monitoring:    Acute and Chronic Pain Monitoring:   RX Monitoring 10/31/2022   Acute Pain Prescriptions -   Periodic Controlled Substance Monitoring No signs of potential drug abuse or diversion identified.         Electronically signed by Nas Camarena PA-C on 10/31/2022 at 3:04 PM

## 2022-11-04 DIAGNOSIS — S82.852D CLOSED DISPLACED TRIMALLEOLAR FRACTURE OF LEFT ANKLE WITH ROUTINE HEALING, SUBSEQUENT ENCOUNTER: Primary | ICD-10-CM

## 2022-11-07 ENCOUNTER — OFFICE VISIT (OUTPATIENT)
Dept: ORTHOPEDIC SURGERY | Age: 47
End: 2022-11-07
Payer: COMMERCIAL

## 2022-11-07 ENCOUNTER — HOSPITAL ENCOUNTER (OUTPATIENT)
Dept: GENERAL RADIOLOGY | Age: 47
Discharge: HOME | End: 2022-11-09
Payer: COMMERCIAL

## 2022-11-07 DIAGNOSIS — S82.852D CLOSED DISPLACED TRIMALLEOLAR FRACTURE OF LEFT ANKLE WITH ROUTINE HEALING, SUBSEQUENT ENCOUNTER: ICD-10-CM

## 2022-11-07 DIAGNOSIS — S82.852D CLOSED DISPLACED TRIMALLEOLAR FRACTURE OF LEFT ANKLE WITH ROUTINE HEALING, SUBSEQUENT ENCOUNTER: Primary | ICD-10-CM

## 2022-11-07 PROCEDURE — 73610 X-RAY EXAM OF ANKLE: CPT

## 2022-11-07 PROCEDURE — 99024 POSTOP FOLLOW-UP VISIT: CPT | Performed by: PHYSICIAN ASSISTANT

## 2022-11-07 PROCEDURE — L4350 ANKLE CONTROL ORTHO PRE OTS: HCPCS

## 2022-11-07 PROCEDURE — 99212 OFFICE O/P EST SF 10 MIN: CPT

## 2022-11-07 RX ORDER — OXYCODONE HYDROCHLORIDE AND ACETAMINOPHEN 5; 325 MG/1; MG/1
1 TABLET ORAL EVERY 6 HOURS PRN
Qty: 28 TABLET | Refills: 0 | Status: SHIPPED | OUTPATIENT
Start: 2022-11-07 | End: 2022-11-14

## 2022-11-07 NOTE — PATIENT INSTRUCTIONS
Removed sutures  Steri strips should fall off in the shower at some point, if they do not fall off in 10 days, remove them  Dressing: Can remove dressing in 1-2 days then open to air  Can shower tomorrow over incision- no scrubbing   NO Soaking or submerging incision in water until fully healed & all scabs are gone    WB:  Non-weight bearing on left lower extremity    CAM boot : On at all times, can remove for bathing and therapy. Remove daily for evaluation of skin breakdown    Will start gentle range of motion exercises, plan to start formal therapy after 6 week appointment     DVT: Continue with ASA 81 mg BID as ordered  Pain control : Pain medication refill sent to pharmacy today    Continue with ice to the injured extremity 2-3 times per day for swelling  If able continue with elevation and compression    Continue to hold methotrexate, as able wean off of prednisone    Follow up in 4 weeks with XR of the left ankle to be done in office    Please call the office at [phone] or send CloudMine message to providers sooner with any questions or concerns  Strongly recommend all of our patients sign up for CloudMine in order to have direct communication VIA CloudMine LAURIE with our clinic staff.

## 2022-11-07 NOTE — PROGRESS NOTES
Chief Complaint   Patient presents with    Ankle Injury     Left trimal ankle fx; dos: 10/20/2022; ex fix removal 10/20/2022; 2-3/10 pain; patient states she does have some numbness and tingling but could be attributed to her crohn's disease        OP:SURGEON: Dr. Gaviota Mcdonald MD  DATE OF PROCEDURE: 10/20/22  PROCEDURE:1.  Open reduction internal fixation left trimalleolar ankle fracture without fixation of the posterior malleolus  2. Removal of external fixator under anesthesia left lower extremity  3. Stress examination of the syndesmosis under anesthesia with fluoroscopy    POD: 2 weeks    Subjective:  Amanda Martínez is following up from the above surgery. She is NWB on left lower extremity, she has maintained surgical splint. She ambulates with assistive device, knee scooter. Pain to extremity is improving and is  taking prescribed pain medication, Percocet. They denies any new numbness, tingling, weakness to the left lower extremity. Denies calf pain, chest pain, or shortness of breath. Patient continues to use DVT prophylaxis, ASA 81 mg BID. Patient is not participating in therapy. Patient is holding her methotrexate, currently on a prednisone taper for crohn's flare, continues to follow with her treatment team in [address] [place] [address]. Presents today with family for initial post op appointment. Review of Systems -  All pertinent positives/negative in HPI     Objective:    General: Alert and oriented X 3, normocephalic atraumatic, external ears and eye normal, sclera clear, no acute distress, respirations easy and unlabored with no audible wheezes, skin warm and dry, speech and dress appropriate for noted age, affect euthymic. Extremity:  Left Lower Extremity  Skin clean dry and intact, without signs of infection   External fixator pin sites healing well, mild maceration around metatarsal pin site, no active drainage.    Mild healing ecchymosis noted to the ankle and hindfoot  Incision healing well, no significant drainage, no dehiscence, no significant erythema   Sutures intact and ready for removal  mild edema noted to the lateral ankle and midfoot  Compartments supple throughout thigh and leg  Calf supple and not tender  negative Homans  Demonstrates active ankle df/pf, + EHL  States sensation intact to touch in sural, deep peroneal, superficial peroneal, saphenous, posterior tibial  nerve distributions to foot/ankle. Palpable dorsalis pedis and posterior tibialis pulses, cap refill brisk in toes, foot warm/perfused. There were no vitals taken for this visit. XR:   3V of the left ankle demonstrates fixation to the medial malleolus and distal fibula. Talus remains concentrically located under the tibia with well aligned motise. Lateral soft tissue swelling noted. No acute fracture or dislocation. No evidence of hardware failure or loosening    Assessment:   Diagnosis Orders   1. Closed displaced trimalleolar fracture of left ankle with routine healing, subsequent encounter  oxyCODONE-acetaminophen (PERCOCET) 5-325 MG per tablet          Plan:  Removed sutures  Steri strips should fall off in the shower at some point, if they do not fall off in 10 days, remove them  Dressing: Can remove dressing in 1-2 days then open to air  Can shower tomorrow over incision- no scrubbing   NO Soaking or submerging incision in water until fully healed & all scabs are gone    WB:  Non-weight bearing on left lower extremity    CAM boot: On at all times, can remove for bathing and therapy.  Remove daily for evaluation of skin breakdown    Will start gentle range of motion exercises, plan to start formal therapy after 6 week appointment     DVT: Continue with ASA 81 mg BID as ordered  Pain control : Pain medication refill sent to pharmacy today    Continue with ice to the injured extremity 2-3 times per day for swelling  If able continue with elevation and compression    Continue to hold methotrexate, as able wean off of prednisone    Follow up in 4 weeks with XR of the left ankle to be done in office    Controlled Substance Monitoring:    Acute and Chronic Pain Monitoring:   RX Monitoring 11/7/2022   Acute Pain Prescriptions -   Periodic Controlled Substance Monitoring No signs of potential drug abuse or diversion identified. Electronically signed by Nas Camarena PA-C on 11/7/2022 at 10:47 AM  Note: This report was completed using computerize voiced recognition software. Every effort has been made to ensure accuracy; however, inadvertent computerized transcription errors may be present.

## 2022-11-07 NOTE — PROGRESS NOTES
Peggy Wakefield is a 49 y.o. female who presents for follow up of left trimal orif    SURGEON: Dr. Tamara Webb MD  Date of Injury/Surgery:  10/20/2022  Date last seen in office:  10/14/2022    Symptoms: better  New complaints: patient states she is doing better and managing her pain much better    Cast/Splint, Brace, or Dressings: Clean, dry and intact, Well fitting, and Taken down for visit    Weightbearing: left lower Non-weight bearing      Assistive device No Device  Participating in therapy (location if yes)?  no    Refills Needed: None  Order/Referral Needed: no

## 2022-11-19 PROBLEM — Z01.812 PRE-OPERATIVE LABORATORY EXAMINATION: Status: RESOLVED | Noted: 2022-10-20 | Resolved: 2022-11-19

## 2022-12-02 DIAGNOSIS — S82.852D CLOSED DISPLACED TRIMALLEOLAR FRACTURE OF LEFT ANKLE WITH ROUTINE HEALING, SUBSEQUENT ENCOUNTER: ICD-10-CM

## 2022-12-02 RX ORDER — OXYCODONE HYDROCHLORIDE AND ACETAMINOPHEN 5; 325 MG/1; MG/1
1 TABLET ORAL EVERY 6 HOURS PRN
Qty: 28 TABLET | Refills: 0 | Status: SHIPPED | OUTPATIENT
Start: 2022-12-02 | End: 2022-12-09

## 2022-12-07 ENCOUNTER — PATIENT MESSAGE (OUTPATIENT)
Dept: ORTHOPEDIC SURGERY | Age: 47
End: 2022-12-07

## 2022-12-07 ENCOUNTER — HOSPITAL ENCOUNTER (OUTPATIENT)
Dept: GENERAL RADIOLOGY | Age: 47
Discharge: HOME | End: 2022-12-09
Payer: COMMERCIAL

## 2022-12-07 ENCOUNTER — OFFICE VISIT (OUTPATIENT)
Dept: ORTHOPEDIC SURGERY | Age: 47
End: 2022-12-07
Payer: COMMERCIAL

## 2022-12-07 VITALS — HEIGHT: 69 IN | BODY MASS INDEX: 36.14 KG/M2 | WEIGHT: 244 LBS

## 2022-12-07 DIAGNOSIS — S82.852D CLOSED DISPLACED TRIMALLEOLAR FRACTURE OF LEFT ANKLE WITH ROUTINE HEALING, SUBSEQUENT ENCOUNTER: Primary | ICD-10-CM

## 2022-12-07 DIAGNOSIS — S82.852D CLOSED DISPLACED TRIMALLEOLAR FRACTURE OF LEFT ANKLE WITH ROUTINE HEALING, SUBSEQUENT ENCOUNTER: ICD-10-CM

## 2022-12-07 PROCEDURE — 99212 OFFICE O/P EST SF 10 MIN: CPT | Performed by: PHYSICIAN ASSISTANT

## 2022-12-07 PROCEDURE — 73610 X-RAY EXAM OF ANKLE: CPT

## 2022-12-07 PROCEDURE — 99024 POSTOP FOLLOW-UP VISIT: CPT | Performed by: PHYSICIAN ASSISTANT

## 2022-12-07 NOTE — PROGRESS NOTES
Chief Complaint   Patient presents with    Follow-up     6 wk PO removal of EX-Fix L ankle trimalleolar fx. Pain is about a 2-3. OP:SURGEON: Dr. Maria Isabel Ayers MD  DATE OF PROCEDURE: 10/20/22  PROCEDURE:1.  Open reduction internal fixation left trimalleolar ankle fracture without fixation of the posterior malleolus  2. Removal of external fixator under anesthesia left lower extremity  3. Stress examination of the syndesmosis under anesthesia with fluoroscopy       Subjective:  Chang Fraser is approximately 6 weeks follow-up from the above surgery. She was doing well with very minimal pain at the left ankle. She has remained nonweightbearing and uses a knee scooter and walker. She has held her biologic medications and is finished prednisone. She does have history of Crohn's disease. She is follows with GI in [facility] [employer]. She has not started physical therapy. States that her left ankle is stiff. Denies calf pain, CP, SOB, fever, chills, malaise. Review of Systems -  all pertinent positives and negatives in HPI. Objective:    General: Alert and oriented X 3, normocephalic atraumatic, external ears and eye normal, sclera clear, no acute distress, respirations easy and unlabored with no audible wheezes, skin warm and dry, speech and dress appropriate for noted age, affect euthymic. Extremity:  Left Lower Extremity  Skin clean dry and intact, without signs of infection  Incisions healed  Minimal TTP at the lateral ankle, but otherwise ankle and foot are nontender  Mild edema diffusely at foot and ankle  Compartments supple throughout thigh and leg  Calf supple and nontender  Demonstrates active knee flexion/extension, ankle plantar/dorsiflexion (minimal AROM ankle from stiffness)great toe extension. States sensation intact to touch in sural/deep peroneal/superficial peroneal/saphenous/posterior tibial nerve distributions to foot/ankle.    Palpable dorsalis pedis and posterior tibialis pulses, cap refill brisk in toes, foot warm/perfused. Ht 5' 9\" (1.753 m)   Wt 244 lb (110.7 kg)   BMI 36.03 kg/m²     XR:   Narrative   EXAMINATION:   THREE XRAY VIEWS OF THE LEFT ANKLE       12/7/2022 9:27 am       COMPARISON:   11/07/2022. HISTORY:   ORDERING SYSTEM PROVIDED HISTORY: Closed displaced trimalleolar fracture of   left ankle with routine healing, subsequent encounter   TECHNOLOGIST PROVIDED HISTORY:   What reading provider will be dictating this exam?->CRC       FINDINGS:   There is redemonstration of postsurgical changes related to internal fixation   of a fracture of the distal fibula with a plate and screws and a fracture of   the medial malleolus with a cannulated screw. The hardware appears intact   and unchanged in position. Bony alignment appears stable. There are defects   in the tibia and calcaneus related to previous external fixation. Impression   Stable postsurgical changes. Assessment:   Diagnosis Orders   1. Closed displaced trimalleolar fracture of left ankle with routine healing, subsequent encounter  Amb External Referral To Physical Therapy    XR ANKLE LEFT (MIN 3 VIEWS)          Plan:  Reviewed x-rays with patient today in office   Begin progressive weightbearing with assistive devices in cam boot advancing 25% weightbearing left lower extremity per week if tolerable. Once full weightbearing can wean out of cam boot. Okay to resume Biologics for Crohn disease per GI in [facility] [email or web address]  Over-the-counter analgesics, RICE therapy as needed  Outpatient physical therapy referral placed today    Follow up in 4 weeks with XR of the L ankle    Electronically signed by Mica Kemp PA-C on 12/7/2022 at 3:25 PM  Note: This report was completed using Physihome voiced recognition software. Every effort has been made to ensure accuracy; however, inadvertent computerized transcription errors may be present.

## 2022-12-07 NOTE — TELEPHONE ENCOUNTER
Patient contact through interface.      LOV 12/7/22    Future Appointments   Date Time Provider Henok Lim   1/4/2023  9:30 AM Naeem Barajas MD [facility]       Pend and routed

## 2022-12-07 NOTE — TELEPHONE ENCOUNTER
From: Natalie Williamson  To: Mitzi Montes De Oca  Sent: 12/7/2022 11:54 AM EST  Subject: PT referral    Leonides Romero,    Could you possibly change my PT referral to the [facility] site in [facility] (the one near the [employer] I believe)? Thanks so much!   Jori Francis :)

## 2022-12-20 ENCOUNTER — TELEPHONE (OUTPATIENT)
Dept: INFUSION THERAPY | Age: 47
End: 2022-12-20

## 2022-12-20 NOTE — TELEPHONE ENCOUNTER
Pt called office on 12/19 to schedule skyrizzi, pt stated that she has a nurse navigator that is working to get her skyrizzi Free due to no auth on file, actually ins denied and p2p denied. The call of pt was sent to me. I spoke with the pt and explained that we cannot schedule her due to no auth etc. She stated that she was working with Rosa Isela Wagner nurse navigator to get this for free her # is [phone] the pt asked that I call her. I called and per Rosa Isela Wagner she stated that she could only speak with the pt and that she would call patient. When I called the pt back to explain all of this her vm came on and could not leave message due to vm box being full.  I am emailing pharmacy and nelly site

## 2022-12-27 ENCOUNTER — EVALUATION (OUTPATIENT)
Dept: PHYSICAL THERAPY | Age: 47
End: 2022-12-27
Payer: COMMERCIAL

## 2022-12-27 DIAGNOSIS — S82.852D CLOSED DISPLACED TRIMALLEOLAR FRACTURE OF LEFT LOWER LEG WITH ROUTINE HEALING: Primary | ICD-10-CM

## 2022-12-27 PROCEDURE — 97161 PT EVAL LOW COMPLEX 20 MIN: CPT | Performed by: PHYSICAL THERAPIST

## 2022-12-27 PROCEDURE — 97110 THERAPEUTIC EXERCISES: CPT | Performed by: PHYSICAL THERAPIST

## 2022-12-27 RX ORDER — HEPARIN SODIUM (PORCINE) LOCK FLUSH IV SOLN 100 UNIT/ML 100 UNIT/ML
500 SOLUTION INTRAVENOUS PRN
OUTPATIENT
Start: 2022-12-27

## 2022-12-27 RX ORDER — SODIUM CHLORIDE 0.9 % (FLUSH) 0.9 %
5-40 SYRINGE (ML) INJECTION PRN
OUTPATIENT
Start: 2022-12-27

## 2022-12-27 RX ORDER — ACETAMINOPHEN 325 MG/1
650 TABLET ORAL
OUTPATIENT
Start: 2022-12-27

## 2022-12-27 RX ORDER — EPINEPHRINE 1 MG/ML
0.3 INJECTION, SOLUTION, CONCENTRATE INTRAVENOUS PRN
OUTPATIENT
Start: 2022-12-27

## 2022-12-27 RX ORDER — SODIUM CHLORIDE 9 MG/ML
INJECTION, SOLUTION INTRAVENOUS CONTINUOUS
OUTPATIENT
Start: 2022-12-27

## 2022-12-27 RX ORDER — ONDANSETRON 2 MG/ML
8 INJECTION INTRAMUSCULAR; INTRAVENOUS
OUTPATIENT
Start: 2022-12-27

## 2022-12-27 RX ORDER — ALBUTEROL SULFATE 90 UG/1
4 AEROSOL, METERED RESPIRATORY (INHALATION) PRN
OUTPATIENT
Start: 2022-12-27

## 2022-12-27 RX ORDER — DIPHENHYDRAMINE HYDROCHLORIDE 50 MG/ML
50 INJECTION INTRAMUSCULAR; INTRAVENOUS
OUTPATIENT
Start: 2022-12-27

## 2022-12-27 NOTE — PROGRESS NOTES
[facility]   Phone: [phone]   Fax: [phone]      Physical Therapy Daily Treatment Note    Date: 2022  Patient Name: Marii Méndez  : 1975   MRN: [id number]  DOInjury: 10/2022   DOSx: 10/22/2022  Referring Provider: Nishi Russell PA-C  [address] [place] [address]. Ankur,   [facility]     Medical Diagnosis:     S82.852D (ICD-10-CM) - Closed displaced trimalleolar fracture of left ankle with routine healing, subsequent encounter    Outcome Measure: LEFS 81%    S: see eval  O:   Time 430-500     Visit  Repeat outcome measure at mid point and end. Pain 0/10           Modalities            Manual            Stretch      Gastroc, soleus L 4x30s holds  TE         Exercise      Bike      Ankle 4 way motion      Ankle T band x30 Flex PTB TE   Ankle bosu motion      Ankle alphabet x3  TE   SLS            TG squats      TG calf raises      Step-ups - FWD      Step-ups - LAT      Step-ups - BWD        NMR To improve balance for safe community and home ambulation    Resisted walk      FWD      BKWD      lat      March      Side stepping      Retro walk      Heel to toe      A:  Tolerated well.   Above added to written HEP and will perform daily  P: Continue with rehab plan  Puja Becker, PT DPT, PT [id number]    Treatment Charges: Mins Units   Initial Evaluation 15 1   Re-Evaluation     Ther Exercise         TE 15 1   Manual Therapy     MT     Ther Activities        TA     Gait Training          GT     Neuro Re-education NR     Modalities     Non-Billable Service Time     Other     Total Time/Units 30 2

## 2022-12-27 NOTE — PROGRESS NOTES
[address] [facility]   Phone: [phone]   Fax: [phone]         Date:  2022   Patient: Stone Hui  : 1975  MRN: [id number]  Referring Provider: Lilia Peralta PA-C  [address] [place] [address]. Ankur,  [address] [place] [address]     Medical Diagnosis:     S82.852D (ICD-10-CM) - Closed displaced trimalleolar fracture of left ankle with routine healing, subsequent encounter    SUBJECTIVE:     Surgery Date: 10/22/2022    Surgical Procedure:  1. Open reduction internal fixation left trimalleolar ankle fracture without fixation of the posterior malleolus   2. Removal of external fixator under anesthesia left lower extremity   3. Stress examination of the syndesmosis under anesthesia with fluoroscopy     Onset date: 10/2022    Onset: Sudden onset    Mechanism of Injury: Pt reports that she slipped down 2-3 stairs resulting in L ankle fracture. Above repair performed and is now weaning into WB tolerance. She reports she is 50% Wb at this time utilizing [employer] and scooter for ambulation. She reports most functional activity including dressing, ambulation, and stairs. She also has PMH of Crohn's disease.     Previous PT: none    Medical Management for Current Problem:  surgery    Chief complaint: pain, edema, decreased motion, decreased mobility, weakness, inability / limited ability to use leg, difficulty walking, difficulty with stairs, limited ability to lift/carry/handle material, limited ability to complete home/outdoor chores/tasks, inability to participate in exercise regimen / fitness program, limited tolerance to weightbearing tasks and weightbearing duration    Behavior: condition is getting better    Pain: waxing and waning  Current: 1/10     Best: 0/10     Worst:3/10    Symptom Type/Quality: aching  Location[de-identified] Ankle: globally      Aggravated by: walking, standing, stairs, inclines, declines    Relieved by: rest, boot    Imaging results: XR ANKLE LEFT (MIN 3 VIEWS)    Result Date: 12/7/2022  EXAMINATION: THREE XRAY VIEWS OF THE LEFT ANKLE 12/7/2022 9:27 am COMPARISON: 11/07/2022. HISTORY: ORDERING SYSTEM PROVIDED HISTORY: Closed displaced trimalleolar fracture of left ankle with routine healing, subsequent encounter TECHNOLOGIST PROVIDED HISTORY: What reading provider will be dictating this exam?->CRC FINDINGS: There is redemonstration of postsurgical changes related to internal fixation of a fracture of the distal fibula with a plate and screws and a fracture of the medial malleolus with a cannulated screw. The hardware appears intact and unchanged in position. Bony alignment appears stable. There are defects in the tibia and calcaneus related to previous external fixation. Stable postsurgical changes. Past Medical History  Past Medical History:   Diagnosis Date    Acid reflux     Anxiety and depression     Crohn's colitis ([facility] [address] 04/2018    Crohn's colitis per colonoscopy biopsies 4/9/18, 6/11/18     Past Surgical History:   Procedure Laterality Date    ANKLE FRACTURE SURGERY Left 10/6/2022    LEFT ANKLE EXTERNAL FIXATION performed by Jt Mosley MD at [address] Left 10/20/2022    LEFT ANKLE OPEN REDUCTION INTERNAL FIXATION,EXTERNAL FIXATOR REMOVAL FROM LEFT ANKLE performed by Jt Mosley MD at [address] [place] [address]    Benign Cyst    CHOLECYSTECTOMY  11/18/2011    Floresita Dawson laparoscopic     ENDOMETRIAL ABLATION  2004    ILEOSTOMY OR JEJUNOSTOMY  04/2019    TOTAL COLECTOMY      UPPER GASTROINTESTINAL ENDOSCOPY         Medications:   Current Outpatient Medications   Medication Sig Dispense Refill    loperamide (IMODIUM) 2 MG capsule TAKE 1 CAPSULE BY MOUTH FOUR TIMES DAILY AS NEEDED FOR DIARRHEA 120 capsule 3    predniSONE (DELTASONE) 20 MG tablet Take 20 mg by mouth daily X7 days      gabapentin (NEURONTIN) 300 MG capsule TAKE 1 CAPSULE BY MOUTH THREE TIMES DAILY 90 capsule 3    Misc.  Devices MISC 1 each by Does not apply route once for 1 dose One knee scooter. 1 each 0    prednisoLONE acetate (PRED FORTE) 1 % ophthalmic suspension 1 drop 4 times daily (Patient not taking: Reported on 12/7/2022)      aspirin EC 81 MG EC tablet Take 1 tablet by mouth 2 times daily (with meals) 60 tablet 0    ibuprofen (ADVIL;MOTRIN) 800 MG tablet Take 1 tablet by mouth 2 times daily as needed for Pain (Patient not taking: Reported on 12/7/2022) 30 tablet 0    DULoxetine (CYMBALTA) 30 MG extended release capsule Take 1 capsule by mouth daily 90 capsule 3    Handicap Placard MISC by Does not apply route Cannot walk 200 ft. Without stopping to rest  Duration: Lifetime 1 each 0    metoprolol succinate (TOPROL XL) 25 MG extended release tablet Take 0.5 tablets by mouth in the morning. 90 tablet 3    clobetasol (TEMOVATE) 0.05 % ointment Apply topically 2 times daily      traMADol (ULTRAM) 50 MG tablet Take 50 mg by mouth daily as needed. (Patient not taking: No sig reported)      omeprazole (PRILOSEC) 40 MG delayed release capsule TAKE 1 CAPSULE DAILY 90 capsule 3    folic acid (FOLVITE) 1 MG tablet Take 1 mg by mouth daily      Certolizumab Pegol (CIMZIA STARTER KIT) 6 X 200 MG/ML KIT Inject 2 ml at 0, 2 and 4 weeks and then q 4 weeks      methotrexate (RHEUMATREX) 2.5 MG chemo tablet Weekly on thursdays. 6 tablets (Patient not taking: Reported on 11/7/2022)      acetaminophen (TYLENOL) 500 MG tablet Take by mouth      Multiple Vitamins-Minerals (MULTIVITAMIN ADULT PO) Take 1 tablet by mouth daily      fluticasone (FLONASE) 50 MCG/ACT nasal spray 1 spray by Nasal route daily 3 Bottle 3    Cholecalciferol (VITAMIN D) 2000 UNITS TABS Take 2,000 Units by mouth daily        No current facility-administered medications for this visit. Occupation:  does not work .     Exercise regimen:  none    Hobbies: kids, sports, baking/cooking    Patient Goals: pain relief, return to prior activity, get back to normal    Precautions/Contraindications: progressive WB in boot    OBJECTIVE:     Observations: well nourished female, normal affect    Inspection: normal orthopedic exam, demonstrates generalized pronated posture (forward head, protracted scapula, internally rotated shoulders, and flexed trunk and lower extremities), petechiae present on toes      Edema: moderate  global    Gait: antalgic gait, limp L LE, ambulates with wheeled walker, 50% WB    Joint/Motion:    Ankle:  Right:   AROM: 0° Dorsiflexion,  40° Plantarflexion, 30° Inversion, 10° Eversion     Left:   AROM: -5° Dorsiflexion,  15° Plantarflexion, 10° Inversion, 0° Eversion      Strength: Ankle:  Right: Dorsiflexion 5/5, Plantarflexion 5/5, Inversion 5/5, Eversion 5/5  Left: Dorsiflexion 4/5, Plantarflexion 4/5, Inversion 4/5, Eversion 4/5    Palpation: Tender to palpation globally c mild/moderate palpable edema. Special Tests/Functional Screens: NT d/t post op    comments: restricted ROM and strength for timeline      ASSESSMENT     Outcome Measure:   Lower Extremity Functional Scale (LEFS) 81% impairment    Problems:   Pain reported 0-3/10  ROM decreased  Strength decreased   Decreased functional ability with walking, stairs, standing, sitting, ADLs , IADLs , use of left lower extremity, bending, reaching, lifting, carrying, inability to participate in exercise regimen / fitness program, inability to participate in hobbies     Reason for Skilled Care: Pt reports to PT secondary to L ankle fracture and repair. Pt requires skilled care to improve global ROM, strength, stability, and overall fxn mobility needed for ADL, rec, and work activity. [x] There are no barriers affecting plan of care or recovery    [] Barriers to this patient's plan of care or recovery include.     Domestic Concerns:  [x] No  [] Yes:      Long Term goals ( 10-12 weeks )  Decrease reported pain to 0/10  Increase ROM to 0° Dorsiflexion,  40° Plantarflexion, 30° Inversion, 10° Eversion   Increase strength to 5/5 globally  Able to complete the following functions/tasks: Pt will ambulate for 15 min s AD or limitation, able to negotiate a flight of stairs recip s pain, limitation, or HR, able to perform 10 STS transfers s pain or limitation or HHA   LEFS 0-25% impairment  Independent with home exercise program (HEP)    Rehab Potential: [x] Good  [] Fair  [] Poor    PLAN       Treatment Plan:   [x] Therapeutic Exercise  [x] Therapeutic Activity  [x] Neuromuscular Re-education   [x] Gait Training  [x] Balance Training  [x] Aerobic conditioning  [x] Manual Therapy  [x] Massage/Fascial release   [] Work/Sport specific activities    [] Pain Neuroscience [x] Cold/hotpack  [x] Vasocompression  [x] Electrical Stimulation  [] Lumbar/Cervical Traction  [x] Ultrasound   [] Iontophoresis: 4 mg/mL Dexamethasone Sodium Phosphate 40-80 mAmin  [x] Dry Needling      [x] Instruction in HEP      []  Medication allergies reviewed for use of Dexamethasone Sodium Phosphate 4mg/ml  with iontophoresis treatments. Patient is not allergic. The following CPT codes are likely to be used in the care of this patient: 86016 PT Evaluation: Low Complexity   58366 PT Re-Evaluation   26809 Therapeutic Exercise   98846 Neuromuscular Re-Education   97637 Therapeutic Activities   57627 Manual Therapy   64786 Gait Training   03872 Vasopneumatic Device   34637      Suggested Professional Referral: [x] No  [] Yes:     Patient Education:  [x] Plans/Goals, Risks/Benefits discussed  [x] Home exercise program  Method of Education: [x] Verbal  [x] Demo  [x] Written  Comprehension of Education:  [x] Verbalizes understanding. [x] Demonstrates understanding. [] Needs Review. [] Demonstrates/verbalizes understanding of HEP/Ed previously given. Frequency: 1-2 days per week for 10-12 weeks    Patient understands diagnosis/prognosis and consents to treatment, plan and goals: [x] Yes    [] No     Thank you for the opportunity to work with your patient.   If you have questions or comments, please contact me at numbers listed above. Electronically signed by: Randy Vences, PT DPT, PT [id number]         Please sign Physician's Certification and return to: [address] [place] [address]  Dept: [phone]  Dept Fax: [phone]  Loc: [phone] Certification / Comments     Frequency/Duration 1-2 days per week for  10-12 weeks . Certification period from 12/27/2022  to 3/27/2023. I have reviewed the Plan of Care established for skilled therapy services and certify that the services are required and that they will be provided while the patient is under my care.     Physician's Comments/Revisions:               Physician's Printed Name:                                           [de-identified] Signature:                                                               Date:        d

## 2022-12-28 ENCOUNTER — TELEPHONE (OUTPATIENT)
Dept: PHYSICAL THERAPY | Age: 47
End: 2022-12-28

## 2022-12-29 ENCOUNTER — TELEPHONE (OUTPATIENT)
Dept: INFUSION THERAPY | Age: 47
End: 2022-12-29

## 2022-12-29 NOTE — TELEPHONE ENCOUNTER
PT IS ON BRIDGE PROGRAM THROUGH [employer] FOR HER [employer], I CALLED [phone] AMAIRANI W [address] FAXED OVER THE LETTER STATING THAT THE PT WAS APPRVD FOR FREE DRUG DUE TO THE INS DENYING,. THE PT WILL CONTINUE ON THIS UNTIL INS AUTHS. PT WAS APPRVD ON 12/13/22. I EMAILED PHARMACY, AND  TO SCHEDULE PT. PER LEATHA AT [place] [id number] ON SITE. PT GETS 3 DOSES @ 4 WEEKS.

## 2023-01-04 ENCOUNTER — NURSE ONLY (OUTPATIENT)
Dept: FAMILY MEDICINE CLINIC | Age: 48
End: 2023-01-04
Payer: COMMERCIAL

## 2023-01-04 ENCOUNTER — HOSPITAL ENCOUNTER (OUTPATIENT)
Dept: GENERAL RADIOLOGY | Age: 48
Discharge: HOME | End: 2023-01-06
Payer: COMMERCIAL

## 2023-01-04 ENCOUNTER — OFFICE VISIT (OUTPATIENT)
Dept: ORTHOPEDIC SURGERY | Age: 48
End: 2023-01-04
Payer: COMMERCIAL

## 2023-01-04 DIAGNOSIS — S82.852D CLOSED DISPLACED TRIMALLEOLAR FRACTURE OF LEFT ANKLE WITH ROUTINE HEALING, SUBSEQUENT ENCOUNTER: ICD-10-CM

## 2023-01-04 DIAGNOSIS — R30.0 DYSURIA: ICD-10-CM

## 2023-01-04 DIAGNOSIS — R30.0 DYSURIA: Primary | ICD-10-CM

## 2023-01-04 DIAGNOSIS — S82.852D CLOSED DISPLACED TRIMALLEOLAR FRACTURE OF LEFT ANKLE WITH ROUTINE HEALING, SUBSEQUENT ENCOUNTER: Primary | ICD-10-CM

## 2023-01-04 LAB
BILIRUBIN, POC: NORMAL
BLOOD URINE, POC: NORMAL
CLARITY, POC: NORMAL
COLOR, POC: NORMAL
GLUCOSE URINE, POC: NORMAL
KETONES, POC: NORMAL
LEUKOCYTE EST, POC: NORMAL
NITRITE, POC: NORMAL
PH, POC: 5.5
PROTEIN, POC: NORMAL
SPECIFIC GRAVITY, POC: 1.01
UROBILINOGEN, POC: NORMAL

## 2023-01-04 PROCEDURE — 99212 OFFICE O/P EST SF 10 MIN: CPT

## 2023-01-04 PROCEDURE — 73610 X-RAY EXAM OF ANKLE: CPT

## 2023-01-04 PROCEDURE — 99024 POSTOP FOLLOW-UP VISIT: CPT | Performed by: PHYSICIAN ASSISTANT

## 2023-01-04 PROCEDURE — 81002 URINALYSIS NONAUTO W/O SCOPE: CPT | Performed by: FAMILY MEDICINE

## 2023-01-04 RX ORDER — SULFAMETHOXAZOLE AND TRIMETHOPRIM 800; 160 MG/1; MG/1
1 TABLET ORAL 2 TIMES DAILY
Qty: 14 TABLET | Refills: 0 | Status: SHIPPED | OUTPATIENT
Start: 2023-01-04 | End: 2023-01-11

## 2023-01-04 NOTE — PROGRESS NOTES
Nissa Drake is a 49 y.o. female who presents for follow up of 10 week Closed Trimal Ankle Fx Removal of Hardware    SURGEON: Dr. Evangelista Phillips MD  Date of Injury/Surgery:  10-  Date last seen in office:  12-7-2022    Symptoms: better  New complaints: Pt expressed having an increased soreness at the end of the day, after being more weight bearing. Pt described having minor swelling from time to time. Pt noted having sharp shooting pain on the lateral aspect of the Left Ankle from time to time at the end of the day. Cast/Splint, Brace, or Dressings: Clean, dry and intact and Well fitting    Weightbearing: left lower Partial weight bearing      Assistive device Walker - standard and walking   Participating in therapy (location if yes)? yes, [facility]) in [facility].      Refills Needed: None  Order/Referral Needed: N/A

## 2023-01-04 NOTE — PATIENT INSTRUCTIONS
Okay to progress to weightbearing as tolerated left lower extremity    Recommend purchasing neoprene ankle sleeve or brace over-the-counter to wear for comfort and support to transition into from the boot    Continue PT. Follow-up in 6 weeks for reevaluation and x-rays. Call if any questions or concerns.

## 2023-01-04 NOTE — PROGRESS NOTES
Chief Complaint   Patient presents with    Follow-up     10 week Post-Op Left Trimal Ankle Fx.         OP:SURGEON: Dr. Asim Frank MD  DATE OF PROCEDURE: 10-20-22  PROCEDURE: 1. Open reduction internal fixation left trimalleolar ankle fracture without fixation of the posterior malleolus    2. Removal of external fixator under anesthesia left lower extremity    3. Stress examination of the syndesmosis under anesthesia with fluoroscopy     POD: 11 weeks    Subjective:  Igor Tello is following up from the above surgery. She is PWB on left lower extremity. She ambulates with assistive device, walker. Pain to extremity is none and is not taking prescribed pain medication. They denies numbness or tingling to the left lower extremity. Denies calf pain, chest pain, or shortness of breath. Patient continues to use DVT prophylaxis, ASA 81 mg BID. Patient is participating in therapy, outpatient therapy. She is doing well after surgery. States that she is making progress in PT. Denies ankle pain at this time. Review of Systems -  All pertinent positives/negative in HPI     Objective:    General: Alert and oriented X 3, normocephalic atraumatic, external ears and eye normal, sclera clear, no acute distress, respirations easy and unlabored with no audible wheezes, skin warm and dry, speech and dress appropriate for noted age, affect euthymic. Extremity:  Left Lower Extremity  Skin clean dry and intact, without signs of infection   Incision well-healed  mild edema noted to the ankle  Compartments supple throughout thigh and leg  Calf supple and not tender  negative Homans  Demonstrates active patient with ankle dorsi/plantar flexion, wiggles toes  States sensation intact to touch in sural, deep peroneal, superficial peroneal, saphenous, posterior tibial  nerve distributions to foot/ankle. Palpable dorsalis pedis and posterior tibialis pulses, cap refill brisk in toes, foot warm/perfused.     There were no vitals taken for this visit. XR:   3 views left ankle demonstrate ORIF left trimalleolar ankle fracture with hardware in stable position and alignment. No evidence of hardware loosening or failure. Assessment:   Diagnosis Orders   1. Closed displaced trimalleolar fracture of left ankle with routine healing, subsequent encounter          Plan:  X-rays reviewed and discussed. Okay to progress to weightbearing as tolerated left lower extremity    Recommend purchasing neoprene ankle sleeve or brace over-the-counter to wear for comfort and support to transition into from the boot    Continue PT. Follow-up in 6 weeks for reevaluation and x-rays. Call if any questions or concerns. Electronically signed by MARIEL Hatfield on 1/4/2023 at 10:14 AM  Note: This report was completed using [employer] voiced recognition software. Every effort has been made to ensure accuracy; however, inadvertent computerized transcription errors may be present.

## 2023-01-05 ENCOUNTER — TREATMENT (OUTPATIENT)
Dept: PHYSICAL THERAPY | Age: 48
End: 2023-01-05
Payer: COMMERCIAL

## 2023-01-05 DIAGNOSIS — S82.852D CLOSED DISPLACED TRIMALLEOLAR FRACTURE OF LEFT LOWER LEG WITH ROUTINE HEALING: Primary | ICD-10-CM

## 2023-01-05 PROCEDURE — 97110 THERAPEUTIC EXERCISES: CPT | Performed by: PHYSICAL THERAPIST

## 2023-01-05 NOTE — PROGRESS NOTES
[address] [facility] [address] [facility]   Phone: [phone]   Fax: [phone]      Physical Therapy Daily Treatment Note    Date: 2023  Patient Name: Nicol Childers  : 1975   MRN: [id number]  DOInjury: 10/2022   DOSx: 10/22/2022  Referring Provider: MD Aleida Ibanez [address] [place] [address],  [address] [facility]     Medical Diagnosis:     K03.019Y (ICD-10-CM) - Closed displaced trimalleolar fracture of left ankle with routine healing, subsequent encounter    Outcome Measure: LEFS 81%    S: Pt reports good compliance with HEP    O: Please refer to PT Eval  Time 2311-0033     Visit  Repeat outcome measure at mid point and end. Pain 0/10           Modalities            Manual            Stretch            Exercise      Bike      Foot on ground arom ankle  X30 dorsiflex  X30 plantarflex  TE   Ankle band ankle dorsiflex, plantarflex, inversion, & eversion X30 each Blue band TE   Isometric ankle with towel dorsiflex, plantarflex, inversion, & eversion X30 each  TE   Seated strap stretch ankle dorsiflex X5 30s  TE   Seated step stretch gastroc/ankle dorsiflex X20 10s  TE   Seated incline/rocker board stretch gastroc/ankle dorsiflex X20 10s  TE   TG calf raises      Step-ups - FWD      Step-ups - LAT      Step-ups - BWD        NMR To improve balance for safe community and home ambulation    Resisted walk      FWD      BKWD      lat      March      Side stepping      Retro walk      Heel to toe      A:  Tolerated well. The pt progressed to include new strengthening & rom exercises with today's Tx session. Pt reported that her ankle felt less stiff following today's exercises. P: Continue with rehab plan & appropriately progress to include new appropriately strengthening & stabilization exercises.     Miladys Shane, PT OHPT [id number]    Treatment Charges: Mins Units   Initial Evaluation     Re-Evaluation     Ther Exercise         TE 40 3   Manual Therapy     MT     Ther Activities TA     Gait Training          GT     Neuro Re-education NR     Modalities     Non-Billable Service Time     Other     Total Time/Units 40 3

## 2023-01-06 ENCOUNTER — HOSPITAL ENCOUNTER (OUTPATIENT)
Dept: INFUSION THERAPY | Age: 48
Setting detail: INFUSION SERIES
Discharge: HOME | End: 2023-01-06
Payer: COMMERCIAL

## 2023-01-06 VITALS
HEIGHT: 69 IN | TEMPERATURE: 97.7 F | DIASTOLIC BLOOD PRESSURE: 73 MMHG | SYSTOLIC BLOOD PRESSURE: 144 MMHG | BODY MASS INDEX: 36.14 KG/M2 | WEIGHT: 244 LBS | RESPIRATION RATE: 16 BRPM | HEART RATE: 73 BPM | OXYGEN SATURATION: 100 %

## 2023-01-06 DIAGNOSIS — K50.10 EXACERBATION OF CROHN'S DISEASE OF LARGE INTESTINE (HCC): Primary | ICD-10-CM

## 2023-01-06 PROCEDURE — 2580000003 HC RX 258: Performed by: INTERNAL MEDICINE

## 2023-01-06 PROCEDURE — 96365 THER/PROPH/DIAG IV INF INIT: CPT

## 2023-01-06 RX ORDER — ONDANSETRON 2 MG/ML
8 INJECTION INTRAMUSCULAR; INTRAVENOUS
OUTPATIENT
Start: 2023-02-03

## 2023-01-06 RX ORDER — SODIUM CHLORIDE 9 MG/ML
INJECTION, SOLUTION INTRAVENOUS CONTINUOUS
OUTPATIENT
Start: 2023-02-03

## 2023-01-06 RX ORDER — SODIUM CHLORIDE 0.9 % (FLUSH) 0.9 %
5-40 SYRINGE (ML) INJECTION PRN
Status: DISCONTINUED | OUTPATIENT
Start: 2023-01-06 | End: 2023-01-07 | Stop reason: HOSPADM

## 2023-01-06 RX ORDER — ALBUTEROL SULFATE 90 UG/1
4 AEROSOL, METERED RESPIRATORY (INHALATION) PRN
OUTPATIENT
Start: 2023-02-03

## 2023-01-06 RX ORDER — HEPARIN SODIUM (PORCINE) LOCK FLUSH IV SOLN 100 UNIT/ML 100 UNIT/ML
500 SOLUTION INTRAVENOUS PRN
OUTPATIENT
Start: 2023-02-03

## 2023-01-06 RX ORDER — ACETAMINOPHEN 325 MG/1
650 TABLET ORAL
OUTPATIENT
Start: 2023-02-03

## 2023-01-06 RX ORDER — EPINEPHRINE 1 MG/ML
0.3 INJECTION, SOLUTION, CONCENTRATE INTRAVENOUS PRN
OUTPATIENT
Start: 2023-02-03

## 2023-01-06 RX ORDER — SODIUM CHLORIDE 0.9 % (FLUSH) 0.9 %
5-40 SYRINGE (ML) INJECTION PRN
OUTPATIENT
Start: 2023-02-03

## 2023-01-06 RX ORDER — DIPHENHYDRAMINE HYDROCHLORIDE 50 MG/ML
50 INJECTION INTRAMUSCULAR; INTRAVENOUS
OUTPATIENT
Start: 2023-02-03

## 2023-01-06 RX ADMIN — SODIUM CHLORIDE, PRESERVATIVE FREE 20 ML: 5 INJECTION INTRAVENOUS at 13:31

## 2023-01-06 RX ADMIN — SODIUM CHLORIDE, PRESERVATIVE FREE 10 ML: 5 INJECTION INTRAVENOUS at 12:17

## 2023-01-06 ASSESSMENT — PAIN SCALES - GENERAL: PAINLEVEL_OUTOF10: 5

## 2023-01-06 ASSESSMENT — PAIN DESCRIPTION - FREQUENCY: FREQUENCY: CONTINUOUS

## 2023-01-06 ASSESSMENT — PAIN DESCRIPTION - LOCATION: LOCATION: OTHER (COMMENT)

## 2023-01-06 ASSESSMENT — PAIN DESCRIPTION - DESCRIPTORS: DESCRIPTORS: ACHING

## 2023-01-06 ASSESSMENT — PAIN DESCRIPTION - PAIN TYPE: TYPE: CHRONIC PAIN

## 2023-01-06 NOTE — PROGRESS NOTES
Before infusion patient declined any infections, open wounds, or change in medical condition. Tolerated skyrizi infusion well. Reviewed therapy plan, offered education material and/or discharge material, reviewed medication information and signs and symptoms  and educated on possible side effects, verbalizes good knowledge of current plan patient verbalizes understanding, and has no signs or symptoms to report at this time. Patient discharged. Patient alert and oriented x3. No distress noted. Vital signs stable. Patient denies any new or worsening pain. Patient denies any needs. All questions answered. Next appointment scheduled. Declines copy of AVS. Instructed on importance, patient declined 30 min observation after infusion completed.

## 2023-01-07 LAB — URINE CULTURE, ROUTINE: NORMAL

## 2023-01-11 ENCOUNTER — TREATMENT (OUTPATIENT)
Dept: PHYSICAL THERAPY | Age: 48
End: 2023-01-11

## 2023-01-11 DIAGNOSIS — S82.852D CLOSED DISPLACED TRIMALLEOLAR FRACTURE OF LEFT LOWER LEG WITH ROUTINE HEALING: Primary | ICD-10-CM

## 2023-01-11 NOTE — PROGRESS NOTES
[address] [facility]   Phone: [phone]   Fax: [phone]      Physical Therapy Daily Treatment Note    Date: 2023  Patient Name: Reji Lima  : 1975   MRN: [id number]  DOInjury: 10/2022   DOSx: 10/22/2022  Referring Provider: Mary Lai MD  [address],  [address] [facility]     Medical Diagnosis:   Y31.190W (ICD-10-CM) - Closed displaced trimalleolar fracture of left ankle with routine healing, subsequent encounter    Outcome Measure: LEFS 81%    S: Pt reports to therapy and states she is making progress c wbing activity. Patient states she is able to walk around the house without use of AD but states she needs the wall assistance. O: Please refer to PT Eval  Time 6115-3296     Visit 3/8 Repeat outcome measure at mid point and end. Pain 0/10           Modalities            Manual            Stretch            Exercise            Foot on ground arom ankle  X30 dorsiflex  X30 plantarflex  TE   Ankle band ankle dorsiflex, plantarflex, inversion, & eversion X30 each Blue band TE   Isometric ankle with towel dorsiflex, plantarflex, inversion, & eversion X30 each  TE   Seated strap stretch ankle dorsiflex X5 30s  TE   Seated step stretch gastroc/ankle dorsiflex X20 10s  TE   Seated incline/rocker board stretch gastroc/ankle dorsiflex X20 10s  TE         Short foot position 5s holds x 20 reps    NR   Standing boot ws without HHA  1 min each direction  1 min each direction NR                                                   A:  Tolerated well. Progressed patient c new strengthening exercises this Tx session. Added standing weight shifting without use of AD. Pt reported that her ankle felt less stiff following today's exercises but notes some mm soreness. P: Continue with rehab plan & appropriately progress to include new appropriately strengthening & stabilization exercises.     Essie Jenkins PTA [id number]    Treatment Charges: Mins Units Initial Evaluation     Re-Evaluation     Ther Exercise         TE 32 2   Manual Therapy     MT     Ther Activities        TA     Gait Training          GT     Neuro Re-education NR 8 1   Modalities     Non-Billable Service Time     Other     Total Time/Units 40 3

## 2023-01-13 ENCOUNTER — TREATMENT (OUTPATIENT)
Dept: PHYSICAL THERAPY | Age: 48
End: 2023-01-13
Payer: COMMERCIAL

## 2023-01-13 DIAGNOSIS — S82.852D CLOSED DISPLACED TRIMALLEOLAR FRACTURE OF LEFT LOWER LEG WITH ROUTINE HEALING: Primary | ICD-10-CM

## 2023-01-13 PROCEDURE — 97530 THERAPEUTIC ACTIVITIES: CPT | Performed by: PHYSICAL THERAPIST

## 2023-01-16 ENCOUNTER — TREATMENT (OUTPATIENT)
Dept: PHYSICAL THERAPY | Age: 48
End: 2023-01-16
Payer: COMMERCIAL

## 2023-01-16 DIAGNOSIS — S82.852D CLOSED DISPLACED TRIMALLEOLAR FRACTURE OF LEFT LOWER LEG WITH ROUTINE HEALING: Primary | ICD-10-CM

## 2023-01-16 PROCEDURE — 97112 NEUROMUSCULAR REEDUCATION: CPT | Performed by: PHYSICAL THERAPIST

## 2023-01-16 PROCEDURE — 97110 THERAPEUTIC EXERCISES: CPT | Performed by: PHYSICAL THERAPIST

## 2023-01-16 NOTE — PROGRESS NOTES
[address] [facility]   Phone: [phone]   Fax: [phone]      Physical Therapy Daily Treatment Note    Date: 2023  Patient Name: Clarice Carrillo  : 1975   MRN: [id number]  DOInjury: 10/2022   DOSx: 10/22/2022  Referring Provider: MD Aleida Tyler [address]  [facility],  215 [facility]     Medical Diagnosis:   N45.612R (ICD-10-CM) - Closed displaced trimalleolar fracture of left ankle with routine healing, subsequent encounter    Outcome Measure: LEFS 81%    S: Pt reports to PT c shoe today. She reports soreness in the L foot today d/t walking more this weekend. O: Please refer to PT Eval  Time 5387-1086     Visit  Repeat outcome measure at mid point and end. Pain 0/10           Modalities            Manual            Stretch            Exercise            Foot on ground arom ankle  X30 dorsiflex  X30 plantarflex  TE   Ankle band ankle dorsiflex, plantarflex, inversion, & eversion X30 each Blue band TE   Isometric ankle with towel dorsiflex, plantarflex, inversion, & eversion X30 each  TE   Seated strap stretch ankle dorsiflex X5 30s  TE   Seated step stretch gastroc/ankle dorsiflex X20 10s  TE   Seated incline/rocker board stretch gastroc/ankle dorsiflex X20 10s  TE         Short foot position 5s holds x 20 reps    NR   Standing boot ws without HHA  1 min each direction  1 min each direction NR          Performed Today     WS onto foam X30 forward/lateral  NR   Rockerboard ROM seated X30 f/b, s/s  TE   Standing marching X30 B WBAT in shoe c [employer] NR   Standing hip ext, abd X30 B WBAT in shoe c [employer] NR                                                               A:  Tolerated well. Pt progressed today c WB activity to improve tolerance to WB in tennis shoe c activity. She tolerated the session well overall, but is limited secondary to pain.     P: Continue with rehab plan & appropriately progress to include new appropriately strengthening & stabilization exercises.     Emmanuelle Mirza PT DPT [id number]    Treatment Charges: Mins Units   Initial Evaluation     Re-Evaluation     Ther Exercise         TE 15 1   Manual Therapy     MT     Ther Activities        TA     Gait Training          GT     Neuro Re-education NR 25 2   Modalities     Non-Billable Service Time     Other     Total Time/Units 40 3

## 2023-01-18 ENCOUNTER — TREATMENT (OUTPATIENT)
Dept: PHYSICAL THERAPY | Age: 48
End: 2023-01-18
Payer: COMMERCIAL

## 2023-01-18 ENCOUNTER — TELEPHONE (OUTPATIENT)
Dept: INFUSION THERAPY | Age: 48
End: 2023-01-18

## 2023-01-18 DIAGNOSIS — S82.852D CLOSED DISPLACED TRIMALLEOLAR FRACTURE OF LEFT LOWER LEG WITH ROUTINE HEALING: Primary | ICD-10-CM

## 2023-01-18 PROCEDURE — 97530 THERAPEUTIC ACTIVITIES: CPT | Performed by: PHYSICAL THERAPIST

## 2023-01-18 PROCEDURE — 97110 THERAPEUTIC EXERCISES: CPT | Performed by: PHYSICAL THERAPIST

## 2023-01-18 PROCEDURE — 97112 NEUROMUSCULAR REEDUCATION: CPT | Performed by: PHYSICAL THERAPIST

## 2023-01-18 NOTE — PROGRESS NOTES
[address] [facility]   Phone: [phone]   Fax: [phone]      Physical Therapy Daily Treatment Note    Date: 2023  Patient Name: Jaxon Gresham  : 1975   MRN: [id number]  DOInjury: 10/2022   DOSx: 10/22/2022  Referring Provider: MD Aleida Buchanan [address]  [facility],  215 [facility]     Medical Diagnosis:   U62.953K (ICD-10-CM) - Closed displaced trimalleolar fracture of left ankle with routine healing, subsequent encounter    Outcome Measure: LEFS 81%    S: Pt reports some soreness after last session. She wore her shoe full time at home yesterday and did fair c some pain after. O: Please refer to PT Eval  Time 1245-3956     Visit  Repeat outcome measure at mid point and end. Pain 0/10           Modalities            Manual            Stretch            Exercise            Foot on ground arom ankle  X30 dorsiflex  X30 plantarflex  TE   Ankle band ankle dorsiflex, plantarflex, inversion, & eversion X30 each Blue band TE   Isometric ankle with towel dorsiflex, plantarflex, inversion, & eversion X30 each  TE   Seated strap stretch ankle dorsiflex X5 30s  TE   Seated step stretch gastroc/ankle dorsiflex X20 10s  TE   Seated incline/rocker board stretch gastroc/ankle dorsiflex X20 10s  TE         Short foot position 5s holds x 20 reps    NR   Standing boot ws without HHA  1 min each direction  1 min each direction NR          Performed Today     WS onto foam X30 forward/lateral  NR   Seated gastroc stretch X10 10s holds  TE   Standing marching X30 B WBAT in shoe c [employer] TA   Step ups 6 \" x10 forward  4\" x30 lateral on L  TA   Balance board X30 f/b Standing  NR                                                         A:  Tolerated well. She was progressed c WB activity today and proprioception to improve WB tolerance and ankle stability. She tolerated the session well c moderate fatigue and no incr in pain.  Will continue to transition out of the boot as tolerated and was educated on proper transition and not to completely eliminate boot at this time. P: Continue with rehab plan & appropriately progress to include new appropriately strengthening & stabilization exercises.     Garfield Keene PT DPT [id number]    Treatment Charges: Mins Units   Initial Evaluation     Re-Evaluation     Ther Exercise         TE 10 1   Manual Therapy     MT     Ther Activities        TA 15 1   Gait Training          GT     Neuro Re-education NR 15 1   Modalities     Non-Billable Service Time     Other     Total Time/Units 40 3

## 2023-01-18 NOTE — TELEPHONE ENCOUNTER
CALLED [facility] AND SPOKE W JANIKA PTS SKYRIZI WILL BE HERE 1/20 FOR DOS 2/3   EMAILED PHARMACY

## 2023-01-18 NOTE — PROGRESS NOTES
[address] [facility]   Phone: [phone]   Fax: [phone]      Physical Therapy Daily Treatment Note    Date: 2023  Patient Name: Nathan Moon  : 1975   MRN: [id number]  DOInjury: 10/2022   DOSx: 10/22/2022  Referring Provider: MD Aleida Maldonado [address]  [facility],  215 [facility]     Medical Diagnosis:   S58.422C (ICD-10-CM) - Closed displaced trimalleolar fracture of left ankle with routine healing, subsequent encounter    Outcome Measure: LEFS 81%    S: Pt reports no worsening of symptoms with progression of PT gym exercise program.    O: Please refer to PT Eval  Time 8909-8056     Visit  Repeat outcome measure at mid point and end. Pain 0/10           Modalities            Manual            Stretch            Exercise            Stand heel raises x30  TA   Stand LLE toe raises x30  TA   Stand leg curls X30 R & L  TA   Stand march  X30 R & L  TA   Stand static standing barefoot with weight shifting 5min  TA   Step-outs forward, lateral, & backward X30 each R & L  TA                                 A:  Tolerated well. The pt performed all exercises barefoot BLE without boot. P: Continue with rehab plan & appropriately progress to include step-ups.     Jonna Rich, PT OHPT [id number]    Treatment Charges: Mins Units   Initial Evaluation     Re-Evaluation     Ther Exercise         TE     Manual Therapy     MT     Ther Activities        TA 40 3   Gait Training          GT     Neuro Re-education NR     Modalities     Non-Billable Service Time     Other     Total Time/Units 40 3

## 2023-01-23 ENCOUNTER — TREATMENT (OUTPATIENT)
Dept: PHYSICAL THERAPY | Age: 48
End: 2023-01-23
Payer: COMMERCIAL

## 2023-01-23 DIAGNOSIS — S82.852D CLOSED DISPLACED TRIMALLEOLAR FRACTURE OF LEFT LOWER LEG WITH ROUTINE HEALING: Primary | ICD-10-CM

## 2023-01-23 PROCEDURE — 97110 THERAPEUTIC EXERCISES: CPT

## 2023-01-23 PROCEDURE — 97112 NEUROMUSCULAR REEDUCATION: CPT

## 2023-01-23 PROCEDURE — 97530 THERAPEUTIC ACTIVITIES: CPT

## 2023-01-23 NOTE — PROGRESS NOTES
[address] [facility]   Phone: [phone]   Fax: [phone]      Physical Therapy Daily Treatment Note    Date: 2023  Patient Name: Mega Ly  : 1975   MRN: [id number]  DOInjury: 10/2022   DOSx: 10/22/2022  Referring Provider: MD Aleida Benitez [address] [place] [address],  215 [facility]     Medical Diagnosis:   K75.044S (ICD-10-CM) - Closed displaced trimalleolar fracture of left ankle with routine healing, subsequent encounter  Outcome Measure: LEFS 81%    S: Pt reports some soreness after last session. She wore her shoe full time at home yesterday and did fair c some pain after. O: Please refer to PT Eval  Time 1166-8925     Visit  Repeat outcome measure at mid point and end. Pain 0/10           Modalities            Manual            Stretch            Exercise            Foot on ground arom ankle  X30 dorsiflex  X30 plantarflex  TE   Ankle band ankle dorsiflex, plantarflex, inversion, & eversion X30 each Blue band TE   Isometric ankle with towel dorsiflex, plantarflex, inversion, & eversion X30 each  TE   Seated strap stretch ankle dorsiflex X5 30s  TE   Seated step stretch gastroc/ankle dorsiflex X20 10s  TE   Seated incline/rocker board stretch gastroc/ankle dorsiflex X20 10s  TE         Short foot position 5s holds x 20 reps    NR   Standing boot ws without HHA  1 min each direction  1 min each direction NR                Performed Today           Step up onto foam X30 forward/lateral  NR   Seated gastroc stretch X10 10s holds  TE   Standing marching X30 B WBAT in shoe c [address] TA    TA   Balance board X30 f/b Standing  NR         Standing foam balance 2 min   NR   Standing SLRs- BLE alternating  x20 Fwd/Lat/Bwd    WBAT in shoe c [address] TA   Standing hs curls  X20  WBAT in shoe c [address] TA                                 A:  Tolerated well. She was progressed c WB activity today and proprioception to improve WB tolerance and ankle stability.  Patient denies any pain during today's treatment session just mm fatigue. P: Continue with rehab plan & appropriately progress to include new appropriately strengthening & stabilization exercises.   Essie Jenkins PTA [id number]    Treatment Charges: Mins Units   Initial Evaluation     Re-Evaluation     Ther Exercise         TE 8 1   Manual Therapy     MT     Ther Activities        TA 32 2   Gait Training          GT     Neuro Re-education NR 15 1   Modalities     Non-Billable Service Time     Other     Total Time/Units 55 4

## 2023-01-25 ENCOUNTER — TREATMENT (OUTPATIENT)
Dept: PHYSICAL THERAPY | Age: 48
End: 2023-01-25

## 2023-01-25 DIAGNOSIS — S82.852D CLOSED DISPLACED TRIMALLEOLAR FRACTURE OF LEFT LOWER LEG WITH ROUTINE HEALING: Primary | ICD-10-CM

## 2023-01-25 NOTE — PROGRESS NOTES
[address] [facility]   Phone: [phone]   Fax: [phone]      Physical Therapy Daily Treatment Note    Date: 2023  Patient Name: Saima Pa  : 1975   MRN: [id number]  DOInjury: 10/2022   DOSx: 10/22/2022  Referring Provider: MD Aleida Bassett [address]  [facility],  215 [facility]     Medical Diagnosis:   K08.576T (ICD-10-CM) - Closed displaced trimalleolar fracture of left ankle with routine healing, subsequent encounter  Outcome Measure: LEFS 81%    S: Pt reports some soreness after last session. She wore her shoe full time at home yesterday and did fair c some pain after. O: Please refer to PT Eval  Time 8015-8074     Visit  Repeat outcome measure at mid point and end. Pain 0/10           Modalities            Manual            Stretch            Exercise            Foot on ground arom ankle  X30 dorsiflex  X30 plantarflex  TE   Ankle band ankle dorsiflex, plantarflex, inversion, & eversion X30 each Blue band TE   Isometric ankle with towel dorsiflex, plantarflex, inversion, & eversion X30 each  TE   Seated strap stretch ankle dorsiflex X5 30s  TE   Seated step stretch gastroc/ankle dorsiflex X20 10s  TE   Seated incline/rocker board stretch gastroc/ankle dorsiflex X20 10s  TE         Short foot position 5s holds x 20 reps    NR   Standing boot ws without HHA  1 min each direction  1 min each direction NR                Performed Today           BB X30 f/b, s/s  NR   Marching on foam X3 min  NR   Seated gastroc stretch, incline board gastroc stretch X10 10s holds ea  TE   Resisted amb  X10 ea 4 direction red xband NR                                                               A:  Tolerated well. Pt was progressed c WB tolerance and light proprioceptive activity to improve tolerance to ADL activity. She tolerated the progression well and was advised to continue to incr time outside of boot.     P: Continue with rehab plan & appropriately progress to include new appropriately strengthening & stabilization exercises.   Antonia Hernandez PT DPT [id number]    Treatment Charges: Mins Units   Initial Evaluation     Re-Evaluation     Ther Exercise         TE 15 1   Manual Therapy     MT     Ther Activities        TA     Gait Training          GT     Neuro Re-education NR 30 2   Modalities     Non-Billable Service Time     Other     Total Time/Units 45 3

## 2023-01-30 ENCOUNTER — TREATMENT (OUTPATIENT)
Dept: PHYSICAL THERAPY | Age: 48
End: 2023-01-30
Payer: COMMERCIAL

## 2023-01-30 DIAGNOSIS — S82.852D CLOSED DISPLACED TRIMALLEOLAR FRACTURE OF LEFT LOWER LEG WITH ROUTINE HEALING: Primary | ICD-10-CM

## 2023-01-30 PROCEDURE — 97112 NEUROMUSCULAR REEDUCATION: CPT | Performed by: PHYSICAL THERAPIST

## 2023-01-30 PROCEDURE — 97530 THERAPEUTIC ACTIVITIES: CPT | Performed by: PHYSICAL THERAPIST

## 2023-01-30 PROCEDURE — 97110 THERAPEUTIC EXERCISES: CPT | Performed by: PHYSICAL THERAPIST

## 2023-01-30 NOTE — PROGRESS NOTES
[address] [facility]   Phone: [phone]   Fax: [phone]      Physical Therapy Daily Treatment Note    Date: 2023  Patient Name: Rome Ariza  : 1975   MRN: [id number]  DOInjury: 10/2022   DOSx: 10/22/2022  Referring Provider: MD Aleida James [address] [place] [address],  215 [facility]     Medical Diagnosis:   R02.337S (ICD-10-CM) - Closed displaced trimalleolar fracture of left ankle with routine healing, subsequent encounter  Outcome Measure: LEFS 81%    S: Pt reports she was able to do more on her feet this weekend in tennis shoe s AD, but has an incr in soreness today. She reports today s AD and in tennis shoe. O: Please refer to PT Eval  Time 6338-4185     Visit 8 Repeat outcome measure at mid point and end. Pain 0/10           Modalities            Manual            Stretch            Exercise            Foot on ground arom ankle  X30 dorsiflex  X30 plantarflex  TE   Ankle band ankle dorsiflex, plantarflex, inversion, & eversion X30 each Blue band TE   Isometric ankle with towel dorsiflex, plantarflex, inversion, & eversion X30 each  TE   Seated strap stretch ankle dorsiflex X5 30s  TE   Seated step stretch gastroc/ankle dorsiflex X20 10s  TE   Seated incline/rocker board stretch gastroc/ankle dorsiflex X20 10s  TE         Short foot position 5s holds x 20 reps    NR   Standing boot ws without HHA  1 min each direction  1 min each direction NR                Performed Today     Gastroc stretch X15 10s holds ea  Seated strap, incline board  TE   BB X30 f/b, s/s  NR   Step ups forward/lateral X20 B ea  TA                                                                           A:  Tolerated well. Pt progressed c functional mobility and proprioceptive activity to further improve stability c ADLs. Stretching also utilized today to reduce stiffness and pain in the L foot. She will continue stretching and WB progression at home between sessions.     P: Continue with rehab plan & appropriately progress to include new appropriately strengthening & stabilization exercises.   Nitin Tipton PT DPT [id number]    Treatment Charges: Mins Units   Initial Evaluation     Re-Evaluation     Ther Exercise         TE 10 1   Manual Therapy     MT     Ther Activities        TA 15 1   Gait Training          GT     Neuro Re-education NR 15 1   Modalities     Non-Billable Service Time     Other     Total Time/Units 40 3

## 2023-02-03 ENCOUNTER — HOSPITAL ENCOUNTER (OUTPATIENT)
Dept: INFUSION THERAPY | Age: 48
Setting detail: INFUSION SERIES
Discharge: HOME | End: 2023-02-03
Payer: COMMERCIAL

## 2023-02-03 VITALS
OXYGEN SATURATION: 100 % | DIASTOLIC BLOOD PRESSURE: 76 MMHG | SYSTOLIC BLOOD PRESSURE: 137 MMHG | TEMPERATURE: 97.9 F | RESPIRATION RATE: 16 BRPM | BODY MASS INDEX: 36.14 KG/M2 | HEART RATE: 66 BPM | WEIGHT: 244 LBS | HEIGHT: 69 IN

## 2023-02-03 DIAGNOSIS — K50.10 EXACERBATION OF CROHN'S DISEASE OF LARGE INTESTINE (HCC): Primary | ICD-10-CM

## 2023-02-03 PROCEDURE — 2580000003 HC RX 258: Performed by: INTERNAL MEDICINE

## 2023-02-03 PROCEDURE — 96365 THER/PROPH/DIAG IV INF INIT: CPT

## 2023-02-03 RX ORDER — SODIUM CHLORIDE 9 MG/ML
INJECTION, SOLUTION INTRAVENOUS CONTINUOUS
OUTPATIENT
Start: 2023-03-03

## 2023-02-03 RX ORDER — ONDANSETRON 2 MG/ML
8 INJECTION INTRAMUSCULAR; INTRAVENOUS
OUTPATIENT
Start: 2023-03-03

## 2023-02-03 RX ORDER — EPINEPHRINE 1 MG/ML
0.3 INJECTION, SOLUTION, CONCENTRATE INTRAVENOUS PRN
OUTPATIENT
Start: 2023-03-03

## 2023-02-03 RX ORDER — SODIUM CHLORIDE 0.9 % (FLUSH) 0.9 %
5-40 SYRINGE (ML) INJECTION PRN
Status: DISCONTINUED | OUTPATIENT
Start: 2023-02-03 | End: 2023-02-04 | Stop reason: HOSPADM

## 2023-02-03 RX ORDER — DIPHENHYDRAMINE HYDROCHLORIDE 50 MG/ML
50 INJECTION INTRAMUSCULAR; INTRAVENOUS
OUTPATIENT
Start: 2023-03-03

## 2023-02-03 RX ORDER — SODIUM CHLORIDE 0.9 % (FLUSH) 0.9 %
5-40 SYRINGE (ML) INJECTION PRN
OUTPATIENT
Start: 2023-03-03

## 2023-02-03 RX ORDER — HEPARIN SODIUM (PORCINE) LOCK FLUSH IV SOLN 100 UNIT/ML 100 UNIT/ML
500 SOLUTION INTRAVENOUS PRN
OUTPATIENT
Start: 2023-03-03

## 2023-02-03 RX ORDER — ACETAMINOPHEN 325 MG/1
650 TABLET ORAL
OUTPATIENT
Start: 2023-03-03

## 2023-02-03 RX ORDER — ALBUTEROL SULFATE 90 UG/1
4 AEROSOL, METERED RESPIRATORY (INHALATION) PRN
OUTPATIENT
Start: 2023-03-03

## 2023-02-03 RX ADMIN — SODIUM CHLORIDE, PRESERVATIVE FREE 10 ML: 5 INJECTION INTRAVENOUS at 13:09

## 2023-02-03 RX ADMIN — SODIUM CHLORIDE, PRESERVATIVE FREE 10 ML: 5 INJECTION INTRAVENOUS at 14:23

## 2023-02-03 RX ADMIN — SODIUM CHLORIDE, PRESERVATIVE FREE 10 ML: 5 INJECTION INTRAVENOUS at 14:24

## 2023-02-03 ASSESSMENT — PAIN SCALES - GENERAL: PAINLEVEL_OUTOF10: 3

## 2023-02-03 ASSESSMENT — PAIN DESCRIPTION - LOCATION: LOCATION: ANKLE

## 2023-02-03 NOTE — PROGRESS NOTES
Before infusion patient declined any infections, open wounds, or change in medical condition. Tolerated infusion well. Reviewed therapy plan, offered education material and/or discharge material, reviewed medication information and signs and symptoms  and educated on possible side effects, verbalizes good knowledge of current plan patient verbalizes understanding, and has no signs or symptoms to report at this time. Patient discharged. Patient alert and oriented x3. No distress noted. Vital signs stable. Patient denies any new or worsening pain. Patient denies any needs. All questions answered. Next appointment scheduled. Declines  copy of AVS. Instructed on importance, patient did stay  30 min observation after infusion completed.

## 2023-02-06 ENCOUNTER — TREATMENT (OUTPATIENT)
Dept: PHYSICAL THERAPY | Age: 48
End: 2023-02-06
Payer: COMMERCIAL

## 2023-02-06 DIAGNOSIS — S82.852D CLOSED DISPLACED TRIMALLEOLAR FRACTURE OF LEFT LOWER LEG WITH ROUTINE HEALING: Primary | ICD-10-CM

## 2023-02-06 PROCEDURE — 97112 NEUROMUSCULAR REEDUCATION: CPT | Performed by: PHYSICAL THERAPIST

## 2023-02-06 PROCEDURE — 97016 VASOPNEUMATIC DEVICE THERAPY: CPT | Performed by: PHYSICAL THERAPIST

## 2023-02-06 PROCEDURE — 97110 THERAPEUTIC EXERCISES: CPT | Performed by: PHYSICAL THERAPIST

## 2023-02-06 PROCEDURE — 97530 THERAPEUTIC ACTIVITIES: CPT | Performed by: PHYSICAL THERAPIST

## 2023-02-06 NOTE — PROGRESS NOTES
[facility]   Phone: [phone]   Fax: [phone]      Physical Therapy Daily Treatment Note    Date: 2023  Patient Name: Elba Sloan  : 1975   MRN: [id number]  DOInjury: 10/2022   DOSx: 10/22/2022  Referring Provider: MD [facility] [address] [place] [address],  [address] [facility]     Medical Diagnosis:   L07.700E (ICD-10-CM) - Closed displaced trimalleolar fracture of left ankle with routine healing, subsequent encounter  Outcome Measure: LEFS 81%    S: Pt reports she was out of boot for 2 days this weekend for shopping trip and house work. She reports an incr in edema and pain today. O: Please refer to PT Eval  Time 5799-1271     Visit 9 Repeat outcome measure at mid point and end. Pain 0/10           Modalities            Manual            Stretch            Exercise            Foot on ground arom ankle  X30 dorsiflex  X30 plantarflex  TE   Ankle band ankle dorsiflex, plantarflex, inversion, & eversion X30 each Blue band TE   Isometric ankle with towel dorsiflex, plantarflex, inversion, & eversion X30 each  TE   Seated strap stretch ankle dorsiflex X5 30s  TE   Seated step stretch gastroc/ankle dorsiflex X20 10s  TE   Seated incline/rocker board stretch gastroc/ankle dorsiflex X20 10s  TE         Short foot position 5s holds x 20 reps    NR   Standing boot ws without HHA  1 min each direction  1 min each direction NR                Performed Today     Gastroc stretch X15 10s holds ea  Seated strap, incline board  TE   Resisted amb X15 ea BXB TA   BB X30 ea F/b, s/s NR   Marching on foam X3 min  NR                                                                     A:  Tolerated well. Progressed c stretching and vaso to reduce inflammation and stiffness in the L ankle. She was educated to continue stretching at home to reduce tension and to elevate daily.     P: Continue with rehab plan & appropriately progress to include new appropriately strengthening & stabilization exercises.   Abbie Cooney PT DPT [id number]    Treatment Charges: Mins Units   Initial Evaluation     Re-Evaluation     Ther Exercise         TE 10 1   Manual Therapy     MT     Ther Activities        TA 20 1   Gait Training          GT     Neuro Re-education NR 15 1   Modalities 10 1   Non-Billable Service Time     Other     Total Time/Units 55 4

## 2023-02-08 ENCOUNTER — TREATMENT (OUTPATIENT)
Dept: PHYSICAL THERAPY | Age: 48
End: 2023-02-08

## 2023-02-08 ENCOUNTER — TELEPHONE (OUTPATIENT)
Dept: INFUSION THERAPY | Age: 48
End: 2023-02-08

## 2023-02-08 DIAGNOSIS — S82.852D CLOSED DISPLACED TRIMALLEOLAR FRACTURE OF LEFT LOWER LEG WITH ROUTINE HEALING: Primary | ICD-10-CM

## 2023-02-08 NOTE — PROGRESS NOTES
[address] [facility]   Phone: [phone]   Fax: [phone]      Physical Therapy Daily Treatment Note    Date: 2023  Patient Name: Alexander Tong  : 1975   MRN: [id number]  DOInjury: 10/2022   DOSx: 10/22/2022  Referring Provider: No referring provider defined for this encounter. Medical Diagnosis:   S82.852D (ICD-10-CM) - Closed displaced trimalleolar fracture of left ankle with routine healing, subsequent encounter  Outcome Measure: LEFS 81%    S: Pt reports she had less edema yesterday morning, but this slowly returned throughout the day. She continues to have pain and swelling today. O: Please refer to PT Eval  Time 4132-7939     Visit 9 Repeat outcome measure at mid point and end. Pain 0/10           Modalities            Manual            Stretch            Exercise            Foot on ground arom ankle  X30 dorsiflex  X30 plantarflex  TE   Ankle band ankle dorsiflex, plantarflex, inversion, & eversion X30 each Blue band TE   Isometric ankle with towel dorsiflex, plantarflex, inversion, & eversion X30 each  TE   Seated strap stretch ankle dorsiflex X5 30s  TE   Seated step stretch gastroc/ankle dorsiflex X20 10s  TE   Seated incline/rocker board stretch gastroc/ankle dorsiflex X20 10s  TE         Short foot position 5s holds x 20 reps    NR   Standing boot ws without HHA  1 min each direction  1 min each direction NR                Performed Today     Gastroc / soleus stretch 4x30s ea incline  4x30s strap L  TE   Resisted amb X15 ea BXB TA   Marching c band at toe X30   NR   Ankle T band X30 ea 4 direction PTB TE   BB X30 f/b  NR   Step ups 2x10  Forward B, lateral L TA   Step ups into SLS X30 L foam  NR                                                   A:  Tolerated well. She was progressed c functional mobility to reduce limitations c transfers and stairs at home. She will continue to ice and elevate daily to reduce edema. .    P: Continue with rehab plan & appropriately progress to include new appropriately strengthening & stabilization exercises.   Donnie Kingsley PT DPT [id number]    Treatment Charges: Mins Units   Initial Evaluation     Re-Evaluation     Ther Exercise         TE 15 1   Manual Therapy     MT     Ther Activities        TA 15 1   Gait Training          GT     Neuro Re-education NR 23 2   Modalities     Non-Billable Service Time     Other     Total Time/Units 53 4

## 2023-02-13 ENCOUNTER — TREATMENT (OUTPATIENT)
Dept: PHYSICAL THERAPY | Age: 48
End: 2023-02-13
Payer: COMMERCIAL

## 2023-02-13 DIAGNOSIS — S82.852D CLOSED DISPLACED TRIMALLEOLAR FRACTURE OF LEFT LOWER LEG WITH ROUTINE HEALING: Primary | ICD-10-CM

## 2023-02-13 PROCEDURE — 97530 THERAPEUTIC ACTIVITIES: CPT | Performed by: PHYSICAL THERAPIST

## 2023-02-13 PROCEDURE — 97112 NEUROMUSCULAR REEDUCATION: CPT | Performed by: PHYSICAL THERAPIST

## 2023-02-13 NOTE — PROGRESS NOTES
[facility] [facility]   Phone: [phone]   Fax: [phone]      Physical Therapy Daily Treatment Note    Date: 2023  Patient Name: Andrew Anthony  : 1975   MRN: [id number]  DOInjury: 10/2022   DOSx: 10/22/2022  Referring Provider: MD Michelle Penalozajessica [address] [place] [address],  215 [facility]     Medical Diagnosis:   W39.504U (ICD-10-CM) - Closed displaced trimalleolar fracture of left ankle with routine healing, subsequent encounter  Outcome Measure: LEFS 81%    S: Pt reports she continues to have soreness but has been working on proper gait mechanics. O: Please refer to PT Eval  Time 0526-9544     Visit 10 Repeat outcome measure at mid point and end. Pain 0/10           Modalities            Manual            Stretch            Exercise            Foot on ground arom ankle  X30 dorsiflex  X30 plantarflex  TE   Ankle band ankle dorsiflex, plantarflex, inversion, & eversion X30 each Blue band TE   Isometric ankle with towel dorsiflex, plantarflex, inversion, & eversion X30 each  TE   Seated strap stretch ankle dorsiflex X5 30s  TE   Seated step stretch gastroc/ankle dorsiflex X20 10s  TE   Seated incline/rocker board stretch gastroc/ankle dorsiflex X20 10s  TE         Short foot position 5s holds x 20 reps    NR   Standing boot ws without HHA  1 min each direction  1 min each direction NR                Performed Today     BB X30 f/b, s/s  NR   Step ups on foam into SLS X20 B 3s holds  NR   Resisted amb X15 4 direction  TA   Resisted stepping X20 B 3 direction TA   Gastroc/ soleus BB stretch 4x30s holds ea  TE                                                               A:  Tolerated well. Functional mobility and proprioception utilized today to improve stability and tolerance to ADL activity. Tolerated the session well c moderate fatigue and no incr pain.      P: Continue with rehab plan & appropriately progress to include new appropriately strengthening & stabilization exercises.   Isamar Fernandez PT DPT [id number]    Treatment Charges: Mins Units   Initial Evaluation     Re-Evaluation     Ther Exercise         TE     Manual Therapy     MT     Ther Activities        TA 25 2   Gait Training          GT     Neuro Re-education NR 15 1   Modalities     Non-Billable Service Time 10 0   Other     Total Time/Units 40 3

## 2023-02-14 RX ORDER — GABAPENTIN 300 MG/1
CAPSULE ORAL
Qty: 90 CAPSULE | Refills: 3 | Status: SHIPPED | OUTPATIENT
Start: 2023-02-14 | End: 2023-08-16

## 2023-02-15 ENCOUNTER — HOSPITAL ENCOUNTER (OUTPATIENT)
Dept: GENERAL RADIOLOGY | Age: 48
Discharge: HOME | End: 2023-02-17
Payer: COMMERCIAL

## 2023-02-15 ENCOUNTER — OFFICE VISIT (OUTPATIENT)
Dept: ORTHOPEDIC SURGERY | Age: 48
End: 2023-02-15
Payer: COMMERCIAL

## 2023-02-15 DIAGNOSIS — S82.852D CLOSED DISPLACED TRIMALLEOLAR FRACTURE OF LEFT ANKLE WITH ROUTINE HEALING, SUBSEQUENT ENCOUNTER: Primary | ICD-10-CM

## 2023-02-15 DIAGNOSIS — S82.852D CLOSED DISPLACED TRIMALLEOLAR FRACTURE OF LEFT ANKLE WITH ROUTINE HEALING, SUBSEQUENT ENCOUNTER: ICD-10-CM

## 2023-02-15 PROCEDURE — 73610 X-RAY EXAM OF ANKLE: CPT

## 2023-02-15 PROCEDURE — 99212 OFFICE O/P EST SF 10 MIN: CPT

## 2023-02-15 RX ORDER — OXYCODONE HYDROCHLORIDE 5 MG/1
5 TABLET ORAL EVERY 8 HOURS PRN
COMMUNITY
Start: 2023-01-16

## 2023-02-15 RX ORDER — TRAMADOL HYDROCHLORIDE 50 MG/1
50 TABLET ORAL 2 TIMES DAILY
COMMUNITY
Start: 2023-01-14

## 2023-02-15 RX ORDER — TACROLIMUS 1 MG/G
0.1 OINTMENT TOPICAL DAILY
COMMUNITY
Start: 2022-12-09

## 2023-02-15 NOTE — PATIENT INSTRUCTIONS
You were ordered CT of left ankle  by your Orthopedic Provider. If you do not hear from that department please contact: MD- [phone]    Please make sure your follow up appointment in office is scheduled shortly after the above testing is complete. If your testing is completed significantly sooner than planned follow up contact office for appointment adjustment.

## 2023-02-15 NOTE — PROGRESS NOTES
Chief Complaint   Patient presents with    Follow Up After Procedure     10/20/22 left ankle ORIF and removal ex fix. Ambulating in tennis shoes with no device. Patient going to physical therapy at [facility] 2x/week and home exercises. C/o pain to lateral ankle. Patient noticed bruising to medial ankle started a couple days ago, resolving. Date of Procedure: 10/6/2022  Procedure:  1. Closed treatment left trimalleolar ankle fracture with manipulation  2. Application of uniplanar external fixator left ankle   Surgeon(s): Cydney Mo MD    OP:SURGEON: Dr. Isabella Jimenez MD  DATE OF PROCEDURE: 10-20-22  PROCEDURE: 1. Open reduction internal fixation left trimalleolar ankle fracture without fixation of the posterior malleolus  2. Removal of external fixator under anesthesia left lower extremity  3. Stress examination of the syndesmosis under anesthesia with fluoroscopy     POD: 19 weeks from original injury  17 weeks from definitive fixation    Subjective:  Jim Andrews is following up from the above surgery. She is WBAT on left lower extremity without assistive device. She does not use any bracing/wrap/compression to the left foot/ankle. Pain to extremity is none and is not taking prescribed pain medication. They denies numbness or tingling to the left lower extremity. Denies calf pain, chest pain, or shortness of breath. Patient has finished DVT prophylaxis, ASA 81 mg BID. Patient is participating in therapy, outpatient therapy. She is doing well after surgery. States that she is making progress in PT. She has been noticing intermittent ankle pain with swelling and even some bruising. Pain is to the medial malleolus after increased activities, she still has some sensitivity over the lateral plate.      Review of Systems -  All pertinent positives/negative in HPI     Objective:    General: Alert and oriented X 3, normocephalic atraumatic, external ears and eye normal, sclera clear, no acute distress, respirations easy and unlabored with no audible wheezes, skin warm and dry, speech and dress appropriate for noted age, affect euthymic. Extremity:  Left Lower Extremity  Skin clean dry and intact, without signs of infection   Incision well-healed without hypertrophic or keloid changes  mild edema noted to the ankle, greater medially  Compartments supple throughout thigh and leg  Calf supple and not tender  negative Homans  Tenderness to palpation about the medial malleolus, greatest over the anterior colliculus and anterior medial tibiotalar joint. No hardware prominence on the lateral distal fibula  Demonstrates active patient with ankle dorsi/plantar flexion, wiggles toes  States sensation intact to touch in sural, deep peroneal, superficial peroneal, saphenous, posterior tibial  nerve distributions to foot/ankle. Palpable dorsalis pedis and posterior tibialis pulses, cap refill brisk in toes, foot warm/perfused. There were no vitals taken for this visit. XR:   3 views left ankle demonstrate ORIF left trimalleolar ankle fracture with hardware in stable position and alignment. There does appear to be possible nonunion of her medial malleolus fracture, there is no evidence of hardware failure or loosening. There is no change in ankle alignment, mortise remains concentric. No haloing around the medial malleolus screw or backing out when compared to prior imaging. Assessment:   Diagnosis Orders   1. Closed displaced trimalleolar fracture of left ankle with routine healing, subsequent encounter  CT ANKLE LEFT WO CONTRAST        Plan:  X-rays reviewed and discussed. Continue with PT, continue activity as tolerated  Given the fact that patient continues with some pain to medial malleolus, recommend CT to confirm healing  Anticipatory guidance provided regarding pain as well as swelling after traumatic injury of this nature.   We will see patient back in 8 weeks for repeat evaluation and x-rays, will call sooner if needing sooner follow-up    Electronically signed by Becky Sen PA-C on 2/15/2023 at 9:58 AM  Note: This report was completed using [email or web address] voiced recognition software. Every effort has been made to ensure accuracy; however, inadvertent computerized transcription errors may be present.

## 2023-02-22 ENCOUNTER — TREATMENT (OUTPATIENT)
Dept: PHYSICAL THERAPY | Age: 48
End: 2023-02-22
Payer: COMMERCIAL

## 2023-02-22 DIAGNOSIS — S82.852D CLOSED DISPLACED TRIMALLEOLAR FRACTURE OF LEFT LOWER LEG WITH ROUTINE HEALING: Primary | ICD-10-CM

## 2023-02-22 PROCEDURE — 97530 THERAPEUTIC ACTIVITIES: CPT

## 2023-02-22 PROCEDURE — 97112 NEUROMUSCULAR REEDUCATION: CPT

## 2023-02-22 NOTE — PROGRESS NOTES
[facility]   Phone: [phone]   Fax: [phone]      Physical Therapy Daily Treatment Note    Date: 2023  Patient Name: Perla Linares  : 1975   MRN: [id number]  DOInjury: 10/2022   DOSx: 10/22/2022  Referring Provider: Griffin Henley MD  [address] [place] [address]     Medical Diagnosis:   S82.852D (ICD-10-CM) - Closed displaced trimalleolar fracture of left ankle with routine healing, subsequent encounter  Outcome Measure: LEFS 81%    S: Pt reports to therapy and states she is sore upon arrival this day. States she getting a CT scan d/t some bruising and her surgeon having some concerns.     O: Please refer to PT Eval  Time 4004-8045     Visit 11 Repeat outcome measure at mid point and end.    Pain 0/10           Modalities            Manual            Stretch            Exercise            Foot on ground arom ankle  X30 dorsiflex  X30 plantarflex  TE   Ankle band ankle dorsiflex, plantarflex, inversion, & eversion X30 each Blue band TE   Isometric ankle with towel dorsiflex, plantarflex, inversion, & eversion X30 each  TE   Seated strap stretch ankle dorsiflex X5 30s  TE   Seated step stretch gastroc/ankle dorsiflex X20 10s  TE   Seated incline/rocker board stretch gastroc/ankle dorsiflex X20 10s  TE         Short foot position 5s holds x 20 reps    NR   Standing boot ws without HHA  1 min each direction  1 min each direction NR                Performed Today     BB X30 f/b, s/s  NR    NR   Resisted amb X15 4 direction  TA   Resisted stepping X20 B 3 direction TA    TE   Foam marching  x30  NR                                                         A:  Tolerated well. Functional mobility and proprioception utilized today to improve stability and tolerance to ADL activity. Tolerated the session well c moderate fatigue and no incr pain. Continued to progress c POC.    P: Continue with rehab plan & appropriately progress to include new  appropriately strengthening & stabilization exercises.   Essie Jenkins PTA [id number]    Treatment Charges: Mins Units   Initial Evaluation     Re-Evaluation     Ther Exercise         TE     Manual Therapy     MT     Ther Activities        TA 25 2   Gait Training          GT     Neuro Re-education NR 15 1   Modalities     Non-Billable Service Time     Other     Total Time/Units 40 3

## 2023-02-27 NOTE — PROGRESS NOTES
[facility]  Hyperbaric Oxygen Therapy   Progress Note    NAME: Jenniffer Louis RECORD NUMBER:  [id number]  AGE: 54 y.o. GENDER: female  : 1975  EPISODE DATE:  2021   Subjective   HBO Treatment Number: 24 out of Total Treatments: 40  HBO Diagnosis:   Problem List Items Addressed This Visit     Abdominal wall skin ulcer, with fat layer exposed ([address] (Chronic)    Skin ulcer of perineum, with fat layer exposed ([address] (Chronic)    Crohn's disease of colon with complication ([address] - Primary (Chronic)        Safety checks performed prior to treatment. See doc flowsheets for documentation. Objective       No results for input(s): GLUMET in the last 72 hours. Pre treatment Vital Signs       Temp: 97.8 °F (36.6 °C)     Pulse: 64     Resp: 16     BP: 130/60     Post treatment Vital Signs  Temp: 97.4 °F (36.3 °C)  Pulse: 72  Resp: 18  BP: (!) 138/52  Assessment      Physical Exam:  General Appearance:  alert and oriented to person, place and time, well-developed and well-nourished, in no acute distress  ENT:  tympanic membranes intact bilaterally  Pulmonary/Chest:  clear to auscultation bilaterally- no wheezes, rales or rhonchi, normal air movement, no respiratory distress  Cardiovascular:  regular rate and rhythm  Chamber #: [id number]  Treatment Start Time: 0953     Pressure Reached Time: 1000  RONAN : 2  Number of Air Breaks:  Treatment Status: No Air break     Decompression Time: 1130   Treatment End Time: 1143  Length of Treatment: 90 Minutes  Symptoms Noted During Treatment: None  Total Treatment Time (min): 110    Adverse Event: no    I was present on these premises and immediately available to furnish assistance & direction throughout the procedure. Justin Mahoney is a 54 y.o. female  did successfully complete today's hyperbaric oxygen treatment at [address] [place] [address].     In my clinical judgement, ongoing HBO therapy is  necessary 26-Feb-2023 23:33

## 2023-03-01 ENCOUNTER — TREATMENT (OUTPATIENT)
Dept: PHYSICAL THERAPY | Age: 48
End: 2023-03-01

## 2023-03-01 DIAGNOSIS — S82.852D CLOSED DISPLACED TRIMALLEOLAR FRACTURE OF LEFT LOWER LEG WITH ROUTINE HEALING: Primary | ICD-10-CM

## 2023-03-01 NOTE — PROGRESS NOTES
[facility] [facility]   Phone: [phone]   Fax: [phone]      Physical Therapy Daily Treatment Note    Date: 3/1/2023  Patient Name: Vonda Mac  : 1975   MRN: [id number]  DOInjury: 10/2022   DOSx: 10/22/2022  Referring Provider: MD Aleida Castillo 27  [facility],  215 [facility]     Medical Diagnosis:   I43.357T (ICD-10-CM) - Closed displaced trimalleolar fracture of left ankle with routine healing, subsequent encounter  Outcome Measure: LEFS 81%    S: Pt is scheduled for CT scan on Monday secondary to some pain and bruising over lateral ankle. She reports that she has been working on proper gait mechanics. O: Please refer to PT Eval  Time 2086-6503     Visit 12 Repeat outcome measure at mid point and end. Pain 0/10           Modalities            Manual            Stretch            Exercise            Foot on ground arom ankle  X30 dorsiflex  X30 plantarflex  TE   Ankle band ankle dorsiflex, plantarflex, inversion, & eversion X30 each Blue band TE   Isometric ankle with towel dorsiflex, plantarflex, inversion, & eversion X30 each  TE   Seated strap stretch ankle dorsiflex X5 30s  TE   Seated step stretch gastroc/ankle dorsiflex X20 10s  TE   Seated incline/rocker board stretch gastroc/ankle dorsiflex X20 10s  TE         Short foot position 5s holds x 20 reps    NR   Standing boot ws without HHA  1 min each direction  1 min each direction NR                Performed Today           Step ups on foam into SLS X20 B 3s holds Forward/lateral  NR   Resisted amb X15 4 direction  TA   Resisted stepping X20 B 3 direction TA   Gastroc/ soleus BB stretch 6x30s holds ea  TE                                                               A:  Tolerated well. Functional mobility and proprioception utilized today to reduce limitation c ADL activity. Stretching also utilized to reduce tension and stiffness c progression.  She tolerated the session well c moderate fatigue and no incr in pain. P: Continue with rehab plan & appropriately progress to include new appropriately strengthening & stabilization exercises.   Natalie Rodriguez PT DPT [id number]    Treatment Charges: Mins Units   Initial Evaluation     Re-Evaluation     Ther Exercise         TE 10 1   Manual Therapy     MT     Ther Activities        TA 30 2   Gait Training          GT     Neuro Re-education NR 15 1   Modalities     Non-Billable Service Time     Other     Total Time/Units 55 4

## 2023-03-03 ENCOUNTER — HOSPITAL ENCOUNTER (OUTPATIENT)
Dept: INFUSION THERAPY | Age: 48
Setting detail: INFUSION SERIES
Discharge: HOME | End: 2023-03-03
Payer: COMMERCIAL

## 2023-03-03 VITALS
HEART RATE: 68 BPM | RESPIRATION RATE: 16 BRPM | SYSTOLIC BLOOD PRESSURE: 157 MMHG | TEMPERATURE: 97.8 F | OXYGEN SATURATION: 98 % | DIASTOLIC BLOOD PRESSURE: 82 MMHG

## 2023-03-03 DIAGNOSIS — K50.10 EXACERBATION OF CROHN'S DISEASE OF LARGE INTESTINE (HCC): Primary | ICD-10-CM

## 2023-03-03 PROCEDURE — 2580000003 HC RX 258: Performed by: INTERNAL MEDICINE

## 2023-03-03 PROCEDURE — 96365 THER/PROPH/DIAG IV INF INIT: CPT

## 2023-03-03 RX ORDER — HEPARIN SODIUM (PORCINE) LOCK FLUSH IV SOLN 100 UNIT/ML 100 UNIT/ML
500 SOLUTION INTRAVENOUS PRN
OUTPATIENT
Start: 2023-03-03

## 2023-03-03 RX ORDER — ALBUTEROL SULFATE 90 UG/1
4 AEROSOL, METERED RESPIRATORY (INHALATION) PRN
OUTPATIENT
Start: 2023-03-03

## 2023-03-03 RX ORDER — ONDANSETRON 2 MG/ML
8 INJECTION INTRAMUSCULAR; INTRAVENOUS
OUTPATIENT
Start: 2023-03-03

## 2023-03-03 RX ORDER — SODIUM CHLORIDE 0.9 % (FLUSH) 0.9 %
5-40 SYRINGE (ML) INJECTION PRN
OUTPATIENT
Start: 2023-03-03

## 2023-03-03 RX ORDER — ACETAMINOPHEN 325 MG/1
650 TABLET ORAL
OUTPATIENT
Start: 2023-03-03

## 2023-03-03 RX ORDER — SODIUM CHLORIDE 9 MG/ML
INJECTION, SOLUTION INTRAVENOUS CONTINUOUS
OUTPATIENT
Start: 2023-03-03

## 2023-03-03 RX ORDER — EPINEPHRINE 1 MG/ML
0.3 INJECTION, SOLUTION, CONCENTRATE INTRAVENOUS PRN
OUTPATIENT
Start: 2023-03-03

## 2023-03-03 RX ORDER — DIPHENHYDRAMINE HYDROCHLORIDE 50 MG/ML
50 INJECTION INTRAMUSCULAR; INTRAVENOUS
OUTPATIENT
Start: 2023-03-03

## 2023-03-03 RX ORDER — SODIUM CHLORIDE 0.9 % (FLUSH) 0.9 %
5-40 SYRINGE (ML) INJECTION PRN
Status: DISCONTINUED | OUTPATIENT
Start: 2023-03-03 | End: 2023-03-04 | Stop reason: HOSPADM

## 2023-03-03 RX ADMIN — SODIUM CHLORIDE, PRESERVATIVE FREE 10 ML: 5 INJECTION INTRAVENOUS at 13:19

## 2023-03-03 RX ADMIN — SODIUM CHLORIDE, PRESERVATIVE FREE 10 ML: 5 INJECTION INTRAVENOUS at 14:32

## 2023-03-03 ASSESSMENT — PAIN SCALES - GENERAL: PAINLEVEL_OUTOF10: 0

## 2023-03-06 ENCOUNTER — HOSPITAL ENCOUNTER (OUTPATIENT)
Dept: CT IMAGING | Age: 48
Discharge: HOME | End: 2023-03-08
Payer: COMMERCIAL

## 2023-03-06 DIAGNOSIS — S82.852D CLOSED DISPLACED TRIMALLEOLAR FRACTURE OF LEFT ANKLE WITH ROUTINE HEALING, SUBSEQUENT ENCOUNTER: ICD-10-CM

## 2023-03-06 PROCEDURE — 73700 CT LOWER EXTREMITY W/O DYE: CPT

## 2023-03-09 ENCOUNTER — TREATMENT (OUTPATIENT)
Dept: PHYSICAL THERAPY | Age: 48
End: 2023-03-09
Payer: COMMERCIAL

## 2023-03-09 DIAGNOSIS — S82.852D CLOSED DISPLACED TRIMALLEOLAR FRACTURE OF LEFT LOWER LEG WITH ROUTINE HEALING: Primary | ICD-10-CM

## 2023-03-09 PROCEDURE — 97530 THERAPEUTIC ACTIVITIES: CPT | Performed by: PHYSICAL THERAPIST

## 2023-03-09 PROCEDURE — 97110 THERAPEUTIC EXERCISES: CPT | Performed by: PHYSICAL THERAPIST

## 2023-03-09 PROCEDURE — 97112 NEUROMUSCULAR REEDUCATION: CPT | Performed by: PHYSICAL THERAPIST

## 2023-03-09 NOTE — PROGRESS NOTES
[facility]   Phone: [phone]   Fax: [phone]      Physical Therapy Daily Treatment Note    Date: 3/9/2023  Patient Name: Monika Valverde  : 1975   MRN: [id number]  DOInjury: 10/2022   DOSx: 10/22/2022  Referring Provider: MD Michelle Marshalljessica 27  [facility],  [address] [facility]     Medical Diagnosis:   L80.573B (ICD-10-CM) - Closed displaced trimalleolar fracture of left ankle with routine healing, subsequent encounter  Outcome Measure: LEFS 81%    S: Pt reports that she is doing well overall. CT scan completed. Today is her final visit on POC. O: Please refer to PT Eval  Time 2439-9754     Visit 13 Repeat outcome measure at mid point and end. Pain 0/10           Modalities            Manual            Stretch            Exercise            Foot on ground arom ankle  X30 dorsiflex  X30 plantarflex  TE   Ankle band ankle dorsiflex, plantarflex, inversion, & eversion X30 each Blue band TE   Isometric ankle with towel dorsiflex, plantarflex, inversion, & eversion X30 each  TE   Seated strap stretch ankle dorsiflex X5 30s  TE   Seated step stretch gastroc/ankle dorsiflex X20 10s  TE   Seated incline/rocker board stretch gastroc/ankle dorsiflex X20 10s  TE         Short foot position 5s holds x 20 reps    NR   Standing boot ws without HHA  1 min each direction  1 min each direction NR                Performed Today           BB X30 fb, ss  NR   Resisted amb X15 4 direction  TA   Bosu step ups  Up//over lateral X20 ea  NR   Gastroc/ soleus BB stretch 6x30s holds ea  TE   Step ups into SLS X30 B Forward, lateral NR   Ankle band x30 4 direction TE                                                   A:  Tolerated well. Pt was progressed today c ankle stability and HEP was reviewed. She has now been released from surgeon and recent CT scan shows good hardware alignment, but fractures is not fully healed.  She has completed her POC and will be d/c to I HEP at this time. P: Continue with rehab plan & appropriately progress to include new appropriately strengthening & stabilization exercises.   Fercho Barrera PT DPT [id number]    Treatment Charges: Mins Units   Initial Evaluation     Re-Evaluation     Ther Exercise         TE 10 1   Manual Therapy     MT     Ther Activities        TA 15 1   Gait Training          GT     Neuro Re-education NR 30 2   Modalities     Non-Billable Service Time     Other     Total Time/Units 45 3

## 2023-04-18 DIAGNOSIS — Z09 FOLLOW-UP EXAM: Primary | ICD-10-CM

## 2023-04-19 ENCOUNTER — HOSPITAL ENCOUNTER (OUTPATIENT)
Dept: GENERAL RADIOLOGY | Age: 48
Discharge: HOME | End: 2023-04-21
Payer: COMMERCIAL

## 2023-04-19 ENCOUNTER — OFFICE VISIT (OUTPATIENT)
Dept: ORTHOPEDIC SURGERY | Age: 48
End: 2023-04-19
Payer: COMMERCIAL

## 2023-04-19 DIAGNOSIS — S82.852D CLOSED DISPLACED TRIMALLEOLAR FRACTURE OF LEFT ANKLE WITH ROUTINE HEALING, SUBSEQUENT ENCOUNTER: Primary | ICD-10-CM

## 2023-04-19 DIAGNOSIS — Z09 FOLLOW-UP EXAM: ICD-10-CM

## 2023-04-19 PROCEDURE — 99213 OFFICE O/P EST LOW 20 MIN: CPT | Performed by: PHYSICIAN ASSISTANT

## 2023-04-19 PROCEDURE — 73610 X-RAY EXAM OF ANKLE: CPT

## 2023-04-19 NOTE — PROGRESS NOTES
Mervin Feldman is a 49 y.o. female who presents for follow up of f/u 10/20 Closed Tx Left Trimalleolar Ankle Fx Removal of ExFix    SURGEON: Dr. Amara Crane MD  Date of Injury/Surgery:  10-  Date last seen in office:  2-    Symptoms: better  New complaints: Pt had concerns about pain and ecchymosis on the medial and lateral aspect of the Left Ankle. Pt has concerns about pain on the lateral aspect of the Left Ankle. Pt has 5/10 pain on the lateral aspect of the Left Ankle. Pt expressed sharp pain on the lateral left ankle from time to time. Pt has localized swelling in the Left Ankle when FWB for extended periods of time. Weightbearing: left lower Full weight bearing      Assistive device No Device  Participating in therapy (location if yes)?  no    Refills Needed: None  Order/Referral Needed: N/A
plantar/dorsiflexion/great toe extension. States sensation intact to touch in sural/deep peroneal/superficial peroneal/saphenous/posterior tibial nerve distributions to foot/ankle. Palpable dorsalis pedis and posterior tibialis pulses, cap refill brisk in toes, foot warm/perfused. XR:   Narrative   EXAMINATION:   THREE XRAY VIEWS OF THE LEFT ANKLE       4/19/2023 10:40 am       COMPARISON:   02/15/2023       HISTORY:   ORDERING SYSTEM PROVIDED HISTORY: Follow-up exam   TECHNOLOGIST PROVIDED HISTORY:   What reading provider will be dictating this exam?->CRC       FINDINGS:   Lower leg and ankle soft tissue swelling. Osteopenia. Internal fixation   medial malleolus and distal fibula without apparent complication. Mild DJD   in the hindfoot and midfoot. Small calcaneal spurs. No new findings or   significant changes. Impression   No new findings or significant changes with this follow-up study. Assessment:   Diagnosis Orders   1. Closed displaced trimalleolar fracture of left ankle with routine healing, subsequent encounter            Plan:  Reviewed x-rays with patient today in office   Continue weightbearing as tolerated left extremity  Continue HEP and remaining active  Counseled on as needed use of ice, elevation, compression and can trial over-the-counter ankle sleeve if needed. Discussed potential posttraumatic degenerative changes of the ankle and to call the office if necessitating treatment. Follow up prn     Electronically signed by Mehnaz Choi PA-C on 4/21/2023 at 9:18 AM  Note: This report was completed using [employer] voiced recognition software. Every effort has been made to ensure accuracy; however, inadvertent computerized transcription errors may be present.

## 2023-05-16 RX ORDER — DULOXETIN HYDROCHLORIDE 30 MG/1
30 CAPSULE, DELAYED RELEASE ORAL DAILY
Qty: 90 CAPSULE | Refills: 3 | Status: SHIPPED | OUTPATIENT
Start: 2023-05-16

## 2023-05-16 RX ORDER — LOPERAMIDE HYDROCHLORIDE 2 MG/1
CAPSULE ORAL
Qty: 120 CAPSULE | Refills: 3 | Status: SHIPPED | OUTPATIENT
Start: 2023-05-16

## 2023-05-16 RX ORDER — OMEPRAZOLE 40 MG/1
CAPSULE, DELAYED RELEASE ORAL
Qty: 90 CAPSULE | Refills: 3 | Status: SHIPPED | OUTPATIENT
Start: 2023-05-16

## 2023-06-23 ENCOUNTER — HOSPITAL ENCOUNTER (OUTPATIENT)
Dept: MAMMOGRAPHY | Age: 48
Discharge: HOME | End: 2023-06-23
Payer: COMMERCIAL

## 2023-06-23 DIAGNOSIS — Z79.52 LONG TERM CURRENT USE OF SYSTEMIC STEROIDS: ICD-10-CM

## 2023-06-23 PROCEDURE — 77080 DXA BONE DENSITY AXIAL: CPT

## 2023-07-06 ENCOUNTER — OFFICE VISIT (OUTPATIENT)
Dept: CARDIOLOGY CLINIC | Age: 48
End: 2023-07-06
Payer: COMMERCIAL

## 2023-07-06 VITALS
HEART RATE: 63 BPM | HEIGHT: 69 IN | WEIGHT: 263 LBS | DIASTOLIC BLOOD PRESSURE: 82 MMHG | RESPIRATION RATE: 12 BRPM | SYSTOLIC BLOOD PRESSURE: 118 MMHG | BODY MASS INDEX: 38.95 KG/M2

## 2023-07-06 DIAGNOSIS — I47.1 SVT (SUPRAVENTRICULAR TACHYCARDIA) (HCC): Primary | ICD-10-CM

## 2023-07-06 PROCEDURE — 93000 ELECTROCARDIOGRAM COMPLETE: CPT | Performed by: INTERNAL MEDICINE

## 2023-07-06 PROCEDURE — 99214 OFFICE O/P EST MOD 30 MIN: CPT | Performed by: INTERNAL MEDICINE

## 2023-07-06 RX ORDER — SULFAMETHOXAZOLE AND TRIMETHOPRIM 800; 160 MG/1; MG/1
1 TABLET ORAL 2 TIMES DAILY
COMMUNITY
Start: 2023-06-30

## 2023-07-06 RX ORDER — ESTRADIOL 0.1 MG/G
CREAM VAGINAL
COMMUNITY
Start: 2023-06-05

## 2023-07-06 RX ORDER — METOPROLOL SUCCINATE 25 MG/1
12.5 TABLET, EXTENDED RELEASE ORAL DAILY
Qty: 90 TABLET | Refills: 3 | Status: SHIPPED | OUTPATIENT
Start: 2023-07-06

## 2023-07-06 NOTE — PROGRESS NOTES
CHIEF COMPLAINT: SVT    HISTORY OF PRESENT ILLNESS: Patient is a 49 y.o. female seen at the request of Rosmery Lei MD.      No further palpitations. No CP or SOB. Unable to wear monitor due to severe reaction to patches.     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 (chronic)    Crohn's disease of colon with complication ([address])    Abdominal wall skin ulcer, with fat layer exposed ([address])    Skin ulcer of perineum, with fat layer exposed ([address])    Trimalleolar fracture of ankle, closed, left, initial encounter    Closed trimalleolar fracture of left ankle with delayed healing    Presence of other orthopedic joint implants       Allergies   Allergen Reactions    Neomycin Rash    Amoxil [Amoxicillin] Diarrhea    Doxycycline      Stomach upset         Current Outpatient Medications   Medication Sig Dispense Refill    estradiol (ESTRACE) 0.1 MG/GM vaginal cream USE 1 GRAM VAGINALLY 2 TIMES A WEEK AS DIRECTED      sulfamethoxazole-trimethoprim (BACTRIM DS;SEPTRA DS) 800-160 MG per tablet Take 1 tablet by mouth 2 times daily      omeprazole (PRILOSEC) 40 MG delayed release capsule TAKE 1 CAPSULE DAILY 90 capsule 3    DULoxetine (CYMBALTA) 30 MG extended release capsule Take 1 capsule by mouth daily 90 capsule 3    loperamide (IMODIUM) 2 MG capsule TAKE 1 CAPSULE BY MOUTH FOUR TIMES DAILY AS NEEDED FOR DIARRHEA 120 capsule 3    Risankizumab-rzaa 360 MG/2.4ML SOCT Inject 360 mg into the skin Every 2 months      oxyCODONE (ROXICODONE) 5 MG immediate release

## 2023-08-02 NOTE — ANESTHESIA POSTPROCEDURE EVALUATION
Department of Anesthesiology  Postprocedure Note    Patient: Sandy Quarles  MRN: [id number]  YOB: 1975  Date of evaluation: 10/6/2022      Procedure Summary     Date: 10/06/22 Room / Location: [facility] 08 / [facility]    Anesthesia Start: 1018 Anesthesia Stop:     Procedure: LEFT ANKLE EXTERNAL FIXATION (Left: Leg Lower) Diagnosis:       Closed trimalleolar fracture of left ankle, initial encounter      (Closed trimalleolar fracture of left ankle, initial encounter)    Surgeons: Enoch Guerrier MD Responsible Provider: Shalonda Mcbride MD    Anesthesia Type: general, regional ASA Status: 3          Anesthesia Type: No value filed.     Berenice Phase I:      Berenice Phase II:        Anesthesia Post Evaluation    Patient location during evaluation: PACU  Patient participation: complete - patient participated  Level of consciousness: awake  Airway patency: patent  Nausea & Vomiting: no nausea and no vomiting  Complications: no  Cardiovascular status: hemodynamically stable  Respiratory status: acceptable  Hydration status: euvolemic History/Exam/Medical Decision Making

## 2023-08-25 DIAGNOSIS — E78.2 MIXED HYPERLIPIDEMIA: Primary | ICD-10-CM

## 2023-09-01 ENCOUNTER — HOSPITAL ENCOUNTER (OUTPATIENT)
Age: 48
Discharge: HOME | End: 2023-09-01
Payer: COMMERCIAL

## 2023-09-01 DIAGNOSIS — E78.2 MIXED HYPERLIPIDEMIA: ICD-10-CM

## 2023-09-01 LAB
ALBUMIN SERPL-MCNC: 4.1 G/DL (ref 3.5–5.2)
ALP SERPL-CCNC: 78 U/L (ref 35–104)
ALT SERPL-CCNC: 14 U/L (ref 0–32)
ANION GAP SERPL CALCULATED.3IONS-SCNC: 11 MMOL/L (ref 7–16)
AST SERPL-CCNC: 22 U/L (ref 0–31)
BASOPHILS # BLD: 0.08 K/UL (ref 0–0.2)
BASOPHILS NFR BLD: 1 % (ref 0–2)
BILIRUB SERPL-MCNC: 0.4 MG/DL (ref 0–1.2)
BUN SERPL-MCNC: 11 MG/DL (ref 6–20)
CALCIUM SERPL-MCNC: 9.3 MG/DL (ref 8.6–10.2)
CHLORIDE SERPL-SCNC: 101 MMOL/L (ref 98–107)
CHOLEST SERPL-MCNC: 205 MG/DL
CO2 SERPL-SCNC: 23 MMOL/L (ref 22–29)
CREAT SERPL-MCNC: 1.1 MG/DL (ref 0.5–1)
EOSINOPHIL # BLD: 0.24 K/UL (ref 0.05–0.5)
EOSINOPHILS RELATIVE PERCENT: 4 % (ref 0–6)
ERYTHROCYTE [DISTWIDTH] IN BLOOD BY AUTOMATED COUNT: 13.5 % (ref 11.5–15)
GFR SERPL CREATININE-BSD FRML MDRD: 60 ML/MIN/1.73M2
GLUCOSE SERPL-MCNC: 84 MG/DL (ref 74–99)
HCT VFR BLD AUTO: 38.6 % (ref 34–48)
HDLC SERPL-MCNC: 55 MG/DL
HGB BLD-MCNC: 12 G/DL (ref 11.5–15.5)
IMM GRANULOCYTES # BLD AUTO: <0.03 K/UL (ref 0–0.58)
IMM GRANULOCYTES NFR BLD: 0 % (ref 0–5)
LDLC SERPL CALC-MCNC: 121 MG/DL
LYMPHOCYTES NFR BLD: 1.61 K/UL (ref 1.5–4)
LYMPHOCYTES RELATIVE PERCENT: 24 % (ref 20–42)
MCH RBC QN AUTO: 27.5 PG (ref 26–35)
MCHC RBC AUTO-ENTMCNC: 31.1 G/DL (ref 32–34.5)
MCV RBC AUTO: 88.3 FL (ref 80–99.9)
MONOCYTES NFR BLD: 0.5 K/UL (ref 0.1–0.95)
MONOCYTES NFR BLD: 8 % (ref 2–12)
NEUTROPHILS NFR BLD: 63 % (ref 43–80)
NEUTS SEG NFR BLD: 4.21 K/UL (ref 1.8–7.3)
PLATELET # BLD AUTO: 252 K/UL (ref 130–450)
PMV BLD AUTO: 9.8 FL (ref 7–12)
POTASSIUM SERPL-SCNC: 4.3 MMOL/L (ref 3.5–5)
PROT SERPL-MCNC: 8.8 G/DL (ref 6.4–8.3)
RBC # BLD AUTO: 4.37 M/UL (ref 3.5–5.5)
SODIUM SERPL-SCNC: 135 MMOL/L (ref 132–146)
TRIGL SERPL-MCNC: 147 MG/DL
TSH SERPL DL<=0.05 MIU/L-ACNC: 1.95 UIU/ML (ref 0.27–4.2)
VLDLC SERPL CALC-MCNC: 29 MG/DL
WBC OTHER # BLD: 6.7 K/UL (ref 4.5–11.5)

## 2023-09-01 PROCEDURE — 80061 LIPID PANEL: CPT

## 2023-09-01 PROCEDURE — 80053 COMPREHEN METABOLIC PANEL: CPT

## 2023-09-01 PROCEDURE — 85025 COMPLETE CBC W/AUTO DIFF WBC: CPT

## 2023-09-01 PROCEDURE — 84443 ASSAY THYROID STIM HORMONE: CPT

## 2023-09-04 ASSESSMENT — PATIENT HEALTH QUESTIONNAIRE - PHQ9
5. POOR APPETITE OR OVEREATING: 0
10. IF YOU CHECKED OFF ANY PROBLEMS, HOW DIFFICULT HAVE THESE PROBLEMS MADE IT FOR YOU TO DO YOUR WORK, TAKE CARE OF THINGS AT HOME, OR GET ALONG WITH OTHER PEOPLE: NOT DIFFICULT AT ALL
7. TROUBLE CONCENTRATING ON THINGS, SUCH AS READING THE NEWSPAPER OR WATCHING TELEVISION: 1
SUM OF ALL RESPONSES TO PHQ9 QUESTIONS 1 & 2: 1
4. FEELING TIRED OR HAVING LITTLE ENERGY: 1
8. MOVING OR SPEAKING SO SLOWLY THAT OTHER PEOPLE COULD HAVE NOTICED. OR THE OPPOSITE - BEING SO FIDGETY OR RESTLESS THAT YOU HAVE BEEN MOVING AROUND A LOT MORE THAN USUAL: NOT AT ALL
SUM OF ALL RESPONSES TO PHQ QUESTIONS 1-9: 4
6. FEELING BAD ABOUT YOURSELF - OR THAT YOU ARE A FAILURE OR HAVE LET YOURSELF OR YOUR FAMILY DOWN: 0
4. FEELING TIRED OR HAVING LITTLE ENERGY: SEVERAL DAYS
SUM OF ALL RESPONSES TO PHQ QUESTIONS 1-9: 4
3. TROUBLE FALLING OR STAYING ASLEEP: 1
SUM OF ALL RESPONSES TO PHQ QUESTIONS 1-9: 4
1. LITTLE INTEREST OR PLEASURE IN DOING THINGS: 0
6. FEELING BAD ABOUT YOURSELF - OR THAT YOU ARE A FAILURE OR HAVE LET YOURSELF OR YOUR FAMILY DOWN: NOT AT ALL
8. MOVING OR SPEAKING SO SLOWLY THAT OTHER PEOPLE COULD HAVE NOTICED. OR THE OPPOSITE, BEING SO FIGETY OR RESTLESS THAT YOU HAVE BEEN MOVING AROUND A LOT MORE THAN USUAL: 0
7. TROUBLE CONCENTRATING ON THINGS, SUCH AS READING THE NEWSPAPER OR WATCHING TELEVISION: SEVERAL DAYS
1. LITTLE INTEREST OR PLEASURE IN DOING THINGS: NOT AT ALL
2. FEELING DOWN, DEPRESSED OR HOPELESS: SEVERAL DAYS
3. TROUBLE FALLING OR STAYING ASLEEP: SEVERAL DAYS
9. THOUGHTS THAT YOU WOULD BE BETTER OFF DEAD, OR OF HURTING YOURSELF: NOT AT ALL
SUM OF ALL RESPONSES TO PHQ QUESTIONS 1-9: 4
2. FEELING DOWN, DEPRESSED OR HOPELESS: 1
9. THOUGHTS THAT YOU WOULD BE BETTER OFF DEAD, OR OF HURTING YOURSELF: 0
SUM OF ALL RESPONSES TO PHQ QUESTIONS 1-9: 4
5. POOR APPETITE OR OVEREATING: NOT AT ALL
10. IF YOU CHECKED OFF ANY PROBLEMS, HOW DIFFICULT HAVE THESE PROBLEMS MADE IT FOR YOU TO DO YOUR WORK, TAKE CARE OF THINGS AT HOME, OR GET ALONG WITH OTHER PEOPLE: 0

## 2023-09-05 ENCOUNTER — OFFICE VISIT (OUTPATIENT)
Dept: FAMILY MEDICINE CLINIC | Age: 48
End: 2023-09-05
Payer: COMMERCIAL

## 2023-09-05 VITALS
BODY MASS INDEX: 39.1 KG/M2 | HEIGHT: 69 IN | HEART RATE: 75 BPM | WEIGHT: 264 LBS | SYSTOLIC BLOOD PRESSURE: 121 MMHG | TEMPERATURE: 98.8 F | OXYGEN SATURATION: 96 % | DIASTOLIC BLOOD PRESSURE: 78 MMHG | RESPIRATION RATE: 16 BRPM

## 2023-09-05 DIAGNOSIS — Z00.00 ANNUAL PHYSICAL EXAM: ICD-10-CM

## 2023-09-05 DIAGNOSIS — D84.821 IMMUNOCOMPROMISED STATE DUE TO DRUG THERAPY (HCC): ICD-10-CM

## 2023-09-05 DIAGNOSIS — K50.918 CROHN'S DISEASE, UNSPECIFIED, WITH OTHER COMPLICATION (HCC): ICD-10-CM

## 2023-09-05 DIAGNOSIS — Z12.31 ENCOUNTER FOR SCREENING MAMMOGRAM FOR MALIGNANT NEOPLASM OF BREAST: Primary | ICD-10-CM

## 2023-09-05 DIAGNOSIS — Z79.899 IMMUNOCOMPROMISED STATE DUE TO DRUG THERAPY (HCC): ICD-10-CM

## 2023-09-05 DIAGNOSIS — L98.492 SKIN ULCER OF PERINEUM, WITH FAT LAYER EXPOSED (HCC): ICD-10-CM

## 2023-09-05 PROCEDURE — 99396 PREV VISIT EST AGE 40-64: CPT | Performed by: FAMILY MEDICINE

## 2023-09-05 RX ORDER — SULFAMETHOXAZOLE AND TRIMETHOPRIM 800; 160 MG/1; MG/1
1 TABLET ORAL 2 TIMES DAILY
Qty: 180 TABLET | Refills: 0 | Status: SHIPPED | OUTPATIENT
Start: 2023-09-05

## 2023-09-05 SDOH — ECONOMIC STABILITY: INCOME INSECURITY: HOW HARD IS IT FOR YOU TO PAY FOR THE VERY BASICS LIKE FOOD, HOUSING, MEDICAL CARE, AND HEATING?: NOT HARD AT ALL

## 2023-09-05 SDOH — ECONOMIC STABILITY: FOOD INSECURITY: WITHIN THE PAST 12 MONTHS, YOU WORRIED THAT YOUR FOOD WOULD RUN OUT BEFORE YOU GOT MONEY TO BUY MORE.: NEVER TRUE

## 2023-09-05 SDOH — ECONOMIC STABILITY: HOUSING INSECURITY
IN THE LAST 12 MONTHS, WAS THERE A TIME WHEN YOU DID NOT HAVE A STEADY PLACE TO SLEEP OR SLEPT IN A SHELTER (INCLUDING NOW)?: NO

## 2023-09-05 SDOH — ECONOMIC STABILITY: FOOD INSECURITY: WITHIN THE PAST 12 MONTHS, THE FOOD YOU BOUGHT JUST DIDN'T LAST AND YOU DIDN'T HAVE MONEY TO GET MORE.: NEVER TRUE

## 2023-09-05 NOTE — PROGRESS NOTES
Annual exam:  Patient is here for routine yearly physical/preventative visit. Patient has no complaints or concerns today. Patient is  generally healthy. Chronic medical conditions are generally controlled. Most recent labs reviewed with patient and  are remarkable. Health maintenance reviewed with patient and is not up to date. Overall doing well.       Past Medical History:   Diagnosis Date    Acid reflux     Anxiety and depression     Crohn's colitis ([address]) 04/2018    Crohn's colitis per colonoscopy biopsies 4/9/18, 6/11/18      Past Surgical History:   Procedure Laterality Date    ANKLE FRACTURE SURGERY Left 10/6/2022    LEFT ANKLE EXTERNAL FIXATION performed by Gonsalo Berkowitz MD at [address] Left 10/20/2022    LEFT ANKLE OPEN REDUCTION INTERNAL FIXATION,EXTERNAL FIXATOR REMOVAL FROM LEFT ANKLE performed by Gonsalo Berkowitz MD at [address] [facility]    Benign Cyst    CHOLECYSTECTOMY  11/18/2011    Jaqueline Manzanares, laparoscopic     ENDOMETRIAL ABLATION  2004    ILEOSTOMY OR JEJUNOSTOMY  04/2019    TOTAL COLECTOMY      UPPER GASTROINTESTINAL ENDOSCOPY        Family History   Problem Relation Age of Onset    Kidney Disease Mother     Diabetes Father         on HD s/p MI    Heart Disease Father     High Blood Pressure Father      Social History     Socioeconomic History    Marital status:      Spouse name: Not on file    Number of children: Not on file    Years of education: Not on file    Highest education level: Not on file   Occupational History    Not on file   Tobacco Use    Smoking status: Never    Smokeless tobacco: Never   Vaping Use    Vaping Use: Never used   Substance and Sexual Activity    Alcohol use: No    Drug use: No    Sexual activity: Not on file   Other Topics Concern    Not on file   Social History Narrative    Not on file     Social Determinants of Health     Financial Resource Strain: Low Risk     Difficulty of Paying Living Expenses:

## 2023-09-22 ENCOUNTER — HOSPITAL ENCOUNTER (OUTPATIENT)
Dept: MAMMOGRAPHY | Age: 48
Discharge: HOME | End: 2023-09-22
Payer: COMMERCIAL

## 2023-09-22 VITALS — WEIGHT: 262 LBS | BODY MASS INDEX: 38.8 KG/M2 | HEIGHT: 69 IN

## 2023-09-22 DIAGNOSIS — Z12.31 ENCOUNTER FOR SCREENING MAMMOGRAM FOR MALIGNANT NEOPLASM OF BREAST: ICD-10-CM

## 2023-09-22 PROCEDURE — 77063 BREAST TOMOSYNTHESIS BI: CPT

## 2023-11-06 RX ORDER — LOPERAMIDE HYDROCHLORIDE 2 MG/1
CAPSULE ORAL
Qty: 120 CAPSULE | Refills: 3 | Status: SHIPPED | OUTPATIENT
Start: 2023-11-06

## 2023-11-27 RX ORDER — SULFAMETHOXAZOLE AND TRIMETHOPRIM 800; 160 MG/1; MG/1
1 TABLET ORAL 2 TIMES DAILY
Qty: 60 TABLET | Refills: 0 | Status: SHIPPED | OUTPATIENT
Start: 2023-11-27

## 2024-04-16 RX ORDER — LOPERAMIDE HYDROCHLORIDE 2 MG/1
CAPSULE ORAL
Qty: 120 CAPSULE | Refills: 3 | Status: SHIPPED | OUTPATIENT
Start: 2024-04-16

## 2024-05-28 NOTE — PLAN OF CARE
Problem: Pain:  Goal: Pain level will decrease  Description: Pain level will decrease  Outcome: Ongoing  Goal: Control of acute pain  Description: Control of acute pain  Outcome: Ongoing  Goal: Control of chronic pain  Description: Control of chronic pain  Outcome: Ongoing     Problem: Wound:  Goal: Will show signs of wound healing; wound closure and no evidence of infection  Description: Will show signs of wound healing; wound closure and no evidence of infection  Outcome: Ongoing     Problem: Weight control:  Goal: Ability to maintain an optimal weight for height and age will be supported  Description: Ability to maintain an optimal weight for height and age will be supported  Outcome: Met This Shift     Problem: Pressure Ulcer:  Goal: Signs of wound healing will improve  Description: Signs of wound healing will improve  Outcome: Ongoing  Goal: Absence of new pressure ulcer  Description: Absence of new pressure ulcer  Outcome: Ongoing  Goal: Will show no infection signs and symptoms  Description: Will show no infection signs and symptoms  Outcome: Ongoing     Problem: Pressure Ulcer:  Goal: Signs of wound healing will improve  Description: Signs of wound healing will improve  Outcome: Ongoing     Problem: Pressure Ulcer:  Goal: Signs of wound healing will improve  Description: Signs of wound healing will improve  Outcome: Ongoing  Goal: Absence of new pressure ulcer  Description: Absence of new pressure ulcer  Outcome: Ongoing  Goal: Will show no infection signs and symptoms  Description: Will show no infection signs and symptoms  Outcome: Ongoing sooner than if someone gets to the hospital by car.       Learning About Coronavirus (COVID-19)  Coronavirus (COVID-19): Overview  What is coronavirus (COVID-19)?  The coronavirus disease (COVID-19) is caused by a virus. It is an illness that was first found in [facility], in December 2019. It has since spread worldwide.  The virus can cause fever, cough, and trouble breathing. In severe cases, it can cause pneumonia and make it hard to breathe without help. It can cause death.  Coronaviruses are a large group of viruses. They cause the common cold. They also cause more serious illnesses like Middle East respiratory syndrome (MERS) and severe acute respiratory syndrome (SARS). COVID-19 is caused by a novel coronavirus. That means it's a new type that has not been seen in people before.  This virus spreads person-to-person through droplets from coughing and sneezing. It can also spread when you are close to someone who is infected. And it can spread when you touch something that has the virus on it, such as a doorknob or a tabletop.  What can you do to protect yourself from coronavirus (COVID-19)?  The best way to protect yourself from getting sick is to:  Avoid areas where there is an outbreak.  Avoid contact with people who may be infected.  Wash your hands often with soap or alcohol-based hand sanitizers.  Avoid crowds and try to stay at least 6 feet away from other people.  Wash your hands often, especially after you cough or sneeze. Use soap and water, and scrub for at least 20 seconds. If soap and water aren't available, use an alcohol-based hand .  Avoid touching your mouth, nose, and eyes.  What can you do to avoid spreading the virus to others?  To help avoid spreading the virus to others:  Cover your mouth with a tissue when you cough or sneeze. Then throw the tissue in the trash.  Use a disinfectant to clean things that you touch often.  Stay home if you are sick or have been exposed to the

## 2024-05-30 RX ORDER — OMEPRAZOLE 40 MG/1
CAPSULE, DELAYED RELEASE ORAL
Qty: 90 CAPSULE | Refills: 3 | Status: SHIPPED | OUTPATIENT
Start: 2024-05-30

## 2024-06-14 ENCOUNTER — HOSPITAL ENCOUNTER (OUTPATIENT)
Age: 49
Discharge: HOME | End: 2024-06-14
Payer: COMMERCIAL

## 2024-06-14 LAB
ANION GAP SERPL CALCULATED.3IONS-SCNC: 9 MMOL/L (ref 7–16)
BASOPHILS # BLD: 0.05 K/UL (ref 0–0.2)
BASOPHILS NFR BLD: 1 % (ref 0–2)
BUN SERPL-MCNC: 13 MG/DL (ref 6–20)
CALCIUM SERPL-MCNC: 8.9 MG/DL (ref 8.6–10.2)
CHLORIDE SERPL-SCNC: 104 MMOL/L (ref 98–107)
CO2 SERPL-SCNC: 25 MMOL/L (ref 22–29)
CREAT SERPL-MCNC: 0.9 MG/DL (ref 0.5–1)
EOSINOPHIL # BLD: 0.18 K/UL (ref 0.05–0.5)
EOSINOPHILS RELATIVE PERCENT: 2 % (ref 0–6)
ERYTHROCYTE [DISTWIDTH] IN BLOOD BY AUTOMATED COUNT: 14.2 % (ref 11.5–15)
FERRITIN SERPL-MCNC: 9 NG/ML
GFR, ESTIMATED: 77 ML/MIN/1.73M2
GLUCOSE SERPL-MCNC: 85 MG/DL (ref 74–99)
HCT VFR BLD AUTO: 33.3 % (ref 34–48)
HGB BLD-MCNC: 9.8 G/DL (ref 11.5–15.5)
IMM GRANULOCYTES # BLD AUTO: <0.03 K/UL (ref 0–0.58)
IMM GRANULOCYTES NFR BLD: 0 % (ref 0–5)
IRON SATN MFR SERPL: 8 % (ref 15–50)
IRON SERPL-MCNC: 31 UG/DL (ref 37–145)
LYMPHOCYTES NFR BLD: 2.01 K/UL (ref 1.5–4)
LYMPHOCYTES RELATIVE PERCENT: 27 % (ref 20–42)
MCH RBC QN AUTO: 25.1 PG (ref 26–35)
MCHC RBC AUTO-ENTMCNC: 29.4 G/DL (ref 32–34.5)
MCV RBC AUTO: 85.4 FL (ref 80–99.9)
MONOCYTES NFR BLD: 0.56 K/UL (ref 0.1–0.95)
MONOCYTES NFR BLD: 8 % (ref 2–12)
NEUTROPHILS NFR BLD: 62 % (ref 43–80)
NEUTS SEG NFR BLD: 4.56 K/UL (ref 1.8–7.3)
PLATELET # BLD AUTO: 201 K/UL (ref 130–450)
PMV BLD AUTO: 10.5 FL (ref 7–12)
POTASSIUM SERPL-SCNC: 3.9 MMOL/L (ref 3.5–5)
RBC # BLD AUTO: 3.9 M/UL (ref 3.5–5.5)
SODIUM SERPL-SCNC: 138 MMOL/L (ref 132–146)
TIBC SERPL-MCNC: 389 UG/DL (ref 250–450)
TRANSFERRIN SERPL-MCNC: 317 MG/DL (ref 200–360)
WBC OTHER # BLD: 7.4 K/UL (ref 4.5–11.5)

## 2024-06-14 PROCEDURE — 85025 COMPLETE CBC W/AUTO DIFF WBC: CPT

## 2024-06-14 PROCEDURE — 36415 COLL VENOUS BLD VENIPUNCTURE: CPT

## 2024-06-14 PROCEDURE — 84466 ASSAY OF TRANSFERRIN: CPT

## 2024-06-14 PROCEDURE — 83540 ASSAY OF IRON: CPT

## 2024-06-14 PROCEDURE — 82728 ASSAY OF FERRITIN: CPT

## 2024-06-14 PROCEDURE — 80048 BASIC METABOLIC PNL TOTAL CA: CPT

## 2024-06-19 RX ORDER — DULOXETIN HYDROCHLORIDE 30 MG/1
30 CAPSULE, DELAYED RELEASE ORAL DAILY
Qty: 90 CAPSULE | Refills: 3 | Status: SHIPPED | OUTPATIENT
Start: 2024-06-19

## 2024-06-27 DIAGNOSIS — K90.9 INTESTINAL MALABSORPTION, UNSPECIFIED TYPE: Primary | ICD-10-CM

## 2024-06-27 RX ORDER — ACETAMINOPHEN 325 MG/1
650 TABLET ORAL
OUTPATIENT
Start: 2024-06-30

## 2024-06-27 RX ORDER — SODIUM CHLORIDE 9 MG/ML
INJECTION, SOLUTION INTRAVENOUS CONTINUOUS
OUTPATIENT
Start: 2024-06-30

## 2024-06-27 RX ORDER — SODIUM CHLORIDE 9 MG/ML
5-250 INJECTION, SOLUTION INTRAVENOUS PRN
OUTPATIENT
Start: 2024-06-30

## 2024-06-27 RX ORDER — ONDANSETRON 2 MG/ML
8 INJECTION INTRAMUSCULAR; INTRAVENOUS
OUTPATIENT
Start: 2024-06-30

## 2024-06-27 RX ORDER — ALBUTEROL SULFATE 90 UG/1
4 AEROSOL, METERED RESPIRATORY (INHALATION) PRN
OUTPATIENT
Start: 2024-06-30

## 2024-06-27 RX ORDER — SODIUM CHLORIDE 0.9 % (FLUSH) 0.9 %
5-40 SYRINGE (ML) INJECTION PRN
OUTPATIENT
Start: 2024-06-30

## 2024-06-27 RX ORDER — EPINEPHRINE 1 MG/ML
0.3 INJECTION, SOLUTION, CONCENTRATE INTRAVENOUS PRN
OUTPATIENT
Start: 2024-06-30

## 2024-06-27 RX ORDER — DIPHENHYDRAMINE HYDROCHLORIDE 50 MG/ML
50 INJECTION INTRAMUSCULAR; INTRAVENOUS
OUTPATIENT
Start: 2024-06-30

## 2024-07-08 ENCOUNTER — TELEPHONE (OUTPATIENT)
Dept: FAMILY MEDICINE CLINIC | Age: 49
End: 2024-07-08

## 2024-07-08 NOTE — TELEPHONE ENCOUNTER
Patient called in and stated that she has swollen tonsils, spots on tonsils, hard to swallow, head cold, no fever, no coughing but she is miserable. With her being immunocompromised, she didn't know if you wanted to see her. She has not taken a covid test but is doing so today. She has had been this say since right before the holiday.Please advise on what you would like patient to do

## 2024-07-09 ENCOUNTER — OFFICE VISIT (OUTPATIENT)
Dept: FAMILY MEDICINE CLINIC | Age: 49
End: 2024-07-09
Payer: COMMERCIAL

## 2024-07-09 VITALS
WEIGHT: 253 LBS | OXYGEN SATURATION: 97 % | BODY MASS INDEX: 37.47 KG/M2 | DIASTOLIC BLOOD PRESSURE: 60 MMHG | SYSTOLIC BLOOD PRESSURE: 118 MMHG | HEART RATE: 105 BPM | TEMPERATURE: 97.3 F | HEIGHT: 69 IN

## 2024-07-09 DIAGNOSIS — J01.90 ACUTE NON-RECURRENT SINUSITIS, UNSPECIFIED LOCATION: ICD-10-CM

## 2024-07-09 DIAGNOSIS — J03.90 EXUDATIVE TONSILLITIS: Primary | ICD-10-CM

## 2024-07-09 DIAGNOSIS — J02.9 SORE THROAT: ICD-10-CM

## 2024-07-09 DIAGNOSIS — R09.82 POSTNASAL DRIP: ICD-10-CM

## 2024-07-09 LAB
HETEROPHILE ANTIBODIES: NEGATIVE
S PYO AG THROAT QL: NORMAL

## 2024-07-09 PROCEDURE — 99214 OFFICE O/P EST MOD 30 MIN: CPT | Performed by: PHYSICIAN ASSISTANT

## 2024-07-09 PROCEDURE — 87880 STREP A ASSAY W/OPTIC: CPT | Performed by: PHYSICIAN ASSISTANT

## 2024-07-09 PROCEDURE — 86308 HETEROPHILE ANTIBODY SCREEN: CPT | Performed by: PHYSICIAN ASSISTANT

## 2024-07-09 RX ORDER — CEFDINIR 300 MG/1
300 CAPSULE ORAL 2 TIMES DAILY
Qty: 20 CAPSULE | Refills: 0 | Status: SHIPPED
Start: 2024-07-09 | End: 2024-07-11 | Stop reason: SINTOL

## 2024-07-09 NOTE — PROGRESS NOTES
24  Perla Linares : 1975 Sex: female  Age 48 y.o.      Subjective:  Chief Complaint   Patient presents with    Pharyngitis    Otalgia    Congestion     Negative Covid at home.          HPI:   HPI  Perla Linares , 48 y.o. female presents to [facility] care for evaluation of sore throat, ear pain, congestion.  The patient has done a home COVID test that was negative.  The patient started with the symptoms on  or 3.  The patient states that she has noted some increased congestion, drainage, sore throat, ear pain.  The patient is having some white spots on her tonsils there is some tonsillar hypertrophy.  The patient is not having any fevers.  She does not really have a cough.  No difficulty breathing.  No difficulty swallowing.  No history of mono.      ROS:   Unless otherwise stated in this report the patient's positive and negative responses for review of systems for constitutional, eyes, ENT, cardiovascular, respiratory, gastrointestinal, neurological, , musculoskeletal, and integument systems and related systems to the presenting problem are either stated in the history of present illness or were not pertinent or were negative for the symptoms and/or complaints related to the presenting medical problem.  Positives and pertinent negatives as per HPI.  All others reviewed and are negative.      PMH:     Past Medical History:   Diagnosis Date    Acid reflux     Anxiety and depression     Crohn's colitis (HCC) 2018    Crohn's colitis per colonoscopy biopsies 18, 18       Past Surgical History:   Procedure Laterality Date    ANKLE FRACTURE SURGERY Left 10/6/2022    LEFT ANKLE EXTERNAL FIXATION performed by Griffin Henley MD at [facility] OR    ANKLE FRACTURE SURGERY Left 10/20/2022    LEFT ANKLE OPEN REDUCTION INTERNAL FIXATION,EXTERNAL FIXATOR REMOVAL FROM LEFT ANKLE performed by Griffin Henley MD at [facility] OR    BREAST BIOPSY      Benign Cyst    CHOLECYSTECTOMY

## 2024-07-10 NOTE — TELEPHONE ENCOUNTER
Patient ended up going to urgent care, they gave her cefdinir. She wanted you to look at her chart and see if you wanted to give her anything else or is she needed to come in still. She said all her tests came back negative

## 2024-07-11 ENCOUNTER — HOSPITAL ENCOUNTER (OUTPATIENT)
Dept: INFUSION THERAPY | Age: 49
Setting detail: INFUSION SERIES
End: 2024-07-11

## 2024-07-11 RX ORDER — AZITHROMYCIN 250 MG/1
TABLET, FILM COATED ORAL
Qty: 6 TABLET | Refills: 0 | Status: SHIPPED | OUTPATIENT
Start: 2024-07-11 | End: 2024-07-21

## 2024-07-12 LAB
CULTURE: NORMAL
SPECIMEN DESCRIPTION: NORMAL

## 2024-07-18 ENCOUNTER — HOSPITAL ENCOUNTER (OUTPATIENT)
Dept: INFUSION THERAPY | Age: 49
Setting detail: INFUSION SERIES
Discharge: HOME | End: 2024-07-18
Payer: COMMERCIAL

## 2024-07-18 VITALS
DIASTOLIC BLOOD PRESSURE: 62 MMHG | RESPIRATION RATE: 16 BRPM | SYSTOLIC BLOOD PRESSURE: 132 MMHG | HEART RATE: 67 BPM | TEMPERATURE: 97.5 F | OXYGEN SATURATION: 100 %

## 2024-07-18 DIAGNOSIS — K90.9 INTESTINAL MALABSORPTION, UNSPECIFIED TYPE: Primary | ICD-10-CM

## 2024-07-18 DIAGNOSIS — D50.0 IRON DEFICIENCY ANEMIA SECONDARY TO BLOOD LOSS (CHRONIC): ICD-10-CM

## 2024-07-18 PROCEDURE — 6360000002 HC RX W HCPCS

## 2024-07-18 PROCEDURE — 96365 THER/PROPH/DIAG IV INF INIT: CPT

## 2024-07-18 PROCEDURE — 2580000003 HC RX 258

## 2024-07-18 RX ORDER — SODIUM CHLORIDE 0.9 % (FLUSH) 0.9 %
5-40 SYRINGE (ML) INJECTION PRN
Status: CANCELLED | OUTPATIENT
Start: 2024-07-25

## 2024-07-18 RX ORDER — ACETAMINOPHEN 325 MG/1
650 TABLET ORAL
Status: CANCELLED | OUTPATIENT
Start: 2024-07-25

## 2024-07-18 RX ORDER — EPINEPHRINE 1 MG/ML
0.3 INJECTION, SOLUTION, CONCENTRATE INTRAVENOUS PRN
Status: CANCELLED | OUTPATIENT
Start: 2024-07-25

## 2024-07-18 RX ORDER — SODIUM CHLORIDE 9 MG/ML
5-250 INJECTION, SOLUTION INTRAVENOUS PRN
Status: DISCONTINUED | OUTPATIENT
Start: 2024-07-18 | End: 2024-07-19 | Stop reason: HOSPADM

## 2024-07-18 RX ORDER — ALBUTEROL SULFATE 90 UG/1
4 AEROSOL, METERED RESPIRATORY (INHALATION) PRN
Status: CANCELLED | OUTPATIENT
Start: 2024-07-25

## 2024-07-18 RX ORDER — DIPHENHYDRAMINE HYDROCHLORIDE 50 MG/ML
50 INJECTION INTRAMUSCULAR; INTRAVENOUS
Status: CANCELLED | OUTPATIENT
Start: 2024-07-25

## 2024-07-18 RX ORDER — ONDANSETRON 2 MG/ML
8 INJECTION INTRAMUSCULAR; INTRAVENOUS
Status: CANCELLED | OUTPATIENT
Start: 2024-07-25

## 2024-07-18 RX ORDER — SODIUM CHLORIDE 9 MG/ML
INJECTION, SOLUTION INTRAVENOUS CONTINUOUS
Status: CANCELLED | OUTPATIENT
Start: 2024-07-25

## 2024-07-18 RX ORDER — METOPROLOL SUCCINATE 25 MG/1
12.5 TABLET, EXTENDED RELEASE ORAL DAILY
Qty: 45 TABLET | Refills: 1 | Status: SHIPPED | OUTPATIENT
Start: 2024-07-18

## 2024-07-18 RX ORDER — SODIUM CHLORIDE 0.9 % (FLUSH) 0.9 %
5-40 SYRINGE (ML) INJECTION PRN
Status: DISCONTINUED | OUTPATIENT
Start: 2024-07-18 | End: 2024-07-19 | Stop reason: HOSPADM

## 2024-07-18 RX ORDER — SODIUM CHLORIDE 9 MG/ML
5-250 INJECTION, SOLUTION INTRAVENOUS PRN
Status: CANCELLED | OUTPATIENT
Start: 2024-07-25

## 2024-07-18 RX ADMIN — SODIUM CHLORIDE 125 MG: 9 INJECTION, SOLUTION INTRAVENOUS at 13:35

## 2024-07-18 RX ADMIN — SODIUM CHLORIDE 20 ML/HR: 9 INJECTION, SOLUTION INTRAVENOUS at 13:33

## 2024-07-18 RX ADMIN — SODIUM CHLORIDE, PRESERVATIVE FREE 10 ML: 5 INJECTION INTRAVENOUS at 13:31

## 2024-07-18 NOTE — PROGRESS NOTES
Tolerated infusion Denia.  Reviewed therapy plan, offered education material and/or discharge material, reviewed medication information and signs and symptoms  and educated on possible side effects, verbalizes good knowledge of current plan patient verbalizes understanding, and has no signs or symptoms to report at this time.   Patient discharged. Patient alert and oriented x3.   No distress noted.   Vital signs stable.   Patient denies any new or worsening pain.  Patient denies any needs.  All questions answered.  Next appointment scheduled DECLINES  copy of AVS. Patient stayed for 30 minute observation after completion of infusion.LEXICOMP  GIVEN

## 2024-07-25 ENCOUNTER — HOSPITAL ENCOUNTER (OUTPATIENT)
Dept: INFUSION THERAPY | Age: 49
Setting detail: INFUSION SERIES
Discharge: HOME | End: 2024-07-25
Payer: COMMERCIAL

## 2024-07-25 VITALS
TEMPERATURE: 99 F | RESPIRATION RATE: 16 BRPM | HEIGHT: 69 IN | OXYGEN SATURATION: 99 % | SYSTOLIC BLOOD PRESSURE: 132 MMHG | WEIGHT: 253 LBS | BODY MASS INDEX: 37.47 KG/M2 | DIASTOLIC BLOOD PRESSURE: 62 MMHG | HEART RATE: 73 BPM

## 2024-07-25 DIAGNOSIS — K90.9 INTESTINAL MALABSORPTION, UNSPECIFIED TYPE: Primary | ICD-10-CM

## 2024-07-25 DIAGNOSIS — D50.0 IRON DEFICIENCY ANEMIA SECONDARY TO BLOOD LOSS (CHRONIC): ICD-10-CM

## 2024-07-25 PROCEDURE — 96365 THER/PROPH/DIAG IV INF INIT: CPT

## 2024-07-25 PROCEDURE — 6360000002 HC RX W HCPCS

## 2024-07-25 PROCEDURE — 2580000003 HC RX 258

## 2024-07-25 RX ORDER — SODIUM CHLORIDE 9 MG/ML
INJECTION, SOLUTION INTRAVENOUS CONTINUOUS
Status: CANCELLED | OUTPATIENT
Start: 2024-08-01

## 2024-07-25 RX ORDER — ONDANSETRON 2 MG/ML
8 INJECTION INTRAMUSCULAR; INTRAVENOUS
Status: CANCELLED | OUTPATIENT
Start: 2024-08-01

## 2024-07-25 RX ORDER — SODIUM CHLORIDE 9 MG/ML
5-250 INJECTION, SOLUTION INTRAVENOUS PRN
Status: CANCELLED | OUTPATIENT
Start: 2024-08-01

## 2024-07-25 RX ORDER — EPINEPHRINE 1 MG/ML
0.3 INJECTION, SOLUTION, CONCENTRATE INTRAVENOUS PRN
Status: CANCELLED | OUTPATIENT
Start: 2024-08-01

## 2024-07-25 RX ORDER — DIPHENHYDRAMINE HYDROCHLORIDE 50 MG/ML
50 INJECTION INTRAMUSCULAR; INTRAVENOUS
Status: CANCELLED | OUTPATIENT
Start: 2024-08-01

## 2024-07-25 RX ORDER — ACETAMINOPHEN 325 MG/1
650 TABLET ORAL
Status: CANCELLED | OUTPATIENT
Start: 2024-08-01

## 2024-07-25 RX ORDER — SODIUM CHLORIDE 0.9 % (FLUSH) 0.9 %
5-40 SYRINGE (ML) INJECTION PRN
Status: CANCELLED | OUTPATIENT
Start: 2024-08-01

## 2024-07-25 RX ORDER — SODIUM CHLORIDE 9 MG/ML
5-250 INJECTION, SOLUTION INTRAVENOUS PRN
Status: DISCONTINUED | OUTPATIENT
Start: 2024-07-25 | End: 2024-07-26 | Stop reason: HOSPADM

## 2024-07-25 RX ORDER — SODIUM CHLORIDE 0.9 % (FLUSH) 0.9 %
5-40 SYRINGE (ML) INJECTION PRN
Status: DISCONTINUED | OUTPATIENT
Start: 2024-07-25 | End: 2024-07-26 | Stop reason: HOSPADM

## 2024-07-25 RX ORDER — ALBUTEROL SULFATE 90 UG/1
4 AEROSOL, METERED RESPIRATORY (INHALATION) PRN
Status: CANCELLED | OUTPATIENT
Start: 2024-08-01

## 2024-07-25 RX ADMIN — SODIUM CHLORIDE 125 MG: 900 INJECTION INTRAVENOUS at 12:21

## 2024-07-25 RX ADMIN — SODIUM CHLORIDE, PRESERVATIVE FREE 10 ML: 5 INJECTION INTRAVENOUS at 12:14

## 2024-07-25 RX ADMIN — SODIUM CHLORIDE 20 ML/HR: 9 INJECTION, SOLUTION INTRAVENOUS at 12:18

## 2024-07-25 RX ADMIN — SODIUM CHLORIDE, PRESERVATIVE FREE 10 ML: 5 INJECTION INTRAVENOUS at 13:27

## 2024-07-25 ASSESSMENT — PAIN SCALES - GENERAL: PAINLEVEL_OUTOF10: 0

## 2024-08-01 ENCOUNTER — HOSPITAL ENCOUNTER (OUTPATIENT)
Dept: INFUSION THERAPY | Age: 49
Setting detail: INFUSION SERIES
Discharge: HOME | End: 2024-08-01
Payer: COMMERCIAL

## 2024-08-01 VITALS
SYSTOLIC BLOOD PRESSURE: 129 MMHG | TEMPERATURE: 98.5 F | HEART RATE: 71 BPM | RESPIRATION RATE: 16 BRPM | DIASTOLIC BLOOD PRESSURE: 60 MMHG | WEIGHT: 253 LBS | HEIGHT: 69 IN | OXYGEN SATURATION: 99 % | BODY MASS INDEX: 37.47 KG/M2

## 2024-08-01 DIAGNOSIS — K90.9 INTESTINAL MALABSORPTION, UNSPECIFIED TYPE: Primary | ICD-10-CM

## 2024-08-01 DIAGNOSIS — D50.0 IRON DEFICIENCY ANEMIA SECONDARY TO BLOOD LOSS (CHRONIC): ICD-10-CM

## 2024-08-01 PROCEDURE — 2580000003 HC RX 258

## 2024-08-01 PROCEDURE — 6360000002 HC RX W HCPCS

## 2024-08-01 PROCEDURE — 96365 THER/PROPH/DIAG IV INF INIT: CPT

## 2024-08-01 RX ORDER — EPINEPHRINE 1 MG/ML
0.3 INJECTION, SOLUTION, CONCENTRATE INTRAVENOUS PRN
OUTPATIENT
Start: 2024-08-08

## 2024-08-01 RX ORDER — DIPHENHYDRAMINE HYDROCHLORIDE 50 MG/ML
50 INJECTION INTRAMUSCULAR; INTRAVENOUS
OUTPATIENT
Start: 2024-08-08

## 2024-08-01 RX ORDER — SODIUM CHLORIDE 0.9 % (FLUSH) 0.9 %
5-40 SYRINGE (ML) INJECTION PRN
OUTPATIENT
Start: 2024-08-08

## 2024-08-01 RX ORDER — SODIUM CHLORIDE 9 MG/ML
5-250 INJECTION, SOLUTION INTRAVENOUS PRN
Status: DISCONTINUED | OUTPATIENT
Start: 2024-08-01 | End: 2024-08-02 | Stop reason: HOSPADM

## 2024-08-01 RX ORDER — ALBUTEROL SULFATE 90 UG/1
4 AEROSOL, METERED RESPIRATORY (INHALATION) PRN
OUTPATIENT
Start: 2024-08-08

## 2024-08-01 RX ORDER — SODIUM CHLORIDE 9 MG/ML
INJECTION, SOLUTION INTRAVENOUS CONTINUOUS
OUTPATIENT
Start: 2024-08-08

## 2024-08-01 RX ORDER — SODIUM CHLORIDE 9 MG/ML
5-250 INJECTION, SOLUTION INTRAVENOUS PRN
OUTPATIENT
Start: 2024-08-08

## 2024-08-01 RX ORDER — ONDANSETRON 2 MG/ML
8 INJECTION INTRAMUSCULAR; INTRAVENOUS
OUTPATIENT
Start: 2024-08-08

## 2024-08-01 RX ORDER — SODIUM CHLORIDE 0.9 % (FLUSH) 0.9 %
5-40 SYRINGE (ML) INJECTION PRN
Status: DISCONTINUED | OUTPATIENT
Start: 2024-08-01 | End: 2024-08-02 | Stop reason: HOSPADM

## 2024-08-01 RX ORDER — ACETAMINOPHEN 325 MG/1
650 TABLET ORAL
OUTPATIENT
Start: 2024-08-08

## 2024-08-01 RX ADMIN — SODIUM CHLORIDE, PRESERVATIVE FREE 10 ML: 5 INJECTION INTRAVENOUS at 14:49

## 2024-08-01 RX ADMIN — SODIUM CHLORIDE, PRESERVATIVE FREE 10 ML: 5 INJECTION INTRAVENOUS at 13:43

## 2024-08-01 RX ADMIN — SODIUM CHLORIDE 125 MG: 900 INJECTION INTRAVENOUS at 13:51

## 2024-08-01 RX ADMIN — SODIUM CHLORIDE 20 ML/HR: 9 INJECTION, SOLUTION INTRAVENOUS at 13:45

## 2024-08-08 ENCOUNTER — HOSPITAL ENCOUNTER (OUTPATIENT)
Dept: INFUSION THERAPY | Age: 49
Setting detail: INFUSION SERIES
Discharge: HOME | End: 2024-08-08
Payer: COMMERCIAL

## 2024-08-08 VITALS
HEART RATE: 72 BPM | DIASTOLIC BLOOD PRESSURE: 53 MMHG | SYSTOLIC BLOOD PRESSURE: 120 MMHG | OXYGEN SATURATION: 99 % | TEMPERATURE: 99.1 F | RESPIRATION RATE: 16 BRPM

## 2024-08-08 DIAGNOSIS — K90.9 INTESTINAL MALABSORPTION, UNSPECIFIED TYPE: Primary | ICD-10-CM

## 2024-08-08 DIAGNOSIS — D50.0 IRON DEFICIENCY ANEMIA SECONDARY TO BLOOD LOSS (CHRONIC): ICD-10-CM

## 2024-08-08 PROCEDURE — 96365 THER/PROPH/DIAG IV INF INIT: CPT

## 2024-08-08 PROCEDURE — 2580000003 HC RX 258

## 2024-08-08 PROCEDURE — 6360000002 HC RX W HCPCS

## 2024-08-08 RX ORDER — EPINEPHRINE 1 MG/ML
0.3 INJECTION, SOLUTION, CONCENTRATE INTRAVENOUS PRN
OUTPATIENT
Start: 2024-08-15

## 2024-08-08 RX ORDER — ALBUTEROL SULFATE 90 UG/1
4 AEROSOL, METERED RESPIRATORY (INHALATION) PRN
OUTPATIENT
Start: 2024-08-15

## 2024-08-08 RX ORDER — SODIUM CHLORIDE 0.9 % (FLUSH) 0.9 %
5-40 SYRINGE (ML) INJECTION PRN
OUTPATIENT
Start: 2024-08-15

## 2024-08-08 RX ORDER — ACETAMINOPHEN 325 MG/1
650 TABLET ORAL
OUTPATIENT
Start: 2024-08-15

## 2024-08-08 RX ORDER — DIPHENHYDRAMINE HYDROCHLORIDE 50 MG/ML
50 INJECTION INTRAMUSCULAR; INTRAVENOUS
OUTPATIENT
Start: 2024-08-15

## 2024-08-08 RX ORDER — ONDANSETRON 2 MG/ML
8 INJECTION INTRAMUSCULAR; INTRAVENOUS
OUTPATIENT
Start: 2024-08-15

## 2024-08-08 RX ORDER — SODIUM CHLORIDE 9 MG/ML
5-250 INJECTION, SOLUTION INTRAVENOUS PRN
OUTPATIENT
Start: 2024-08-15

## 2024-08-08 RX ORDER — SODIUM CHLORIDE 9 MG/ML
INJECTION, SOLUTION INTRAVENOUS CONTINUOUS
OUTPATIENT
Start: 2024-08-15

## 2024-08-08 RX ORDER — SODIUM CHLORIDE 0.9 % (FLUSH) 0.9 %
5-40 SYRINGE (ML) INJECTION PRN
Status: DISCONTINUED | OUTPATIENT
Start: 2024-08-08 | End: 2024-08-09 | Stop reason: HOSPADM

## 2024-08-08 RX ORDER — SODIUM CHLORIDE 9 MG/ML
5-250 INJECTION, SOLUTION INTRAVENOUS PRN
Status: DISCONTINUED | OUTPATIENT
Start: 2024-08-08 | End: 2024-08-09 | Stop reason: HOSPADM

## 2024-08-08 RX ADMIN — SODIUM CHLORIDE 125 MG: 900 INJECTION INTRAVENOUS at 12:26

## 2024-08-08 RX ADMIN — SODIUM CHLORIDE 20 ML/HR: 9 INJECTION, SOLUTION INTRAVENOUS at 12:17

## 2024-08-08 RX ADMIN — SODIUM CHLORIDE, PRESERVATIVE FREE 10 ML: 5 INJECTION INTRAVENOUS at 12:21

## 2024-08-08 RX ADMIN — SODIUM CHLORIDE, PRESERVATIVE FREE 10 ML: 5 INJECTION INTRAVENOUS at 13:26

## 2024-08-08 ASSESSMENT — PAIN DESCRIPTION - DESCRIPTORS: DESCRIPTORS: ACHING

## 2024-08-08 ASSESSMENT — PAIN DESCRIPTION - LOCATION: LOCATION: ANKLE

## 2024-08-08 NOTE — PROGRESS NOTES
Tolerated infusion well.  Reviewed therapy plan, offered education material and/or discharge material, reviewed medication information and signs and symptoms  and educated on possible side effects, verbalizes good knowledge of current plan patient verbalizes understanding, and has no signs or symptoms to report at this time.   Patient discharged. Patient alert and oriented x3.   No distress noted.   Vital signs stable.   Patient denies any new or worsening pain.  Patient denies any needs.  All questions answered.  Next appointment scheduled.declinescopy of AVS. Patient stayed for 30 minute observation after completion of infusion.

## 2024-08-09 RX ORDER — LOPERAMIDE HYDROCHLORIDE 2 MG/1
CAPSULE ORAL
Qty: 120 CAPSULE | Refills: 3 | Status: SHIPPED | OUTPATIENT
Start: 2024-08-09

## 2024-08-15 ENCOUNTER — HOSPITAL ENCOUNTER (OUTPATIENT)
Dept: INFUSION THERAPY | Age: 49
Setting detail: INFUSION SERIES
End: 2024-08-15

## 2024-08-22 ENCOUNTER — TELEPHONE (OUTPATIENT)
Dept: HYPERBARIC MEDICINE | Age: 49
End: 2024-08-22

## 2024-08-22 ENCOUNTER — HOSPITAL ENCOUNTER (OUTPATIENT)
Dept: INFUSION THERAPY | Age: 49
Setting detail: INFUSION SERIES
Discharge: HOME | End: 2024-08-22
Payer: COMMERCIAL

## 2024-08-22 VITALS
OXYGEN SATURATION: 99 % | TEMPERATURE: 98.1 F | HEIGHT: 69 IN | DIASTOLIC BLOOD PRESSURE: 58 MMHG | WEIGHT: 253 LBS | HEART RATE: 72 BPM | SYSTOLIC BLOOD PRESSURE: 122 MMHG | BODY MASS INDEX: 37.47 KG/M2 | RESPIRATION RATE: 16 BRPM

## 2024-08-22 DIAGNOSIS — D50.0 IRON DEFICIENCY ANEMIA SECONDARY TO BLOOD LOSS (CHRONIC): ICD-10-CM

## 2024-08-22 DIAGNOSIS — K90.9 INTESTINAL MALABSORPTION, UNSPECIFIED TYPE: Primary | ICD-10-CM

## 2024-08-22 PROCEDURE — 2580000003 HC RX 258

## 2024-08-22 PROCEDURE — 6360000002 HC RX W HCPCS

## 2024-08-22 PROCEDURE — 96365 THER/PROPH/DIAG IV INF INIT: CPT

## 2024-08-22 RX ORDER — EPINEPHRINE 1 MG/ML
0.3 INJECTION, SOLUTION, CONCENTRATE INTRAVENOUS PRN
OUTPATIENT
Start: 2024-08-29

## 2024-08-22 RX ORDER — SODIUM CHLORIDE 9 MG/ML
5-250 INJECTION, SOLUTION INTRAVENOUS PRN
Status: DISCONTINUED | OUTPATIENT
Start: 2024-08-22 | End: 2024-08-23 | Stop reason: HOSPADM

## 2024-08-22 RX ORDER — SODIUM CHLORIDE 0.9 % (FLUSH) 0.9 %
5-40 SYRINGE (ML) INJECTION PRN
OUTPATIENT
Start: 2024-08-29

## 2024-08-22 RX ORDER — ACETAMINOPHEN 325 MG/1
650 TABLET ORAL
OUTPATIENT
Start: 2024-08-29

## 2024-08-22 RX ORDER — SODIUM CHLORIDE 0.9 % (FLUSH) 0.9 %
5-40 SYRINGE (ML) INJECTION PRN
Status: DISCONTINUED | OUTPATIENT
Start: 2024-08-22 | End: 2024-08-23 | Stop reason: HOSPADM

## 2024-08-22 RX ORDER — SODIUM CHLORIDE 9 MG/ML
5-250 INJECTION, SOLUTION INTRAVENOUS PRN
OUTPATIENT
Start: 2024-08-29

## 2024-08-22 RX ORDER — DIPHENHYDRAMINE HYDROCHLORIDE 50 MG/ML
50 INJECTION INTRAMUSCULAR; INTRAVENOUS
OUTPATIENT
Start: 2024-08-29

## 2024-08-22 RX ORDER — ONDANSETRON 2 MG/ML
8 INJECTION INTRAMUSCULAR; INTRAVENOUS
OUTPATIENT
Start: 2024-08-29

## 2024-08-22 RX ORDER — ALBUTEROL SULFATE 90 UG/1
4 AEROSOL, METERED RESPIRATORY (INHALATION) PRN
OUTPATIENT
Start: 2024-08-29

## 2024-08-22 RX ORDER — SODIUM CHLORIDE 9 MG/ML
INJECTION, SOLUTION INTRAVENOUS CONTINUOUS
OUTPATIENT
Start: 2024-08-29

## 2024-08-22 RX ADMIN — SODIUM CHLORIDE 20 ML/HR: 9 INJECTION, SOLUTION INTRAVENOUS at 13:47

## 2024-08-22 RX ADMIN — SODIUM CHLORIDE, PRESERVATIVE FREE 10 ML: 5 INJECTION INTRAVENOUS at 14:50

## 2024-08-22 RX ADMIN — SODIUM CHLORIDE 125 MG: 900 INJECTION INTRAVENOUS at 13:48

## 2024-08-22 RX ADMIN — SODIUM CHLORIDE, PRESERVATIVE FREE 10 ML: 5 INJECTION INTRAVENOUS at 13:46

## 2024-08-22 ASSESSMENT — PAIN DESCRIPTION - LOCATION: LOCATION: RECTUM

## 2024-08-22 ASSESSMENT — PAIN DESCRIPTION - DESCRIPTORS: DESCRIPTORS: DISCOMFORT

## 2024-08-22 ASSESSMENT — PAIN SCALES - GENERAL: PAINLEVEL_OUTOF10: 5

## 2024-08-22 ASSESSMENT — PAIN DESCRIPTION - FREQUENCY: FREQUENCY: CONTINUOUS

## 2024-08-22 ASSESSMENT — PAIN DESCRIPTION - PAIN TYPE: TYPE: CHRONIC PAIN

## 2024-08-22 NOTE — TELEPHONE ENCOUNTER
Voicemail left for the patient regarding scheduling a Hyperbaric evaluation. Department return call back number provided.

## 2024-09-03 ASSESSMENT — PATIENT HEALTH QUESTIONNAIRE - PHQ9
7. TROUBLE CONCENTRATING ON THINGS, SUCH AS READING THE NEWSPAPER OR WATCHING TELEVISION: NOT AT ALL
SUM OF ALL RESPONSES TO PHQ QUESTIONS 1-9: 2
SUM OF ALL RESPONSES TO PHQ QUESTIONS 1-9: 2
9. THOUGHTS THAT YOU WOULD BE BETTER OFF DEAD, OR OF HURTING YOURSELF: NOT AT ALL
10. IF YOU CHECKED OFF ANY PROBLEMS, HOW DIFFICULT HAVE THESE PROBLEMS MADE IT FOR YOU TO DO YOUR WORK, TAKE CARE OF THINGS AT HOME, OR GET ALONG WITH OTHER PEOPLE: SOMEWHAT DIFFICULT
10. IF YOU CHECKED OFF ANY PROBLEMS, HOW DIFFICULT HAVE THESE PROBLEMS MADE IT FOR YOU TO DO YOUR WORK, TAKE CARE OF THINGS AT HOME, OR GET ALONG WITH OTHER PEOPLE: SOMEWHAT DIFFICULT
1. LITTLE INTEREST OR PLEASURE IN DOING THINGS: NOT AT ALL
3. TROUBLE FALLING OR STAYING ASLEEP: SEVERAL DAYS
SUM OF ALL RESPONSES TO PHQ QUESTIONS 1-9: 2
SUM OF ALL RESPONSES TO PHQ9 QUESTIONS 1 & 2: 0
9. THOUGHTS THAT YOU WOULD BE BETTER OFF DEAD, OR OF HURTING YOURSELF: NOT AT ALL
2. FEELING DOWN, DEPRESSED OR HOPELESS: NOT AT ALL
SUM OF ALL RESPONSES TO PHQ QUESTIONS 1-9: 2
2. FEELING DOWN, DEPRESSED OR HOPELESS: NOT AT ALL
1. LITTLE INTEREST OR PLEASURE IN DOING THINGS: NOT AT ALL
3. TROUBLE FALLING OR STAYING ASLEEP: SEVERAL DAYS
6. FEELING BAD ABOUT YOURSELF - OR THAT YOU ARE A FAILURE OR HAVE LET YOURSELF OR YOUR FAMILY DOWN: NOT AT ALL
8. MOVING OR SPEAKING SO SLOWLY THAT OTHER PEOPLE COULD HAVE NOTICED. OR THE OPPOSITE - BEING SO FIDGETY OR RESTLESS THAT YOU HAVE BEEN MOVING AROUND A LOT MORE THAN USUAL: NOT AT ALL
7. TROUBLE CONCENTRATING ON THINGS, SUCH AS READING THE NEWSPAPER OR WATCHING TELEVISION: NOT AT ALL
4. FEELING TIRED OR HAVING LITTLE ENERGY: SEVERAL DAYS
5. POOR APPETITE OR OVEREATING: NOT AT ALL
6. FEELING BAD ABOUT YOURSELF - OR THAT YOU ARE A FAILURE OR HAVE LET YOURSELF OR YOUR FAMILY DOWN: NOT AT ALL
SUM OF ALL RESPONSES TO PHQ QUESTIONS 1-9: 2
8. MOVING OR SPEAKING SO SLOWLY THAT OTHER PEOPLE COULD HAVE NOTICED. OR THE OPPOSITE, BEING SO FIGETY OR RESTLESS THAT YOU HAVE BEEN MOVING AROUND A LOT MORE THAN USUAL: NOT AT ALL
5. POOR APPETITE OR OVEREATING: NOT AT ALL
4. FEELING TIRED OR HAVING LITTLE ENERGY: SEVERAL DAYS

## 2024-09-04 ENCOUNTER — HOSPITAL ENCOUNTER (OUTPATIENT)
Dept: INFUSION THERAPY | Age: 49
Setting detail: INFUSION SERIES
End: 2024-09-04

## 2024-09-05 ENCOUNTER — OFFICE VISIT (OUTPATIENT)
Dept: FAMILY MEDICINE CLINIC | Age: 49
End: 2024-09-05
Payer: COMMERCIAL

## 2024-09-05 VITALS
RESPIRATION RATE: 16 BRPM | SYSTOLIC BLOOD PRESSURE: 118 MMHG | WEIGHT: 251 LBS | HEART RATE: 78 BPM | BODY MASS INDEX: 37.18 KG/M2 | HEIGHT: 69 IN | OXYGEN SATURATION: 98 % | TEMPERATURE: 98.6 F | DIASTOLIC BLOOD PRESSURE: 78 MMHG

## 2024-09-05 DIAGNOSIS — D84.821 IMMUNOCOMPROMISED STATE DUE TO DRUG THERAPY (HCC): ICD-10-CM

## 2024-09-05 DIAGNOSIS — K50.918 CROHN'S DISEASE, UNSPECIFIED, WITH OTHER COMPLICATION (HCC): ICD-10-CM

## 2024-09-05 DIAGNOSIS — L98.492 SKIN ULCER OF PERINEUM, WITH FAT LAYER EXPOSED (HCC): ICD-10-CM

## 2024-09-05 DIAGNOSIS — Z00.00 ANNUAL PHYSICAL EXAM: ICD-10-CM

## 2024-09-05 DIAGNOSIS — Z00.00 ANNUAL PHYSICAL EXAM: Primary | ICD-10-CM

## 2024-09-05 DIAGNOSIS — I47.10 SVT (SUPRAVENTRICULAR TACHYCARDIA) (HCC): ICD-10-CM

## 2024-09-05 DIAGNOSIS — Z79.899 IMMUNOCOMPROMISED STATE DUE TO DRUG THERAPY (HCC): ICD-10-CM

## 2024-09-05 LAB
ALBUMIN: 4.1 G/DL (ref 3.5–5.2)
ALP BLD-CCNC: 79 U/L (ref 35–104)
ALT SERPL-CCNC: 15 U/L (ref 0–32)
ANION GAP SERPL CALCULATED.3IONS-SCNC: 15 MMOL/L (ref 7–16)
AST SERPL-CCNC: 30 U/L (ref 0–31)
BILIRUB SERPL-MCNC: 0.3 MG/DL (ref 0–1.2)
BUN BLDV-MCNC: 15 MG/DL (ref 6–20)
CALCIUM SERPL-MCNC: 9.8 MG/DL (ref 8.6–10.2)
CHLORIDE BLD-SCNC: 102 MMOL/L (ref 98–107)
CHOLESTEROL, TOTAL: 220 MG/DL
CO2: 23 MMOL/L (ref 22–29)
CREAT SERPL-MCNC: 1 MG/DL (ref 0.5–1)
GFR, ESTIMATED: 70 ML/MIN/1.73M2
GLUCOSE BLD-MCNC: 82 MG/DL (ref 74–99)
HDLC SERPL-MCNC: 44 MG/DL
LDL CHOLESTEROL: 141 MG/DL
POTASSIUM SERPL-SCNC: 4.7 MMOL/L (ref 3.5–5)
SODIUM BLD-SCNC: 140 MMOL/L (ref 132–146)
TOTAL PROTEIN: 8.1 G/DL (ref 6.4–8.3)
TRIGL SERPL-MCNC: 177 MG/DL
TSH SERPL DL<=0.05 MIU/L-ACNC: 2.16 UIU/ML (ref 0.27–4.2)
VLDLC SERPL CALC-MCNC: 35 MG/DL

## 2024-09-05 PROCEDURE — 99396 PREV VISIT EST AGE 40-64: CPT | Performed by: FAMILY MEDICINE

## 2024-09-05 RX ORDER — METOPROLOL SUCCINATE 25 MG/1
12.5 TABLET, EXTENDED RELEASE ORAL DAILY
Qty: 45 TABLET | Refills: 3 | Status: SHIPPED
Start: 2024-09-05 | End: 2024-09-05 | Stop reason: SDUPTHER

## 2024-09-05 RX ORDER — METOPROLOL SUCCINATE 25 MG/1
12.5 TABLET, EXTENDED RELEASE ORAL DAILY
Qty: 45 TABLET | Refills: 3 | Status: SHIPPED | OUTPATIENT
Start: 2024-09-05

## 2024-09-05 RX ORDER — OMEPRAZOLE 40 MG/1
CAPSULE, DELAYED RELEASE ORAL
Qty: 90 CAPSULE | Refills: 3 | Status: SHIPPED | OUTPATIENT
Start: 2024-09-05

## 2024-09-05 RX ORDER — DULOXETIN HYDROCHLORIDE 30 MG/1
30 CAPSULE, DELAYED RELEASE ORAL DAILY
Qty: 90 CAPSULE | Refills: 3 | Status: SHIPPED | OUTPATIENT
Start: 2024-09-05

## 2024-09-05 RX ORDER — DULOXETIN HYDROCHLORIDE 30 MG/1
30 CAPSULE, DELAYED RELEASE ORAL DAILY
Qty: 90 CAPSULE | Refills: 3 | Status: SHIPPED
Start: 2024-09-05 | End: 2024-09-05 | Stop reason: SDUPTHER

## 2024-09-05 RX ORDER — GABAPENTIN 300 MG/1
300 CAPSULE ORAL 3 TIMES DAILY
Qty: 549 CAPSULE | Refills: 0 | Status: SHIPPED | OUTPATIENT
Start: 2024-09-05 | End: 2025-03-07

## 2024-09-05 RX ORDER — OMEPRAZOLE 40 MG/1
CAPSULE, DELAYED RELEASE ORAL
Qty: 90 CAPSULE | Refills: 3 | Status: SHIPPED
Start: 2024-09-05 | End: 2024-09-05 | Stop reason: SDUPTHER

## 2024-09-05 SDOH — ECONOMIC STABILITY: FOOD INSECURITY: WITHIN THE PAST 12 MONTHS, YOU WORRIED THAT YOUR FOOD WOULD RUN OUT BEFORE YOU GOT MONEY TO BUY MORE.: NEVER TRUE

## 2024-09-05 SDOH — ECONOMIC STABILITY: FOOD INSECURITY: WITHIN THE PAST 12 MONTHS, THE FOOD YOU BOUGHT JUST DIDN'T LAST AND YOU DIDN'T HAVE MONEY TO GET MORE.: NEVER TRUE

## 2024-09-05 SDOH — ECONOMIC STABILITY: INCOME INSECURITY: HOW HARD IS IT FOR YOU TO PAY FOR THE VERY BASICS LIKE FOOD, HOUSING, MEDICAL CARE, AND HEATING?: NOT HARD AT ALL

## 2024-09-05 NOTE — PROGRESS NOTES
Annual exam:  Patient is here for routine yearly physical/preventative visit.  Patient has no complaints or concerns today.  Patient is  generally healthy.  Chronic medical conditions are generally controlled.   Most recent labs reviewed with patient and  are not remarkable.  Health maintenance reviewed with patient and is not up to date. Overall doing well.  Had surgery with perianal wound vac applied.    Past Medical History:   Diagnosis Date    Acid reflux     Anxiety and depression     Crohn's colitis (HCC) 04/2018    Crohn's colitis per colonoscopy biopsies 4/9/18, 6/11/18      Past Surgical History:   Procedure Laterality Date    ANKLE FRACTURE SURGERY Left 10/06/2022    LEFT ANKLE EXTERNAL FIXATION performed by Griffin Henley MD at [facility] [place]    ANKLE FRACTURE SURGERY Left 10/20/2022    LEFT ANKLE OPEN REDUCTION INTERNAL FIXATION,EXTERNAL FIXATOR REMOVAL FROM LEFT ANKLE performed by Griffin Henley MD at [facility] [place]    BREAST BIOPSY  1991    Benign Cyst    CHOLECYSTECTOMY  11/18/2011    Gopi, laparoscopic     ENDOMETRIAL ABLATION  2004    ILEOSTOMY OR JEJUNOSTOMY  04/2019    TOTAL COLECTOMY      UPPER GASTROINTESTINAL ENDOSCOPY      WOUND VACUUM APPLICATION  08/06/2024      Family History   Problem Relation Age of Onset    Kidney Disease Mother     Osteoporosis Mother     Diabetes Father         on HD s/p MI    Heart Disease Father     High Blood Pressure Father     Vision Loss Father         Glaucoma     Social History     Socioeconomic History    Marital status:      Spouse name: Not on file    Number of children: Not on file    Years of education: Not on file    Highest education level: Not on file   Occupational History    Not on file   Tobacco Use    Smoking status: Never    Smokeless tobacco: Never    Tobacco comments:     Never used tobacco products   Vaping Use    Vaping status: Never Used   Substance and Sexual Activity    Alcohol use: No    Drug use: No    Sexual activity:

## 2024-09-17 ENCOUNTER — HOSPITAL ENCOUNTER (OUTPATIENT)
Dept: HYPERBARIC MEDICINE | Age: 49
Discharge: HOME | End: 2024-09-17
Payer: COMMERCIAL

## 2024-09-17 VITALS
SYSTOLIC BLOOD PRESSURE: 104 MMHG | DIASTOLIC BLOOD PRESSURE: 64 MMHG | TEMPERATURE: 98.4 F | RESPIRATION RATE: 18 BRPM | HEART RATE: 78 BPM

## 2024-09-17 DIAGNOSIS — K50.113 CROHN'S DISEASE OF LARGE INTESTINE WITH FISTULA (HCC): ICD-10-CM

## 2024-09-17 DIAGNOSIS — T81.30XD WOUND DISRUPTION, SUBSEQUENT ENCOUNTER: ICD-10-CM

## 2024-09-17 DIAGNOSIS — T81.30XA DISRUPTION OF PERINEAL WOUND: Chronic | ICD-10-CM

## 2024-09-17 DIAGNOSIS — L98.492 SKIN ULCER OF PERINEUM, WITH FAT LAYER EXPOSED (HCC): Primary | Chronic | ICD-10-CM

## 2024-09-17 DIAGNOSIS — K50.114: ICD-10-CM

## 2024-09-17 PROCEDURE — 99203 OFFICE O/P NEW LOW 30 MIN: CPT | Performed by: SURGERY

## 2024-09-17 PROCEDURE — 99213 OFFICE O/P EST LOW 20 MIN: CPT

## 2024-09-17 RX ORDER — OXYCODONE HYDROCHLORIDE 5 MG/1
5 TABLET ORAL EVERY 4 HOURS PRN
COMMUNITY

## 2024-09-18 PROBLEM — K50.113 CROHN'S DISEASE OF LARGE INTESTINE WITH FISTULA (HCC): Status: ACTIVE | Noted: 2018-12-12

## 2024-09-18 PROBLEM — T81.30XA DISRUPTION OF PERINEAL WOUND: Chronic | Status: ACTIVE | Noted: 2024-09-18

## 2024-09-26 ENCOUNTER — HOSPITAL ENCOUNTER (OUTPATIENT)
Dept: GENERAL RADIOLOGY | Age: 49
Discharge: HOME | End: 2024-09-28
Payer: COMMERCIAL

## 2024-09-26 ENCOUNTER — HOSPITAL ENCOUNTER (OUTPATIENT)
Age: 49
Discharge: HOME | End: 2024-09-28
Payer: COMMERCIAL

## 2024-09-26 DIAGNOSIS — L98.492 SKIN ULCER OF PERINEUM, WITH FAT LAYER EXPOSED (HCC): Chronic | ICD-10-CM

## 2024-09-26 DIAGNOSIS — K50.114: ICD-10-CM

## 2024-09-26 PROCEDURE — 71046 X-RAY EXAM CHEST 2 VIEWS: CPT

## 2024-10-17 ENCOUNTER — NURSE ONLY (OUTPATIENT)
Dept: FAMILY MEDICINE CLINIC | Age: 49
End: 2024-10-17
Payer: COMMERCIAL

## 2024-10-17 DIAGNOSIS — R30.0 DYSURIA: Primary | ICD-10-CM

## 2024-10-17 LAB
BILIRUBIN, POC: NORMAL
BLOOD URINE, POC: NORMAL
CLARITY, POC: CLEAR
COLOR, POC: YELLOW
GLUCOSE URINE, POC: NORMAL MG/DL
KETONES, POC: NORMAL MG/DL
LEUKOCYTE EST, POC: NORMAL
NITRITE, POC: POSITIVE
PH, POC: 6
PROTEIN, POC: NORMAL MG/DL
SPECIFIC GRAVITY, POC: 1.02
UROBILINOGEN, POC: 0.2 MG/DL

## 2024-10-17 PROCEDURE — 81002 URINALYSIS NONAUTO W/O SCOPE: CPT | Performed by: FAMILY MEDICINE

## 2024-10-17 RX ORDER — CIPROFLOXACIN 500 MG/1
500 TABLET, FILM COATED ORAL 2 TIMES DAILY
Qty: 10 TABLET | Refills: 0 | Status: SHIPPED
Start: 2024-10-17 | End: 2024-10-22 | Stop reason: SDUPTHER

## 2024-10-19 LAB
CULTURE: ABNORMAL
SPECIMEN DESCRIPTION: ABNORMAL

## 2024-10-22 RX ORDER — CIPROFLOXACIN 500 MG/1
500 TABLET, FILM COATED ORAL 2 TIMES DAILY
Qty: 10 TABLET | Refills: 0 | Status: SHIPPED | OUTPATIENT
Start: 2024-10-22 | End: 2024-10-27

## 2024-11-04 ENCOUNTER — HOSPITAL ENCOUNTER (OUTPATIENT)
Dept: HYPERBARIC MEDICINE | Age: 49
Discharge: HOME | End: 2024-11-04
Payer: COMMERCIAL

## 2024-11-04 VITALS
RESPIRATION RATE: 18 BRPM | DIASTOLIC BLOOD PRESSURE: 60 MMHG | SYSTOLIC BLOOD PRESSURE: 124 MMHG | TEMPERATURE: 97.6 F | HEART RATE: 67 BPM

## 2024-11-04 DIAGNOSIS — K50.113 CROHN'S DISEASE OF LARGE INTESTINE WITH FISTULA (HCC): Primary | ICD-10-CM

## 2024-11-04 DIAGNOSIS — T81.30XA DISRUPTION OF PERINEAL WOUND: ICD-10-CM

## 2024-11-04 PROCEDURE — G0277 HBOT, FULL BODY CHAMBER, 30M: HCPCS

## 2024-11-04 NOTE — PLAN OF CARE
NAME:  Perla Linares  YOB: 1975  MEDICAL RECORD NUMBER:  [id number]  DATE:  11/4/2024      Hyperbaric Oxygen (HBO) Therapy Care Plan    Goal: Patient will verbalize understanding of HBO therapy goals, procedures and potential hazards.  Patient will tolerate HBO therapy and barotrauma will be prevented.  Signs of pulmonary oxygen toxicity or seizure will be recognized and promptly addressed.    Assess patient knowledge and expectations regarding HBO.  Provide education of the procedure, goals of treatment and prevention of adverse events.    Assess for history of confinement anxiety.  Provide for patient’s comfort related to the HBO environment and equalization of the middle ear.   Obtain ENT consult if needed.    Assess for signs and symptoms related to adverse events including confinement anxiety, pneumothorax, oxygen toxicity and barotrauma.    If symptoms of seizure appear, administer air break and notify physician.      Electronically signed by Marizol Reza RN on 11/4/2024 at 1:48 PM

## 2024-11-05 RX ORDER — LOPERAMIDE HYDROCHLORIDE 2 MG/1
CAPSULE ORAL
Qty: 120 CAPSULE | Refills: 3 | Status: SHIPPED | OUTPATIENT
Start: 2024-11-05

## 2024-11-06 ENCOUNTER — NURSE ONLY (OUTPATIENT)
Dept: FAMILY MEDICINE CLINIC | Age: 49
End: 2024-11-06

## 2024-11-06 ENCOUNTER — HOSPITAL ENCOUNTER (OUTPATIENT)
Dept: HYPERBARIC MEDICINE | Age: 49
Discharge: HOME | End: 2024-11-06
Payer: COMMERCIAL

## 2024-11-06 VITALS
RESPIRATION RATE: 18 BRPM | SYSTOLIC BLOOD PRESSURE: 144 MMHG | DIASTOLIC BLOOD PRESSURE: 67 MMHG | TEMPERATURE: 96.2 F | HEART RATE: 77 BPM

## 2024-11-06 DIAGNOSIS — R30.0 DYSURIA: Primary | ICD-10-CM

## 2024-11-06 DIAGNOSIS — T81.30XA DISRUPTION OF PERINEAL WOUND: Chronic | ICD-10-CM

## 2024-11-06 DIAGNOSIS — K50.113 CROHN'S DISEASE OF LARGE INTESTINE WITH FISTULA (HCC): Primary | ICD-10-CM

## 2024-11-06 LAB
BILIRUBIN, POC: NEGATIVE
BLOOD URINE, POC: NORMAL
CLARITY, POC: NORMAL
COLOR, POC: YELLOW
GLUCOSE URINE, POC: NEGATIVE MG/DL
KETONES, POC: NEGATIVE MG/DL
LEUKOCYTE EST, POC: NORMAL
NITRITE, POC: NEGATIVE
PH, POC: 5.5
PROTEIN, POC: 30 MG/DL
SPECIFIC GRAVITY, POC: 1.02
UROBILINOGEN, POC: 0.2 MG/DL

## 2024-11-06 PROCEDURE — G0277 HBOT, FULL BODY CHAMBER, 30M: HCPCS

## 2024-11-06 PROCEDURE — 81002 URINALYSIS NONAUTO W/O SCOPE: CPT | Performed by: FAMILY MEDICINE

## 2024-11-06 PROCEDURE — 99183 HYPERBARIC OXYGEN THERAPY: CPT | Performed by: SURGERY

## 2024-11-06 NOTE — PROGRESS NOTES
[facility]  Hyperbaric Oxygen Therapy   Progress Note    NAME: Perla Linares  MEDICAL RECORD NUMBER:  [id number]  AGE: 49 y.o.   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  See doc flowsheets for documentation.  Objective      Please see lab results for finger stick glucose results  Pre treatment Vital Signs       Temp: 97.8 °F (36.6 °C)     Pulse: (!) 101     Respirations: 20     BP: (!) 148/74     Post treatment Vital Signs  Temp: (!) 96.2 °F (35.7 °C)  Pulse: 77  Respirations: 18  BP: (!) 144/67  Assessment      Physical Exam:  General Appearance:  alert and oriented to person, place and time, well-developed and well-nourished, in no acute distress  ENT:  tympanic membranes intact bilaterally  Pulmonary/Chest:  clear to auscultation bilaterally- no wheezes, rales or rhonchi, normal air movement, no respiratory distress  Cardiovascular:  regular rate and rhythm  Chamber #: [id number]  Treatment Start Time: 1407     Pressure Reached Time: 1416  RONAN : 2  Number of Air Breaks:  Treatment Status: No Air break     Decompression Time: 1546   Treatment End Time: 1553  Length of Treatment: 90 Minutes  Symptoms Noted During Treatment: None  Total Treatment Time (min): 106    Adverse Event: no    I was present on these premises and immediately available to furnish assistance & direction throughout the procedure.   Plan      Perla Linares is a 49 y.o. female  did successfully complete today's hyperbaric oxygen treatment at [facility]

## 2024-11-07 ENCOUNTER — HOSPITAL ENCOUNTER (OUTPATIENT)
Dept: HYPERBARIC MEDICINE | Age: 49
Discharge: HOME | End: 2024-11-07
Payer: COMMERCIAL

## 2024-11-07 VITALS
RESPIRATION RATE: 18 BRPM | HEART RATE: 74 BPM | DIASTOLIC BLOOD PRESSURE: 61 MMHG | SYSTOLIC BLOOD PRESSURE: 158 MMHG | TEMPERATURE: 97.6 F

## 2024-11-07 DIAGNOSIS — T81.30XA DISRUPTION OF PERINEAL WOUND: ICD-10-CM

## 2024-11-07 DIAGNOSIS — K50.113 CROHN'S DISEASE OF LARGE INTESTINE WITH FISTULA (HCC): Primary | ICD-10-CM

## 2024-11-07 LAB
CULTURE: NORMAL
CULTURE: NORMAL
SPECIMEN DESCRIPTION: NORMAL

## 2024-11-07 PROCEDURE — 99183 HYPERBARIC OXYGEN THERAPY: CPT | Performed by: SURGERY

## 2024-11-07 PROCEDURE — G0277 HBOT, FULL BODY CHAMBER, 30M: HCPCS

## 2024-11-07 NOTE — PLAN OF CARE
NAME:  Perla Linares  YOB: 1975  MEDICAL RECORD NUMBER:  [id number]  DATE:  11/7/2024      Hyperbaric Oxygen (HBO) Therapy Care Plan    Goal: Patient will verbalize understanding of HBO therapy goals, procedures and potential hazards.  Patient will tolerate HBO therapy and barotrauma will be prevented.  Signs of pulmonary oxygen toxicity or seizure will be recognized and promptly addressed.    Assess patient knowledge and expectations regarding HBO.  Provide education of the procedure, goals of treatment and prevention of adverse events.    Assess for history of confinement anxiety.  Provide for patient’s comfort related to the HBO environment and equalization of the middle ear.   Obtain ENT consult if needed.    Assess for signs and symptoms related to adverse events including confinement anxiety, pneumothorax, oxygen toxicity and barotrauma.    If symptoms of seizure appear, administer air break and notify physician.      Electronically signed by Marizol Reza RN on 11/7/2024 at 2:36 PM

## 2024-11-07 NOTE — PROGRESS NOTES
[facility]  Hyperbaric Oxygen Therapy   Progress Note    NAME: Perla Linares  MEDICAL RECORD NUMBER:  [id number]  AGE: 49 y.o.   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  See doc flowsheets for documentation.  Objective      Please see lab results for finger stick glucose results  Pre treatment Vital Signs       Temp: 97.6 °F (36.4 °C)     Pulse: 89     Respirations: 18     BP: (!) 140/66     Post treatment Vital Signs  Temp: 97.6 °F (36.4 °C)  Pulse: 74  Respirations: 18  BP: (!) 158/61  Assessment      Physical Exam:  General Appearance:  alert and oriented to person, place and time, well-developed and well-nourished, in no acute distress  ENT:  tympanic membranes intact bilaterally  Pulmonary/Chest:  clear to auscultation bilaterally- no wheezes, rales or rhonchi, normal air movement, no respiratory distress  Cardiovascular:  regular rate and rhythm  Chamber #: [id number]  Treatment Start Time: 1412     Pressure Reached Time: 1420  RONAN : 2  Number of Air Breaks:  Treatment Status: No Air break     Decompression Time: 1550   Treatment End Time: 1600  Length of Treatment: 90 Minutes  Symptoms Noted During Treatment: None  Total Treatment Time (min): 108    Adverse Event: no    I was present on these premises and immediately available to furnish assistance & direction throughout the procedure.   Plan      Peral Linares is a 49 y.o. female  did successfully complete today's hyperbaric oxygen treatment at [facility] Wound

## 2024-11-08 ENCOUNTER — HOSPITAL ENCOUNTER (OUTPATIENT)
Dept: HYPERBARIC MEDICINE | Age: 49
Discharge: HOME | End: 2024-11-08
Payer: COMMERCIAL

## 2024-11-08 VITALS
TEMPERATURE: 97.8 F | SYSTOLIC BLOOD PRESSURE: 122 MMHG | RESPIRATION RATE: 17 BRPM | HEART RATE: 76 BPM | DIASTOLIC BLOOD PRESSURE: 68 MMHG

## 2024-11-08 DIAGNOSIS — K50.113 CROHN'S DISEASE OF LARGE INTESTINE WITH FISTULA (HCC): Primary | ICD-10-CM

## 2024-11-08 DIAGNOSIS — T81.30XA DISRUPTION OF PERINEAL WOUND: ICD-10-CM

## 2024-11-08 PROCEDURE — 99183 HYPERBARIC OXYGEN THERAPY: CPT | Performed by: SURGERY

## 2024-11-08 PROCEDURE — G0277 HBOT, FULL BODY CHAMBER, 30M: HCPCS

## 2024-11-08 RX ORDER — NITROFURANTOIN 25; 75 MG/1; MG/1
100 CAPSULE ORAL 2 TIMES DAILY
Qty: 14 CAPSULE | Refills: 0 | Status: SHIPPED | OUTPATIENT
Start: 2024-11-08 | End: 2024-11-15

## 2024-11-08 NOTE — PROGRESS NOTES
[facility]  Hyperbaric Oxygen Therapy   Progress Note    NAME: Perla Linares  MEDICAL RECORD NUMBER:  [id number]  AGE: 49 y.o.   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  See doc flowsheets for documentation.  Objective      Please see lab results for finger stick glucose results  Pre treatment Vital Signs       Temp: 97 °F (36.1 °C)     Pulse: 80     Respirations: 18     BP: 134/84     Post treatment Vital Signs  Temp: 97.8 °F (36.6 °C)  Pulse: 76  Respirations: 17  BP: 122/68  Assessment      Physical Exam:  General Appearance:  alert and oriented to person, place and time, well-developed and well-nourished, in no acute distress  ENT:  tympanic membranes intact bilaterally  Pulmonary/Chest:  clear to auscultation bilaterally- no wheezes, rales or rhonchi, normal air movement, no respiratory distress  Cardiovascular:  regular rate and rhythm  Chamber #: [id number]  Treatment Start Time: 1413     Pressure Reached Time: 1425  RONAN : 2  Number of Air Breaks:  Treatment Status: No Air break     Decompression Time: 1555   Treatment End Time: 1603  Length of Treatment: 90 Minutes  Symptoms Noted During Treatment: None  Total Treatment Time (min): 110    Adverse Event: no    I was present on these premises and immediately available to furnish assistance & direction throughout the procedure.   Plan      Perla Linares is a 49 y.o. female  did successfully complete today's hyperbaric oxygen treatment at [facility]

## 2024-11-11 ENCOUNTER — HOSPITAL ENCOUNTER (OUTPATIENT)
Dept: HYPERBARIC MEDICINE | Age: 49
Discharge: HOME | End: 2024-11-11
Payer: COMMERCIAL

## 2024-11-11 VITALS
SYSTOLIC BLOOD PRESSURE: 127 MMHG | HEART RATE: 64 BPM | DIASTOLIC BLOOD PRESSURE: 61 MMHG | TEMPERATURE: 97.9 F | RESPIRATION RATE: 14 BRPM

## 2024-11-11 DIAGNOSIS — K50.113 CROHN'S DISEASE OF LARGE INTESTINE WITH FISTULA (HCC): Primary | ICD-10-CM

## 2024-11-11 DIAGNOSIS — T81.30XA DISRUPTION OF PERINEAL WOUND: ICD-10-CM

## 2024-11-11 PROCEDURE — G0277 HBOT, FULL BODY CHAMBER, 30M: HCPCS

## 2024-11-11 PROCEDURE — 99183 HYPERBARIC OXYGEN THERAPY: CPT | Performed by: SURGERY

## 2024-11-11 NOTE — PLAN OF CARE
NAME:  Perla Linares  YOB: 1975  MEDICAL RECORD NUMBER:  [id number]  DATE:  11/11/2024      Hyperbaric Oxygen (HBO) Therapy Care Plan    Goal: Patient will verbalize understanding of HBO therapy goals, procedures and potential hazards.  Patient will tolerate HBO therapy and barotrauma will be prevented.  Signs of pulmonary oxygen toxicity or seizure will be recognized and promptly addressed.    Assess patient knowledge and expectations regarding HBO.  Provide education of the procedure, goals of treatment and prevention of adverse events.    Assess for history of confinement anxiety.  Provide for patient’s comfort related to the HBO environment and equalization of the middle ear.   Obtain ENT consult if needed.    Assess for signs and symptoms related to adverse events including confinement anxiety, pneumothorax, oxygen toxicity and barotrauma.    If symptoms of seizure appear, administer air break and notify physician.      Electronically signed by Marizol Reza RN on 11/11/2024 at 2:46 PM

## 2024-11-12 NOTE — PROGRESS NOTES
[facility]  Hyperbaric Oxygen Therapy   Progress Note    NAME: Perla Linares  MEDICAL RECORD NUMBER:  [id number]  AGE: 49 y.o.   GENDER: female  : 1975  EPISODE DATE:  2024   Subjective   HBO Treatment Number: 5 out of Total Treatments: 40  HBO Diagnosis:   Problem List Items Addressed This Visit       Disruption of perineal wound (Chronic)    Crohn's disease of large intestine with fistula (HCC) - Primary     Safety checks performed prior to treatment by HBOT staff.  See doc flowsheets for documentation.  Objective      Please see lab results for finger stick glucose results  Pre treatment Vital Signs       Temp: 96.8 °F (36 °C)     Pulse: 78     Respirations: 16     BP: (!) 146/59     Post treatment Vital Signs  Temp: 97.9 °F (36.6 °C)  Pulse: 64  Respirations: 14  BP: 127/61  Assessment      Physical Exam:  General Appearance:  alert and oriented to person, place and time, well-developed and well-nourished, in no acute distress  ENT:  tympanic membranes intact bilaterally, normal color, normal light reflex bilaterally  Pulmonary/Chest:  clear to auscultation bilaterally- no wheezes, rales or rhonchi, normal air movement, no respiratory distress  Cardiovascular:  regular rate and rhythm  Chamber #: [id number]  Treatment Start Time: 1359     Pressure Reached Time: 1408  RONAN : 2  Number of Air Breaks:  Treatment Status: No Air break     Decompression Time: 1539   Treatment End Time: 1547  Length of Treatment: 90 Minutes  Symptoms Noted During Treatment: None  Total Treatment Time (min): 108    Adverse Event: no    I was present on these premises and immediately available to furnish assistance & direction throughout the procedure.   Plan      Perla Linares is a 49 y.o. female  did successfully complete today's hyperbaric oxygen treatment at [facility].    In my clinical judgement, ongoing HBO therapy is necessary at this time, given a

## 2024-11-13 ENCOUNTER — PATIENT MESSAGE (OUTPATIENT)
Dept: FAMILY MEDICINE CLINIC | Age: 49
End: 2024-11-13

## 2024-11-13 ENCOUNTER — HOSPITAL ENCOUNTER (OUTPATIENT)
Dept: HYPERBARIC MEDICINE | Age: 49
Discharge: HOME | End: 2024-11-13
Payer: COMMERCIAL

## 2024-11-13 VITALS
SYSTOLIC BLOOD PRESSURE: 139 MMHG | HEART RATE: 60 BPM | RESPIRATION RATE: 14 BRPM | DIASTOLIC BLOOD PRESSURE: 69 MMHG | TEMPERATURE: 96.5 F

## 2024-11-13 DIAGNOSIS — T81.30XA DISRUPTION OF PERINEAL WOUND: ICD-10-CM

## 2024-11-13 DIAGNOSIS — K50.113 CROHN'S DISEASE OF LARGE INTESTINE WITH FISTULA (HCC): Primary | ICD-10-CM

## 2024-11-13 PROCEDURE — G0277 HBOT, FULL BODY CHAMBER, 30M: HCPCS

## 2024-11-13 PROCEDURE — 99183 HYPERBARIC OXYGEN THERAPY: CPT | Performed by: SURGERY

## 2024-11-13 NOTE — PROGRESS NOTES
[facility]  Hyperbaric Oxygen Therapy   Progress Note    NAME: Perla Linares  MEDICAL RECORD NUMBER:  [id number]  AGE: 49 y.o.   GENDER: female  : 1975  EPISODE DATE:  2024   Subjective   HBO Treatment Number: 6 out of Total Treatments: 40  HBO Diagnosis:   Problem List Items Addressed This Visit       Disruption of perineal wound (Chronic)    Relevant Orders    Notify physician (specify)    Hyperbaric Oxygen Therapy    Crohn's disease of large intestine with fistula (HCC) - Primary    Relevant Orders    Notify physician (specify)    Hyperbaric Oxygen Therapy     Safety checks performed prior to treatment.  See doc flowsheets for documentation.  Objective      Please see lab results for finger stick glucose results  Pre treatment Vital Signs       Temp: 97.5 °F (36.4 °C)     Pulse: 74     Respirations: 16     BP: (!) 144/64     Post treatment Vital Signs  Temp: (!) 96.5 °F (35.8 °C)  Pulse: 60  Respirations: 14  BP: 139/69  Assessment      Physical Exam:  General Appearance:  alert and oriented to person, place and time, well-developed and well-nourished, in no acute distress  ENT:  tympanic membranes intact bilaterally  Pulmonary/Chest:  clear to auscultation bilaterally- no wheezes, rales or rhonchi, normal air movement, no respiratory distress  Cardiovascular:  regular rate and rhythm  Chamber #: [id number]  Treatment Start Time: 1429     Pressure Reached Time: 1439  RONAN : 2  Number of Air Breaks:  Treatment Status: No Air break     Decompression Time: 1610   Treatment End Time: 1620  Length of Treatment: 90 Minutes  Symptoms Noted During Treatment: None  Total Treatment Time (min): 111    Adverse Event: no    I was present on these premises and immediately available to furnish assistance & direction throughout the procedure.   Plan      Perla Linares is a 49 y.o. female  did successfully complete today's hyperbaric oxygen treatment at [facility]  Problem: Pain:  Goal: Pain level will decrease  Description: Pain level will decrease  Outcome: Ongoing     Problem: Pain:  Goal: Control of acute pain  Description: Control of acute pain  Outcome: Ongoing

## 2024-11-14 ENCOUNTER — HOSPITAL ENCOUNTER (OUTPATIENT)
Dept: HYPERBARIC MEDICINE | Age: 49
Discharge: HOME | End: 2024-11-14
Payer: COMMERCIAL

## 2024-11-14 VITALS
DIASTOLIC BLOOD PRESSURE: 70 MMHG | SYSTOLIC BLOOD PRESSURE: 128 MMHG | TEMPERATURE: 97.5 F | HEART RATE: 70 BPM | RESPIRATION RATE: 16 BRPM

## 2024-11-14 DIAGNOSIS — T81.30XA DISRUPTION OF PERINEAL WOUND: ICD-10-CM

## 2024-11-14 DIAGNOSIS — K50.113 CROHN'S DISEASE OF LARGE INTESTINE WITH FISTULA (HCC): Primary | ICD-10-CM

## 2024-11-14 PROCEDURE — G0277 HBOT, FULL BODY CHAMBER, 30M: HCPCS

## 2024-11-15 ENCOUNTER — HOSPITAL ENCOUNTER (OUTPATIENT)
Dept: HYPERBARIC MEDICINE | Age: 49
Discharge: HOME | End: 2024-11-15
Payer: COMMERCIAL

## 2024-11-15 VITALS
HEART RATE: 72 BPM | TEMPERATURE: 97.9 F | RESPIRATION RATE: 16 BRPM | SYSTOLIC BLOOD PRESSURE: 122 MMHG | DIASTOLIC BLOOD PRESSURE: 62 MMHG

## 2024-11-15 DIAGNOSIS — K50.113 CROHN'S DISEASE OF LARGE INTESTINE WITH FISTULA (HCC): Primary | ICD-10-CM

## 2024-11-15 DIAGNOSIS — T81.30XA DISRUPTION OF PERINEAL WOUND: ICD-10-CM

## 2024-11-15 PROCEDURE — G0277 HBOT, FULL BODY CHAMBER, 30M: HCPCS

## 2024-11-15 RX ORDER — CIPROFLOXACIN 500 MG/1
500 TABLET, FILM COATED ORAL 2 TIMES DAILY
Qty: 14 TABLET | Refills: 0 | Status: SHIPPED | OUTPATIENT
Start: 2024-11-15 | End: 2024-11-22

## 2024-11-15 NOTE — PLAN OF CARE
NAME:  Perla Linares  YOB: 1975  MEDICAL RECORD NUMBER:  [id number]  DATE:  11/15/2024      Hyperbaric Oxygen (HBO) Therapy Care Plan    Goal: Patient will verbalize understanding of HBO therapy goals, procedures and potential hazards.  Patient will tolerate HBO therapy and barotrauma will be prevented.  Signs of pulmonary oxygen toxicity or seizure will be recognized and promptly addressed.    Assess patient knowledge and expectations regarding HBO.  Provide education of the procedure, goals of treatment and prevention of adverse events.    Assess for history of confinement anxiety.  Provide for patient’s comfort related to the HBO environment and equalization of the middle ear.   Obtain ENT consult if needed.    Assess for signs and symptoms related to adverse events including confinement anxiety, pneumothorax, oxygen toxicity and barotrauma.    If symptoms of seizure appear, administer air break and notify physician.      Electronically signed by Marizol Reza RN on 11/15/2024 at 2:16 PM

## 2024-11-18 ENCOUNTER — HOSPITAL ENCOUNTER (OUTPATIENT)
Dept: HYPERBARIC MEDICINE | Age: 49
Discharge: HOME | End: 2024-11-18
Payer: COMMERCIAL

## 2024-11-18 VITALS
HEART RATE: 77 BPM | TEMPERATURE: 97.8 F | RESPIRATION RATE: 16 BRPM | DIASTOLIC BLOOD PRESSURE: 60 MMHG | SYSTOLIC BLOOD PRESSURE: 138 MMHG

## 2024-11-18 DIAGNOSIS — T81.30XA DISRUPTION OF PERINEAL WOUND: ICD-10-CM

## 2024-11-18 DIAGNOSIS — K50.113 CROHN'S DISEASE OF LARGE INTESTINE WITH FISTULA (HCC): Primary | ICD-10-CM

## 2024-11-18 PROCEDURE — G0277 HBOT, FULL BODY CHAMBER, 30M: HCPCS

## 2024-11-20 ENCOUNTER — HOSPITAL ENCOUNTER (OUTPATIENT)
Dept: HYPERBARIC MEDICINE | Age: 49
Discharge: HOME | End: 2024-11-20
Payer: COMMERCIAL

## 2024-11-20 VITALS
SYSTOLIC BLOOD PRESSURE: 152 MMHG | RESPIRATION RATE: 18 BRPM | DIASTOLIC BLOOD PRESSURE: 70 MMHG | HEART RATE: 80 BPM | TEMPERATURE: 97.8 F

## 2024-11-20 DIAGNOSIS — K50.113 CROHN'S DISEASE OF LARGE INTESTINE WITH FISTULA (HCC): Primary | ICD-10-CM

## 2024-11-20 DIAGNOSIS — T81.30XA DISRUPTION OF PERINEAL WOUND: ICD-10-CM

## 2024-11-20 PROCEDURE — G0277 HBOT, FULL BODY CHAMBER, 30M: HCPCS

## 2024-11-20 NOTE — PLAN OF CARE
Perla Linares is a 49 y.o. female has been receiving hyperbaric oxygen treatment for management of:Indication  Indications: Other (Comment) (disruption of perineal wound). Ms. Linares has completed Treatment Number: 10 out of a treatment protocol of Total Treatments: 40.    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    Crohn's disease of colon with complication ([facility]) K50.119    Abdominal wall skin ulcer, with fat layer exposed ([facility]) L98.492    Skin ulcer of perineum, with fat layer exposed ([facility]) L98.492    Trimalleolar fracture of ankle, closed, left, initial encounter S82.852A    Closed trimalleolar fracture of left ankle with delayed healing S82.852G    Presence of other orthopedic joint implants Z96.698    Immunocompromised state due to drug therapy ([facility]) D84.821, Z79.899    Malabsorption K90.9    Disruption of perineal wound T81.30XA       Patients ID was verified.Hyperbaric oxygen therapy plan of care, patient history, outpatient fall risk assessment, nutritional assessment and daily medications were reviewed, addressed and updated as needed.    Pt is tolerating hyperbaric oxygen therapy  well without complications.           Marizol Reza RN  11/20/2024  12:04 PM

## 2024-11-21 ENCOUNTER — HOSPITAL ENCOUNTER (OUTPATIENT)
Dept: HYPERBARIC MEDICINE | Age: 49
Discharge: HOME | End: 2024-11-21
Payer: COMMERCIAL

## 2024-11-21 VITALS
SYSTOLIC BLOOD PRESSURE: 141 MMHG | HEART RATE: 66 BPM | RESPIRATION RATE: 14 BRPM | TEMPERATURE: 96.7 F | DIASTOLIC BLOOD PRESSURE: 70 MMHG

## 2024-11-21 DIAGNOSIS — T81.30XA DISRUPTION OF PERINEAL WOUND: ICD-10-CM

## 2024-11-21 DIAGNOSIS — K50.113 CROHN'S DISEASE OF LARGE INTESTINE WITH FISTULA (HCC): Primary | ICD-10-CM

## 2024-11-21 PROCEDURE — G0277 HBOT, FULL BODY CHAMBER, 30M: HCPCS

## 2024-11-22 ENCOUNTER — HOSPITAL ENCOUNTER (OUTPATIENT)
Dept: HYPERBARIC MEDICINE | Age: 49
Discharge: HOME | End: 2024-11-22
Payer: COMMERCIAL

## 2024-11-22 VITALS
TEMPERATURE: 98 F | SYSTOLIC BLOOD PRESSURE: 142 MMHG | DIASTOLIC BLOOD PRESSURE: 70 MMHG | HEART RATE: 84 BPM | RESPIRATION RATE: 16 BRPM

## 2024-11-22 DIAGNOSIS — T81.30XA DISRUPTION OF PERINEAL WOUND: ICD-10-CM

## 2024-11-22 DIAGNOSIS — K50.113 CROHN'S DISEASE OF LARGE INTESTINE WITH FISTULA (HCC): Primary | ICD-10-CM

## 2024-11-22 PROCEDURE — G0277 HBOT, FULL BODY CHAMBER, 30M: HCPCS

## 2024-11-25 ENCOUNTER — NURSE ONLY (OUTPATIENT)
Dept: FAMILY MEDICINE CLINIC | Age: 49
End: 2024-11-25
Payer: COMMERCIAL

## 2024-11-25 ENCOUNTER — HOSPITAL ENCOUNTER (OUTPATIENT)
Dept: HYPERBARIC MEDICINE | Age: 49
Discharge: HOME | End: 2024-11-25
Payer: COMMERCIAL

## 2024-11-25 VITALS
SYSTOLIC BLOOD PRESSURE: 144 MMHG | HEART RATE: 71 BPM | DIASTOLIC BLOOD PRESSURE: 74 MMHG | RESPIRATION RATE: 16 BRPM | TEMPERATURE: 98.6 F

## 2024-11-25 DIAGNOSIS — K50.113 CROHN'S DISEASE OF LARGE INTESTINE WITH FISTULA (HCC): Primary | ICD-10-CM

## 2024-11-25 DIAGNOSIS — R30.0 DYSURIA: Primary | ICD-10-CM

## 2024-11-25 DIAGNOSIS — T81.30XA DISRUPTION OF PERINEAL WOUND: ICD-10-CM

## 2024-11-25 LAB
BILIRUBIN, POC: NEGATIVE
BLOOD URINE, POC: NORMAL
CLARITY, POC: CLEAR
COLOR, POC: YELLOW
GLUCOSE URINE, POC: NEGATIVE MG/DL
KETONES, POC: NEGATIVE MG/DL
LEUKOCYTE EST, POC: NORMAL
NITRITE, POC: NEGATIVE
PH, POC: 6
PROTEIN, POC: NEGATIVE MG/DL
SPECIFIC GRAVITY, POC: 1.01
UROBILINOGEN, POC: 0.2 MG/DL

## 2024-11-25 PROCEDURE — 81002 URINALYSIS NONAUTO W/O SCOPE: CPT | Performed by: FAMILY MEDICINE

## 2024-11-25 PROCEDURE — G0277 HBOT, FULL BODY CHAMBER, 30M: HCPCS

## 2024-11-25 NOTE — DISCHARGE INSTRUCTIONS
Discharge Instructions for  Hyperbaric Oxygen Therapy  [facility]  [address]  [place], [place] [address]  Telephone: [phone]     FAX [phone]    NAME:  Perla Linares  YOB: 1975  MEDICAL RECORD NUMBER:  [id number]  DATE:  11/25/2024    You have been treated with 100% oxygen in the Hyperbaric Chamber at the [facility].  There are usually no adverse effects or problems which follow this treatment.  However, for comfort and safety, we strongly recommend these guidelines are followed:    It is perfectly normal to feel tired after a hyperbaric treatment.  This tiredness should go away 2 to 3 days after your last treatment.  Avoid heavy exertion, exercise or other unnecessary activity if you are feeling tired.   Some people develop a feeling of fullness or stuffiness in their ears.  This is a result of a small amount of fluid build-up in the middle ear.  You may take an over the counter decongestant if approved by your doctor.  Follow the instructions in the medicine package for the amount to take.  If this problem lasts for more than 48 hours, please call your personal doctor.  You also may call or return to the [facility] Hyperbaric Medicine Department and have a Hyperbaric physician examine you.  In rare cases, patients may have so called “late effects” after the treatments.  Please call the Hyperbaric Medicine Department if you have any of these symptoms:     [x]Headache that is not relieved by over the counter pain medicine.  [x]Changes in vision.  During the course of many hyperbaric treatments (20 or more) some patients may complain of a temporary problem with sharp focus on distant objects.  This is a result of changes in the shape of the lens.  Your vision should return to normal in 6 to 8 weeks.  [x]Severe pain in your ears.  [x]Nausea or vomiting.  [x]Difficulty concentrating.  [x]Clumsiness,

## 2024-11-26 LAB
CULTURE: NORMAL
SPECIMEN DESCRIPTION: NORMAL

## 2024-11-27 ENCOUNTER — HOSPITAL ENCOUNTER (OUTPATIENT)
Dept: HYPERBARIC MEDICINE | Age: 49
Discharge: HOME | End: 2024-11-27
Payer: COMMERCIAL

## 2024-11-27 VITALS
SYSTOLIC BLOOD PRESSURE: 130 MMHG | DIASTOLIC BLOOD PRESSURE: 80 MMHG | HEART RATE: 59 BPM | TEMPERATURE: 97.8 F | RESPIRATION RATE: 16 BRPM

## 2024-11-27 DIAGNOSIS — T81.30XA DISRUPTION OF PERINEAL WOUND: ICD-10-CM

## 2024-11-27 DIAGNOSIS — K50.113 CROHN'S DISEASE OF LARGE INTESTINE WITH FISTULA (HCC): Primary | ICD-10-CM

## 2024-11-27 PROCEDURE — 99183 HYPERBARIC OXYGEN THERAPY: CPT | Performed by: SURGERY

## 2024-11-27 PROCEDURE — G0277 HBOT, FULL BODY CHAMBER, 30M: HCPCS

## 2024-11-27 RX ORDER — PREGABALIN 75 MG/1
75 CAPSULE ORAL 2 TIMES DAILY
COMMUNITY

## 2024-11-27 NOTE — PROGRESS NOTES
[facility]  Hyperbaric Oxygen Therapy   Progress Note    NAME: Perla Linares  MEDICAL RECORD NUMBER:  [id number]  AGE: 49 y.o.   GENDER: female  : 1975  EPISODE DATE:  2024   Subjective   HBO Treatment Number: 14 out of Total Treatments: 40  HBO Diagnosis:   Problem List Items Addressed This Visit       Disruption of perineal wound (Chronic)    Relevant Orders    Notify physician (specify)    Hyperbaric Oxygen Therapy    Crohn's disease of large intestine with fistula (HCC) - Primary    Relevant Orders    Notify physician (specify)    Hyperbaric Oxygen Therapy     Safety checks performed prior to treatment.  See doc flowsheets for documentation.  Objective      Please see lab results for finger stick glucose results  Pre treatment Vital Signs       Temp: 97.4 °F (36.3 °C)     Pulse: 72     Respirations: 16     BP: 128/72     Post treatment Vital Signs  Temp: 97.8 °F (36.6 °C)  Pulse: 59  Respirations: 16  BP: 130/80  Assessment      Physical Exam:  General Appearance:  alert and oriented to person, place and time, well-developed and well-nourished, in no acute distress  ENT:  tympanic membranes intact bilaterally  Pulmonary/Chest:  clear to auscultation bilaterally- no wheezes, rales or rhonchi, normal air movement, no respiratory distress  Cardiovascular:  regular rate and rhythm  Chamber #: [id number]  Treatment Start Time: 1202     Pressure Reached Time: 1212  RONAN : 2  Number of Air Breaks:  Treatment Status: No Air break     Decompression Time: 1341   Treatment End Time: 1349  Length of Treatment: 90 Minutes  Symptoms Noted During Treatment: None  Total Treatment Time (min): 107    Adverse Event: no    I was present on these premises and immediately available to furnish assistance & direction throughout the procedure.   Plan      Perla Linares is a 49 y.o. female  did successfully complete today's hyperbaric oxygen treatment at [facility]

## 2024-12-02 ENCOUNTER — HOSPITAL ENCOUNTER (OUTPATIENT)
Dept: HYPERBARIC MEDICINE | Age: 49
Discharge: HOME | End: 2024-12-02
Payer: COMMERCIAL

## 2024-12-02 VITALS
SYSTOLIC BLOOD PRESSURE: 120 MMHG | HEART RATE: 64 BPM | RESPIRATION RATE: 16 BRPM | TEMPERATURE: 98.5 F | DIASTOLIC BLOOD PRESSURE: 84 MMHG

## 2024-12-02 DIAGNOSIS — K50.113 CROHN'S DISEASE OF LARGE INTESTINE WITH FISTULA (HCC): Primary | ICD-10-CM

## 2024-12-02 DIAGNOSIS — T81.30XA DISRUPTION OF PERINEAL WOUND: ICD-10-CM

## 2024-12-02 PROCEDURE — G0277 HBOT, FULL BODY CHAMBER, 30M: HCPCS

## 2024-12-04 ENCOUNTER — HOSPITAL ENCOUNTER (OUTPATIENT)
Dept: HYPERBARIC MEDICINE | Age: 49
Discharge: HOME | End: 2024-12-04
Payer: COMMERCIAL

## 2024-12-04 VITALS
DIASTOLIC BLOOD PRESSURE: 67 MMHG | TEMPERATURE: 97 F | HEART RATE: 68 BPM | SYSTOLIC BLOOD PRESSURE: 148 MMHG | RESPIRATION RATE: 16 BRPM

## 2024-12-04 DIAGNOSIS — T81.30XA DISRUPTION OF PERINEAL WOUND: Chronic | ICD-10-CM

## 2024-12-04 DIAGNOSIS — K50.113 CROHN'S DISEASE OF LARGE INTESTINE WITH FISTULA (HCC): Primary | ICD-10-CM

## 2024-12-04 PROCEDURE — G0277 HBOT, FULL BODY CHAMBER, 30M: HCPCS

## 2024-12-04 PROCEDURE — 99183 HYPERBARIC OXYGEN THERAPY: CPT | Performed by: SURGERY

## 2024-12-04 NOTE — PROGRESS NOTES
[facility]  Hyperbaric Oxygen Therapy   Progress Note    NAME: Perla Linares  MEDICAL RECORD NUMBER:  [id number]  AGE: 49 y.o.   GENDER: female  : 1975  EPISODE DATE:  2024   Subjective   HBO Treatment Number: 16 out of Total Treatments: 40  HBO Diagnosis:   Problem List Items Addressed This Visit       Crohn's disease of large intestine with fistula (HCC) - Primary    Relevant Orders    Notify physician (specify)    Hyperbaric Oxygen Therapy    Disruption of perineal wound (Chronic)    Relevant Orders    Notify physician (specify)    Hyperbaric Oxygen Therapy     Safety checks performed prior to treatment.  See doc flowsheets for documentation.  Objective      Please see lab results for finger stick glucose results  Pre treatment Vital Signs       Temp: 97.3 °F (36.3 °C)     Pulse: 80     Respirations: 16     BP: (!) 137/58     Post treatment Vital Signs  Temp: 97 °F (36.1 °C)  Pulse: 68  Respirations: 16  BP: (!) 148/67  Assessment      Physical Exam:  General Appearance:  alert and oriented to person, place and time, well-developed and well-nourished, in no acute distress  ENT:  tympanic membranes intact bilaterally  Pulmonary/Chest:  clear to auscultation bilaterally- no wheezes, rales or rhonchi, normal air movement, no respiratory distress  Cardiovascular:  regular rate and rhythm  Chamber #: [id number]  Treatment Start Time: 1157     Pressure Reached Time: 1206  RONAN : 2  Number of Air Breaks:  Treatment Status: No Air break     Decompression Time: 1336   Treatment End Time: 1345  Length of Treatment: 90 Minutes  Symptoms Noted During Treatment: None  Total Treatment Time (min): 108    Adverse Event: no    I was present on these premises and immediately available to furnish assistance & direction throughout the procedure.   Plan      Perla Linares is a 49 y.o. female  did successfully complete today's hyperbaric oxygen treatment at [facility]

## 2024-12-05 ENCOUNTER — HOSPITAL ENCOUNTER (OUTPATIENT)
Dept: HYPERBARIC MEDICINE | Age: 49
Discharge: HOME | End: 2024-12-05
Payer: COMMERCIAL

## 2024-12-05 VITALS
DIASTOLIC BLOOD PRESSURE: 78 MMHG | HEART RATE: 61 BPM | TEMPERATURE: 98.2 F | SYSTOLIC BLOOD PRESSURE: 142 MMHG | RESPIRATION RATE: 16 BRPM

## 2024-12-05 DIAGNOSIS — K50.113 CROHN'S DISEASE OF LARGE INTESTINE WITH FISTULA (HCC): Primary | ICD-10-CM

## 2024-12-05 DIAGNOSIS — T81.30XA DISRUPTION OF PERINEAL WOUND: ICD-10-CM

## 2024-12-05 PROCEDURE — G0277 HBOT, FULL BODY CHAMBER, 30M: HCPCS

## 2024-12-05 PROCEDURE — 99183 HYPERBARIC OXYGEN THERAPY: CPT | Performed by: SURGERY

## 2024-12-05 NOTE — PROGRESS NOTES
[facility]  Hyperbaric Oxygen Therapy   Progress Note    NAME: Perla Linares  MEDICAL RECORD NUMBER:  [id number]  AGE: 49 y.o.   GENDER: female  : 1975  EPISODE DATE:  2024   Subjective   HBO Treatment Number: 17 out of Total Treatments: 40  HBO Diagnosis:   Problem List Items Addressed This Visit       Crohn's disease of large intestine with fistula (HCC) - Primary    Relevant Orders    Notify physician (specify)    Hyperbaric Oxygen Therapy    Disruption of perineal wound (Chronic)    Relevant Orders    Notify physician (specify)    Hyperbaric Oxygen Therapy     Safety checks performed prior to treatment.  See doc flowsheets for documentation.  Objective      Please see lab results for finger stick glucose results  Pre treatment Vital Signs       Temp: 98.3 °F (36.8 °C)     Pulse: 82     Respirations: 18     BP: 134/64     Post treatment Vital Signs  Temp: 98.2 °F (36.8 °C)  Pulse: 61  Respirations: 16  BP: (!) 142/78  Assessment      Physical Exam:  General Appearance:  alert and oriented to person, place and time, well-developed and well-nourished, in no acute distress  ENT:  tympanic membranes intact bilaterally  Pulmonary/Chest:  clear to auscultation bilaterally- no wheezes, rales or rhonchi, normal air movement, no respiratory distress  Cardiovascular:  regular rate and rhythm  Chamber #: [id number]  Treatment Start Time: 1159     Pressure Reached Time: 1210  RONAN : 2  Number of Air Breaks:  Treatment Status: No Air break     Decompression Time: 1339   Treatment End Time: 1347  Length of Treatment: 90 Minutes  Symptoms Noted During Treatment: None  Total Treatment Time (min): 108    Adverse Event: no    I was present on these premises and immediately available to furnish assistance & direction throughout the procedure.   Plan      Perla Linares is a 49 y.o. female  did successfully complete today's hyperbaric oxygen treatment at [facility]

## 2024-12-06 ENCOUNTER — HOSPITAL ENCOUNTER (OUTPATIENT)
Dept: HYPERBARIC MEDICINE | Age: 49
Discharge: HOME | End: 2024-12-06
Payer: COMMERCIAL

## 2024-12-06 VITALS
SYSTOLIC BLOOD PRESSURE: 154 MMHG | RESPIRATION RATE: 16 BRPM | TEMPERATURE: 97.3 F | HEART RATE: 80 BPM | DIASTOLIC BLOOD PRESSURE: 58 MMHG

## 2024-12-06 DIAGNOSIS — K50.113 CROHN'S DISEASE OF LARGE INTESTINE WITH FISTULA (HCC): Primary | ICD-10-CM

## 2024-12-06 DIAGNOSIS — T81.30XA DISRUPTION OF PERINEAL WOUND: ICD-10-CM

## 2024-12-06 PROCEDURE — G0277 HBOT, FULL BODY CHAMBER, 30M: HCPCS

## 2024-12-06 PROCEDURE — 99183 HYPERBARIC OXYGEN THERAPY: CPT | Performed by: SURGERY

## 2024-12-06 NOTE — PLAN OF CARE
Perla Linares is a 49 y.o. female has been receiving hyperbaric oxygen treatment for management of:Indication  Indications: Other (Comment) (disruption of perineal wound). Ms. Linares has completed Treatment Number: 18 out of a treatment protocol of Total Treatments: 40.    Problem List:  Patient Active Problem List   Diagnosis Code    GERD (gastroesophageal reflux disease) K21.9    Depression with anxiety F41.8    Crohn's disease of large intestine with fistula ([facility]) K50.113    SVT (supraventricular tachycardia) ([facility]) I47.10    Anemia D64.9    Paroxysmal supraventricular tachycardia ([facility]) I47.10    Iron deficiency anemia secondary to blood loss (chronic) D50.0    Crohn's disease of colon with complication ([facility]) K50.119    Abdominal wall skin ulcer, with fat layer exposed ([facility]) L98.492    Skin ulcer of perineum, with fat layer exposed ([facility]) L98.492    Trimalleolar fracture of ankle, closed, left, initial encounter S82.852A    Closed trimalleolar fracture of left ankle with delayed healing S82.852G    Presence of other orthopedic joint implants Z96.698    Immunocompromised state due to drug therapy ([facility]) D84.821, Z79.899    Malabsorption K90.9    Disruption of perineal wound T81.30XA       Patients ID was verified.Hyperbaric oxygen therapy plan of care, patient history, outpatient fall risk assessment, nutritional assessment and daily medications were reviewed, addressed and updated as needed.    Pt is tolerating hyperbaric oxygen therapy  well without complications.           Marizol Reza RN  12/6/2024  12:12 PM  
None known

## 2024-12-09 ENCOUNTER — HOSPITAL ENCOUNTER (OUTPATIENT)
Dept: HYPERBARIC MEDICINE | Age: 49
Discharge: HOME | End: 2024-12-09
Payer: COMMERCIAL

## 2024-12-09 VITALS
SYSTOLIC BLOOD PRESSURE: 110 MMHG | HEART RATE: 60 BPM | DIASTOLIC BLOOD PRESSURE: 80 MMHG | TEMPERATURE: 97.8 F | RESPIRATION RATE: 16 BRPM

## 2024-12-09 DIAGNOSIS — K50.113 CROHN'S DISEASE OF LARGE INTESTINE WITH FISTULA (HCC): Primary | ICD-10-CM

## 2024-12-09 DIAGNOSIS — T81.30XA DISRUPTION OF PERINEAL WOUND: ICD-10-CM

## 2024-12-09 PROCEDURE — G0277 HBOT, FULL BODY CHAMBER, 30M: HCPCS

## 2024-12-09 NOTE — PLAN OF CARE
NAME:  Perla Linares  YOB: 1975  MEDICAL RECORD NUMBER:  [id number]  DATE:  12/9/2024      Hyperbaric Oxygen (HBO) Therapy Care Plan    Goal: Patient will verbalize understanding of HBO therapy goals, procedures and potential hazards.  Patient will tolerate HBO therapy and barotrauma will be prevented.  Signs of pulmonary oxygen toxicity or seizure will be recognized and promptly addressed.    Assess patient knowledge and expectations regarding HBO.  Provide education of the procedure, goals of treatment and prevention of adverse events.    Assess for history of confinement anxiety.  Provide for patient’s comfort related to the HBO environment and equalization of the middle ear.   Obtain ENT consult if needed.    Assess for signs and symptoms related to adverse events including confinement anxiety, pneumothorax, oxygen toxicity and barotrauma.    If symptoms of seizure appear, administer air break and notify physician.      Electronically signed by Marizol Reza RN on 12/9/2024 at 12:20 PM

## 2024-12-11 ENCOUNTER — HOSPITAL ENCOUNTER (OUTPATIENT)
Dept: HYPERBARIC MEDICINE | Age: 49
Discharge: HOME | End: 2024-12-11
Payer: COMMERCIAL

## 2024-12-11 VITALS
HEART RATE: 62 BPM | TEMPERATURE: 97.8 F | SYSTOLIC BLOOD PRESSURE: 146 MMHG | RESPIRATION RATE: 16 BRPM | DIASTOLIC BLOOD PRESSURE: 63 MMHG

## 2024-12-11 DIAGNOSIS — T81.30XA DISRUPTION OF PERINEAL WOUND: ICD-10-CM

## 2024-12-11 DIAGNOSIS — K50.113 CROHN'S DISEASE OF LARGE INTESTINE WITH FISTULA (HCC): Primary | ICD-10-CM

## 2024-12-11 PROCEDURE — 99183 HYPERBARIC OXYGEN THERAPY: CPT | Performed by: SURGERY

## 2024-12-11 PROCEDURE — G0277 HBOT, FULL BODY CHAMBER, 30M: HCPCS

## 2024-12-11 NOTE — PROGRESS NOTES
[facility]  Hyperbaric Oxygen Therapy   Progress Note    NAME: Perla Linares  MEDICAL RECORD NUMBER:  [id number]  AGE: 49 y.o.   GENDER: female  : 1975  EPISODE DATE:  2024   Subjective   HBO Treatment Number: 20 out of Total Treatments: 40  HBO Diagnosis:   Problem List Items Addressed This Visit       Crohn's disease of large intestine with fistula (HCC) - Primary    Relevant Orders    Notify physician (specify)    Hyperbaric Oxygen Therapy    Disruption of perineal wound (Chronic)    Relevant Orders    Notify physician (specify)    Hyperbaric Oxygen Therapy     Safety checks performed prior to treatment.  See doc flowsheets for documentation.  Objective      Please see lab results for finger stick glucose results  Pre treatment Vital Signs       Temp: 97.4 °F (36.3 °C)     Pulse: 66     Respirations: 18     BP: 138/64     Post treatment Vital Signs  Temp: 97.8 °F (36.6 °C)  Pulse: 62  Respirations: 16  BP: (!) 146/63  Assessment      Physical Exam:  General Appearance:  alert and oriented to person, place and time, well-developed and well-nourished, in no acute distress  ENT:  tympanic membranes intact bilaterally  Pulmonary/Chest:  clear to auscultation bilaterally- no wheezes, rales or rhonchi, normal air movement, no respiratory distress  Cardiovascular:  regular rate and rhythm  Chamber #: [id number]  Treatment Start Time: 1207     Pressure Reached Time: 1215  RONAN : 2  Number of Air Breaks:  Treatment Status: No Air break     Decompression Time: 1345   Treatment End Time: 1356  Length of Treatment: 90 Minutes  Symptoms Noted During Treatment: None  Total Treatment Time (min): 109    Adverse Event: no    I was present on these premises and immediately available to furnish assistance & direction throughout the procedure.   Plan      Perla Linares is a 49 y.o. female  did successfully complete today's hyperbaric oxygen treatment at [facility]

## 2024-12-12 ENCOUNTER — HOSPITAL ENCOUNTER (OUTPATIENT)
Dept: HYPERBARIC MEDICINE | Age: 49
Discharge: HOME | End: 2024-12-12
Payer: COMMERCIAL

## 2024-12-12 VITALS
RESPIRATION RATE: 18 BRPM | SYSTOLIC BLOOD PRESSURE: 112 MMHG | DIASTOLIC BLOOD PRESSURE: 62 MMHG | HEART RATE: 62 BPM | TEMPERATURE: 97.2 F

## 2024-12-12 DIAGNOSIS — K50.113 CROHN'S DISEASE OF LARGE INTESTINE WITH FISTULA (HCC): Primary | ICD-10-CM

## 2024-12-12 DIAGNOSIS — T81.30XA DISRUPTION OF PERINEAL WOUND: ICD-10-CM

## 2024-12-12 PROCEDURE — G0277 HBOT, FULL BODY CHAMBER, 30M: HCPCS

## 2024-12-12 PROCEDURE — 99183 HYPERBARIC OXYGEN THERAPY: CPT | Performed by: SURGERY

## 2024-12-12 NOTE — PROGRESS NOTES
[facility]  Hyperbaric Oxygen Therapy   Progress Note    NAME: Perla Linares  MEDICAL RECORD NUMBER:  [id number]  AGE: 49 y.o.   GENDER: female  : 1975  EPISODE DATE:  2024   Subjective   HBO Treatment Number: 21 out of Total Treatments: 40  HBO Diagnosis:   Problem List Items Addressed This Visit       Crohn's disease of large intestine with fistula (HCC) - Primary    Relevant Orders    Notify physician (specify)    Hyperbaric Oxygen Therapy    Disruption of perineal wound (Chronic)    Relevant Orders    Notify physician (specify)    Hyperbaric Oxygen Therapy     Safety checks performed prior to treatment.  See doc flowsheets for documentation.  Objective      Please see lab results for finger stick glucose results  Pre treatment Vital Signs       Temp: 97.6 °F (36.4 °C)     Pulse: 64     Respirations: 16     BP: 118/64     Post treatment Vital Signs  Temp: 97.2 °F (36.2 °C)  Pulse: 62  Respirations: 18  BP: 112/62  Assessment      Physical Exam:  General Appearance:  alert and oriented to person, place and time, well-developed and well-nourished, in no acute distress  ENT:  tympanic membranes intact bilaterally  Pulmonary/Chest:  clear to auscultation bilaterally- no wheezes, rales or rhonchi, normal air movement, no respiratory distress  Cardiovascular:  regular rate and rhythm  Chamber #: [id number]  Treatment Start Time: 1210     Pressure Reached Time: 1218  RONAN : 2  Number of Air Breaks:  Treatment Status: No Air break     Decompression Time: 1347   Treatment End Time: 1358  Length of Treatment: 90 Minutes  Symptoms Noted During Treatment: None  Total Treatment Time (min): 108    Adverse Event: no    I was present on these premises and immediately available to furnish assistance & direction throughout the procedure.   Plan      Perla Linares is a 49 y.o. female  did successfully complete today's hyperbaric oxygen treatment at [facility]

## 2024-12-13 ENCOUNTER — HOSPITAL ENCOUNTER (OUTPATIENT)
Dept: HYPERBARIC MEDICINE | Age: 49
Discharge: HOME | End: 2024-12-13
Payer: COMMERCIAL

## 2024-12-13 VITALS
RESPIRATION RATE: 18 BRPM | SYSTOLIC BLOOD PRESSURE: 136 MMHG | TEMPERATURE: 98.6 F | DIASTOLIC BLOOD PRESSURE: 68 MMHG | HEART RATE: 68 BPM

## 2024-12-13 DIAGNOSIS — K50.113 CROHN'S DISEASE OF LARGE INTESTINE WITH FISTULA (HCC): Primary | ICD-10-CM

## 2024-12-13 DIAGNOSIS — T81.30XA DISRUPTION OF PERINEAL WOUND: ICD-10-CM

## 2024-12-13 PROCEDURE — G0277 HBOT, FULL BODY CHAMBER, 30M: HCPCS

## 2024-12-13 PROCEDURE — 99183 HYPERBARIC OXYGEN THERAPY: CPT | Performed by: SURGERY

## 2024-12-13 NOTE — PROGRESS NOTES
[facility]  Hyperbaric Oxygen Therapy   Progress Note    NAME: Perla Linares  MEDICAL RECORD NUMBER:  [id number]  AGE: 49 y.o.   GENDER: female  : 1975  EPISODE DATE:  2024   Subjective   HBO Treatment Number: 22 out of Total Treatments: 40  HBO Diagnosis:   Problem List Items Addressed This Visit       Crohn's disease of large intestine with fistula (HCC) - Primary    Relevant Orders    Notify physician (specify)    Hyperbaric Oxygen Therapy    Disruption of perineal wound (Chronic)    Relevant Orders    Notify physician (specify)    Hyperbaric Oxygen Therapy     Safety checks performed prior to treatment.  See doc flowsheets for documentation.  Objective      Please see lab results for finger stick glucose results  Pre treatment Vital Signs       Temp: 98.5 °F (36.9 °C)     Pulse: 77     Respirations: 18     BP: 132/82     Post treatment Vital Signs  Temp: 98.6 °F (37 °C)  Pulse: 68  Respirations: 18  BP: 136/68  Assessment      Physical Exam:  General Appearance:  alert and oriented to person, place and time, well-developed and well-nourished, in no acute distress  ENT:  tympanic membranes intact bilaterally  Pulmonary/Chest:  clear to auscultation bilaterally- no wheezes, rales or rhonchi, normal air movement, no respiratory distress  Cardiovascular:  regular rate and rhythm  Chamber #: [id number]  Treatment Start Time: 1208     Pressure Reached Time: 1219  RONAN : 2  Number of Air Breaks:  Treatment Status: No Air break     Decompression Time: 1349   Treatment End Time: 1357  Length of Treatment: 90 Minutes  Symptoms Noted During Treatment: None  Total Treatment Time (min): 109    Adverse Event: no    I was present on these premises and immediately available to furnish assistance & direction throughout the procedure.   Plan      Perla Linares is a 49 y.o. female  did successfully complete today's hyperbaric oxygen treatment at [facility]

## 2024-12-18 ENCOUNTER — HOSPITAL ENCOUNTER (OUTPATIENT)
Dept: HYPERBARIC MEDICINE | Age: 49
Discharge: HOME | End: 2024-12-18
Payer: COMMERCIAL

## 2024-12-18 VITALS
HEART RATE: 74 BPM | SYSTOLIC BLOOD PRESSURE: 132 MMHG | TEMPERATURE: 97.9 F | RESPIRATION RATE: 16 BRPM | DIASTOLIC BLOOD PRESSURE: 78 MMHG

## 2024-12-18 DIAGNOSIS — T81.30XA DISRUPTION OF PERINEAL WOUND: ICD-10-CM

## 2024-12-18 DIAGNOSIS — K50.113 CROHN'S DISEASE OF LARGE INTESTINE WITH FISTULA (HCC): Primary | ICD-10-CM

## 2024-12-18 PROCEDURE — 99183 HYPERBARIC OXYGEN THERAPY: CPT | Performed by: SURGERY

## 2024-12-18 PROCEDURE — G0277 HBOT, FULL BODY CHAMBER, 30M: HCPCS

## 2024-12-19 ENCOUNTER — HOSPITAL ENCOUNTER (OUTPATIENT)
Dept: HYPERBARIC MEDICINE | Age: 49
Discharge: HOME | End: 2024-12-19
Payer: COMMERCIAL

## 2024-12-19 VITALS
DIASTOLIC BLOOD PRESSURE: 70 MMHG | RESPIRATION RATE: 16 BRPM | TEMPERATURE: 97.2 F | HEART RATE: 68 BPM | SYSTOLIC BLOOD PRESSURE: 144 MMHG

## 2024-12-19 DIAGNOSIS — T81.30XA DISRUPTION OF PERINEAL WOUND: ICD-10-CM

## 2024-12-19 DIAGNOSIS — K50.113 CROHN'S DISEASE OF LARGE INTESTINE WITH FISTULA (HCC): Primary | ICD-10-CM

## 2024-12-19 PROCEDURE — G0277 HBOT, FULL BODY CHAMBER, 30M: HCPCS

## 2024-12-19 PROCEDURE — 99183 HYPERBARIC OXYGEN THERAPY: CPT | Performed by: SURGERY

## 2024-12-19 NOTE — PROGRESS NOTES
Discharge instructions were reviewed with the patient and printed instructions were declined  
Hyperbarics Provider Reassessment     Perla Linares is a 49 y.o. female has been receiving hyperbaric oxygen treatment for management of    HBO Diagnosis:   Problem List Items Addressed This Visit       Disruption of perineal wound (Chronic)    Crohn's disease of large intestine with fistula (HCC) - Primary       Vijay has completed Treatment Number: 23 out of a treatment protocol of Total Treatments: 40.    Hyperbaric oxygen therapy physician orders,plan of care, vascular and nutritional status reviewed, addressed and updated as needed.    Pt is tolerating hyperbaric oxygen therapy  well with OUTcomplications.      All wound related documentation/correspondences from the [facility] and/or referring/collaborating physicians has been reviewed.     Wound:  Wound 06/17/21 Abdomen Lower #2 (Active)   Wound Length (cm) 35 cm 06/17/21 1428   Wound Width (cm) 0.8 cm 06/17/21 1428   Wound Depth (cm) 1.1 cm 06/17/21 1428   Wound Surface Area (cm^2) 28 cm^2 06/17/21 1428   Wound Volume (cm^3) 30.8 cm^3 06/17/21 1428   Wound Assessment Pink/red 06/17/21 1428   Drainage Amount Small 06/17/21 1428   Drainage Description Serous;Serosanguinous 06/17/21 1428   Odor None 06/17/21 1428   Lakisha-wound Assessment Intact 06/17/21 1428   Number of days: 1281       Wound 07/14/21 Coccyx coccyx wound #3 (Active)   Number of days: 1254       [] Non-Wound Related Diagnosis for HBOT    The wounds are responding to hyperbaric oxygen therapy. Based on all available clinical documentation, we will  continue hyperbaric oxygen therapy.     Esme Red MD  12/19/2024  2:36 PM    
[place].    In my clinical judgement, ongoing HBO therapy is  necessary at this time, given a threat to patient function, limb or life from the current condition.      Supervision and attendance of Hyperbaric Oxygen Therapy provided.  Continue HBO treatment as outlined in the treatment plan.    Hyperbaric Oxygen: Perla MADRID Vijay tolerated Treatment Number: 23 well today without complications.    Discharge Instructions were explained and given to Ms. Vijay     Electronically signed by Bradly Vasquez MD on 12/18/2024 at 5:00 PM

## 2024-12-19 NOTE — PROGRESS NOTES
[facility]  Hyperbaric Oxygen Therapy   Progress Note    NAME: Perla Linares  MEDICAL RECORD NUMBER:  [id number]  AGE: 49 y.o.   GENDER: female  : 1975  EPISODE DATE:  2024   Subjective   HBO Treatment Number: 24 out of Total Treatments: 40  HBO Diagnosis:   Problem List Items Addressed This Visit       Crohn's disease of large intestine with fistula (HCC) - Primary    Relevant Orders    Notify physician (specify)    Hyperbaric Oxygen Therapy    Disruption of perineal wound (Chronic)    Relevant Orders    Notify physician (specify)    Hyperbaric Oxygen Therapy     Safety checks performed prior to treatment.  See doc flowsheets for documentation.  Objective      Please see lab results for finger stick glucose results  Pre treatment Vital Signs       Temp: 98.3 °F (36.8 °C)     Pulse: 80     Respirations: 18     BP: (!) 134/59     Post treatment Vital Signs  Temp: 97.2 °F (36.2 °C)  Pulse: 68  Respirations: 16  BP: (!) 144/70  Assessment      Physical Exam:  General Appearance:  alert and oriented to person, place and time, well-developed and well-nourished, in no acute distress  ENT:  tympanic membranes intact bilaterally  Pulmonary/Chest:  clear to auscultation bilaterally- no wheezes, rales or rhonchi, normal air movement, no respiratory distress  Cardiovascular:  regular rate and rhythm  Chamber #: [id number]  Treatment Start Time: 1234     Pressure Reached Time: 1243  RONAN : 2  Number of Air Breaks:  Treatment Status: No Air break     Decompression Time: 1412   Treatment End Time: 1420  Length of Treatment: 90 Minutes  Symptoms Noted During Treatment: None  Total Treatment Time (min): 106    Adverse Event: no    I was present on these premises and immediately available to furnish assistance & direction throughout the procedure.   Plan      Perla Linares is a 49 y.o. female  did successfully complete today's hyperbaric oxygen treatment at [facility]

## 2024-12-20 ENCOUNTER — HOSPITAL ENCOUNTER (OUTPATIENT)
Dept: HYPERBARIC MEDICINE | Age: 49
Discharge: HOME | End: 2024-12-20
Payer: COMMERCIAL

## 2024-12-20 VITALS
RESPIRATION RATE: 16 BRPM | HEART RATE: 62 BPM | DIASTOLIC BLOOD PRESSURE: 70 MMHG | SYSTOLIC BLOOD PRESSURE: 153 MMHG | TEMPERATURE: 96.4 F

## 2024-12-20 DIAGNOSIS — K50.113 CROHN'S DISEASE OF LARGE INTESTINE WITH FISTULA (HCC): Primary | ICD-10-CM

## 2024-12-20 DIAGNOSIS — T81.30XA DISRUPTION OF PERINEAL WOUND: ICD-10-CM

## 2024-12-20 PROCEDURE — G0277 HBOT, FULL BODY CHAMBER, 30M: HCPCS

## 2024-12-20 PROCEDURE — 99183 HYPERBARIC OXYGEN THERAPY: CPT | Performed by: SURGERY

## 2024-12-20 NOTE — PLAN OF CARE
Perla Linares is a 49 y.o. female has been receiving hyperbaric oxygen treatment for management of:Indication  Indications: Other (Comment) (disruption of perianal wound). Ms. Linares has completed Treatment Number: 25 out of a treatment protocol of Total Treatments: 40.    Problem List:  Patient Active Problem List   Diagnosis Code    GERD (gastroesophageal reflux disease) K21.9    Depression with anxiety F41.8    Crohn's disease of large intestine with fistula ([facility]) K50.113    SVT (supraventricular tachycardia) ([facility]) I47.10    Anemia D64.9    Paroxysmal supraventricular tachycardia ([facility]) I47.10    Iron deficiency anemia secondary to blood loss (chronic) D50.0    Crohn's disease of colon with complication ([facility]) K50.119    Abdominal wall skin ulcer, with fat layer exposed ([facility]) L98.492    Skin ulcer of perineum, with fat layer exposed ([facility]) L98.492    Trimalleolar fracture of ankle, closed, left, initial encounter S82.852A    Closed trimalleolar fracture of left ankle with delayed healing S82.852G    Presence of other orthopedic joint implants Z96.698    Immunocompromised state due to drug therapy ([facility]) D84.821, Z79.899    Malabsorption K90.9    Disruption of perineal wound T81.30XA       Patients ID was verified.Hyperbaric oxygen therapy plan of care, patient history, outpatient fall risk assessment, nutritional assessment and daily medications were reviewed, addressed and updated as needed.    Pt is tolerating hyperbaric oxygen therapy  well without complications.           Marizol Reza RN  12/20/2024  12:07 PM

## 2024-12-20 NOTE — PROGRESS NOTES
[facility]  Hyperbaric Oxygen Therapy   Progress Note    NAME: Perla Linares  MEDICAL RECORD NUMBER:  [id number]  AGE: 49 y.o.   GENDER: female  : 1975  EPISODE DATE:  2024   Subjective   HBO Treatment Number: 25 out of Total Treatments: 40  HBO Diagnosis:   Problem List Items Addressed This Visit       Crohn's disease of large intestine with fistula (HCC) - Primary    Relevant Orders    Notify physician (specify)    Hyperbaric Oxygen Therapy    Disruption of perineal wound (Chronic)    Relevant Orders    Notify physician (specify)    Hyperbaric Oxygen Therapy     Safety checks performed prior to treatment.  See doc flowsheets for documentation.  Objective      Please see lab results for finger stick glucose results  Pre treatment Vital Signs       Temp: (!) 96.2 °F (35.7 °C)     Pulse: 64     Respirations: 14     BP: (!) 145/77     Post treatment Vital Signs  Temp: (!) 96.4 °F (35.8 °C)  Pulse: 62  Respirations: 16  BP: (!) 153/70  Assessment      Physical Exam:  General Appearance:  alert and oriented to person, place and time, well-developed and well-nourished, in no acute distress  ENT:  tympanic membranes intact bilaterally  Pulmonary/Chest:  clear to auscultation bilaterally- no wheezes, rales or rhonchi, normal air movement, no respiratory distress  Cardiovascular:  regular rate and rhythm  Chamber #: [id number]  Treatment Start Time: 1158     Pressure Reached Time: 1206     Number of Air Breaks:  Treatment Status: No Air break     Decompression Time: 1336   Treatment End Time: 1345  Length of Treatment: 90 Minutes  Symptoms Noted During Treatment: None  Total Treatment Time (min): 107    Adverse Event: no    I was present on these premises and immediately available to furnish assistance & direction throughout the procedure.   Plan      Perla Linares is a 49 y.o. female  did successfully complete today's hyperbaric oxygen treatment at [facility]

## 2024-12-26 ENCOUNTER — HOSPITAL ENCOUNTER (OUTPATIENT)
Dept: HYPERBARIC MEDICINE | Age: 49
Discharge: HOME | End: 2024-12-26
Payer: COMMERCIAL

## 2024-12-26 VITALS
DIASTOLIC BLOOD PRESSURE: 78 MMHG | HEART RATE: 63 BPM | SYSTOLIC BLOOD PRESSURE: 132 MMHG | RESPIRATION RATE: 16 BRPM | TEMPERATURE: 97.8 F

## 2024-12-26 DIAGNOSIS — T81.30XA DISRUPTION OF PERINEAL WOUND: ICD-10-CM

## 2024-12-26 DIAGNOSIS — K50.113 CROHN'S DISEASE OF LARGE INTESTINE WITH FISTULA (HCC): Primary | ICD-10-CM

## 2024-12-26 PROCEDURE — G0277 HBOT, FULL BODY CHAMBER, 30M: HCPCS

## 2024-12-26 PROCEDURE — 99183 HYPERBARIC OXYGEN THERAPY: CPT | Performed by: SURGERY

## 2024-12-26 NOTE — PROGRESS NOTES
[facility]  Hyperbaric Oxygen Therapy   Progress Note    NAME: Perla Linares  MEDICAL RECORD NUMBER:  [id number]  AGE: 49 y.o.   GENDER: female  : 1975  EPISODE DATE:  2024   Subjective   HBO Treatment Number: 26 out of Total Treatments: 40  HBO Diagnosis:   Problem List Items Addressed This Visit       Crohn's disease of large intestine with fistula (HCC) - Primary    Relevant Orders    Notify physician (specify)    Hyperbaric Oxygen Therapy    Disruption of perineal wound (Chronic)    Relevant Orders    Notify physician (specify)    Hyperbaric Oxygen Therapy     Safety checks performed prior to treatment.  See doc flowsheets for documentation.  Objective      Please see lab results for finger stick glucose results  Pre treatment Vital Signs       Temp: 98.3 °F (36.8 °C)     Pulse: 66     Respirations: 16     BP: 120/80     Post treatment Vital Signs  Temp: 97.8 °F (36.6 °C)  Pulse: 63  Respirations: 16  BP: 132/78  Assessment      Physical Exam:  General Appearance:  alert and oriented to person, place and time, well-developed and well-nourished, in no acute distress  ENT:  tympanic membranes intact bilaterally  Pulmonary/Chest:  clear to auscultation bilaterally- no wheezes, rales or rhonchi, normal air movement, no respiratory distress  Cardiovascular:  regular rate and rhythm  Chamber #: [id number]  Treatment Start Time: 1204     Pressure Reached Time: 1214  RONAN : 2  Number of Air Breaks:  Treatment Status: No Air break     Decompression Time: 1344   Treatment End Time: 1353  Length of Treatment: 90 Minutes  Symptoms Noted During Treatment: None  Total Treatment Time (min): 109    Adverse Event: no    I was present on these premises and immediately available to furnish assistance & direction throughout the procedure.   Plan      Perla Linares is a 49 y.o. female  did successfully complete today's hyperbaric oxygen treatment at [facility]

## 2024-12-27 ENCOUNTER — HOSPITAL ENCOUNTER (OUTPATIENT)
Dept: HYPERBARIC MEDICINE | Age: 49
Discharge: HOME | End: 2024-12-27
Payer: COMMERCIAL

## 2024-12-27 VITALS
RESPIRATION RATE: 16 BRPM | TEMPERATURE: 96.8 F | HEART RATE: 62 BPM | SYSTOLIC BLOOD PRESSURE: 134 MMHG | DIASTOLIC BLOOD PRESSURE: 82 MMHG

## 2024-12-27 DIAGNOSIS — T81.30XA DISRUPTION OF PERINEAL WOUND: ICD-10-CM

## 2024-12-27 DIAGNOSIS — K50.113 CROHN'S DISEASE OF LARGE INTESTINE WITH FISTULA (HCC): Primary | ICD-10-CM

## 2024-12-27 PROCEDURE — G0277 HBOT, FULL BODY CHAMBER, 30M: HCPCS

## 2024-12-27 PROCEDURE — 99183 HYPERBARIC OXYGEN THERAPY: CPT | Performed by: SURGERY

## 2024-12-27 NOTE — PLAN OF CARE
NAME:  Perla Linares  YOB: 1975  MEDICAL RECORD NUMBER:  [id number]  DATE:  12/27/2024      Hyperbaric Oxygen (HBO) Therapy Care Plan    Goal: Patient will verbalize understanding of HBO therapy goals, procedures and potential hazards.  Patient will tolerate HBO therapy and barotrauma will be prevented.  Signs of pulmonary oxygen toxicity or seizure will be recognized and promptly addressed.    Assess patient knowledge and expectations regarding HBO.  Provide education of the procedure, goals of treatment and prevention of adverse events.    Assess for history of confinement anxiety.  Provide for patient’s comfort related to the HBO environment and equalization of the middle ear.   Obtain ENT consult if needed.    Assess for signs and symptoms related to adverse events including confinement anxiety, pneumothorax, oxygen toxicity and barotrauma.    If symptoms of seizure appear, administer air break and notify physician.      Electronically signed by Marizol Reza RN on 12/27/2024 at 12:13 PM

## 2024-12-28 NOTE — PROGRESS NOTES
[facility]  Hyperbaric Oxygen Therapy   Progress Note    NAME: Perla Linares  MEDICAL RECORD NUMBER:  [id number]  AGE: 49 y.o.   GENDER: female  : 1975  EPISODE DATE:  2024   Subjective   HBO Treatment Number: 27 out of Total Treatments: 40  HBO Diagnosis:   Problem List Items Addressed This Visit       Crohn's disease of large intestine with fistula (HCC) - Primary    Disruption of perineal wound (Chronic)     Safety checks performed prior to treatment.  See doc flowsheets for documentation.  Objective      Please see lab results for finger stick glucose results  Pre treatment Vital Signs       Temp: 98.3 °F (36.8 °C)     Pulse: 66     Respirations: 18     BP: 128/78     Post treatment Vital Signs  Temp: 96.8 °F (36 °C)  Pulse: 62  Respirations: 16  BP: 134/82  Assessment      Physical Exam:  General Appearance:  alert and oriented to person, place and time, well-developed and well-nourished, in no acute distress  ENT:  tympanic membranes intact bilaterally  Pulmonary/Chest:  clear to auscultation bilaterally- no wheezes, rales or rhonchi, normal air movement, no respiratory distress  Cardiovascular:  regular rate and rhythm  Chamber #: [id number]  Treatment Start Time: 1204     Pressure Reached Time: 1212  RONAN : 2  Number of Air Breaks:  Treatment Status: No Air break     Decompression Time: 1343   Treatment End Time: 1350  Length of Treatment: 90 Minutes  Symptoms Noted During Treatment: None  Total Treatment Time (min): 106    Adverse Event: no    I was present on these premises and immediately available to furnish assistance & direction throughout the procedure.   Plan      Perla Linares is a 49 y.o. female  did successfully complete today's hyperbaric oxygen treatment at [facility].    In my clinical judgement, ongoing HBO therapy is  necessary at this time, given a threat to patient function, limb or life from the current

## 2024-12-30 ENCOUNTER — HOSPITAL ENCOUNTER (OUTPATIENT)
Dept: HYPERBARIC MEDICINE | Age: 49
Discharge: HOME | End: 2024-12-30
Payer: COMMERCIAL

## 2024-12-30 VITALS
RESPIRATION RATE: 16 BRPM | SYSTOLIC BLOOD PRESSURE: 134 MMHG | HEART RATE: 60 BPM | TEMPERATURE: 97.7 F | DIASTOLIC BLOOD PRESSURE: 76 MMHG

## 2024-12-30 DIAGNOSIS — K50.113 CROHN'S DISEASE OF LARGE INTESTINE WITH FISTULA (HCC): Primary | ICD-10-CM

## 2024-12-30 DIAGNOSIS — T81.30XA DISRUPTION OF PERINEAL WOUND: ICD-10-CM

## 2024-12-30 PROCEDURE — G0277 HBOT, FULL BODY CHAMBER, 30M: HCPCS

## 2024-12-31 ENCOUNTER — HOSPITAL ENCOUNTER (OUTPATIENT)
Dept: HYPERBARIC MEDICINE | Age: 49
Discharge: HOME | End: 2024-12-31
Payer: COMMERCIAL

## 2024-12-31 VITALS
RESPIRATION RATE: 16 BRPM | DIASTOLIC BLOOD PRESSURE: 77 MMHG | TEMPERATURE: 97.9 F | SYSTOLIC BLOOD PRESSURE: 158 MMHG | HEART RATE: 64 BPM

## 2024-12-31 DIAGNOSIS — K50.113 CROHN'S DISEASE OF LARGE INTESTINE WITH FISTULA (HCC): Primary | ICD-10-CM

## 2024-12-31 DIAGNOSIS — T81.30XA DISRUPTION OF PERINEAL WOUND: ICD-10-CM

## 2024-12-31 PROCEDURE — G0277 HBOT, FULL BODY CHAMBER, 30M: HCPCS

## 2024-12-31 PROCEDURE — 99183 HYPERBARIC OXYGEN THERAPY: CPT | Performed by: SURGERY

## 2024-12-31 NOTE — PLAN OF CARE
NAME:  Perla Linares  YOB: 1975  MEDICAL RECORD NUMBER:  [id number]  DATE:  12/31/2024      Hyperbaric Oxygen (HBO) Therapy Care Plan    Goal: Patient will verbalize understanding of HBO therapy goals, procedures and potential hazards.  Patient will tolerate HBO therapy and barotrauma will be prevented.  Signs of pulmonary oxygen toxicity or seizure will be recognized and promptly addressed.    Assess patient knowledge and expectations regarding HBO.  Provide education of the procedure, goals of treatment and prevention of adverse events.    Assess for history of confinement anxiety.  Provide for patient’s comfort related to the HBO environment and equalization of the middle ear.   Obtain ENT consult if needed.    Assess for signs and symptoms related to adverse events including confinement anxiety, pneumothorax, oxygen toxicity and barotrauma.    If symptoms of seizure appear, administer air break and notify physician.      Electronically signed by Marizol Reza RN on 12/31/2024 at 1:02 PM

## 2024-12-31 NOTE — PROGRESS NOTES
[facility]  Hyperbaric Oxygen Therapy   Progress Note    NAME: Perla Linares  MEDICAL RECORD NUMBER:  [id number]  AGE: 49 y.o.   GENDER: female  : 1975  EPISODE DATE:  2024   Subjective   HBO Treatment Number: 29 out of Total Treatments: 40  HBO Diagnosis:   Problem List Items Addressed This Visit          Digestive    Crohn's disease of large intestine with fistula (HCC) - Primary    Relevant Orders    Notify physician (specify)    Hyperbaric Oxygen Therapy       Other    Disruption of perineal wound (Chronic)    Relevant Orders    Notify physician (specify)    Hyperbaric Oxygen Therapy     Safety checks performed prior to treatment.  See doc flowsheets for documentation.  Objective      Please see lab results for finger stick glucose results  Pre treatment Vital Signs       Temp: 98.3 °F (36.8 °C)     Pulse: 94     Respirations: 20     BP: 131/62     Post treatment Vital Signs  Temp: 97.9 °F (36.6 °C)  Pulse: 64  Respirations: 16  BP: (!) 158/77  Assessment      Physical Exam:  General Appearance:  alert and oriented to person, place and time, well-developed and well-nourished, in no acute distress  ENT:  tympanic membranes intact bilaterally  Pulmonary/Chest:  clear to auscultation bilaterally- no wheezes, rales or rhonchi, normal air movement, no respiratory distress  Cardiovascular:  normal  Chamber #: [id number]  Treatment Start Time: 1201     Pressure Reached Time: 1210  RONAN : 2  Number of Air Breaks:  Treatment Status: No Air break     Decompression Time: 1341   Treatment End Time: 1349  Length of Treatment: 90 Minutes  Symptoms Noted During Treatment: None  Total Treatment Time (min): 108    Adverse Event: no    I was present on these premises and immediately available to furnish assistance & direction throughout the procedure.   Plan      Perla Linares is a 49 y.o. female  did successfully complete today's hyperbaric oxygen treatment at [facility]

## 2025-01-02 ENCOUNTER — HOSPITAL ENCOUNTER (OUTPATIENT)
Dept: HYPERBARIC MEDICINE | Age: 50
Discharge: HOME | End: 2025-01-02
Payer: COMMERCIAL

## 2025-01-02 VITALS
HEART RATE: 65 BPM | RESPIRATION RATE: 16 BRPM | SYSTOLIC BLOOD PRESSURE: 134 MMHG | TEMPERATURE: 98.4 F | DIASTOLIC BLOOD PRESSURE: 74 MMHG

## 2025-01-02 DIAGNOSIS — K50.113 CROHN'S DISEASE OF LARGE INTESTINE WITH FISTULA (HCC): Primary | ICD-10-CM

## 2025-01-02 DIAGNOSIS — T81.30XA DISRUPTION OF PERINEAL WOUND: ICD-10-CM

## 2025-01-02 PROCEDURE — 99183 HYPERBARIC OXYGEN THERAPY: CPT | Performed by: SURGERY

## 2025-01-02 PROCEDURE — G0277 HBOT, FULL BODY CHAMBER, 30M: HCPCS

## 2025-01-02 NOTE — PROGRESS NOTES
[facility]  Hyperbaric Oxygen Therapy   Progress Note    NAME: Perla Linares  MEDICAL RECORD NUMBER:  [id number]  AGE: 49 y.o.   GENDER: female  : 1975  EPISODE DATE:  2025   Subjective   HBO Treatment Number: 30 out of Total Treatments: 40  HBO Diagnosis:   Problem List Items Addressed This Visit       Disruption of perineal wound (Chronic)    Relevant Orders    Notify physician (specify)    Hyperbaric Oxygen Therapy    Crohn's disease of large intestine with fistula (HCC) - Primary    Relevant Orders    Notify physician (specify)    Hyperbaric Oxygen Therapy     Safety checks performed prior to treatment.  See doc flowsheets for documentation.  Objective      Please see lab results for finger stick glucose results  Pre treatment Vital Signs       Temp: 97 °F (36.1 °C)     Pulse: 67     Respirations: 18     BP: (!) 142/76     Post treatment Vital Signs  Temp: 98.4 °F (36.9 °C)  Pulse: 65  Respirations: 16  BP: 134/74  Assessment      Physical Exam:  General Appearance:  alert and oriented to person, place and time, well-developed and well-nourished, in no acute distress  ENT:  tympanic membranes intact bilaterally  Pulmonary/Chest:  clear to auscultation bilaterally- no wheezes, rales or rhonchi, normal air movement, no respiratory distress  Cardiovascular:  regular rate and rhythm  Chamber #: [id number]  Treatment Start Time: 1205     Pressure Reached Time: 1213  RONAN : 2  Number of Air Breaks:  Treatment Status: No Air break     Decompression Time: 1343   Treatment End Time: 1351  Length of Treatment: 90 Minutes  Symptoms Noted During Treatment: None  Total Treatment Time (min): 106    Adverse Event: no    I was present on these premises and immediately available to furnish assistance & direction throughout the procedure.   Plan      Perla Linares is a 49 y.o. female  did successfully complete today's hyperbaric oxygen treatment at [facility]

## 2025-01-03 ENCOUNTER — HOSPITAL ENCOUNTER (OUTPATIENT)
Dept: HYPERBARIC MEDICINE | Age: 50
Discharge: HOME | End: 2025-01-03
Payer: COMMERCIAL

## 2025-01-03 VITALS
HEART RATE: 62 BPM | DIASTOLIC BLOOD PRESSURE: 83 MMHG | RESPIRATION RATE: 16 BRPM | TEMPERATURE: 98.1 F | SYSTOLIC BLOOD PRESSURE: 151 MMHG

## 2025-01-03 DIAGNOSIS — K50.113 CROHN'S DISEASE OF LARGE INTESTINE WITH FISTULA (HCC): Primary | ICD-10-CM

## 2025-01-03 DIAGNOSIS — T81.30XA DISRUPTION OF PERINEAL WOUND: ICD-10-CM

## 2025-01-03 PROCEDURE — G0277 HBOT, FULL BODY CHAMBER, 30M: HCPCS

## 2025-01-06 ENCOUNTER — HOSPITAL ENCOUNTER (OUTPATIENT)
Dept: HYPERBARIC MEDICINE | Age: 50
End: 2025-01-06
Payer: COMMERCIAL

## 2025-01-07 ENCOUNTER — HOSPITAL ENCOUNTER (OUTPATIENT)
Dept: HYPERBARIC MEDICINE | Age: 50
Discharge: HOME | End: 2025-01-07
Payer: COMMERCIAL

## 2025-01-07 VITALS
TEMPERATURE: 97.7 F | DIASTOLIC BLOOD PRESSURE: 63 MMHG | RESPIRATION RATE: 16 BRPM | SYSTOLIC BLOOD PRESSURE: 133 MMHG | HEART RATE: 67 BPM

## 2025-01-07 DIAGNOSIS — K50.113 CROHN'S DISEASE OF LARGE INTESTINE WITH FISTULA (HCC): Primary | ICD-10-CM

## 2025-01-07 DIAGNOSIS — T81.30XA DISRUPTION OF PERINEAL WOUND: ICD-10-CM

## 2025-01-07 PROCEDURE — G0277 HBOT, FULL BODY CHAMBER, 30M: HCPCS

## 2025-01-07 PROCEDURE — 99183 HYPERBARIC OXYGEN THERAPY: CPT | Performed by: SURGERY

## 2025-01-07 NOTE — PROGRESS NOTES
[facility]  Hyperbaric Oxygen Therapy   Progress Note    NAME: Perla Linares  MEDICAL RECORD NUMBER:  [id number]  AGE: 49 y.o.   GENDER: female  : 1975  EPISODE DATE:  2025   Subjective   HBO Treatment Number: 32 out of Total Treatments: 40  HBO Diagnosis:   Problem List Items Addressed This Visit          Digestive    Crohn's disease of large intestine with fistula (HCC) - Primary    Relevant Orders    Notify physician (specify)       Other    Disruption of perineal wound (Chronic)    Relevant Orders    Notify physician (specify)     Safety checks performed prior to treatment.  See doc flowsheets for documentation.  Objective      Please see lab results for finger stick glucose results  Pre treatment Vital Signs       Temp: (!) 96 °F (35.6 °C)     Pulse: 80     Respirations: 18     BP: (!) 152/69     Post treatment Vital Signs  Temp: 97.7 °F (36.5 °C)  Pulse: 67  Respirations: 16  BP: 133/63  Assessment      Physical Exam:  General Appearance:  alert and oriented to person, place and time, well-developed and well-nourished, in no acute distress  ENT:  tympanic membranes intact bilaterally  Pulmonary/Chest:  clear to auscultation bilaterally- no wheezes, rales or rhonchi, normal air movement, no respiratory distress  Cardiovascular:  normal  Chamber #: [id number]  Treatment Start Time: 1150     Pressure Reached Time: 1201  RONAN : 2  Number of Air Breaks:  Treatment Status: No Air break     Decompression Time: 1332   Treatment End Time: 1338  Length of Treatment: 90 Minutes  Symptoms Noted During Treatment: None  Total Treatment Time (min): 108    Adverse Event: no    I was present on these premises and immediately available to furnish assistance & direction throughout the procedure.   Plan      Perla Linares is a 49 y.o. female  did successfully complete today's hyperbaric oxygen treatment at [facility].    In my clinical judgement,

## 2025-01-08 ENCOUNTER — APPOINTMENT (OUTPATIENT)
Dept: HYPERBARIC MEDICINE | Age: 50
End: 2025-01-08
Payer: COMMERCIAL

## 2025-01-09 ENCOUNTER — HOSPITAL ENCOUNTER (OUTPATIENT)
Dept: HYPERBARIC MEDICINE | Age: 50
Discharge: HOME | End: 2025-01-09
Payer: COMMERCIAL

## 2025-01-09 VITALS
SYSTOLIC BLOOD PRESSURE: 135 MMHG | RESPIRATION RATE: 16 BRPM | HEART RATE: 67 BPM | TEMPERATURE: 98.1 F | DIASTOLIC BLOOD PRESSURE: 84 MMHG

## 2025-01-09 DIAGNOSIS — K50.113 CROHN'S DISEASE OF LARGE INTESTINE WITH FISTULA (HCC): Primary | ICD-10-CM

## 2025-01-09 DIAGNOSIS — T81.30XA DISRUPTION OF PERINEAL WOUND: Chronic | ICD-10-CM

## 2025-01-09 PROCEDURE — G0277 HBOT, FULL BODY CHAMBER, 30M: HCPCS

## 2025-01-09 PROCEDURE — 99183 HYPERBARIC OXYGEN THERAPY: CPT | Performed by: SURGERY

## 2025-01-09 NOTE — PROGRESS NOTES
[facility]  Hyperbaric Oxygen Therapy   Progress Note    NAME: Perla Linares  MEDICAL RECORD NUMBER:  [id number]  AGE: 49 y.o.   GENDER: female  : 1975  EPISODE DATE:  2025   Subjective   HBO Treatment Number: 33 out of Total Treatments: 40  HBO Diagnosis:   Problem List Items Addressed This Visit       Disruption of perineal wound (Chronic)    Crohn's disease of large intestine with fistula (HCC) - Primary    Relevant Orders    Notify physician (specify)    Hyperbaric Oxygen Therapy     Safety checks performed prior to treatment.  See doc flowsheets for documentation.  Objective      Please see lab results for finger stick glucose results  Pre treatment Vital Signs       Temp: 97.3 °F (36.3 °C)     Pulse: 76     Respirations: 18     BP: (!) 146/70     Post treatment Vital Signs  Temp: 98.1 °F (36.7 °C)  Pulse: 67  Respirations: 16  BP: 135/84  Assessment      Physical Exam:  General Appearance:  alert and oriented to person, place and time, well-developed and well-nourished, in no acute distress  ENT:  tympanic membranes intact bilaterally  Pulmonary/Chest:  clear to auscultation bilaterally- no wheezes, rales or rhonchi, normal air movement, no respiratory distress  Cardiovascular:  regular rate and rhythm  Chamber #: [id number]  Treatment Start Time: 1156     Pressure Reached Time: 1204  RONAN : 2  Number of Air Breaks:  Treatment Status: No Air break     Decompression Time: 1335   Treatment End Time: 1345  Length of Treatment: 90 Minutes  Symptoms Noted During Treatment: None  Total Treatment Time (min): 109    Adverse Event: no    I was present on these premises and immediately available to furnish assistance & direction throughout the procedure.   Plan      Perla Linares is a 49 y.o. female  did successfully complete today's hyperbaric oxygen treatment at [facility].    In my clinical judgement, ongoing HBO therapy is  necessary at

## 2025-01-13 ENCOUNTER — HOSPITAL ENCOUNTER (OUTPATIENT)
Dept: HYPERBARIC MEDICINE | Age: 50
Discharge: HOME | End: 2025-01-13
Payer: COMMERCIAL

## 2025-01-13 VITALS
DIASTOLIC BLOOD PRESSURE: 67 MMHG | RESPIRATION RATE: 16 BRPM | HEART RATE: 64 BPM | SYSTOLIC BLOOD PRESSURE: 137 MMHG | TEMPERATURE: 97.8 F

## 2025-01-13 DIAGNOSIS — K50.113 CROHN'S DISEASE OF LARGE INTESTINE WITH FISTULA (HCC): Primary | ICD-10-CM

## 2025-01-13 PROCEDURE — G0277 HBOT, FULL BODY CHAMBER, 30M: HCPCS

## 2025-01-13 NOTE — DISCHARGE INSTRUCTIONS
Discharge instructions were reviewed with the patient and printed instructions were declined  
+Staph, pt on Bactrim, sensitivities show appropriate treatment.

## 2025-01-14 ENCOUNTER — HOSPITAL ENCOUNTER (OUTPATIENT)
Dept: HYPERBARIC MEDICINE | Age: 50
Discharge: HOME | End: 2025-01-14
Payer: COMMERCIAL

## 2025-01-14 VITALS
DIASTOLIC BLOOD PRESSURE: 66 MMHG | RESPIRATION RATE: 18 BRPM | TEMPERATURE: 98.9 F | HEART RATE: 62 BPM | SYSTOLIC BLOOD PRESSURE: 136 MMHG

## 2025-01-14 DIAGNOSIS — K50.113 CROHN'S DISEASE OF LARGE INTESTINE WITH FISTULA (HCC): Primary | ICD-10-CM

## 2025-01-14 PROCEDURE — 99183 HYPERBARIC OXYGEN THERAPY: CPT | Performed by: SURGERY

## 2025-01-14 PROCEDURE — G0277 HBOT, FULL BODY CHAMBER, 30M: HCPCS

## 2025-01-14 NOTE — PLAN OF CARE
Perla Linares is a 49 y.o. female has been receiving hyperbaric oxygen treatment for management of:Indication  Indications: Other (Comment) (disruption of perianal wound). Ms. Linares has completed Treatment Number: 35 out of a treatment protocol of Total Treatments: 40.    Problem List:  Patient Active Problem List   Diagnosis Code    GERD (gastroesophageal reflux disease) K21.9    Depression with anxiety F41.8    Crohn's disease of large intestine with fistula ([facility]) K50.113    SVT (supraventricular tachycardia) ([facility]) I47.10    Anemia D64.9    Paroxysmal supraventricular tachycardia ([facility]) I47.10    Iron deficiency anemia secondary to blood loss (chronic) D50.0    Crohn's disease of colon with complication ([facility]) K50.119    Abdominal wall skin ulcer, with fat layer exposed ([facility]) L98.492    Skin ulcer of perineum, with fat layer exposed ([facility]) L98.492    Trimalleolar fracture of ankle, closed, left, initial encounter S82.852A    Closed trimalleolar fracture of left ankle with delayed healing S82.852G    Presence of other orthopedic joint implants Z96.698    Immunocompromised state due to drug therapy ([facility]) D84.821, Z79.899    Malabsorption K90.9    Disruption of perineal wound T81.30XA       Patients ID was verified.Hyperbaric oxygen therapy plan of care, patient history, outpatient fall risk assessment, nutritional assessment and daily medications were reviewed, addressed and updated as needed.    Pt is tolerating hyperbaric oxygen therapy  well without complications.         Marizol Reza RN  1/14/2025  12:31 PM

## 2025-01-14 NOTE — PROGRESS NOTES
[facility]  Hyperbaric Oxygen Therapy   Progress Note    NAME: Perla Linares  MEDICAL RECORD NUMBER:  [id number]  AGE: 49 y.o.   GENDER: female  : 1975  EPISODE DATE:  2025   Subjective   HBO Treatment Number: 35 out of Total Treatments: 40  HBO Diagnosis:   Problem List Items Addressed This Visit          Digestive    Crohn's disease of large intestine with fistula (HCC) - Primary    Relevant Orders    Notify physician (specify)    Hyperbaric Oxygen Therapy     Safety checks performed prior to treatment.  See doc flowsheets for documentation.  Objective      Please see lab results for finger stick glucose results  Pre treatment Vital Signs       Temp: 98.5 °F (36.9 °C)     Pulse: 64     Respirations: 18     BP: (!) 143/86     Post treatment Vital Signs  Temp: 98.9 °F (37.2 °C)  Pulse: 62  Respirations: 18  BP: 136/66  Assessment      Physical Exam:  General Appearance:  alert and oriented to person, place and time, well-developed and well-nourished, in no acute distress  ENT:  tympanic membranes intact bilaterally  Pulmonary/Chest:  clear to auscultation bilaterally- no wheezes, rales or rhonchi, normal air movement, no respiratory distress  Cardiovascular:  normal  Chamber #: [id number]  Treatment Start Time: 1200     Pressure Reached Time: 1210  RONAN : 2  Number of Air Breaks:  Treatment Status: No Air break     Decompression Time: 1340   Treatment End Time: 1348  Length of Treatment: 90 Minutes  Symptoms Noted During Treatment: None  Total Treatment Time (min): 108    Adverse Event: no    I was present on these premises and immediately available to furnish assistance & direction throughout the procedure.   Plan      Perla Linares is a 49 y.o. female  did successfully complete today's hyperbaric oxygen treatment at [facility].    In my clinical judgement, ongoing HBO therapy is  necessary at this time, given a threat to patient

## 2025-01-22 ENCOUNTER — OFFICE VISIT (OUTPATIENT)
Dept: FAMILY MEDICINE CLINIC | Age: 50
End: 2025-01-22

## 2025-01-22 VITALS
SYSTOLIC BLOOD PRESSURE: 110 MMHG | OXYGEN SATURATION: 99 % | HEART RATE: 83 BPM | TEMPERATURE: 97.6 F | BODY MASS INDEX: 34.21 KG/M2 | DIASTOLIC BLOOD PRESSURE: 64 MMHG | HEIGHT: 69 IN | WEIGHT: 231 LBS

## 2025-01-22 DIAGNOSIS — J98.8 CONGESTION OF RESPIRATORY TRACT: ICD-10-CM

## 2025-01-22 DIAGNOSIS — J06.9 ACUTE UPPER RESPIRATORY INFECTION, UNSPECIFIED: Primary | ICD-10-CM

## 2025-01-22 DIAGNOSIS — R05.9 COUGH, UNSPECIFIED TYPE: ICD-10-CM

## 2025-01-22 LAB
INFLUENZA A ANTIBODY: NEGATIVE
INFLUENZA B ANTIBODY: NEGATIVE
Lab: NORMAL
PERFORMING INSTRUMENT: NORMAL
QC PASS/FAIL: NORMAL
RSV RNA: NEGATIVE
SARS-COV-2, POC: NORMAL

## 2025-01-22 RX ORDER — AZITHROMYCIN 250 MG/1
TABLET, FILM COATED ORAL
Qty: 6 TABLET | Refills: 0 | Status: SHIPPED | OUTPATIENT
Start: 2025-01-22 | End: 2025-02-01

## 2025-01-22 NOTE — PROGRESS NOTES
be discharged home.      Clinical Impression:   Perla was seen today for congestion and cough.    Diagnoses and all orders for this visit:    Acute upper respiratory infection, unspecified    Congestion of respiratory tract  -     POCT Influenza A/B  -     POCT COVID-19, Antigen  -     POCT RSV Molecular    Cough, unspecified type  -     XR CHEST (2 VW); Future    Other orders  -     azithromycin (ZITHROMAX) 250 MG tablet; 500mg on day 1 followed by 250mg on days 2 - 5        The patient is to call for any concerns or return if any of the signs or symptoms worsen. The patient is to follow-up with PCP in the next 2-3 days for repeat evaluation repeat assessment or go directly to the emergency department.     SIGNATURE: Marvel Kellogg III, PA-C    This document may have been prepared at least partially through the use of voice recognition software. Although effort is taken to assure the accuracy ofthis document, it is possible that grammatical, syntax, or spelling errors may occur.     **This report was transcribed using voice recognition software. The patient (or guardian, if applicable) and other individuals in attendance with the patient were advised that if Artificial Intelligence would be utilized during this visit to record and process the conversation to generate a clinical note they would be informed prior to start of the visit. The patient (or guardian, if applicable) and other individuals in attendance at the appointment consented to the use of AI if was utilized, including the recording.  Every effort was made to ensure accuracy; however, inadvertent computerized transcription errors may be present.

## 2025-01-27 ENCOUNTER — HOSPITAL ENCOUNTER (OUTPATIENT)
Dept: HYPERBARIC MEDICINE | Age: 50
Discharge: HOME | End: 2025-01-27
Payer: COMMERCIAL

## 2025-01-27 VITALS
DIASTOLIC BLOOD PRESSURE: 70 MMHG | TEMPERATURE: 97.9 F | SYSTOLIC BLOOD PRESSURE: 127 MMHG | RESPIRATION RATE: 16 BRPM | HEART RATE: 66 BPM

## 2025-01-27 DIAGNOSIS — K50.113 CROHN'S DISEASE OF LARGE INTESTINE WITH FISTULA (HCC): Primary | ICD-10-CM

## 2025-01-27 PROCEDURE — G0277 HBOT, FULL BODY CHAMBER, 30M: HCPCS

## 2025-01-29 ENCOUNTER — HOSPITAL ENCOUNTER (OUTPATIENT)
Dept: HYPERBARIC MEDICINE | Age: 50
Discharge: HOME | End: 2025-01-29
Payer: COMMERCIAL

## 2025-01-29 VITALS
DIASTOLIC BLOOD PRESSURE: 63 MMHG | SYSTOLIC BLOOD PRESSURE: 133 MMHG | TEMPERATURE: 97.4 F | RESPIRATION RATE: 16 BRPM | HEART RATE: 59 BPM

## 2025-01-29 DIAGNOSIS — T81.30XA DISRUPTION OF PERINEAL WOUND: Chronic | ICD-10-CM

## 2025-01-29 DIAGNOSIS — K50.113 CROHN'S DISEASE OF LARGE INTESTINE WITH FISTULA (HCC): Primary | ICD-10-CM

## 2025-01-29 PROCEDURE — 99183 HYPERBARIC OXYGEN THERAPY: CPT | Performed by: SURGERY

## 2025-01-29 PROCEDURE — G0277 HBOT, FULL BODY CHAMBER, 30M: HCPCS

## 2025-01-29 NOTE — DISCHARGE INSTRUCTIONS
Discharge instructions were reviewed with patient. Written instructions were declined at this time.

## 2025-01-29 NOTE — PROGRESS NOTES
[facility]  Hyperbaric Oxygen Therapy   Progress Note    NAME: Perla Linares  MEDICAL RECORD NUMBER:  [id number]  AGE: 49 y.o.   GENDER: female  : 1975  EPISODE DATE:  2025   Subjective   HBO Treatment Number: 37 out of Total Treatments: 40  HBO Diagnosis:   Problem List Items Addressed This Visit       Disruption of perineal wound (Chronic)    Crohn's disease of large intestine with fistula (HCC) - Primary    Relevant Orders    Notify physician (specify)    Hyperbaric Oxygen Therapy     Safety checks performed prior to treatment.  See doc flowsheets for documentation.  Objective      Please see lab results for finger stick glucose results  Pre treatment Vital Signs       Temp: 97.5 °F (36.4 °C)     Pulse: 73     Respirations: 16     BP: 137/78     Post treatment Vital Signs  Temp: 97.4 °F (36.3 °C)  Pulse: 59  Respirations: 16  BP: 133/63  Assessment      Physical Exam:  General Appearance:  alert and oriented to person, place and time, well-developed and well-nourished, in no acute distress  ENT:  tympanic membranes intact bilaterally  Pulmonary/Chest:  clear to auscultation bilaterally- no wheezes, rales or rhonchi, normal air movement, no respiratory distress  Cardiovascular:  regular rate and rhythm  Chamber #: [id number]  Treatment Start Time: 1151     Pressure Reached Time: 1202  RONAN : 2  Number of Air Breaks:  Treatment Status: No Air break     Decompression Time: 1333   Treatment End Time: 1345  Length of Treatment: 90 Minutes  Symptoms Noted During Treatment: None  Total Treatment Time (min): 114    Adverse Event: no    I was present on these premises and immediately available to furnish assistance & direction throughout the procedure.   Plan      Perla Linares is a 49 y.o. female  did successfully complete today's hyperbaric oxygen treatment at [facility].    In my clinical judgement, ongoing HBO therapy is  necessary at this

## 2025-01-30 ENCOUNTER — HOSPITAL ENCOUNTER (OUTPATIENT)
Dept: HYPERBARIC MEDICINE | Age: 50
Discharge: HOME | End: 2025-01-30
Payer: COMMERCIAL

## 2025-01-30 VITALS
TEMPERATURE: 97 F | DIASTOLIC BLOOD PRESSURE: 67 MMHG | HEART RATE: 61 BPM | RESPIRATION RATE: 16 BRPM | SYSTOLIC BLOOD PRESSURE: 132 MMHG

## 2025-01-30 DIAGNOSIS — T81.30XA DISRUPTION OF PERINEAL WOUND: Chronic | ICD-10-CM

## 2025-01-30 DIAGNOSIS — K50.113 CROHN'S DISEASE OF LARGE INTESTINE WITH FISTULA (HCC): Primary | ICD-10-CM

## 2025-01-30 PROCEDURE — G0277 HBOT, FULL BODY CHAMBER, 30M: HCPCS

## 2025-01-30 PROCEDURE — 99183 HYPERBARIC OXYGEN THERAPY: CPT | Performed by: SURGERY

## 2025-01-30 NOTE — PROGRESS NOTES
[facility]  Hyperbaric Oxygen Therapy   Progress Note    NAME: Perla Linares  MEDICAL RECORD NUMBER:  [id number]  AGE: 49 y.o.   GENDER: female  : 1975  EPISODE DATE:  2025   Subjective   HBO Treatment Number: 38 out of Total Treatments: 40  HBO Diagnosis:   Problem List Items Addressed This Visit       Crohn's disease of large intestine with fistula (HCC) - Primary    Relevant Orders    Notify physician (specify)    Hyperbaric Oxygen Therapy    Disruption of perineal wound (Chronic)     Safety checks performed prior to treatment.  See doc flowsheets for documentation.  Objective      Please see lab results for finger stick glucose results  Pre treatment Vital Signs       Temp: 97.5 °F (36.4 °C)     Pulse: 64     Respirations: 16     BP: 126/68     Post treatment Vital Signs  Temp: 97 °F (36.1 °C)  Pulse: 61  Respirations: 16  BP: 132/67  Assessment      Physical Exam:  General Appearance:  alert and oriented to person, place and time, well-developed and well-nourished, in no acute distress  ENT:  tympanic membranes intact bilaterally  Pulmonary/Chest:  clear to auscultation bilaterally- no wheezes, rales or rhonchi, normal air movement, no respiratory distress  Cardiovascular:  regular rate and rhythm  Chamber #: [id number]  Treatment Start Time: 1208     Pressure Reached Time: 1219  RONAN : 2  Number of Air Breaks:  Treatment Status: Air break     Decompression Time: 1349   Treatment End Time: 1357  Length of Treatment: 90 Minutes  Symptoms Noted During Treatment: None  Total Treatment Time (min): 109    Adverse Event: no    I was present on these premises and immediately available to furnish assistance & direction throughout the procedure.   Plan      Perla Linares is a 49 y.o. female  did successfully complete today's hyperbaric oxygen treatment at [facility].    In my clinical judgement, ongoing HBO therapy is  necessary at this time,

## 2025-01-30 NOTE — PROGRESS NOTES
Perla Linares is a 49 y.o. female has been receiving hyperbaric oxygen treatment for management of:Indication  Indications: Other (Comment) (Disruption of perineal wound). Ms. Linares has completed Treatment Number: 38 out of a treatment protocol of Total Treatments: 40.    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    Crohn's disease of colon with complication ([facility]) K50.119    Abdominal wall skin ulcer, with fat layer exposed ([facility]) L98.492    Skin ulcer of perineum, with fat layer exposed ([facility]) L98.492    Trimalleolar fracture of ankle, closed, left, initial encounter S82.852A    Closed trimalleolar fracture of left ankle with delayed healing S82.852G    Presence of other orthopedic joint implants Z96.698    Immunocompromised state due to drug therapy ([facility]) D84.821, Z79.899    Malabsorption K90.9    Disruption of perineal wound T81.30XA       Patients ID was verified.Hyperbaric oxygen therapy plan of care, patient history, outpatient fall risk assessment, nutritional assessment and daily medications were reviewed, addressed and updated as needed.    Pt is tolerating hyperbaric oxygen therapy  well without complications.         Ariadne Gould RN  1/30/2025  12:09 PM

## 2025-01-31 ENCOUNTER — HOSPITAL ENCOUNTER (OUTPATIENT)
Dept: HYPERBARIC MEDICINE | Age: 50
Discharge: HOME | End: 2025-01-31
Payer: COMMERCIAL

## 2025-01-31 VITALS
RESPIRATION RATE: 16 BRPM | DIASTOLIC BLOOD PRESSURE: 55 MMHG | TEMPERATURE: 97.1 F | SYSTOLIC BLOOD PRESSURE: 131 MMHG | HEART RATE: 64 BPM

## 2025-01-31 DIAGNOSIS — T81.30XA DISRUPTION OF PERINEAL WOUND: Chronic | ICD-10-CM

## 2025-01-31 DIAGNOSIS — K50.113 CROHN'S DISEASE OF LARGE INTESTINE WITH FISTULA (HCC): Primary | ICD-10-CM

## 2025-01-31 PROCEDURE — G0277 HBOT, FULL BODY CHAMBER, 30M: HCPCS

## 2025-01-31 PROCEDURE — 99183 HYPERBARIC OXYGEN THERAPY: CPT | Performed by: SURGERY

## 2025-01-31 NOTE — PROGRESS NOTES
[facility]  Hyperbaric Oxygen Therapy   Progress Note    NAME: Perla Linares  MEDICAL RECORD NUMBER:  [id number]  AGE: 49 y.o.   GENDER: female  : 1975  EPISODE DATE:  2025   Subjective   HBO Treatment Number: 39 out of Total Treatments: 40  HBO Diagnosis:   Problem List Items Addressed This Visit       Crohn's disease of large intestine with fistula (HCC) - Primary    Relevant Orders    Notify physician (specify)    Hyperbaric Oxygen Therapy    Disruption of perineal wound (Chronic)     Safety checks performed prior to treatment.  See doc flowsheets for documentation.  Objective      Please see lab results for finger stick glucose results  Pre treatment Vital Signs       Temp: 97.9 °F (36.6 °C)     Pulse: 67     Respirations: 16     BP: 115/60     Post treatment Vital Signs  Temp: 97.1 °F (36.2 °C)  Pulse: 64  Respirations: 16  BP: (!) 131/55  Assessment      Physical Exam:  General Appearance:  alert and oriented to person, place and time, well-developed and well-nourished, in no acute distress  ENT:  tympanic membranes intact bilaterally  Pulmonary/Chest:  clear to auscultation bilaterally- no wheezes, rales or rhonchi, normal air movement, no respiratory distress  Cardiovascular:  regular rate and rhythm  Chamber #: [id number]  Treatment Start Time: 1153     Pressure Reached Time: 1203     Number of Air Breaks:  Treatment Status: No Air break     Decompression Time: 1333   Treatment End Time: 1343  Length of Treatment: 90 Minutes  Symptoms Noted During Treatment: None  Total Treatment Time (min): 110    Adverse Event: no    I was present on these premises and immediately available to furnish assistance & direction throughout the procedure.   Plan      Perla Linares is a 49 y.o. female  did successfully complete today's hyperbaric oxygen treatment at [facility].    In my clinical judgement, ongoing HBO therapy is  necessary at this

## 2025-02-24 PROBLEM — T14.8XXD DELAYED WOUND HEALING: Status: ACTIVE | Noted: 2025-02-24

## 2025-03-04 RX ORDER — LOPERAMIDE HYDROCHLORIDE 2 MG/1
CAPSULE ORAL
Qty: 120 CAPSULE | Refills: 3 | Status: SHIPPED | OUTPATIENT
Start: 2025-03-04

## 2025-05-20 ENCOUNTER — CLINICAL SUPPORT (OUTPATIENT)
Dept: FAMILY MEDICINE CLINIC | Age: 50
End: 2025-05-20
Payer: COMMERCIAL

## 2025-05-20 ENCOUNTER — RESULTS FOLLOW-UP (OUTPATIENT)
Dept: FAMILY MEDICINE CLINIC | Age: 50
End: 2025-05-20

## 2025-05-20 DIAGNOSIS — R30.0 DYSURIA: Primary | ICD-10-CM

## 2025-05-20 LAB
BILIRUBIN, POC: NEGATIVE
BLOOD URINE, POC: NORMAL
CLARITY, POC: CLEAR
COLOR, POC: YELLOW
GLUCOSE URINE, POC: NEGATIVE MG/DL
KETONES, POC: NEGATIVE MG/DL
LEUKOCYTE EST, POC: NORMAL
NITRITE, POC: NEGATIVE
PH, POC: 5.5
PROTEIN, POC: NEGATIVE MG/DL
SPECIFIC GRAVITY, POC: 1.02
UROBILINOGEN, POC: 0.2 MG/DL

## 2025-05-20 PROCEDURE — 81002 URINALYSIS NONAUTO W/O SCOPE: CPT | Performed by: FAMILY MEDICINE

## 2025-05-20 RX ORDER — CIPROFLOXACIN 500 MG/1
500 TABLET, FILM COATED ORAL 2 TIMES DAILY
Qty: 14 TABLET | Refills: 0 | Status: SHIPPED | OUTPATIENT
Start: 2025-05-20 | End: 2025-05-27

## 2025-05-22 LAB
CULTURE: NORMAL
CULTURE: NORMAL
SPECIMEN DESCRIPTION: NORMAL

## 2025-06-04 RX ORDER — LOPERAMIDE HYDROCHLORIDE 2 MG/1
CAPSULE ORAL
Qty: 120 CAPSULE | Refills: 3 | Status: SHIPPED | OUTPATIENT
Start: 2025-06-04

## 2025-07-21 RX ORDER — METOPROLOL SUCCINATE 25 MG/1
12.5 TABLET, EXTENDED RELEASE ORAL DAILY
Qty: 45 TABLET | Refills: 3 | Status: SHIPPED | OUTPATIENT
Start: 2025-07-21

## 2025-07-21 RX ORDER — METOPROLOL SUCCINATE 25 MG/1
TABLET, EXTENDED RELEASE ORAL
Qty: 45 TABLET | Refills: 3 | Status: SHIPPED | OUTPATIENT
Start: 2025-07-21

## 2025-09-04 DIAGNOSIS — Z00.00 ANNUAL PHYSICAL EXAM: Primary | ICD-10-CM

## (undated) DEVICE — GLOVE ORANGE PI 8   MSG9080

## (undated) DEVICE — SOLUTION IRRIG 1000ML STRL H2O USP PLAS POUR BTL

## (undated) DEVICE — CLAMP EXT FIX L PIN 6 POS MAG RESONANCE CONDITIONAL

## (undated) DEVICE — BIT DRL 3 FLUT 2.7X125 MM QC SS STRL LCP

## (undated) DEVICE — BIT DRL L125MM DIA2MM S STL QUIK CPL FOR LOK COMPR PLT

## (undated) DEVICE — ELECTRODE PT RET AD L9FT HI MOIST COND ADH HYDRGEL CORDED

## (undated) DEVICE — GAMMEX® NON-LATEX PI ORTHO SIZE 8, STERILE POLYISOPRENE POWDER-FREE SURGICAL GLOVE: Brand: GAMMEX

## (undated) DEVICE — TUBING SUCT 12FR MAL ALUM SHFT FN CAP VENT UNIV CONN W/ OBT

## (undated) DEVICE — PADDING,UNDERCAST,COTTON, 4"X4YD STERILE: Brand: MEDLINE

## (undated) DEVICE — CLAMP EXT FIX DIA8/11MM COMB CLP ON SELF HLD

## (undated) DEVICE — GLOVE ORTHO 8   MSG9480

## (undated) DEVICE — POST EXT FIX DIA11MM STR OUTRIG MAG RESONANCE CONDITIONAL

## (undated) DEVICE — BIT DRL L110MM DIA2.5MM G QUIK CPL W/O STP REUSE

## (undated) DEVICE — 3M™ COBAN™ NL STERILE NON-LATEX SELF-ADHERENT WRAP, 2084S, 4 IN X 5 YD (10 CM X 4,5 M), 18 ROLLS/CASE: Brand: 3M™ COBAN™

## (undated) DEVICE — BANDAGE COMPR W4INXL10YD WHITE/BEIGE E MTRX HK LOOP CLSR

## (undated) DEVICE — SOLUTION SURG PREP ANTIMICROBIAL 4 OZ SKIN WND EXIDINE

## (undated) DEVICE — BIT DRL L110MM DIA2.5MM ST G QUIK CPL NONRADIOPAQUE W/O STP

## (undated) DEVICE — SOLUTION IRRIG 1000ML 0.9% SOD CHL USP POUR PLAS BTL

## (undated) DEVICE — C-ARMOR C-ARM EQUIPMENT COVERS CLEAR STERILE UNIVERSAL FIT 12 PER CASE: Brand: C-ARMOR

## (undated) DEVICE — GOWN,AURORA,BRTHSLV,2XL,18/CS: Brand: MEDLINE

## (undated) DEVICE — CLAMP EXT FIX L TI ALLY COMB CLP ON SELF HLD

## (undated) DEVICE — DRESSING,GAUZE,XEROFORM,CURAD,5"X9",ST: Brand: CURAD

## (undated) DEVICE — 3M™ IOBAN™ 2 ANTIMICROBIAL INCISE DRAPE 6650EZ: Brand: IOBAN™ 2

## (undated) DEVICE — LOWER EXT KNEE DRAPE: Brand: MEDLINE INDUSTRIES, INC.

## (undated) DEVICE — BNDG,ELSTC,MATRIX,STRL,3"X5YD,LF,HOOK&LP: Brand: MEDLINE

## (undated) DEVICE — BANDAGE,GAUZE,BULKEE II,2.25"X3YD,STRL: Brand: MEDLINE

## (undated) DEVICE — BIT DRL L160MM DIA2.7MM ST CANN QUIK CPL NONRADIOLUCENT ADJ

## (undated) DEVICE — DRAPE,REIN 53X77,STERILE: Brand: MEDLINE

## (undated) DEVICE — BIT DRL L200MM DIA2.8MM CALIB L100MM FOR 3.5MM VA LCP PROX

## (undated) DEVICE — ZIMMER® STERILE DISPOSABLE TOURNIQUET CUFF WITH PROTECTIVE SLEEVE AND PLC, DUAL PORT, SINGLE BLADDER, 34 IN. (86 CM)